# Patient Record
Sex: FEMALE | Race: WHITE | NOT HISPANIC OR LATINO | Employment: FULL TIME | URBAN - NONMETROPOLITAN AREA
[De-identification: names, ages, dates, MRNs, and addresses within clinical notes are randomized per-mention and may not be internally consistent; named-entity substitution may affect disease eponyms.]

---

## 2017-03-12 ENCOUNTER — TRANSFERRED RECORDS (OUTPATIENT)
Dept: HEALTH INFORMATION MANAGEMENT | Facility: HOSPITAL | Age: 47
End: 2017-03-12

## 2017-03-22 ENCOUNTER — OFFICE VISIT (OUTPATIENT)
Dept: FAMILY MEDICINE | Facility: OTHER | Age: 47
End: 2017-03-22
Attending: NURSE PRACTITIONER
Payer: COMMERCIAL

## 2017-03-22 VITALS
HEIGHT: 64 IN | TEMPERATURE: 96.7 F | RESPIRATION RATE: 14 BRPM | SYSTOLIC BLOOD PRESSURE: 122 MMHG | WEIGHT: 151 LBS | HEART RATE: 76 BPM | BODY MASS INDEX: 25.78 KG/M2 | DIASTOLIC BLOOD PRESSURE: 80 MMHG

## 2017-03-22 DIAGNOSIS — N85.9 FLUID IN ENDOMETRIAL CAVITY: ICD-10-CM

## 2017-03-22 DIAGNOSIS — N92.1 MENORRHAGIA WITH IRREGULAR CYCLE: Primary | ICD-10-CM

## 2017-03-22 PROCEDURE — 99213 OFFICE O/P EST LOW 20 MIN: CPT | Performed by: NURSE PRACTITIONER

## 2017-03-22 ASSESSMENT — PAIN SCALES - GENERAL: PAINLEVEL: SEVERE PAIN (6)

## 2017-03-22 NOTE — MR AVS SNAPSHOT
After Visit Summary   3/22/2017    Shazia Verma    MRN: [id number]           Patient Information     Date Of Birth          1970        Visit Information        Provider Department      3/22/2017 8:15 AM Mary Ellen Armstrong NP [facility]        Today's Diagnoses     Menorrhagia with irregular cycle    -  1    Fluid in endometrial cavity          Care Instructions      ASSESSMENT / PLAN:  1. Menorrhagia with irregular cycle  - US Transvaginal Non OB  - US Pelvis Complete without Transvaginal    2. Fluid in endometrial cavity  - US Transvaginal Non OB  - US Pelvis Complete without Transvaginal        Follow-up as needed for acute concerns    Mary Ellen Armstrong,   Certified Adult Nurse Practitioner  [phone]          Follow-ups after your visit        Who to contact     If you have questions or need follow up information about today's clinic visit or your schedule please contact [facility] directly at [phone].  Normal or non-critical lab and imaging results will be communicated to you by [email or web address], letter or phone within 4 business days after the clinic has received the results. If you do not hear from us within 7 days, please contact the clinic through iPositiont or phone. If you have a critical or abnormal lab result, we will notify you by phone as soon as possible.  Submit refill requests through ShareTracker or call your pharmacy and they will forward the refill request to us. Please allow 3 business days for your refill to be completed.          Additional Information About Your Visit        MyChart Information     ShareTracker gives you secure access to your electronic health record. If you see a primary care provider, you can also send messages to your care team and make appointments. If you have questions, please call your primary care clinic.  If you do not have a primary care provider, please call [phone] and they will assist you.        Care EveryWhere  "ID     This is your Care EveryWhere ID. This could be used by other organizations to access your [place] medical records  [id number]        Your Vitals Were     Pulse Temperature Respirations Height BMI (Body Mass Index)       76 96.7  F (35.9  C) (Tympanic) 14 5' 3.5\" (1.613 m) 26.33 kg/m2        Blood Pressure from Last 3 Encounters:   03/22/17 122/80   11/28/16 118/72    Weight from Last 3 Encounters:   03/22/17 151 lb (68.5 kg)   11/28/16 153 lb 3.2 oz (69.5 kg)              We Performed the Following     US Pelvis Complete without Transvaginal     US Transvaginal Non OB        Primary Care Provider Office Phone # Fax #    Mary Ellen Armstrong [phone]       [facility] [address] [place] [address]        Thank you!     Thank you for choosing [facility]  for your care. Our goal is always to provide you with excellent care. Hearing back from our patients is one way we can continue to improve our services. Please take a few minutes to complete the written survey that you may receive in the mail after your visit with us. Thank you!             Your Updated Medication List - Protect others around you: Learn how to safely use, store and throw away your medicines at www.disposemymeds.org.          This list is accurate as of: 3/22/17  8:44 AM.  Always use your most recent med list.                   Brand Name Dispense Instructions for use    calcium carbonate 500 MG tablet    OS-MANUEL 500 mg [place]. Ca         IRON SUPPLEMENT PO      Take 325 mg by mouth daily (with breakfast) 2 tabs daily       Multi-vitamin Tabs tablet      Take 1 tablet by mouth daily       VITAMIN B 12 PO          VITAMIN D (CHOLECALCIFEROL) PO      Take 5,000 Units by mouth daily 2 tabs daily         "
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
## 2017-03-22 NOTE — NURSING NOTE
"Chief Complaint   Patient presents with     Clinic Care Coordination - Follow-up     er visit [facility] 11/12/17     *_* Health Care Directive *_*     info at home        Initial /80 (BP Location: Left arm, Patient Position: Chair, Cuff Size: Adult Regular)  Pulse 76  Temp 96.7  F (35.9  C) (Tympanic)  Resp 14  Ht 5' 3.5\" (1.613 m)  Wt 151 lb (68.5 kg)  BMI 26.33 kg/m2 Estimated body mass index is 26.33 kg/(m^2) as calculated from the following:    Height as of this encounter: 5' 3.5\" (1.613 m).    Weight as of this encounter: 151 lb (68.5 kg).  Medication Reconciliation: toro LENNON      "

## 2017-03-22 NOTE — PATIENT INSTRUCTIONS
ASSESSMENT / PLAN:  1. Menorrhagia with irregular cycle  - US Transvaginal Non OB  - US Pelvis Complete without Transvaginal    2. Fluid in endometrial cavity  - US Transvaginal Non OB  - US Pelvis Complete without Transvaginal        Follow-up as needed for acute concerns    Mary Ellen Armstrong,   Certified Adult Nurse Practitioner  [phone]

## 2017-03-24 ENCOUNTER — HOSPITAL ENCOUNTER (OUTPATIENT)
Dept: ULTRASOUND IMAGING | Facility: HOSPITAL | Age: 47
Discharge: HOME | End: 2017-03-24
Attending: NURSE PRACTITIONER | Admitting: NURSE PRACTITIONER
Payer: COMMERCIAL

## 2017-03-24 PROCEDURE — 76830 TRANSVAGINAL US NON-OB: CPT | Mod: TC | Performed by: RADIOLOGY

## 2017-03-24 PROCEDURE — 76856 US EXAM PELVIC COMPLETE: CPT | Mod: TC | Performed by: RADIOLOGY

## 2017-03-30 ENCOUNTER — OFFICE VISIT (OUTPATIENT)
Dept: OBGYN | Facility: OTHER | Age: 47
End: 2017-03-30
Attending: OBSTETRICS & GYNECOLOGY
Payer: COMMERCIAL

## 2017-03-30 VITALS
HEIGHT: 64 IN | DIASTOLIC BLOOD PRESSURE: 76 MMHG | WEIGHT: 146 LBS | HEART RATE: 69 BPM | SYSTOLIC BLOOD PRESSURE: 122 MMHG | BODY MASS INDEX: 24.92 KG/M2

## 2017-03-30 DIAGNOSIS — N92.0 EXCESSIVE OR FREQUENT MENSTRUATION: Primary | ICD-10-CM

## 2017-03-30 LAB
ERYTHROCYTE [DISTWIDTH] IN BLOOD BY AUTOMATED COUNT: 13.1 % (ref 10–15)
HCT VFR BLD AUTO: 38 % (ref 35–47)
HGB BLD-MCNC: 12.7 G/DL (ref 11.7–15.7)
MCH RBC QN AUTO: 29.1 PG (ref 26.5–33)
MCHC RBC AUTO-ENTMCNC: 33.4 G/DL (ref 31.5–36.5)
MCV RBC AUTO: 87 FL (ref 78–100)
PLATELET # BLD AUTO: 342 10E9/L (ref 150–450)
RBC # BLD AUTO: 4.37 10E12/L (ref 3.8–5.2)
WBC # BLD AUTO: 6.4 10E9/L (ref 4–11)

## 2017-03-30 PROCEDURE — 85027 COMPLETE CBC AUTOMATED: CPT | Performed by: OBSTETRICS & GYNECOLOGY

## 2017-03-30 PROCEDURE — 36415 COLL VENOUS BLD VENIPUNCTURE: CPT | Performed by: OBSTETRICS & GYNECOLOGY

## 2017-03-30 PROCEDURE — 88305 TISSUE EXAM BY PATHOLOGIST: CPT | Mod: TC | Performed by: OBSTETRICS & GYNECOLOGY

## 2017-03-30 PROCEDURE — 99213 OFFICE O/P EST LOW 20 MIN: CPT | Performed by: OBSTETRICS & GYNECOLOGY

## 2017-03-30 NOTE — MR AVS SNAPSHOT
After Visit Summary   3/30/2017    Shazia Verma    MRN: [id number]           Patient Information     Date Of Birth          1970        Visit Information        Provider Department      3/30/2017 10:00 AM Julian Mcdermott MD [facility]        Today's Diagnoses     Excessive or frequent menstruation    -  1      Care Instructions    See us in 4 weeks to discuss results of biopsy and if bleeding per vagina persists  To discuss further measures to stop vaginal bleeding like laparoscopic assisted  Vaginal hysterectomy.        Follow-ups after your visit        Follow-up notes from your care team     Return in about 4 weeks (around 4/27/2017).      Your next 10 appointments already scheduled     Apr 28, 2017  2:00 PM CDT   (Arrive by 1:45 PM)   SHORT with MD Allyson Alcantara ([facility])    [address]  [place] [address]   [phone]              Who to contact     If you have questions or need follow up information about today's clinic visit or your schedule please contact [facility] directly at [phone].  Normal or non-critical lab and imaging results will be communicated to you by MyChart, letter or phone within 4 business days after the clinic has received the results. If you do not hear from us within 7 days, please contact the clinic through MyChart or phone. If you have a critical or abnormal lab result, we will notify you by phone as soon as possible.  Submit refill requests through 3G Multimedia or call your pharmacy and they will forward the refill request to us. Please allow 3 business days for your refill to be completed.          Additional Information About Your Visit        MyChart Information     [employer] gives you secure access to your electronic health record. If you see a primary care provider, you can also send messages to your care team and make appointments. If you have questions, please call your primary care  "clinic.  If you do not have a primary care provider, please call [phone] and they will assist you.        Care EveryWhere ID     This is your Care EveryWhere ID. This could be used by other organizations to access your [place] medical records  [id number]        Your Vitals Were     Pulse Height BMI (Body Mass Index)             69 5' 4\" (1.626 m) 25.06 kg/m2          Blood Pressure from Last 3 Encounters:   03/30/17 122/76   03/22/17 122/80   11/28/16 118/72    Weight from Last 3 Encounters:   03/30/17 146 lb (66.2 kg)   03/22/17 151 lb (68.5 kg)   11/28/16 153 lb 3.2 oz (69.5 kg)              We Performed the Following     CBC with platelets     Surgical pathology exam        Primary Care Provider Office Phone # Fax #    Mary Ellen ZAIRE Armstrong [phone]       [facility] [address] [place]  [facility] [address]        Thank you!     Thank you for choosing [facility]  for your care. Our goal is always to provide you with excellent care. Hearing back from our patients is one way we can continue to improve our services. Please take a few minutes to complete the written survey that you may receive in the mail after your visit with us. Thank you!             Your Updated Medication List - Protect others around you: Learn how to safely use, store and throw away your medicines at www.disposemymeds.org.          This list is accurate as of: 3/30/17 11:59 PM.  Always use your most recent med list.                   Brand Name Dispense Instructions for use    calcium carbonate 500 MG tablet    OS-MANUEL 500 mg [place]. Ca         IRON SUPPLEMENT PO      Take 325 mg by mouth daily (with breakfast) 2 tabs daily       Multi-vitamin Tabs tablet      Take 1 tablet by mouth daily       VITAMIN B 12 PO          VITAMIN D (CHOLECALCIFEROL) PO      Take 5,000 Units by mouth daily 2 tabs daily         "

## 2017-03-30 NOTE — NURSING NOTE
"Chief Complaint   Patient presents with     Consult     natalia       Initial /76  Pulse 69  Ht 5' 4\" (1.626 m)  Wt 146 lb (66.2 kg)  BMI 25.06 kg/m2 Estimated body mass index is 25.06 kg/(m^2) as calculated from the following:    Height as of this encounter: 5' 4\" (1.626 m).    Weight as of this encounter: 146 lb (66.2 kg).  Medication Reconciliation: toro Castro      "

## 2017-03-31 LAB — COPATH REPORT: NORMAL
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
## 2017-04-03 NOTE — PATIENT INSTRUCTIONS
See us in 4 weeks to discuss results of biopsy and if bleeding per vagina persists  To discuss further measures to stop vaginal bleeding like laparoscopic assisted  Vaginal hysterectomy.

## 2017-04-28 ENCOUNTER — OFFICE VISIT (OUTPATIENT)
Dept: OBGYN | Facility: OTHER | Age: 47
End: 2017-04-28
Attending: OBSTETRICS & GYNECOLOGY
Payer: COMMERCIAL

## 2017-04-28 VITALS
DIASTOLIC BLOOD PRESSURE: 74 MMHG | HEIGHT: 65 IN | HEART RATE: 76 BPM | OXYGEN SATURATION: 98 % | WEIGHT: 146 LBS | SYSTOLIC BLOOD PRESSURE: 118 MMHG | BODY MASS INDEX: 24.32 KG/M2

## 2017-04-28 DIAGNOSIS — R10.2 PELVIC PAIN IN FEMALE: Primary | ICD-10-CM

## 2017-04-28 PROCEDURE — 99213 OFFICE O/P EST LOW 20 MIN: CPT | Performed by: OBSTETRICS & GYNECOLOGY

## 2017-04-28 ASSESSMENT — PAIN SCALES - GENERAL: PAINLEVEL: NO PAIN (0)

## 2017-04-28 NOTE — NURSING NOTE
"Chief Complaint   Patient presents with     RECHECK     1 month Follow up/ Vaginal Bleeding        Initial /74 (BP Location: Left arm, Patient Position: Chair, Cuff Size: Adult Regular)  Pulse 76  Ht 5' 5\" (1.651 m)  Wt 146 lb (66.2 kg)  SpO2 98%  BMI 24.3 kg/m2 Estimated body mass index is 24.3 kg/(m^2) as calculated from the following:    Height as of this encounter: 5' 5\" (1.651 m).    Weight as of this encounter: 146 lb (66.2 kg).  Medication Reconciliation: complete  [place] [facility]      "

## 2017-04-28 NOTE — MR AVS SNAPSHOT
"              After Visit Summary   4/28/2017    Shazia Verma    MRN: [id number]           Patient Information     Date Of Birth          1970        Visit Information        Provider Department      4/28/2017 2:00 PM Julian Mcdermott MD [facility]        Today's Diagnoses     Pelvic pain in female    -  1       Follow-ups after your visit        Who to contact     If you have questions or need follow up information about today's clinic visit or your schedule please contact [facility] directly at [phone].  Normal or non-critical lab and imaging results will be communicated to you by MyChart, letter or phone within 4 business days after the clinic has received the results. If you do not hear from us within 7 days, please contact the clinic through Novate Medicalt or phone. If you have a critical or abnormal lab result, we will notify you by phone as soon as possible.  Submit refill requests through Showkicker or call your pharmacy and they will forward the refill request to us. Please allow 3 business days for your refill to be completed.          Additional Information About Your Visit        MyChart Information     Showkicker gives you secure access to your electronic health record. If you see a primary care provider, you can also send messages to your care team and make appointments. If you have questions, please call your primary care clinic.  If you do not have a primary care provider, please call [phone] and they will assist you.        Care EveryWhere ID     This is your Care EveryWhere ID. This could be used by other organizations to access your [facility] medical records  [id number]        Your Vitals Were     Pulse Height Last Period Pulse Oximetry BMI (Body Mass Index)       76 5' 5\" (1.651 m) 03/28/2017 98% 24.3 kg/m2        Blood Pressure from Last 3 Encounters:   04/28/17 118/74   03/30/17 122/76   03/22/17 122/80    Weight from Last 3 Encounters:   04/28/17 146 lb (66.2 kg) "   03/30/17 146 lb (66.2 kg)   03/22/17 151 lb (68.5 kg)              Today, you had the following     No orders found for display       Primary Care Provider Office Phone # Fax #    Mary Ellen Armstrong [phone]       [facility] [address] [place] [address]        Thank you!     Thank you for choosing [facility]  for your care. Our goal is always to provide you with excellent care. Hearing back from our patients is one way we can continue to improve our services. Please take a few minutes to complete the written survey that you may receive in the mail after your visit with us. Thank you!             Your Updated Medication List - Protect others around you: Learn how to safely use, store and throw away your medicines at www.disposemymeds.org.          This list is accurate as of: 4/28/17 11:59 PM.  Always use your most recent med list.                   Brand Name Dispense Instructions for use    calcium carbonate 500 MG tablet    OS-MANUEL 500 mg [place]. Ca         IRON SUPPLEMENT PO      Take 325 mg by mouth daily (with breakfast) 2 tabs daily       Multi-vitamin Tabs tablet      Take 1 tablet by mouth daily       VITAMIN B 12 PO          VITAMIN D (CHOLECALCIFEROL) PO      Take 5,000 Units by mouth daily 2 tabs daily

## 2017-06-30 ENCOUNTER — HOSPITAL ENCOUNTER (EMERGENCY)
Facility: HOSPITAL | Age: 47
Discharge: HOME | End: 2017-06-30
Attending: EMERGENCY MEDICINE | Admitting: EMERGENCY MEDICINE
Payer: COMMERCIAL

## 2017-06-30 VITALS
HEART RATE: 69 BPM | OXYGEN SATURATION: 98 % | RESPIRATION RATE: 14 BRPM | SYSTOLIC BLOOD PRESSURE: 143 MMHG | TEMPERATURE: 98.5 F | DIASTOLIC BLOOD PRESSURE: 90 MMHG

## 2017-06-30 DIAGNOSIS — R42 DIZZINESS: ICD-10-CM

## 2017-06-30 DIAGNOSIS — R53.1 WEAKNESS: ICD-10-CM

## 2017-06-30 LAB
ALBUMIN SERPL-MCNC: 3.6 G/DL (ref 3.4–5)
ALBUMIN UR-MCNC: NEGATIVE MG/DL
ALP SERPL-CCNC: 83 U/L (ref 40–150)
ALT SERPL W P-5'-P-CCNC: 21 U/L (ref 0–50)
ANION GAP SERPL CALCULATED.3IONS-SCNC: 8 MMOL/L (ref 3–14)
APPEARANCE UR: CLEAR
AST SERPL W P-5'-P-CCNC: 21 U/L (ref 0–45)
BASOPHILS # BLD AUTO: 0 10E9/L (ref 0–0.2)
BASOPHILS NFR BLD AUTO: 0.5 %
BILIRUB SERPL-MCNC: 0.8 MG/DL (ref 0.2–1.3)
BILIRUB UR QL STRIP: NEGATIVE
BUN SERPL-MCNC: 12 MG/DL (ref 7–30)
CALCIUM SERPL-MCNC: 8.7 MG/DL (ref 8.5–10.1)
CHLORIDE SERPL-SCNC: 109 MMOL/L (ref 94–109)
CO2 SERPL-SCNC: 25 MMOL/L (ref 20–32)
COLOR UR AUTO: NORMAL
CREAT SERPL-MCNC: 0.57 MG/DL (ref 0.52–1.04)
DIFFERENTIAL METHOD BLD: ABNORMAL
EOSINOPHIL # BLD AUTO: 0.1 10E9/L (ref 0–0.7)
EOSINOPHIL NFR BLD AUTO: 0.9 %
ERYTHROCYTE [DISTWIDTH] IN BLOOD BY AUTOMATED COUNT: 13 % (ref 10–15)
GFR SERPL CREATININE-BSD FRML MDRD: NORMAL ML/MIN/1.7M2
GLUCOSE SERPL-MCNC: 91 MG/DL (ref 70–99)
GLUCOSE UR STRIP-MCNC: NEGATIVE MG/DL
HCG UR QL: NEGATIVE
HCT VFR BLD AUTO: 34.8 % (ref 35–47)
HGB BLD-MCNC: 11.7 G/DL (ref 11.7–15.7)
HGB UR QL STRIP: NEGATIVE
IMM GRANULOCYTES # BLD: 0 10E9/L (ref 0–0.4)
IMM GRANULOCYTES NFR BLD: 0.4 %
KETONES UR STRIP-MCNC: NEGATIVE MG/DL
LEUKOCYTE ESTERASE UR QL STRIP: NEGATIVE
LYMPHOCYTES # BLD AUTO: 1.7 10E9/L (ref 0.8–5.3)
LYMPHOCYTES NFR BLD AUTO: 21.1 %
MAGNESIUM SERPL-MCNC: 2.1 MG/DL (ref 1.6–2.3)
MCH RBC QN AUTO: 28.7 PG (ref 26.5–33)
MCHC RBC AUTO-ENTMCNC: 33.6 G/DL (ref 31.5–36.5)
MCV RBC AUTO: 86 FL (ref 78–100)
MONOCYTES # BLD AUTO: 0.5 10E9/L (ref 0–1.3)
MONOCYTES NFR BLD AUTO: 6.6 %
NEUTROPHILS # BLD AUTO: 5.7 10E9/L (ref 1.6–8.3)
NEUTROPHILS NFR BLD AUTO: 70.5 %
NITRATE UR QL: NEGATIVE
NRBC # BLD AUTO: 0 10*3/UL
NRBC BLD AUTO-RTO: 0 /100
PH UR STRIP: 6.5 PH (ref 4.7–8)
PLATELET # BLD AUTO: 314 10E9/L (ref 150–450)
POTASSIUM SERPL-SCNC: 4.3 MMOL/L (ref 3.4–5.3)
PROT SERPL-MCNC: 7.3 G/DL (ref 6.8–8.8)
RBC # BLD AUTO: 4.07 10E12/L (ref 3.8–5.2)
SODIUM SERPL-SCNC: 142 MMOL/L (ref 133–144)
SP GR UR STRIP: 1.01 (ref 1–1.03)
TROPONIN I SERPL-MCNC: NORMAL UG/L (ref 0–0.04)
TSH SERPL DL<=0.005 MIU/L-ACNC: 1.36 MU/L (ref 0.4–4)
URN SPEC COLLECT METH UR: NORMAL
UROBILINOGEN UR STRIP-MCNC: NORMAL MG/DL (ref 0–2)
WBC # BLD AUTO: 8 10E9/L (ref 4–11)

## 2017-06-30 PROCEDURE — 99284 EMERGENCY DEPT VISIT MOD MDM: CPT | Performed by: PHYSICIAN ASSISTANT

## 2017-06-30 PROCEDURE — 93010 ELECTROCARDIOGRAM REPORT: CPT | Performed by: INTERNAL MEDICINE

## 2017-06-30 PROCEDURE — 99284 EMERGENCY DEPT VISIT MOD MDM: CPT

## 2017-06-30 PROCEDURE — 85025 COMPLETE CBC W/AUTO DIFF WBC: CPT | Performed by: PHYSICIAN ASSISTANT

## 2017-06-30 PROCEDURE — 93005 ELECTROCARDIOGRAM TRACING: CPT

## 2017-06-30 PROCEDURE — 84443 ASSAY THYROID STIM HORMONE: CPT | Performed by: PHYSICIAN ASSISTANT

## 2017-06-30 PROCEDURE — 80053 COMPREHEN METABOLIC PANEL: CPT | Performed by: PHYSICIAN ASSISTANT

## 2017-06-30 PROCEDURE — 82607 VITAMIN B-12: CPT | Performed by: PHYSICIAN ASSISTANT

## 2017-06-30 PROCEDURE — 83540 ASSAY OF IRON: CPT | Performed by: PHYSICIAN ASSISTANT

## 2017-06-30 PROCEDURE — 83550 IRON BINDING TEST: CPT | Performed by: PHYSICIAN ASSISTANT

## 2017-06-30 PROCEDURE — 84425 ASSAY OF VITAMIN B-1: CPT

## 2017-06-30 PROCEDURE — 36415 COLL VENOUS BLD VENIPUNCTURE: CPT | Performed by: PHYSICIAN ASSISTANT

## 2017-06-30 PROCEDURE — 81003 URINALYSIS AUTO W/O SCOPE: CPT | Performed by: PHYSICIAN ASSISTANT

## 2017-06-30 PROCEDURE — 81025 URINE PREGNANCY TEST: CPT | Performed by: PHYSICIAN ASSISTANT

## 2017-06-30 PROCEDURE — 84484 ASSAY OF TROPONIN QUANT: CPT | Performed by: PHYSICIAN ASSISTANT

## 2017-06-30 PROCEDURE — 83735 ASSAY OF MAGNESIUM: CPT | Performed by: PHYSICIAN ASSISTANT

## 2017-06-30 PROCEDURE — 82728 ASSAY OF FERRITIN: CPT | Performed by: PHYSICIAN ASSISTANT

## 2017-06-30 RX ORDER — LIDOCAINE 40 MG/G
CREAM TOPICAL
Status: DISCONTINUED | OUTPATIENT
Start: 2017-06-30 | End: 2017-07-01 | Stop reason: HOSPADM

## 2017-06-30 ASSESSMENT — ENCOUNTER SYMPTOMS
VOMITING: 0
CHILLS: 0
PHOTOPHOBIA: 0
NAUSEA: 1
APPETITE CHANGE: 0
SPEECH DIFFICULTY: 0
COUGH: 0
SHORTNESS OF BREATH: 0
DIAPHORESIS: 1
WEAKNESS: 1
MUSCULOSKELETAL NEGATIVE: 1
PALPITATIONS: 1
CHEST TIGHTNESS: 0
DIARRHEA: 0
NUMBNESS: 0
ABDOMINAL PAIN: 0
HEADACHES: 1
FEVER: 0
ACTIVITY CHANGE: 0
LIGHT-HEADEDNESS: 1
DIZZINESS: 1

## 2017-06-30 NOTE — ED AVS SNAPSHOT
[facility]    [address]    [facility] [address]    Phone:  [phone]                                       Shazia Verma   MRN: [id number]    Department:  [facility]   Date of Visit:  6/30/2017           Patient Information     Date Of Birth          1970        Your diagnoses for this visit were:     Dizziness     Weakness        You were seen by Leroy French MD and Alberto Jarquin PA-C.      Follow-up Information     Follow up with Mary Ellen Armstrong NP.    Specialty:  Family Practice    Contact information:    [facility]  [address] [place]  [facility] [address] [phone]          Follow up with [facility].    Specialty:  EMERGENCY MEDICINE    Why:  If symptoms worsen    Contact information:    [address]  [facility] [address] [phone]    Additional information:    From [place]: Take [address]. Turn left at [address]. Turn left at the first stoplight on [address]. At the first stop sign, take a right onto [address]. Take a left into the parking lot and continue through until you reach the North enterance of the building.       From [place]: Take [address]. Take the [address] ramp towards [place]. Turn left onto [address]. Take a slight right onto [address]. Turn left at the first stoplight on [address]. At the first stop sign, take a right onto [address]. Take a left into the parking lot and continue through until you reach the North enterance of the building.       From [place]: Take [address]. Take a right at [address]. At the first stop sign, take a right onto [address]. Take a left into the parking lot and continue through until you reach the North enterance of the building.         Discharge Instructions       Your exam and labs are normal today. This does not mean that nothing is wrong.     Rest and  stay hydrated.     Take tomorrow off to monitor your symptoms.     Follow-up in the clinic for recheck.     Return HERE for ANY other concerns or questions.     Discharge References/Attachments     DIZZINESS, UNCERTAIN CAUSE (ENGLISH)    WEAKNESS (UNCERTAIN CAUSE) (ENGLISH)         Review of your medicines      Our records show that you are taking the medicines listed below. If these are incorrect, please call your family doctor or clinic.        Dose / Directions Last dose taken    calcium carbonate 1250 MG tablet   Commonly known as:  OS-MANUEL 500 mg [place]. Ca        Refills:  0        IRON SUPPLEMENT PO   Dose:  325 mg        Take 325 mg by mouth daily (with breakfast) 2 tabs daily   Refills:  0        Multi-vitamin Tabs tablet   Dose:  1 tablet        Take 1 tablet by mouth daily   Refills:  0        VITAMIN B 12 PO        Refills:  0        VITAMIN D (CHOLECALCIFEROL) PO   Dose:  5000 Units        Take 5,000 Units by mouth daily 2 tabs daily   Refills:  0                Procedures and tests performed during your visit     CBC with platelets differential    Comprehensive metabolic panel    EKG 12-lead, tracing only    HCG qualitative urine    Magnesium    Orthostatic blood pressure and pulse    Peripheral IV catheter    TSH with free T4 reflex    Troponin I    UA reflex to Microscopic      Orders Needing Specimen Collection     None      Pending Results     No orders found from 6/28/2017 to 7/1/2017.            Pending Culture Results     No orders found from 6/28/2017 to 7/1/2017.            Thank you for choosing [facility]       Thank you for choosing [facility] for your care. Our goal is always to provide you with excellent care. Hearing back from our patients is one way we can continue to improve our services. Please take a few minutes to complete the written survey that you may receive in the mail after you visit with us. Thank you!        [employer] Information     [employer] gives you secure access to your electronic  health record. If you see a primary care provider, you can also send messages to your care team and make appointments. If you have questions, please call your primary care clinic.  If you do not have a primary care provider, please call [phone] and they will assist you.        Care EveryWhere ID     This is your Care EveryWhere ID. This could be used by other organizations to access your [facility] medical records  [id number]        Equal Access to Services     [place] : Portillo Conn, marlon alarcon, wyatt edmonds, jeff osullivan . So [facility] [phone].    ATENCIÓN: Si habla español, tiene a wadsworth disposición servicios gratuitos de asistencia lingüística. Llame al [phone].    We comply with applicable federal civil rights laws and [place] laws. We do not discriminate on the basis of race, color, national origin, age, disability sex, sexual orientation or gender identity.            After Visit Summary       This is your record. Keep this with you and show to your community pharmacist(s) and doctor(s) at your next visit.

## 2017-06-30 NOTE — ED NOTES
Pt reports lightheadedness, difficulty finding words.  Had similar episode at work on Wednesday.  FAST negative in triage.

## 2017-06-30 NOTE — LETTER
[facility]  [address]  [place] [address]  Phone: [phone]    June 30, 2017        Shazia Verma  [address]  [place] [address]          To whom it may concern:    Shazia Verma was seen and treated in the [facility] ED on 6/30.  Please excuse her from work on 7/1/17 due to illness.         Please contact me for questions or concerns.      Sincerely,                Alberto Jarquin PA-C

## 2017-06-30 NOTE — ED AVS SNAPSHOT
[facility]    [address]    [place] [address]    Phone:  [phone]                                       Shazia Verma   MRN: [id number]    Department:  [facility]   Date of Visit:  6/30/2017           After Visit Summary Signature Page     I have received my discharge instructions, and my questions have been answered. I have discussed any challenges I see with this plan with the nurse or doctor.    ..........................................................................................................................................  Patient/Patient Representative Signature      ..........................................................................................................................................  Patient Representative Print Name and Relationship to Patient    ..................................................               ................................................  Date                                            Time    ..........................................................................................................................................  Reviewed by Signature/Title    ...................................................              ..............................................  Date                                                            Time

## 2017-07-01 LAB
FERRITIN SERPL-MCNC: 7 NG/ML (ref 8–252)
IRON SATN MFR SERPL: 9 % (ref 15–46)
IRON SERPL-MCNC: 40 UG/DL (ref 35–180)
TIBC SERPL-MCNC: 440 UG/DL (ref 240–430)

## 2017-07-01 NOTE — PROGRESS NOTES
Iron and Iron Binding Capicity and Ferritin results routed to PCP, EVELIO Armstrong NP.  Pt advised to follow up with PCP.

## 2017-07-01 NOTE — DISCHARGE INSTRUCTIONS
Your exam and labs are normal today. This does not mean that nothing is wrong.     Rest and stay hydrated.     Take tomorrow off to monitor your symptoms.     Follow-up in the clinic for recheck.     Return HERE for ANY other concerns or questions.

## 2017-07-01 NOTE — ED NOTES
Patient presents after having two episodes of difficulty speaking and blurred vision-- almost like a hypoglycemic reaction, however the patient did check her blood sugar and it was 80. The patient had one episode Wednesday and again today. Assessment as per chart and WNL. IV in.

## 2017-07-01 NOTE — ED PROVIDER NOTES
History     Chief Complaint   Patient presents with     Dizziness     episode today, also one on wednesday     HPI  Shazia Verma is a 47 year old female whois not my patient.    I have reviewed the Medications, Allergies, Past Medical and Surgical History, and Social History in the Epic system.  Review of Systems   Constitutional:        Not my patient     Physical Exam   BP: (!) 154/101  Pulse: 69  Temp: 97.6  F (36.4  C)  Resp: 16  SpO2: 100 %  Physical Exam   Constitutional:   Not my patient     ED Course     ED Course     Procedures  Critical Care time:  none    Labs Ordered and Resulted from Time of ED Arrival Up to the Time of Departure from the ED   CBC WITH PLATELETS DIFFERENTIAL - Abnormal; Notable for the following:        Result Value    Hematocrit 34.8 (*)     All other components within normal limits   COMPREHENSIVE METABOLIC PANEL   TROPONIN I   TSH WITH FREE T4 REFLEX   MAGNESIUM   URINE MACROSCOPIC WITH REFLEX TO MICRO   PERIPHERAL IV CATHETER   ORTHOSTATIC BLOOD PRESSURE AND PULSE     Assessments & Plan (with Medical Decision Making)   Not my patient.   I have reviewed the nursing notes.    I have reviewed the findings, diagnosis, plan and need for follow up with the patient.    New Prescriptions    No medications on file       Final diagnoses:   Dizziness       6/30/2017   [facility]     Leroy French MD  06/30/17 2044

## 2017-07-01 NOTE — ED PROVIDER NOTES
"  History     Chief Complaint   Patient presents with     Dizziness     episode today, also one on wednesday     The history is provided by the patient.     Shazia Verma is a 47 year old female who presented to the ED ambulatory for evaluation of two episodes of multiple vague symptoms.  Shazia reports that on Wednesday she was sitting at work and began to feel flushed and warm.  She then began to feel dizziness and weak.  She ate something and slowly felt better.  No headaches.  Denied any difficulty speaking or walking to me.  No weakness. The exact symptoms happened again today around the same time.  She elected to come to the ED.  On her arrival here Shazia tells me that she felt \"a little bleh.\"  Otherwise had no questions or concerns.  She has a hx of RNY and does not take her oral supplements.  No fevers.  No chest pain.  No unusual headaches.  No abdominal pain.  No rashes.  No unilateral weakness.  No gait concerns.      I have reviewed the Medications, Allergies, Past Medical and Surgical History, and Social History in the Epic system.    Allergies:   Allergies   Allergen Reactions     Reglan [Metoclopramide] Anxiety         No current facility-administered medications on file prior to encounter.   Current Outpatient Prescriptions on File Prior to Encounter:  VITAMIN D, CHOLECALCIFEROL, PO Take 5,000 Units by mouth daily 2 tabs daily   Cyanocobalamin (VITAMIN B 12 PO)    multivitamin, therapeutic with minerals (MULTI-VITAMIN) TABS Take 1 tablet by mouth daily   Ferrous Sulfate (IRON SUPPLEMENT PO) Take 325 mg by mouth daily (with breakfast) 2 tabs daily   calcium carbonate (OS-MANUEL 500 MG [place]. CA) 500 MG tablet        Patient Active Problem List   Diagnosis     ACP (advance care planning)     H/O gastric bypass       Past Surgical History:   Procedure Laterality Date     ABDOMEN SURGERY  2012    gastric bypass     APPENDECTOMY  1991     BREAST SURGERY  2002    right bx     COLONOSCOPY  2013     GI SURGERY  " "2012    gastric by pass     GYN SURGERY  1992 1994    c section x 2     ORTHOPEDIC SURGERY  2015    right hip labreal tear     ORTHOPEDIC SURGERY  2014    left wrist        Social History   Substance Use Topics     Smoking status: Former Smoker     Smokeless tobacco: Not on file     Alcohol use Yes      Comment: occ       Most Recent Immunizations   Administered Date(s) Administered     Influenza Vaccine, 3 YRS +, IM (QUADRIVALENT W/PRESERVATIVES) 10/05/2016     Pneumococcal 23 valent 03/02/2012     TD (ADULT, 7+) 02/06/1996     TDAP Vaccine (Adacel) 01/01/2010     TDAP Vaccine (Boostrix) 11/28/2016       BMI: Estimated body mass index is 24.3 kg/(m^2) as calculated from the following:    Height as of 4/28/17: 1.651 m (5' 5\").    Weight as of 4/28/17: 66.2 kg (146 lb).      Review of Systems   Constitutional: Positive for diaphoresis. Negative for activity change, appetite change, chills and fever.        Had some sweating during the episodes    Eyes: Negative for photophobia and visual disturbance.   Respiratory: Negative for cough, chest tightness and shortness of breath.    Cardiovascular: Positive for palpitations. Negative for chest pain and leg swelling.   Gastrointestinal: Positive for nausea. Negative for abdominal pain, diarrhea and vomiting.   Genitourinary: Negative.    Musculoskeletal: Negative.    Skin: Negative.    Neurological: Positive for dizziness, weakness, light-headedness and headaches. Negative for syncope, speech difficulty and numbness.       Physical Exam   BP: (!) 154/101  Pulse: 69  Temp: 97.6  F (36.4  C)  Resp: 16  SpO2: 100 %  Physical Exam   Constitutional: She is oriented to person, place, and time. She appears well-developed and well-nourished. No distress.   Pleasant and talkative    HENT:   Head is atraumatic and normocephalic.  External auditory canals are clear and without edema or erythema.  TMs are pearly gray and unremarkable. Posterior pharynx has no swelling, erythema, or " exudate.  Oral mucosa is pink and moist, without petechia, lesions, or ulcer.  Tongue is midline.  Palate rises symmetric.    Eyes:   Extraocular movements are intact and smooth throughout the H.  Conjunctiva are normal.  There is no horizontal or vertical nystagmus.  Pupils are equil in size and reactive to light. Lids are unremarkable.     Neck: Normal range of motion. Neck supple.   Cardiovascular: Normal rate and regular rhythm.    Pulmonary/Chest: Effort normal.   Abdominal: Soft. There is no tenderness. There is no guarding.   Musculoskeletal: She exhibits no edema.   Neurological: She is alert and oriented to person, place, and time.   No focal findings.    Normal speech and normal gait  Follows commands well    Skin: Skin is warm and dry.   Psychiatric: She has a normal mood and affect.   Nursing note and vitals reviewed.      ED Course     ED Course     Procedures        EKG shows sinus bradycardia without ST or T wave concerns.      Medications - No data to display     Results for orders placed or performed during the hospital encounter of 06/30/17   CBC with platelets differential   Result Value Ref Range    WBC 8.0 4.0 - 11.0 10e9/L    RBC Count 4.07 3.8 - 5.2 10e12/L    Hemoglobin 11.7 11.7 - 15.7 g/dL    Hematocrit 34.8 (L) 35.0 - 47.0 %    MCV 86 78 - 100 fl    MCH 28.7 26.5 - 33.0 pg    MCHC 33.6 31.5 - 36.5 g/dL    RDW 13.0 10.0 - 15.0 %    Platelet Count 314 150 - 450 10e9/L    Diff Method Automated Method     % Neutrophils 70.5 %    % Lymphocytes 21.1 %    % Monocytes 6.6 %    % Eosinophils 0.9 %    % Basophils 0.5 %    % Immature Granulocytes 0.4 %    Nucleated RBCs 0 0 /100    Absolute Neutrophil 5.7 1.6 - 8.3 10e9/L    Absolute Lymphocytes 1.7 0.8 - 5.3 10e9/L    Absolute Monocytes 0.5 0.0 - 1.3 10e9/L    Absolute Eosinophils 0.1 0.0 - 0.7 10e9/L    Absolute Basophils 0.0 0.0 - 0.2 10e9/L    Abs Immature Granulocytes 0.0 0 - 0.4 10e9/L    Absolute Nucleated RBC 0.0    Comprehensive metabolic  panel   Result Value Ref Range    Sodium 142 133 - 144 mmol/L    Potassium 4.3 3.4 - 5.3 mmol/L    Chloride 109 94 - 109 mmol/L    Carbon Dioxide 25 20 - 32 mmol/L    Anion Gap 8 3 - 14 mmol/L    Glucose 91 70 - 99 mg/dL    Urea Nitrogen 12 7 - 30 mg/dL    Creatinine 0.57 0.52 - 1.04 mg/dL    GFR Estimate >90  Non  GFR Calc   >60 mL/min/1.7m2    GFR Estimate If Black >90   GFR Calc   >60 mL/min/1.7m2    Calcium 8.7 8.5 - 10.1 mg/dL    Bilirubin Total 0.8 0.2 - 1.3 mg/dL    Albumin 3.6 3.4 - 5.0 g/dL    Protein Total 7.3 6.8 - 8.8 g/dL    Alkaline Phosphatase 83 40 - 150 U/L    ALT 21 0 - 50 U/L    AST 21 0 - 45 U/L   Troponin I   Result Value Ref Range    Troponin I ES  0.000 - 0.045 ug/L     <0.015  The 99th percentile for upper reference range is 0.045 ug/L.  Troponin values in   the range of 0.045 - 0.120 ug/L may be associated with risks of adverse   clinical events.     TSH with free T4 reflex   Result Value Ref Range    TSH 1.36 0.40 - 4.00 mU/L   Magnesium   Result Value Ref Range    Magnesium 2.1 1.6 - 2.3 mg/dL   UA reflex to Microscopic   Result Value Ref Range    Color Urine Light Yellow     Appearance Urine Clear     Glucose Urine Negative NEG mg/dL    Bilirubin Urine Negative NEG    Ketones Urine Negative NEG mg/dL    Specific Gravity Urine 1.015 1.003 - 1.035    Blood Urine Negative NEG    pH Urine 6.5 4.7 - 8.0 pH    Protein Albumin Urine Negative NEG mg/dL    Urobilinogen mg/dL Normal 0.0 - 2.0 mg/dL    Nitrite Urine Negative NEG    Leukocyte Esterase Urine Negative NEG    Source Midstream Urine    HCG qualitative urine   Result Value Ref Range    HCG Qual Urine Negative NEG   Vitamin B12   Result Value Ref Range    Vitamin B12 335 193 - 986 pg/mL   Iron and iron binding capacity   Result Value Ref Range    Iron 40 35 - 180 ug/dL    Iron Binding Cap 440 (H) 240 - 430 ug/dL    Iron Saturation Index 9 (L) 15 - 46 %   Ferritin   Result Value Ref Range    Ferritin 7 (L) 8  - 252 ng/mL       Medications - No data to display  No results found for this or any previous visit (from the past 24 hour(s)).    Critical Care time:  none               Labs Ordered and Resulted from Time of ED Arrival Up to the Time of Departure from the ED   CBC WITH PLATELETS DIFFERENTIAL - Abnormal; Notable for the following:        Result Value    Hematocrit 34.8 (*)     All other components within normal limits   COMPREHENSIVE METABOLIC PANEL   TROPONIN I   TSH WITH FREE T4 REFLEX   MAGNESIUM   URINE MACROSCOPIC WITH REFLEX TO MICRO   HCG QUALITATIVE URINE   VITAMIN B1 PLASMA   PERIPHERAL IV CATHETER   ORTHOSTATIC BLOOD PRESSURE AND PULSE       Assessments & Plan (with Medical Decision Making)   Shazia was observed over a protracted period and did well.  She was asymptomatic here. Looks well.  Work-up and exam are normal.  No red flags.  Shazia would like to go home.  This is certainly reasonable.  Discussed rest and hydration.  Follow-up in the clinic.  Ms. Verma requested additional testing for her RNY.  This is reasonable as well.  She voiced complete understanding and was happy and agreeable.  She was discharged in stable and good condition, ambulatory without assistance.     I have reviewed the nursing notes.    I have reviewed the findings, diagnosis, plan and need for follow up with the patient.       Discharge Medication List as of 6/30/2017 10:38 PM          Final diagnoses:   Dizziness   Weakness       6/30/2017   [facility]     Alberto Jarquin PA-C  07/05/17 1004

## 2017-07-03 LAB — VIT B12 SERPL-MCNC: 335 PG/ML (ref 193–986)

## 2017-07-07 LAB — VIT B1 SERPL-MCNC: 4 UG/DL

## 2017-09-01 ENCOUNTER — OFFICE VISIT (OUTPATIENT)
Dept: FAMILY MEDICINE | Facility: OTHER | Age: 47
End: 2017-09-01
Attending: NURSE PRACTITIONER
Payer: COMMERCIAL

## 2017-09-01 ENCOUNTER — MYC MEDICAL ADVICE (OUTPATIENT)
Dept: FAMILY MEDICINE | Facility: OTHER | Age: 47
End: 2017-09-01

## 2017-09-01 VITALS
BODY MASS INDEX: 26.49 KG/M2 | HEART RATE: 73 BPM | OXYGEN SATURATION: 100 % | HEIGHT: 65 IN | SYSTOLIC BLOOD PRESSURE: 132 MMHG | WEIGHT: 159 LBS | DIASTOLIC BLOOD PRESSURE: 82 MMHG | TEMPERATURE: 95.9 F | RESPIRATION RATE: 18 BRPM

## 2017-09-01 DIAGNOSIS — E16.2 HYPOGLYCEMIA: Primary | ICD-10-CM

## 2017-09-01 DIAGNOSIS — Z98.84 S/P GASTRIC BYPASS: ICD-10-CM

## 2017-09-01 DIAGNOSIS — R42 DIZZINESS: ICD-10-CM

## 2017-09-01 LAB
ANION GAP SERPL CALCULATED.3IONS-SCNC: 8 MMOL/L (ref 3–14)
BUN SERPL-MCNC: 13 MG/DL (ref 7–30)
CALCIUM SERPL-MCNC: 8.3 MG/DL (ref 8.5–10.1)
CHLORIDE SERPL-SCNC: 105 MMOL/L (ref 94–109)
CO2 SERPL-SCNC: 26 MMOL/L (ref 20–32)
CREAT SERPL-MCNC: 0.57 MG/DL (ref 0.52–1.04)
ERYTHROCYTE [DISTWIDTH] IN BLOOD BY AUTOMATED COUNT: 13.6 % (ref 10–15)
EST. AVERAGE GLUCOSE BLD GHB EST-MCNC: 117 MG/DL
GFR SERPL CREATININE-BSD FRML MDRD: >90 ML/MIN/1.7M2
GLUCOSE SERPL-MCNC: 155 MG/DL (ref 70–99)
HBA1C MFR BLD: 5.7 % (ref 4.3–6)
HCT VFR BLD AUTO: 33.8 % (ref 35–47)
HGB BLD-MCNC: 11.4 G/DL (ref 11.7–15.7)
MCH RBC QN AUTO: 28.7 PG (ref 26.5–33)
MCHC RBC AUTO-ENTMCNC: 33.7 G/DL (ref 31.5–36.5)
MCV RBC AUTO: 85 FL (ref 78–100)
PLATELET # BLD AUTO: 299 10E9/L (ref 150–450)
POTASSIUM SERPL-SCNC: 4.1 MMOL/L (ref 3.4–5.3)
RBC # BLD AUTO: 3.97 10E12/L (ref 3.8–5.2)
SODIUM SERPL-SCNC: 139 MMOL/L (ref 133–144)
TSH SERPL DL<=0.005 MIU/L-ACNC: 2.16 MU/L (ref 0.4–4)
WBC # BLD AUTO: 5 10E9/L (ref 4–11)

## 2017-09-01 PROCEDURE — 84443 ASSAY THYROID STIM HORMONE: CPT | Performed by: NURSE PRACTITIONER

## 2017-09-01 PROCEDURE — 99214 OFFICE O/P EST MOD 30 MIN: CPT | Performed by: NURSE PRACTITIONER

## 2017-09-01 PROCEDURE — 36415 COLL VENOUS BLD VENIPUNCTURE: CPT | Performed by: NURSE PRACTITIONER

## 2017-09-01 PROCEDURE — 83036 HEMOGLOBIN GLYCOSYLATED A1C: CPT | Performed by: NURSE PRACTITIONER

## 2017-09-01 PROCEDURE — 80048 BASIC METABOLIC PNL TOTAL CA: CPT | Performed by: NURSE PRACTITIONER

## 2017-09-01 PROCEDURE — 85027 COMPLETE CBC AUTOMATED: CPT | Performed by: NURSE PRACTITIONER

## 2017-09-01 ASSESSMENT — ANXIETY QUESTIONNAIRES
4. TROUBLE RELAXING: SEVERAL DAYS
IF YOU CHECKED OFF ANY PROBLEMS ON THIS QUESTIONNAIRE, HOW DIFFICULT HAVE THESE PROBLEMS MADE IT FOR YOU TO DO YOUR WORK, TAKE CARE OF THINGS AT HOME, OR GET ALONG WITH OTHER PEOPLE: SOMEWHAT DIFFICULT
6. BECOMING EASILY ANNOYED OR IRRITABLE: NOT AT ALL
7. FEELING AFRAID AS IF SOMETHING AWFUL MIGHT HAPPEN: NOT AT ALL
5. BEING SO RESTLESS THAT IT IS HARD TO SIT STILL: NOT AT ALL
GAD7 TOTAL SCORE: 4
2. NOT BEING ABLE TO STOP OR CONTROL WORRYING: NOT AT ALL
3. WORRYING TOO MUCH ABOUT DIFFERENT THINGS: SEVERAL DAYS
1. FEELING NERVOUS, ANXIOUS, OR ON EDGE: MORE THAN HALF THE DAYS

## 2017-09-01 ASSESSMENT — PATIENT HEALTH QUESTIONNAIRE - PHQ9: SUM OF ALL RESPONSES TO PHQ QUESTIONS 1-9: 14

## 2017-09-01 ASSESSMENT — PAIN SCALES - GENERAL: PAINLEVEL: NO PAIN (0)

## 2017-09-01 NOTE — PROGRESS NOTES
SUBJECTIVE:   Shazia Verma is a 47 year old female who presents to clinic today for the following health issues:  Follow up Er Visit - notes and labs are reviewed.      Acute Illness   Acute illness concerns: Dizzy shaky low bs lethargic blurred vision dizziness  Onset: since june    Fever: no     Chills/Sweats: YES    Headache (location?): YES    Sinus Pressure:no    Conjunctivitis:  no    Ear Pain: no    Rhinorrhea: no     Congestion: no     Sore Throat: no      Cough: no    Wheeze: no     Decreased Appetite: YES    Nausea: no     Vomiting: no     Diarrhea:  no     Dysuria/Freq.: no     Fatigue/Achiness: YES    Sick/Strep Exposure: no      Therapies Tried and outcome: eating more frequently    She has a history of type 2 diabetes prior to her gastric bypass.  Shortly after her surgery she had a few episodes of hypoglycemia, these episodes feel the same.  She did not have any further episodes until June of this year.  These episodes are occurring more frequently.   She is worried, she does not feel well, she notes increased confusion when these episodes occur.  She is trying to eat more frequently with foods that contain both carb and protein.  It is not helping.        Problem list and histories reviewed & adjusted, as indicated.  Additional history: as documented    Patient Active Problem List   Diagnosis     ACP (advance care planning)     H/O gastric bypass     Past Surgical History:   Procedure Laterality Date     ABDOMEN SURGERY  2012    gastric bypass     APPENDECTOMY  1991     BREAST SURGERY  2002    right bx     COLONOSCOPY  2013     GI SURGERY  2012    gastric by pass     GYN SURGERY  1992 1994    c section x 2     ORTHOPEDIC SURGERY  2015    right hip labreal tear     ORTHOPEDIC SURGERY  2014    left wrist        Social History   Substance Use Topics     Smoking status: Former Smoker     Smokeless tobacco: Not on file     Alcohol use Yes      Comment: occ     Family History   Problem Relation Age of  "Onset     CEREBROVASCULAR DISEASE Mother      Hyperlipidemia Mother      Hypertension Mother      Coronary Artery Disease Mother      Migraines Mother      Obesity Mother      Osteoarthritis Mother      OSTEOPOROSIS Mother      DIABETES Mother      Hyperlipidemia Father      Hypertension Father      Thyroid Disease Father      Hypertension Brother      Hyperlipidemia Brother      Obesity Brother      Hypertension Sister      Thyroid Disease Sister          Current Outpatient Prescriptions   Medication Sig Dispense Refill     VITAMIN D, CHOLECALCIFEROL, PO Take 5,000 Units by mouth daily 2 tabs daily       Cyanocobalamin (VITAMIN B 12 PO)        multivitamin, therapeutic with minerals (MULTI-VITAMIN) TABS Take 1 tablet by mouth daily       Ferrous Sulfate (IRON SUPPLEMENT PO) Take 325 mg by mouth daily (with breakfast) 2 tabs daily       calcium carbonate (OS-MANUEL 500 MG [place]. CA) 500 MG tablet        Allergies   Allergen Reactions     Reglan [Metoclopramide] Anxiety     Recent Labs   Lab Test  06/30/17 1956 11/28/16   1048   A1C   --   5.2   ALT  21   --    CR  0.57   --    GFRESTIMATED  >90  Non  GFR Calc     --    GFRESTBLACK  >90   GFR Calc     --    POTASSIUM  4.3   --    TSH  1.36   --       BP Readings from Last 3 Encounters:   09/01/17 132/82   06/30/17 143/90   04/28/17 118/74    Wt Readings from Last 3 Encounters:   09/01/17 159 lb (72.1 kg)   04/28/17 146 lb (66.2 kg)   03/30/17 146 lb (66.2 kg)            Reviewed and updated as needed this visit by clinical staffTobacco  Allergies       Reviewed and updated as needed this visit by Provider         ROS:  Constitutional, HEENT, cardiovascular, pulmonary, gi and gu systems are negative, except as otherwise noted.      OBJECTIVE:   /82 (BP Location: Left arm, Patient Position: Chair, Cuff Size: Adult Regular)  Pulse 73  Temp 95.9  F (35.5  C) (Tympanic)  Resp 18  Ht 5' 5\" (1.651 m)  Wt 159 lb (72.1 kg)  LMP " 08/31/2017  SpO2 100%  BMI 26.46 kg/m2  Body mass index is 26.46 kg/(m^2).  GENERAL: healthy, alert and no distress  NECK: no adenopathy, no asymmetry, masses, or scars and thyroid normal to palpation  RESP: lungs clear to auscultation - no rales, rhonchi or wheezes  CV: regular rate and rhythm, normal S1 S2, no S3 or S4, no murmur, click or rub, no peripheral edema and peripheral pulses strong  MS: no gross musculoskeletal defects noted, no edema  NEURO: Normal strength and tone, mentation intact and speech normal  PSYCH: mentation appears normal, affect normal/bright        ASSESSMENT/PLAN:       1. Hypoglycemia  suspect  - Hemoglobin A1c  - TSH with free T4 reflex  - CBC with platelets  - Basic metabolic panel  - DIABETES EDUCATION REFERRAL ([place]) - continuous glucose monitoring study  - small frequent meals - mixed carb/protein.        2. S/P gastric bypass  - DIABETES EDUCATION REFERRAL ([place])    3. Dizziness  - Hemoglobin A1c  - TSH with free T4 reflex  - CBC with platelets  - Basic metabolic panel    FUTURE APPOINTMENTS:       - Follow-up visit as needed, to ED with acute concerns.     Mary Ellen Armstrong NP  [facility]

## 2017-09-01 NOTE — Clinical Note
Hi! Please review; any chance you can get her in next week for CGM?  She scared and not feeling well at all.  Thanks for your help!!

## 2017-09-01 NOTE — NURSING NOTE
"Chief Complaint   Patient presents with     Hospital F/U     from june 30th dizzy and weak       Initial /82 (BP Location: Left arm, Patient Position: Chair, Cuff Size: Adult Regular)  Pulse 73  Temp 95.9  F (35.5  C) (Tympanic)  Resp 18  Ht 5' 5\" (1.651 m)  Wt 159 lb (72.1 kg)  LMP 08/31/2017  SpO2 100%  BMI 26.46 kg/m2 Estimated body mass index is 26.46 kg/(m^2) as calculated from the following:    Height as of this encounter: 5' 5\" (1.651 m).    Weight as of this encounter: 159 lb (72.1 kg).  Medication Reconciliation: complete   Pamela M Lechevalier LPN      "

## 2017-09-01 NOTE — PATIENT INSTRUCTIONS
"  ASSESSMENT/PLAN:       1. Hypoglycemia  suspect  - Hemoglobin A1c  - TSH with free T4 reflex  - CBC with platelets  - Basic metabolic panel  - DIABETES EDUCATION REFERRAL ([place]) - continuous glucose monitoring study  - small frequent meals - mixed carb/protein.        2. S/P gastric bypass  - DIABETES EDUCATION REFERRAL ([place])    3. Dizziness  - Hemoglobin A1c  - TSH with free T4 reflex  - CBC with platelets  - Basic metabolic panel    FUTURE APPOINTMENTS:       - Follow-up visit as needed, to ED with acute concerns.     Mary Ellen Armstrong NP  [facility] IRONTreating Hypoglycemia (Low Blood Glucose)  Type 1 and Type 2 Diabetes  What is hypoglycemia?  When your blood glucose (blood sugar) level goes below 70, or you have certain symptoms, we say you have hypoglycemia (low blood glucose).   If not treated quickly, it can be dangerous. Follow the treatment guidelines listed here. If you over-treat low glucose, it can cause \"rebound hyperglycemia\" (high blood glucose), which is not healthy for your body.  Reduce your risk of hypoglycemia   1. Treat low blood glucose right away.  2. Eat balanced meals and snacks spread evenly throughout the day.  3. Your blood glucose should be at least 100 to drive, exercise, do heavy housework or if you can't eat for an hour.  4. Take the prescribed amount of medicines that lower blood glucose (such as sulfonylureas, meglitinides or insulin).  5. Ask your doctor before taking any herbal remedies (such as bitter melon or fenugreek), as these may lower blood glucose.  6. Watch your blood glucose carefully if you are under stress, exercising harder or more often or drinking alcohol on an empty stomach.  7. Call your care team if your blood glucose readings are often low.  What are the signs of low blood glucose?  You may have low blood glucose if:    You feel shaky.    You start sweating.    Your heart begins to beat fast.    You feel dizzy, tired or weak.    You " "feel nervous, crabby or confused.    You are suddenly very hungry.    You cannot see well.    You have a headache.    Your lips or mouth feel numb or tingly.  Very young children may seem dazed, confused, tired and crabby. If they are old enough to talk, their speech may slow. Some children use a special word to describe how they are feeling, such as \"silly,\" \"weird\" or \"tired.\"  Sometimes symptoms occur at night. If you are restless, sweating, having nightmares or waking up with headaches, you may have low blood glucose. Check your glucose and treat yourself if needed.  How should I treat low blood glucose?  You need to treat low blood glucose as soon as possible. It will not get better on its own.  What to do:  1. Test your blood glucose, if you can.  2. Eat or drink carbohydrate right away.    If glucose level is 51 to 70: eat or drink 15 grams of carbohydrate (one carbohydrate choice from the box below).    If glucose level is less than 50: eat or drink 30 grams of carbohydrate (pick two different carbohydrate choices or one carbohydrate choice and have twice the amount shown.)  Each of these items has 15 grams of carbohydrate (1 carbohydrate choice):    3 to 4 glucose tablets    1/2 cup (4 ounces) regular soda pop (soda pop with sugar)    1/2 cup (4 ounces) fruit juice    Small box of raisins    1 cup (8 ounces) skim or low-fat milk    Small tube (15 grams) of glucose gel  3. Wait 15 minutes before eating or drinking anything else. Then, retest your blood glucose.  4.  Repeat these steps until your glucose is between 70 and 100. Your glucose level should be at least 100 if:    You are going to drive.    You are going to exercise. This includes heavy housework, yard work, running, walking or other physical activity.    Your next meal or snack is more than an hour away.  5. Check your glucose again in one hour if you take insulin, sulfonylureas or meglitinides.  Call 911 if your blood glucose does not get " better.  What happens if I can't treat myself?  Your doctor may prescribe glucagon (medicine to raise your blood glucose). If your glucose is very low and you cannot eat or drink carbohydrate, someone else can give you a shot of this medicine.  If your doctor prescribes this, we will give you special instructions to share with family and friends (as well as teachers, day care providers and other caregivers, if the glucagon is for a child).   When should I call my care team?  Let your diabetes care team know if:    You have more than two to three low blood glucose readings in a row.    You have two or more low readings in 24 hours.    You have low readings at the same time of day several days in a row.    You often need to eat extra food to keep your blood glucose from getting too low.  For informational purposes only. Not to replace the advice of your health care provider.   Copyright   2006 [employer]. All rights reserved. tradeNOW [id number]   REV 11/15.

## 2017-09-01 NOTE — MR AVS SNAPSHOT
"              After Visit Summary   9/1/2017    Shazia Verma    MRN: [id number]           Patient Information     Date Of Birth          1970        Visit Information        Provider Department      9/1/2017 11:15 AM Mary Ellen Armstrong NP [facility]        Today's Diagnoses     Hypoglycemia    -  1    S/P gastric bypass        Dizziness          Care Instructions      ASSESSMENT/PLAN:       1. Hypoglycemia  suspect  - Hemoglobin A1c  - TSH with free T4 reflex  - CBC with platelets  - Basic metabolic panel  - DIABETES EDUCATION REFERRAL ([place]) - continuous glucose monitoring study  - small frequent meals - mixed carb/protein.        2. S/P gastric bypass  - DIABETES EDUCATION REFERRAL ([place])    3. Dizziness  - Hemoglobin A1c  - TSH with free T4 reflex  - CBC with platelets  - Basic metabolic panel    FUTURE APPOINTMENTS:       - Follow-up visit as needed, to ED with acute concerns.     Mary Ellen Armstrong NP  [facility] IRONTreating Hypoglycemia (Low Blood Glucose)  Type 1 and Type 2 Diabetes  What is hypoglycemia?  When your blood glucose (blood sugar) level goes below 70, or you have certain symptoms, we say you have hypoglycemia (low blood glucose).   If not treated quickly, it can be dangerous. Follow the treatment guidelines listed here. If you over-treat low glucose, it can cause \"rebound hyperglycemia\" (high blood glucose), which is not healthy for your body.  Reduce your risk of hypoglycemia   1. Treat low blood glucose right away.  2. Eat balanced meals and snacks spread evenly throughout the day.  3. Your blood glucose should be at least 100 to drive, exercise, do heavy housework or if you can't eat for an hour.  4. Take the prescribed amount of medicines that lower blood glucose (such as sulfonylureas, meglitinides or insulin).  5. Ask your doctor before taking any herbal remedies (such as bitter melon or fenugreek), as these may lower blood " "glucose.  6. Watch your blood glucose carefully if you are under stress, exercising harder or more often or drinking alcohol on an empty stomach.  7. Call your care team if your blood glucose readings are often low.  What are the signs of low blood glucose?  You may have low blood glucose if:    You feel shaky.    You start sweating.    Your heart begins to beat fast.    You feel dizzy, tired or weak.    You feel nervous, crabby or confused.    You are suddenly very hungry.    You cannot see well.    You have a headache.    Your lips or mouth feel numb or tingly.  Very young children may seem dazed, confused, tired and crabby. If they are old enough to talk, their speech may slow. Some children use a special word to describe how they are feeling, such as \"silly,\" \"weird\" or \"tired.\"  Sometimes symptoms occur at night. If you are restless, sweating, having nightmares or waking up with headaches, you may have low blood glucose. Check your glucose and treat yourself if needed.  How should I treat low blood glucose?  You need to treat low blood glucose as soon as possible. It will not get better on its own.  What to do:  1. Test your blood glucose, if you can.  2. Eat or drink carbohydrate right away.    If glucose level is 51 to 70: eat or drink 15 grams of carbohydrate (one carbohydrate choice from the box below).    If glucose level is less than 50: eat or drink 30 grams of carbohydrate (pick two different carbohydrate choices or one carbohydrate choice and have twice the amount shown.)  Each of these items has 15 grams of carbohydrate (1 carbohydrate choice):    3 to 4 glucose tablets    1/2 cup (4 ounces) regular soda pop (soda pop with sugar)    1/2 cup (4 ounces) fruit juice    Small box of raisins    1 cup (8 ounces) skim or low-fat milk    Small tube (15 grams) of glucose gel  3. Wait 15 minutes before eating or drinking anything else. Then, retest your blood glucose.  4.  Repeat these steps until your glucose " is between 70 and 100. Your glucose level should be at least 100 if:    You are going to drive.    You are going to exercise. This includes heavy housework, yard work, running, walking or other physical activity.    Your next meal or snack is more than an hour away.  5. Check your glucose again in one hour if you take insulin, sulfonylureas or meglitinides.  Call 911 if your blood glucose does not get better.  What happens if I can't treat myself?  Your doctor may prescribe glucagon (medicine to raise your blood glucose). If your glucose is very low and you cannot eat or drink carbohydrate, someone else can give you a shot of this medicine.  If your doctor prescribes this, we will give you special instructions to share with family and friends (as well as teachers, day care providers and other caregivers, if the glucagon is for a child).   When should I call my care team?  Let your diabetes care team know if:    You have more than two to three low blood glucose readings in a row.    You have two or more low readings in 24 hours.    You have low readings at the same time of day several days in a row.    You often need to eat extra food to keep your blood glucose from getting too low.  For informational purposes only. Not to replace the advice of your health care provider.   Copyright   2006 [place] [facility]. All rights reserved. Mayvenn [id number] - REV 11/15.            Follow-ups after your visit        Additional Services     DIABETES EDUCATION REFERRAL ([place])       DIABETES SELF-MANAGEMENT TRAINING (DSMT)  Type of training and number of hours requested:  CGM;     (Medicare will cover:10 hours initial DSMT in 12-month period, plus 2 hours follow-up DSMT annually)    Please add if the patient has special educational need:   (Patients with special needs requiring individual DSMT)    Please include the following DMST content:     Patient has the following:    Please begin Medical Nutritional  Therapy.    (Medcare allows 3 hours initial MNT in the first calendar year, plus two hours follow up MNT annually.  Additional MNT hours are available for change in medical condition treatment and/or diagnosis)    Additional Services Provided:  >>A1c will be completed upon referral and completion of program unless completed in clinic.  >>Influenza vaccination assessment (form #[id number]) as applicable.  >>Order for diabetes supplies will be faxed to patient's pharmacy.  >>If on insulin: Insulin dose adjustment per staged Diabetes Mgmt. Protocols    DIABETES RESOURCE CENTER  [facility]  Telephone:  [phone]   Fax:  [phone]                  Who to contact     If you have questions or need follow up information about today's clinic visit or your schedule please contact [facility] directly at [phone].  Normal or non-critical lab and imaging results will be communicated to you by MyChart, letter or phone within 4 business days after the clinic has received the results. If you do not hear from us within 7 days, please contact the clinic through [employer] or phone. If you have a critical or abnormal lab result, we will notify you by phone as soon as possible.  Submit refill requests through [employer] or call your pharmacy and they will forward the refill request to us. Please allow 3 business days for your refill to be completed.          Additional Information About Your Visit        [employer] Information     [employer] gives you secure access to your electronic health record. If you see a primary care provider, you can also send messages to your care team and make appointments. If you have questions, please call your primary care clinic.  If you do not have a primary care provider, please call [phone] and they will assist you.        Care EveryWhere ID     This is your Care EveryWhere ID. This could be used by other organizations to access your [facility]  "records  [id number]        Your Vitals Were     Pulse Temperature Respirations Height Last Period Pulse Oximetry    73 95.9  F (35.5  C) (Tympanic) 18 5' 5\" (1.651 m) 08/31/2017 100%    BMI (Body Mass Index)                   26.46 kg/m2            Blood Pressure from Last 3 Encounters:   09/01/17 132/82   06/30/17 143/90   04/28/17 118/74    Weight from Last 3 Encounters:   09/01/17 159 lb (72.1 kg)   04/28/17 146 lb (66.2 kg)   03/30/17 146 lb (66.2 kg)              We Performed the Following     Basic metabolic panel     CBC with platelets     DIABETES EDUCATION REFERRAL ([place])     Hemoglobin A1c     TSH with free T4 reflex        Primary Care Provider Office Phone # Fax #    Mary Ellen ZAIRE Armstrong [phone]       [facility] [address] [place]  [facility] [address]        Equal Access to Services     [place] : Hadii aad ku hadasho Soomaali, waaxda luqadaha, qaybta kaalmada adeegyada, waxay idiin hayjurgenn diana osullivan . So [facility] [phone].    ATENCIÓN: Si habla español, tiene a wadsworth disposición servicios gratuitos de asistencia lingüística. Llame al [phone].    We comply with applicable federal civil rights laws and [place] laws. We do not discriminate on the basis of race, color, national origin, age, disability sex, sexual orientation or gender identity.            Thank you!     Thank you for choosing [facility]  for your care. Our goal is always to provide you with excellent care. Hearing back from our patients is one way we can continue to improve our services. Please take a few minutes to complete the written survey that you may receive in the mail after your visit with us. Thank you!             Your Updated Medication List - Protect others around you: Learn how to safely use, store and throw away your medicines at www.disposemymeds.org.          This list is accurate as of: 9/1/17 11:47 AM.  Always use your most recent med list.       "             Brand Name Dispense Instructions for use Diagnosis    calcium carbonate 1250 MG tablet    OS-MANUEL 500 mg [place]. Ca          IRON SUPPLEMENT PO      Take 325 mg by mouth daily (with breakfast) 2 tabs daily        Multi-vitamin Tabs tablet      Take 1 tablet by mouth daily        VITAMIN B 12 PO           VITAMIN D (CHOLECALCIFEROL) PO      Take 5,000 Units by mouth daily 2 tabs daily

## 2017-09-02 ASSESSMENT — ANXIETY QUESTIONNAIRES: GAD7 TOTAL SCORE: 4

## 2017-09-06 ENCOUNTER — TELEPHONE (OUTPATIENT)
Dept: FAMILY MEDICINE | Facility: OTHER | Age: 47
End: 2017-09-06

## 2017-09-06 ENCOUNTER — MYC MEDICAL ADVICE (OUTPATIENT)
Dept: FAMILY MEDICINE | Facility: OTHER | Age: 47
End: 2017-09-06

## 2017-09-06 NOTE — TELEPHONE ENCOUNTER
11:19 AM    Reason for Call: Phone Call    Description: Pt called and states that she was told to give them a call back if the diabetes person had not gotten back to her , and she states she has not heard anything yet     Was an appointment offered for this call? No  If yes : Appointment type              Date    Preferred method for responding to this message: Telephone Call  What is your phone number ?    If we cannot reach you directly, may we leave a detailed response at the number you provided? Yes    Can this message wait until your PCP/provider returns, if available today? Not applicable,     Amee Vee

## 2017-09-06 NOTE — TELEPHONE ENCOUNTER
Pt called back and stated she still has not heard anything back from nurse or diabetic place. Please call her back at [phone]

## 2017-09-08 ENCOUNTER — ALLIED HEALTH/NURSE VISIT (OUTPATIENT)
Dept: WOUND CARE | Facility: OTHER | Age: 47
End: 2017-09-08
Attending: NURSE PRACTITIONER
Payer: COMMERCIAL

## 2017-09-08 DIAGNOSIS — E16.2 LOW BLOOD SUGAR: Primary | ICD-10-CM

## 2017-09-08 PROCEDURE — 95250 CONT GLUC MNTR PHYS/QHP EQP: CPT

## 2017-09-08 NOTE — MR AVS SNAPSHOT
After Visit Summary   9/8/2017    Shazia Verma    MRN: [id number]           Patient Information     Date Of Birth          1970        Visit Information        Provider Department      9/8/2017 1:30 PM Nurse, Hc Dm [facility]        Today's Diagnoses     Low blood sugar    -  1       Follow-ups after your visit        Your next 10 appointments already scheduled     Sep 12, 2017  8:45 AM CDT   (Arrive by 8:30 AM)   Continuous Glucose Monitor Detach with Hc Dm Nurse   [facility] ([facility] )    [address]  [facility] [id number] [phone]            Sep 27, 2017  8:30 AM CDT   (Arrive by 8:15 AM)   Continuous Glucose Monitor Evaluation with Janeth Call NP   [facility] ([facility] )    [address]  [place] [id number] [phone]              Who to contact     If you have questions or need follow up information about today's clinic visit or your schedule please contact [facility] directly at [phone].  Normal or non-critical lab and imaging results will be communicated to you by [employer], letter or phone within 4 business days after the clinic has received the results. If you do not hear from us within 7 days, please contact the clinic through [employer] or phone. If you have a critical or abnormal lab result, we will notify you by phone as soon as possible.  Submit refill requests through [employer] or call your pharmacy and they will forward the refill request to us. Please allow 3 business days for your refill to be completed.          Additional Information About Your Visit        [employer] Information     [employer] gives you secure access to your electronic health record. If you see a primary care provider, you can also send messages to your care team and make appointments. If you have questions, please call your primary care clinic.  If you do not have a primary care  provider, please call [phone] and they will assist you.        Care EveryWhere ID     This is your Care EveryWhere ID. This could be used by other organizations to access your [facility] medical records  [id number]        Your Vitals Were     Last Period                   08/31/2017            Blood Pressure from Last 3 Encounters:   09/01/17 132/82   06/30/17 143/90   04/28/17 118/74    Weight from Last 3 Encounters:   09/01/17 159 lb (72.1 kg)   04/28/17 146 lb (66.2 kg)   03/30/17 146 lb (66.2 kg)              Today, you had the following     No orders found for display       Primary Care Provider Office Phone # Fax #    Mary Ellen ZAIRE Armstrong [phone]       [facility] [address] [place]  [facility] [address]        Equal Access to Services     [facility]: Hadii aad ku hadasho Soomaali, waaxda luqadaha, qaybta kaalmada adeegyada, waxay serain hayaan diana osullivan . So [facility] [phone].    ATENCIÓN: Si habla español, tiene a wadsworth disposición servicios gratuitos de asistencia lingüística. Llame al [phone].    We comply with applicable federal civil rights laws and [place] laws. We do not discriminate on the basis of race, color, national origin, age, disability sex, sexual orientation or gender identity.            Thank you!     Thank you for choosing [facility] [place]  for your care. Our goal is always to provide you with excellent care. Hearing back from our patients is one way we can continue to improve our services. Please take a few minutes to complete the written survey that you may receive in the mail after your visit with us. Thank you!             Your Updated Medication List - Protect others around you: Learn how to safely use, store and throw away your medicines at www.disposemymeds.org.          This list is accurate as of: 9/8/17  2:35 PM.  Always use your most recent med list.                   Brand Name Dispense Instructions for use  Diagnosis    calcium carbonate 1250 MG tablet    OS-MANUEL 500 mg [place]. Ca          IRON SUPPLEMENT PO      Take 325 mg by mouth daily (with breakfast) 2 tabs daily        Multi-vitamin Tabs tablet      Take 1 tablet by mouth daily        VITAMIN B 12 PO           VITAMIN D (CHOLECALCIFEROL) PO      Take 5,000 Units by mouth daily 2 tabs daily

## 2017-09-08 NOTE — PROGRESS NOTES
Shazia Mosleydick is here for a glucose sensor insertion for a CGMS study. Sensor agreement signed.    Sensor attached to R flank. No redness, drainage or bleeding noted. Pt instructed on testing schedule, how to complete the patient log, restrictions during wear, and to remove if needs to have MRI, CT or x-ray. Pt will return on 9/12/17 for detach and 9/27/17 for evaluation.     Sensor Lot #: [id number]  Exp date: 12/19/2017  Transmitter S/N:  [id number]  Hook up time: 1:30 pm    First BG to be done at: 2:30 pm    Written instructions and patient log given to patient.  Aimee Fox

## 2017-09-12 ENCOUNTER — APPOINTMENT (OUTPATIENT)
Dept: WOUND CARE | Facility: OTHER | Age: 47
End: 2017-09-12
Attending: NURSE PRACTITIONER
Payer: COMMERCIAL

## 2017-09-27 ENCOUNTER — ALLIED HEALTH/NURSE VISIT (OUTPATIENT)
Dept: WOUND CARE | Facility: OTHER | Age: 47
End: 2017-09-27
Attending: NURSE PRACTITIONER
Payer: COMMERCIAL

## 2017-09-27 VITALS
OXYGEN SATURATION: 99 % | WEIGHT: 159.1 LBS | DIASTOLIC BLOOD PRESSURE: 70 MMHG | SYSTOLIC BLOOD PRESSURE: 121 MMHG | HEART RATE: 75 BPM | BODY MASS INDEX: 26.51 KG/M2 | HEIGHT: 65 IN

## 2017-09-27 DIAGNOSIS — E16.2 HYPOGLYCEMIA WITHOUT DIAGNOSIS OF DIABETES MELLITUS: Primary | ICD-10-CM

## 2017-09-27 DIAGNOSIS — Z98.84 HISTORY OF ROUX-EN-Y GASTRIC BYPASS: ICD-10-CM

## 2017-09-27 PROCEDURE — 99214 OFFICE O/P EST MOD 30 MIN: CPT | Mod: 25 | Performed by: NURSE PRACTITIONER

## 2017-09-27 PROCEDURE — 95251 CONT GLUC MNTR ANALYSIS I&R: CPT | Performed by: NURSE PRACTITIONER

## 2017-09-27 ASSESSMENT — PAIN SCALES - GENERAL: PAINLEVEL: EXTREME PAIN (8)

## 2017-09-27 NOTE — PATIENT INSTRUCTIONS
Hypoglycemic Reaction (Nondiabetic)  You have had an episode of low blood sugar (hypoglycemia). A single episode of hypoglycemia does not mean that you have diabetes or that this problem will recur. There are many causes for low blood sugar. These include eating highly refined starchy foods (carbohydrates), drinking too much alcohol, intense exercise, fatigue, stress, poor diet, pregnancy, and certain illnesses.  Your blood sugar level may also be affected by tobacco, caffeine, and certain medicines, including:    Aspirin    Haloperidol    Propoxyphene    Chlorpromazine    Propranolol    Disopyramide    ACE inhibitors  A class of medicine called beta-blockers is used for high blood pressure, rapid heart rates, and other conditions. Beta-blockers may prevent the early symptoms of low blood sugar. If you are taking a beta-blocker, you might not realize that your blood sugar is getting low. If you are taking a beta-blocker and are prone to low blood sugar, talk to your healthcare provider about switching to a different class of medicine. The beta-blocker class includes:    Propranolol    Atenolol    Metoprolol    Nadolol    Labetalol    Carvedilol  Home care    Rest today and resume a normal diet. Eliminate any of the above known causes where possible.    The proper diet for true hypoglycemia (diagnosed with a glucose tolerance test) is high protein (20% of calories), low carbohydrate (50% of calories), and moderate fat (30% of calories) in 6 small meals per day.    If this is your first episode of low blood sugar, or if you have not yet been tested with a glucose tolerance test, eat small frequent meals rather than fewer large meals. Limit starchy foods during the next 1 to 2 days to avoid a recurrence of low blood sugar.    It is important to learn the warning signals your body gives as your blood sugar starts to drop. See the symptoms listed below.  If symptoms of hypoglycemia return    Keep a source of  fast-acting sugar with you. At the first sign of low blood sugar, eat or drink 15 to 20 grams of fast-acting sugar. Examples include:    3 to 4 glucose tablets (found at most drugstores)    4 ounces of regular soda    4 ounces of fruit juice    2 tablespoons of raisins    1 tablespoon of honey    If consuming fast-acting sugar does not improve your symptoms within 20 minutes, go to an emergency room.    If you have severe hypoglycemic spells, wear a medical alert bracelet or carry a card in your wallet describing this condition. If you have a severe hypoglycemic reaction and are unable to give this information, it will help medical staff provide proper care.  Follow-up care  Follow up with your healthcare provider, or as advised.  When to seek medical advice  Call your healthcare provider right away if any of these symptoms of low blood sugar occur.    Fatigue or headache    Trembling or excess sweating    Hunger    Feeling anxious or restless    Vision changes    Irritability    Sleepiness    Dizziness  Call 911  Contact emergency services right away if any of these occur:    Drowsiness    Weakness    Confusion    Loss of consciousness  Date Last Reviewed: 8/24/2015 2000-2017 [employer]. [address] [place] [address]. All rights reserved. This information is not intended as a substitute for professional medical care. Always follow your healthcare professional's instructions.    Recommendation:  1.  Add protein to both meals and snacks    [facility]  [phone]

## 2017-09-27 NOTE — PROGRESS NOTES
"SUBJECTIVE:  Shazia Verma, 47 year old, female presents with the following Chief Complaint(s) with HPI to follow:  Chief Complaint   Patient presents with     Diabetes     CGM eval        Diabetes Follow-up      Patient is checking blood sugars: 0-3.  Results:  Highest: 177  Lowest: 76  Period average: 106    Values above, >130: 1  Values within goal (): 13  Values below goal, <70: 0      Symptoms of hypoglycemia (low blood sugar): yes, BG in the 50-60s.      Paresthesias (numbness or burning in feet) or sores: No    Breakfast eaten regularly: Yes    Patient counting carbs: No         HPI:  Shazia's here today for the follow up regarding her hypoglycemia without diagnosis of diabetes mellitus.      Lab Results   Component Value Date    A1C 5.7 09/01/2017    A1C 5.2 11/28/2016     Shazia reports the following:  It started at the end of June.    She was went to the ED, on June 30th, 2017, after her \"2nd episode\".    Shazia didn't have a BG meter at this time, but reports that she began to feel flushed, warm, dizzines, and weak.  She ate something and slowly felt better.  BG during ED visit, 91.    She does report BGs in the 50-60s.  Feels symptomatically low, BGs <80.    History of Janusz-en-Y gastric bypass in 2012.    Reports dumping syndrome with concentrated sugar consumption.    Has been eating protein to help.  However, Shazia's concerned about weight gain.    Has not seen an Endocrinologist for it or a Registered Dietician.       [facility].  She has her evaluation today.      Patient Active Problem List   Diagnosis     ACP (advance care planning)     H/O gastric bypass     Low blood sugar       History reviewed. No pertinent past medical history.    Past Surgical History:   Procedure Laterality Date     ABDOMEN SURGERY  2012    gastric bypass     APPENDECTOMY  1991     BREAST SURGERY  2002    right bx     COLONOSCOPY  2013     GI SURGERY  2012    gastric by pass     " "GYN SURGERY  1992 1994    c section x 2     ORTHOPEDIC SURGERY  2015    right hip labreal tear     ORTHOPEDIC SURGERY  2014    left wrist        Family History   Problem Relation Age of Onset     CEREBROVASCULAR DISEASE Mother      Hyperlipidemia Mother      Hypertension Mother      Coronary Artery Disease Mother      Migraines Mother      Obesity Mother      Osteoarthritis Mother      OSTEOPOROSIS Mother      DIABETES Mother      Hyperlipidemia Father      Hypertension Father      Thyroid Disease Father      Hypertension Brother      Hyperlipidemia Brother      Obesity Brother      Hypertension Sister      Thyroid Disease Sister        Social History   Substance Use Topics     Smoking status: Former Smoker     Smokeless tobacco: Never Used     Alcohol use Yes      Comment: occ       Current Outpatient Prescriptions   Medication Sig Dispense Refill     VITAMIN D, CHOLECALCIFEROL, PO Take 5,000 Units by mouth daily 2 tabs daily       Cyanocobalamin (VITAMIN B 12 PO)        multivitamin, therapeutic with minerals (MULTI-VITAMIN) TABS Take 1 tablet by mouth daily       Ferrous Sulfate (IRON SUPPLEMENT PO) Take 325 mg by mouth daily (with breakfast) 2 tabs daily       calcium carbonate (OS-MANUEL 500 MG [place]. [place]) 500 MG tablet          Allergies   Allergen Reactions     Reglan [Metoclopramide] Anxiety       REVIEW OF SYSTEMS  Skin: negative  Eyes: positive for vision changes with low BGs  Ears/Nose/Throat: negative  Respiratory: No shortness of breath, dyspnea on exertion, cough, or hemoptysis  Cardiovascular: negative  Gastrointestinal: positive for dumping syndrome; history of Janusz-en-Y gastric bypass in 2012  Genitourinary: negative  Musculoskeletal: positive for arthritis and joint pain  Neurologic: positive for headaches  Psychiatric: positive for \"worried\"  Hematologic/Lymphatic/Immunologic: negative  Endocrine: positive for prediabetes    OBJECTIVE:  /70 (BP Location: Right arm, Patient Position: Chair, Cuff " "Size: Adult Large)  Pulse 75  Ht 5' 5\" (1.651 m)  Wt 159 lb 1.6 oz (72.2 kg)  LMP 2017  SpO2 99%  BMI 26.48 kg/m2  Constitutional: healthy, alert and no distress  Neck: neck supple, no lymphadenopathy, trachea midline, thyroid symmetrical with out nodules noted.  Cardiovascular: RRR. No murmurs, clicks gallops or rub  Respiratory:  Good diaphragmatic excursion. Lungs clear  Psychiatric: mentation appears normal and affect normal/bright  Hematologic/Lymphatic/Immunologic: Normal cervical lymph nodes      LABS  Results for orders placed or performed in visit on 17   Hemoglobin A1c   Result Value Ref Range    Hemoglobin A1C 5.7 4.3 - 6.0 %   TSH with free T4 reflex   Result Value Ref Range    TSH 2.16 0.40 - 4.00 mU/L   CBC with platelets   Result Value Ref Range    WBC 5.0 4.0 - 11.0 10e9/L    RBC Count 3.97 3.8 - 5.2 10e12/L    Hemoglobin 11.4 (L) 11.7 - 15.7 g/dL    Hematocrit 33.8 (L) 35.0 - 47.0 %    MCV 85 78 - 100 fl    MCH 28.7 26.5 - 33.0 pg    MCHC 33.7 31.5 - 36.5 g/dL    RDW 13.6 10.0 - 15.0 %    Platelet Count 299 150 - 450 10e9/L   Basic metabolic panel   Result Value Ref Range    Sodium 139 133 - 144 mmol/L    Potassium 4.1 3.4 - 5.3 mmol/L    Chloride 105 94 - 109 mmol/L    Carbon Dioxide 26 20 - 32 mmol/L    Anion Gap 8 3 - 14 mmol/L    Glucose 155 (H) 70 - 99 mg/dL    Urea Nitrogen 13 7 - 30 mg/dL    Creatinine 0.57 0.52 - 1.04 mg/dL    GFR Estimate >90 >60 mL/min/1.7m2    GFR Estimate If Black >90 >60 mL/min/1.7m2    Calcium 8.3 (L) 8.5 - 10.1 mg/dL   Estimated Average Glucose   Result Value Ref Range    Estimated Average Glucose 117 mg/dL       ASSESSMENT / PLAN:  (E16.2) Hypoglycemia without diagnosis of diabetes mellitus  (primary encounter diagnosis)  Comment:   Suggested an RD consult.  Shazia refused at this time.   Continue checking BGs if symptomatic.      Findings:  Ave. S  Est. A1c: 5.2%    Date: 17  Lowest: 76 Highest: 138  Date: 17  Lowest: 56 Highest: " 214  Date: 9/10/17  Lowest: 79 Highest: 172  Date: 17  Lowest: 82 Highest: 168  Date: 17  Lowest: 79 Highest: 124    Distribution Data:  Percentage above 140: 5%  Percentage within : 91%  Percentage below 70: 4%    Nocturnal glucose control:  Lowest: 62  Highest: 141    Post-prandial glucose control:  After breakfast:  Lowest: 86  Highest: 170    After lunch:  Lowest: 92  Highest 141    After supper:  Lowest: 57  Highest: 195    Hypoglycemia incidence:  Yes, noted SG <70 on Saturday,  on 3 separate occasions from the period of 1600 to 2300.      Food choices/Carbohydrate countin17  1.  small bag BBQ chips, 1/2 c diet rootbeer  2.  meatlaf 2 oz + ketchup  3.  3/4 c. Total oatmeal squares (dry)  4.  Turkey and ham sandwich, crackers  5. Crackers and diet rootbeer  6.  Candy and banana  7.  Cheese stick and slim ginette    17  1.  Mini donuts  2.  Egg, toast, coffee  3.  Pizza and water  4.  Trail mix  5.  Banana  6.  Total oatmeal squares  7. Crackers, snap peas, broccoli, cauliflower, chips, dips, and carrots  8.  Red licorice and total squares  9.  Mini snickers bars  10.  Candy  11.  P3--turkey breast, almonds, cheese      1.  Oatmeal squares, PB and J toast, coffee  2.  Corn dog (/2), water  3.  Sesame chicken, 1/2 egg roll, grapes  4.  Nachos and cheese, diet coke  5. Pizza and diet pepsi   6. Pizza leftovers  7.  Popcorn    17  1.  Cheddar sausage and coffee  2.  Chicken dumpling soup and water  3.  Trail mix and ice tea  4. Taco salad and iced tea, maria teresa butter bar (1/2)  5. oatmeal square cereal, cheese stick and slim ginette     Exercise:  Nothing regular    Recommendations:  See note      Plan: ENDOCRINOLOGY ADULT REFERRAL, GLUCOSE         MONITORING CONTINUOUS, >=72 HR          Referral sent for endocrinology.     for Manuel Armstrong NP, to discuss plan.    Will wait to order labs until I talk to Manuel.      (Z98.84) History of Janusz-en-Y gastric  bypass  Comment: noted  Plan: continue eating small, frequent meals with proteint    Please contact the [facility] if you changed your mind about meeting with the RD.      Patient Instructions     Hypoglycemic Reaction (Nondiabetic)  You have had an episode of low blood sugar (hypoglycemia). A single episode of hypoglycemia does not mean that you have diabetes or that this problem will recur. There are many causes for low blood sugar. These include eating highly refined starchy foods (carbohydrates), drinking too much alcohol, intense exercise, fatigue, stress, poor diet, pregnancy, and certain illnesses.  Your blood sugar level may also be affected by tobacco, caffeine, and certain medicines, including:    Aspirin    Haloperidol    Propoxyphene    Chlorpromazine    Propranolol    Disopyramide    ACE inhibitors  A class of medicine called beta-blockers is used for high blood pressure, rapid heart rates, and other conditions. Beta-blockers may prevent the early symptoms of low blood sugar. If you are taking a beta-blocker, you might not realize that your blood sugar is getting low. If you are taking a beta-blocker and are prone to low blood sugar, talk to your healthcare provider about switching to a different class of medicine. The beta-blocker class includes:    Propranolol    Atenolol    Metoprolol    Nadolol    Labetalol    Carvedilol  Home care    Rest today and resume a normal diet. Eliminate any of the above known causes where possible.    The proper diet for true hypoglycemia (diagnosed with a glucose tolerance test) is high protein (20% of calories), low carbohydrate (50% of calories), and moderate fat (30% of calories) in 6 small meals per day.    If this is your first episode of low blood sugar, or if you have not yet been tested with a glucose tolerance test, eat small frequent meals rather than fewer large meals. Limit starchy foods during the next 1 to 2 days to avoid a recurrence of low blood sugar.    It is  important to learn the warning signals your body gives as your blood sugar starts to drop. See the symptoms listed below.  If symptoms of hypoglycemia return    Keep a source of fast-acting sugar with you. At the first sign of low blood sugar, eat or drink 15 to 20 grams of fast-acting sugar. Examples include:    3 to 4 glucose tablets (found at most drugstores)    4 ounces of regular soda    4 ounces of fruit juice    2 tablespoons of raisins    1 tablespoon of honey    If consuming fast-acting sugar does not improve your symptoms within 20 minutes, go to an emergency room.    If you have severe hypoglycemic spells, wear a medical alert bracelet or carry a card in your wallet describing this condition. If you have a severe hypoglycemic reaction and are unable to give this information, it will help medical staff provide proper care.  Follow-up care  Follow up with your healthcare provider, or as advised.  When to seek medical advice  Call your healthcare provider right away if any of these symptoms of low blood sugar occur.    Fatigue or headache    Trembling or excess sweating    Hunger    Feeling anxious or restless    Vision changes    Irritability    Sleepiness    Dizziness  Call 911  Contact emergency services right away if any of these occur:    Drowsiness    Weakness    Confusion    Loss of consciousness  Date Last Reviewed: 8/24/2015 2000-2017 [employer]. [address] [place] [address], [place] [address]. All rights reserved. This information is not intended as a substitute for professional medical care. Always follow your healthcare professional's instructions.    Recommendation:  1.  Add protein to both meals and snacks    [facility]  [phone]            Time: 50 minutes  Barrier: none  Willingness to learn: accepting    Janeth FITZGERALD [facility]  Disease Management    50 minutes was spent with patient.    Over 50%  of this time was spent on counseling patient regarding  illness, medication and/or treatment options, coordinating further cares and follow ups that are needed along with resource material that will be helpful in the treatment of these issues.

## 2017-09-27 NOTE — MR AVS SNAPSHOT
After Visit Summary   9/27/2017    Shazia Verma    MRN: [id number]           Patient Information     Date Of Birth          1970        Visit Information        Provider Department      9/27/2017 8:30 AM Janeth Call NP [facility]        Today's Diagnoses     Low blood sugar    -  1      Care Instructions      Hypoglycemic Reaction (Nondiabetic)  You have had an episode of low blood sugar (hypoglycemia). A single episode of hypoglycemia does not mean that you have diabetes or that this problem will recur. There are many causes for low blood sugar. These include eating highly refined starchy foods (carbohydrates), drinking too much alcohol, intense exercise, fatigue, stress, poor diet, pregnancy, and certain illnesses.  Your blood sugar level may also be affected by tobacco, caffeine, and certain medicines, including:    Aspirin    Haloperidol    Propoxyphene    Chlorpromazine    Propranolol    Disopyramide    ACE inhibitors  A class of medicine called beta-blockers is used for high blood pressure, rapid heart rates, and other conditions. Beta-blockers may prevent the early symptoms of low blood sugar. If you are taking a beta-blocker, you might not realize that your blood sugar is getting low. If you are taking a beta-blocker and are prone to low blood sugar, talk to your healthcare provider about switching to a different class of medicine. The beta-blocker class includes:    Propranolol    Atenolol    Metoprolol    Nadolol    Labetalol    Carvedilol  Home care    Rest today and resume a normal diet. Eliminate any of the above known causes where possible.    The proper diet for true hypoglycemia (diagnosed with a glucose tolerance test) is high protein (20% of calories), low carbohydrate (50% of calories), and moderate fat (30% of calories) in 6 small meals per day.    If this is your first episode of low blood sugar, or if you have not yet been tested with a glucose tolerance  test, eat small frequent meals rather than fewer large meals. Limit starchy foods during the next 1 to 2 days to avoid a recurrence of low blood sugar.    It is important to learn the warning signals your body gives as your blood sugar starts to drop. See the symptoms listed below.  If symptoms of hypoglycemia return    Keep a source of fast-acting sugar with you. At the first sign of low blood sugar, eat or drink 15 to 20 grams of fast-acting sugar. Examples include:    3 to 4 glucose tablets (found at most drugstores)    4 ounces of regular soda    4 ounces of fruit juice    2 tablespoons of raisins    1 tablespoon of honey    If consuming fast-acting sugar does not improve your symptoms within 20 minutes, go to an emergency room.    If you have severe hypoglycemic spells, wear a medical alert bracelet or carry a card in your wallet describing this condition. If you have a severe hypoglycemic reaction and are unable to give this information, it will help medical staff provide proper care.  Follow-up care  Follow up with your healthcare provider, or as advised.  When to seek medical advice  Call your healthcare provider right away if any of these symptoms of low blood sugar occur.    Fatigue or headache    Trembling or excess sweating    Hunger    Feeling anxious or restless    Vision changes    Irritability    Sleepiness    Dizziness  Call 911  Contact emergency services right away if any of these occur:    Drowsiness    Weakness    Confusion    Loss of consciousness  Date Last Reviewed: 8/24/2015 2000-2017 [employer]. [address] [place] [address]. All rights reserved. This information is not intended as a substitute for professional medical care. Always follow your healthcare professional's instructions.    Recommendation:  1.  Add protein to both meals and snacks    [facility]  [phone]                Follow-ups after your visit        Your next 10 appointments  "already scheduled     Oct 27, 2017  8:00 AM CDT   Xray with [facility]   [facility] )    [place] [address]   [phone]            Oct 27, 2017  8:20 AM CDT   (Arrive by 8:00 AM)   New Visit with Floyd Bunn MD    ORTHOPEDICS ([facility] )    [address]  [place] [id number] [phone]              Who to contact     If you have questions or need follow up information about today's clinic visit or your schedule please contact [facility] directly at [phone].  Normal or non-critical lab and imaging results will be communicated to you by [facility], letter or phone within 4 business days after the clinic has received the results. If you do not hear from us within 7 days, please contact the clinic through [facility] or phone. If you have a critical or abnormal lab result, we will notify you by phone as soon as possible.  Submit refill requests through [employer] or call your pharmacy and they will forward the refill request to us. Please allow 3 business days for your refill to be completed.          Additional Information About Your Visit        [facility] Information     [employer] gives you secure access to your electronic health record. If you see a primary care provider, you can also send messages to your care team and make appointments. If you have questions, please call your primary care clinic.  If you do not have a primary care provider, please call [phone] and they will assist you.        Care EveryWhere ID     This is your Care EveryWhere ID. This could be used by other organizations to access your [facility] medical records  [id number]        Your Vitals Were     Pulse Height Last Period Pulse Oximetry BMI (Body Mass Index)       75 5' 5\" (1.651 m) 08/31/2017 99% 26.48 kg/m2        Blood Pressure from Last 3 Encounters:   09/27/17 121/70   09/01/17 132/82   06/30/17 143/90    Weight from Last 3 " Encounters:   09/27/17 159 lb 1.6 oz (72.2 kg)   09/01/17 159 lb (72.1 kg)   04/28/17 146 lb (66.2 kg)              Today, you had the following     No orders found for display       Primary Care Provider Office Phone # Fax #    Mary Ellen ArmstrongSAL [phone]       [facility] [address] [place]  [facility] [address]        Equal Access to Services     SANTA SHABAZZ : Hadii aad ku hadasho Soomaali, waaxda luqadaha, qaybta kaalmada adeegyada, waxay idiin hayaan adeeg kharaakila larajendra . So [facility] [phone].    ATENCIÓN: Si habla español, tiene a wadsworth disposición servicios gratuitos de asistencia lingüística. [facility] [phone].    We comply with applicable federal civil rights laws and [place] laws. We do not discriminate on the basis of race, color, national origin, age, disability sex, sexual orientation or gender identity.            Thank you!     Thank you for choosing [facility]  for your [facility]. Our goal is always to provide you with excellent care. Hearing back from our patients is one way we can continue to improve our services. Please take a few minutes to complete the written survey that you may receive in the mail after your visit with us. Thank you!             Your Updated Medication List - Protect others around you: Learn how to safely use, store and throw away your medicines at www.disposemymeds.org.          This list is accurate as of: 9/27/17  9:01 AM.  Always use your most recent med list.                   Brand Name Dispense Instructions for use Diagnosis    calcium carbonate 1250 MG tablet    OS-MANUEL 500 mg [place]. [place]          IRON SUPPLEMENT PO      Take 325 mg by mouth daily (with breakfast) 2 tabs daily        Multi-vitamin Tabs tablet      Take 1 tablet by mouth daily        VITAMIN B 12 PO           VITAMIN D (CHOLECALCIFEROL) PO      Take 5,000 Units by mouth daily 2 tabs daily

## 2017-09-27 NOTE — PROGRESS NOTES
"Chief Complaint   Patient presents with     Diabetes     CGM eval       Initial LMP 08/31/2017 Estimated body mass index is 26.46 kg/(m^2) as calculated from the following:    Height as of 9/1/17: 5' 5\" (1.651 m).    Weight as of 9/1/17: 159 lb (72.1 kg).  Medication Reconciliation: complete   Aimee Fox      "

## 2017-10-05 ENCOUNTER — TRANSFERRED RECORDS (OUTPATIENT)
Dept: HEALTH INFORMATION MANAGEMENT | Facility: HOSPITAL | Age: 47
End: 2017-10-05

## 2017-10-05 ENCOUNTER — MYC MEDICAL ADVICE (OUTPATIENT)
Dept: FAMILY MEDICINE | Facility: OTHER | Age: 47
End: 2017-10-05

## 2017-10-06 ENCOUNTER — TELEPHONE (OUTPATIENT)
Dept: FAMILY MEDICINE | Facility: OTHER | Age: 47
End: 2017-10-06

## 2017-10-12 ENCOUNTER — OFFICE VISIT (OUTPATIENT)
Dept: FAMILY MEDICINE | Facility: OTHER | Age: 47
End: 2017-10-12
Attending: NURSE PRACTITIONER
Payer: COMMERCIAL

## 2017-10-12 VITALS
TEMPERATURE: 97 F | BODY MASS INDEX: 25.23 KG/M2 | DIASTOLIC BLOOD PRESSURE: 68 MMHG | HEART RATE: 72 BPM | RESPIRATION RATE: 14 BRPM | HEIGHT: 66 IN | SYSTOLIC BLOOD PRESSURE: 98 MMHG | WEIGHT: 157 LBS

## 2017-10-12 DIAGNOSIS — Z00.00 ROUTINE GENERAL MEDICAL EXAMINATION AT A HEALTH CARE FACILITY: Primary | ICD-10-CM

## 2017-10-12 DIAGNOSIS — Z12.31 ENCOUNTER FOR SCREENING MAMMOGRAM FOR BREAST CANCER: ICD-10-CM

## 2017-10-12 PROCEDURE — 99396 PREV VISIT EST AGE 40-64: CPT | Performed by: NURSE PRACTITIONER

## 2017-10-12 ASSESSMENT — ANXIETY QUESTIONNAIRES
5. BEING SO RESTLESS THAT IT IS HARD TO SIT STILL: NOT AT ALL
2. NOT BEING ABLE TO STOP OR CONTROL WORRYING: SEVERAL DAYS
3. WORRYING TOO MUCH ABOUT DIFFERENT THINGS: SEVERAL DAYS
IF YOU CHECKED OFF ANY PROBLEMS ON THIS QUESTIONNAIRE, HOW DIFFICULT HAVE THESE PROBLEMS MADE IT FOR YOU TO DO YOUR WORK, TAKE CARE OF THINGS AT HOME, OR GET ALONG WITH OTHER PEOPLE: NOT DIFFICULT AT ALL
6. BECOMING EASILY ANNOYED OR IRRITABLE: NOT AT ALL
4. TROUBLE RELAXING: NOT AT ALL
GAD7 TOTAL SCORE: 3
1. FEELING NERVOUS, ANXIOUS, OR ON EDGE: SEVERAL DAYS
7. FEELING AFRAID AS IF SOMETHING AWFUL MIGHT HAPPEN: NOT AT ALL

## 2017-10-12 ASSESSMENT — PATIENT HEALTH QUESTIONNAIRE - PHQ9: SUM OF ALL RESPONSES TO PHQ QUESTIONS 1-9: 5

## 2017-10-12 NOTE — PROGRESS NOTES
SUBJECTIVE:   CC: Shazia Verma is an 47 year old woman who presents for preventive health visit.     Healthy Habits:    Do you get at least three servings of calcium containing foods daily (dairy, green leafy vegetables, etc.)?  no, taking calcium and/or vitamin D supplement: yes -     Amount of exercise or daily activities, outside of work: none, has bad hip    Problems taking medications regularly No    Medication side effects: No    Have you had an eye exam in the past two years? yes    Do you see a dentist twice per year? yes    Do you have sleep apnea, excessive snoring or daytime drowsiness?no    Had cholesterol levels checked at work.    Had flu vaccine at work last week.      She has been following with [facility] for hypoglycemia - she has been referred to endocrinology and is scheduled early November.      She is also following with Dr Rincon for hip pain; she had an injection a few weeks ago and it has been helping a bit.  She is scheduled for a follow-up.      She is otherwise feeling well and does not have any new concerns today.         Today's PHQ-2 Score:   PHQ-2 ( 1999 [employer]) 10/9/2017   Q1: Little interest or pleasure in doing things 0   Q2: Feeling down, depressed or hopeless 0   PHQ-2 Score 0   Q1: Little interest or pleasure in doing things Not at all   Q2: Feeling down, depressed or hopeless Not at all   PHQ-2 Score 0       Abuse: Current or Past(Physical, Sexual or Emotional)- No  Do you feel safe in your environment - Yes    Social History   Substance Use Topics     Smoking status: Former Smoker     Smokeless tobacco: Never Used     Alcohol use Yes      Comment: occ     Limits alcohol.    Reviewed orders with patient.  Reviewed health maintenance and updated orders accordingly - Yes  BP Readings from Last 3 Encounters:   10/12/17 98/68   09/27/17 121/70   09/01/17 132/82    Wt Readings from Last 3 Encounters:   10/12/17 157 lb (71.2 kg)   09/27/17 159 lb 1.6 oz (72.2 kg)   09/01/17 159 lb (72.1  kg)                  Patient Active Problem List   Diagnosis     ACP (advance care planning)     H/O gastric bypass     Low blood sugar     Past Surgical History:   Procedure Laterality Date     ABDOMEN SURGERY  2012    gastric bypass     APPENDECTOMY  1991     BREAST SURGERY  2002    right bx     COLONOSCOPY  2013     GI SURGERY  2012    gastric by pass     GYN SURGERY  1992 1994    c section x 2     ORTHOPEDIC SURGERY  2015    right hip labreal tear     ORTHOPEDIC SURGERY  2014    left wrist        Social History   Substance Use Topics     Smoking status: Former Smoker     Smokeless tobacco: Never Used     Alcohol use Yes      Comment: occ     Family History   Problem Relation Age of Onset     CEREBROVASCULAR DISEASE Mother      Hyperlipidemia Mother      Hypertension Mother      Coronary Artery Disease Mother      Migraines Mother      Obesity Mother      Osteoarthritis Mother      OSTEOPOROSIS Mother      DIABETES Mother      Hyperlipidemia Father      Hypertension Father      Thyroid Disease Father      Hypertension Brother      Hyperlipidemia Brother      Obesity Brother      Hypertension Sister      Thyroid Disease Sister          Current Outpatient Prescriptions   Medication Sig Dispense Refill     VITAMIN D, CHOLECALCIFEROL, PO Take 5,000 Units by mouth daily 2 tabs daily       Cyanocobalamin (VITAMIN B 12 PO)        multivitamin, therapeutic with minerals (MULTI-VITAMIN) TABS Take 1 tablet by mouth daily       Ferrous Sulfate (IRON SUPPLEMENT PO) Take 325 mg by mouth daily (with breakfast) 2 tabs daily       calcium carbonate (OS-MANUEL 500 MG [facility]. [place]) 500 MG tablet        Allergies   Allergen Reactions     Reglan [Metoclopramide] Anxiety     Recent Labs   Lab Test  09/01/17   1154  06/30/17   1956  11/28/16   1048   A1C  5.7   --   5.2   ALT   --   21   --    CR  0.57  0.57   --    GFRESTIMATED  >90  >90  Non African American GFR Calc     --    GFRESTBLACK  >90  >90  African American GFR Calc     --   "  POTASSIUM  4.1  4.3   --    TSH  2.16  1.36   --               Mammogram is ordered    Pertinent mammograms are reviewed under the imaging tab.  History of abnormal Pap smear: due next year.      Reviewed and updated as needed this visit by clinical staffTobacco  Allergies  Meds  Med Hx  Surg Hx  Fam Hx  Soc Hx        Reviewed and updated as needed this visit by Provider            ROS:  C: NEGATIVE for fever, chills, change in weight  I: NEGATIVE for worrisome rashes, moles or lesions  E: NEGATIVE for vision changes or irritation  ENT: NEGATIVE for ear, mouth and throat problems  R: NEGATIVE for significant cough or SOB  B: NEGATIVE for masses, tenderness or discharge  CV: NEGATIVE for chest pain, palpitations or peripheral edema  GI: NEGATIVE for nausea, abdominal pain, heartburn, or change in bowel habits  : NEGATIVE for unusual urinary or vaginal symptoms.   M:hip pain as above  N: NEGATIVE for weakness, dizziness or paresthesias  P: NEGATIVE for changes in mood or affect   Hypoglycemia as above    OBJECTIVE:   BP 98/68 (BP Location: Left arm, Patient Position: Sitting, Cuff Size: Adult Regular)  Pulse 72  Temp 97  F (36.1  C)  Resp 14  Ht 5' 6\" (1.676 m)  Wt 157 lb (71.2 kg)  LMP 10/12/2017  BMI 25.34 kg/m2  EXAM:  GENERAL: healthy, alert and no distress  EYES: Eyes grossly normal to inspection, PERRL and conjunctivae and sclerae normal  HENT: ear canals and TM's normal, nose and mouth without ulcers or lesions  NECK: no adenopathy, no asymmetry, masses, or scars, thyroid normal to palpation and no carotid bruits  RESP: lungs clear to auscultation - no rales, rhonchi or wheezes  BREAST: normal without masses, tenderness or nipple discharge and no palpable axillary masses or adenopathy  CV: regular rate and rhythm, normal S1 S2, no S3 or S4, no murmur, click or rub, no peripheral edema and peripheral pulses strong  ABDOMEN: soft, nontender, no hepatosplenomegaly, no masses and bowel sounds " "normal  MS: no gross musculoskeletal defects noted, no edema  SKIN: no suspicious lesions or rashes  NEURO: Normal strength and tone, mentation intact and speech normal  PSYCH: mentation appears normal, affect normal/bright    ASSESSMENT/PLAN:   1. Routine general medical examination at a health care facility  Exam completed    2. Encounter for screening mammogram for breast cancer  - MA Screen Bilateral w/Paul; Future    COUNSELING:   Reviewed preventive health counseling, as reflected in patient instructions       Regular exercise       Healthy diet/nutrition       Vision screening       Hearing screening       Immunizations                 reports that she has quit smoking. She has never used smokeless tobacco.    Estimated body mass index is 25.34 kg/(m^2) as calculated from the following:    Height as of this encounter: 5' 6\" (1.676 m).    Weight as of this encounter: 157 lb (71.2 kg).       Counseling Resources:  ATP IV Guidelines  Pooled Cohorts Equation Calculator  Breast Cancer Risk Calculator  FRAX Risk Assessment  ICSI Preventive Guidelines  Dietary Guidelines for Americans, 2010  USDA's MyPlate  ASA Prophylaxis  Lung CA Screening    Mary Ellen Armstrong, NP  [facility]  "

## 2017-10-12 NOTE — MR AVS SNAPSHOT
After Visit Summary   10/12/2017    Shazia Verma    MRN: [id number]           Patient Information     Date Of Birth          1970        Visit Information        Provider Department      10/12/2017 9:00 AM Mary Ellen Armstrong NP [facility]        Today's Diagnoses     Routine general medical examination at a health care facility    -  1    Encounter for screening mammogram for breast cancer          Care Instructions      ASSESSMENT/PLAN:   1. Routine general medical examination at a health care facility  Exam completed    2. Encounter for screening mammogram for breast cancer  - MA Screen Bilateral w/Paul; Future      Preventive Health Recommendations  Female Ages 40 to 49    Yearly exam:     See your health care provider every year in order to  1. Review health changes.   2. Discuss preventive care.    3. Review your medicines if your doctor prescribed any.      Get a Pap test every three years (unless you have an abnormal result and your provider advises testing more often).      If you get Pap tests with HPV test, you only need to test every 5 years, unless you have an abnormal result. You do not need a Pap test if your uterus was removed (hysterectomy) and you have not had cancer.      You should be tested each year for STDs (sexually transmitted diseases), if you're at risk.       Ask your doctor if you should have a mammogram.      Have a colonoscopy (test for colon cancer) if someone in your family has had colon cancer or polyps before age 50.       Have a cholesterol test every 5 years.       Have a diabetes test (fasting glucose) after age 45. If you are at risk for diabetes, you should have this test every 3 years.    Shots: Get a flu shot each year. Get a tetanus shot every 10 years.     Nutrition:     Eat at least 5 servings of fruits and vegetables each day.    Eat whole-grain bread, whole-wheat pasta and brown rice instead of white grains and rice.    Talk to  your provider about Calcium and Vitamin D.     Lifestyle    Exercise at least 150 minutes a week (an average of 30 minutes a day, 5 days a week). This will help you control your weight and prevent disease.    Limit alcohol to one drink per day.    No smoking.     Wear sunscreen to prevent skin cancer.    See your dentist every six months for an exam and cleaning.          Follow-ups after your visit        Future tests that were ordered for you today     Open Future Orders        Priority Expected Expires Ordered    MA Screen Bilateral w/Paul Routine  10/12/2018 10/12/2017            Who to contact     If you have questions or need follow up information about today's clinic visit or your schedule please contact [facility] directly at [phone].  Normal or non-critical lab and imaging results will be communicated to you by Managed Methodshart, letter or phone within 4 business days after the clinic has received the results. If you do not hear from us within 7 days, please contact the clinic through Managed Methodshart or phone. If you have a critical or abnormal lab result, we will notify you by phone as soon as possible.  Submit refill requests through Lectorati or call your pharmacy and they will forward the refill request to us. Please allow 3 business days for your refill to be completed.          Additional Information About Your Visit        Managed MethodsharClavister Information     Lectorati gives you secure access to your electronic health record. If you see a primary care provider, you can also send messages to your care team and make appointments. If you have questions, please call your primary care clinic.  If you do not have a primary care provider, please call [phone] and they will assist you.        Care EveryWhere ID     This is your Care EveryWhere ID. This could be used by other organizations to access your [facility] medical records  [id number]        Your Vitals Were     Pulse Temperature Respirations Height Last Period  "BMI (Body Mass Index)    72 97  F (36.1  C) 14 5' 6\" (1.676 m) 10/12/2017 25.34 kg/m2       Blood Pressure from Last 3 Encounters:   10/12/17 98/68   09/27/17 121/70   09/01/17 132/82    Weight from Last 3 Encounters:   10/12/17 157 lb (71.2 kg)   09/27/17 159 lb 1.6 oz (72.2 kg)   09/01/17 159 lb (72.1 kg)               Primary Care Provider Office Phone # Fax #    Mary Ellen MukherjeebobEzeSAL [phone]       [facility] [address] [place]  [facility] [id number]        Equal Access to Services     [place] : Portillo stewarto Soomaali, waaxda luqadaha, qaybta kaalmada adeegyada, jeff osullivan . So [facility] [phone].    ATENCIÓN: Si habla español, tiene a wadsworth disposición servicios gratuitos de asistencia lingüística. Llame al [phone].    We comply with applicable federal civil rights laws and [place] laws. We do not discriminate on the basis of race, color, national origin, age, disability, sex, sexual orientation, or gender identity.            Thank you!     Thank you for choosing [facility]  for your care. Our goal is always to provide you with excellent care. Hearing back from our patients is one way we can continue to improve our services. Please take a few minutes to complete the written survey that you may receive in the mail after your visit with us. Thank you!             Your Updated Medication List - Protect others around you: Learn how to safely use, store and throw away your medicines at www.disposemymeds.org.          This list is accurate as of: 10/12/17  9:43 AM.  Always use your most recent med list.                   Brand Name Dispense Instructions for use Diagnosis    calcium carbonate 1250 MG tablet    OS-MANUEL 500 mg [place]. [place]          IRON SUPPLEMENT PO      Take 325 mg by mouth daily (with breakfast) 2 tabs daily        Multi-vitamin Tabs tablet      Take 1 tablet by mouth daily        VITAMIN B 12 PO           " VITAMIN D (CHOLECALCIFEROL) PO      Take 5,000 Units by mouth daily 2 tabs daily

## 2017-10-12 NOTE — NURSING NOTE
"Chief Complaint   Patient presents with     Physical       Initial BP 98/68 (BP Location: Left arm, Patient Position: Sitting, Cuff Size: Adult Regular)  Pulse 72  Temp 97  F (36.1  C)  Resp 14  Ht 5' 6\" (1.676 m)  Wt 157 lb (71.2 kg)  LMP 10/12/2017  BMI 25.34 kg/m2 Estimated body mass index is 25.34 kg/(m^2) as calculated from the following:    Height as of this encounter: 5' 6\" (1.676 m).    Weight as of this encounter: 157 lb (71.2 kg).  Medication Reconciliation: complete     Liliya Mejia      "

## 2017-10-12 NOTE — PATIENT INSTRUCTIONS
ASSESSMENT/PLAN:   1. Routine general medical examination at a health care facility  Exam completed    2. Encounter for screening mammogram for breast cancer  - MA Screen Bilateral w/Paul; Future      Preventive Health Recommendations  Female Ages 40 to 49    Yearly exam:     See your health care provider every year in order to  1. Review health changes.   2. Discuss preventive care.    3. Review your medicines if your doctor prescribed any.      Get a Pap test every three years (unless you have an abnormal result and your provider advises testing more often).      If you get Pap tests with HPV test, you only need to test every 5 years, unless you have an abnormal result. You do not need a Pap test if your uterus was removed (hysterectomy) and you have not had cancer.      You should be tested each year for STDs (sexually transmitted diseases), if you're at risk.       Ask your doctor if you should have a mammogram.      Have a colonoscopy (test for colon cancer) if someone in your family has had colon cancer or polyps before age 50.       Have a cholesterol test every 5 years.       Have a diabetes test (fasting glucose) after age 45. If you are at risk for diabetes, you should have this test every 3 years.    Shots: Get a flu shot each year. Get a tetanus shot every 10 years.     Nutrition:     Eat at least 5 servings of fruits and vegetables each day.    Eat whole-grain bread, whole-wheat pasta and brown rice instead of white grains and rice.    Talk to your provider about Calcium and Vitamin D.     Lifestyle    Exercise at least 150 minutes a week (an average of 30 minutes a day, 5 days a week). This will help you control your weight and prevent disease.    Limit alcohol to one drink per day.    No smoking.     Wear sunscreen to prevent skin cancer.    See your dentist every six months for an exam and cleaning.

## 2017-10-13 ASSESSMENT — ANXIETY QUESTIONNAIRES: GAD7 TOTAL SCORE: 3

## 2017-10-17 ENCOUNTER — RADIANT APPOINTMENT (OUTPATIENT)
Dept: MAMMOGRAPHY | Facility: OTHER | Age: 47
End: 2017-10-17
Attending: NURSE PRACTITIONER
Payer: COMMERCIAL

## 2017-10-17 DIAGNOSIS — Z12.31 ENCOUNTER FOR SCREENING MAMMOGRAM FOR BREAST CANCER: ICD-10-CM

## 2017-10-17 PROCEDURE — 77063 BREAST TOMOSYNTHESIS BI: CPT | Mod: TC

## 2017-10-17 PROCEDURE — G0202 SCR MAMMO BI INCL CAD: HCPCS | Mod: TC

## 2017-11-06 ENCOUNTER — TRANSFERRED RECORDS (OUTPATIENT)
Dept: HEALTH INFORMATION MANAGEMENT | Facility: HOSPITAL | Age: 47
End: 2017-11-06

## 2017-11-15 ENCOUNTER — TRANSFERRED RECORDS (OUTPATIENT)
Dept: HEALTH INFORMATION MANAGEMENT | Facility: HOSPITAL | Age: 47
End: 2017-11-15

## 2017-11-20 ENCOUNTER — HOSPITAL ENCOUNTER (OUTPATIENT)
Dept: PHYSICAL THERAPY | Facility: HOSPITAL | Age: 47
Setting detail: THERAPIES SERIES
End: 2017-11-20
Attending: SPECIALIST
Payer: COMMERCIAL

## 2017-11-20 PROCEDURE — 97162 PT EVAL MOD COMPLEX 30 MIN: CPT | Mod: GP

## 2017-11-20 PROCEDURE — 40000718 ZZHC STATISTIC PT DEPARTMENT ORTHO VISIT

## 2017-11-20 PROCEDURE — 97035 APP MDLTY 1+ULTRASOUND EA 15: CPT | Mod: GP

## 2017-11-20 ASSESSMENT — ACTIVITIES OF DAILY LIVING (ADL)
WALKING_APPROXIMATELY_10_MINUTES: SLIGHT DIFFICULTY
GOING_UP_1_FLIGHT_OF_STAIRS: MODERATE DIFFICULTY
DEEP_SQUATTING: MODERATE DIFFICULTY
PUTTING_ON_SOCKS_AND_SHOES: MODERATE DIFFICULTY
WALKING_UP_STEEP_HILLS: MODERATE DIFFICULTY
STANDING_FOR_15_MINUTES: SLIGHT DIFFICULTY
STEPPING_UP_AND_DOWN_CURBS: SLIGHT DIFFICULTY
HOW_WOULD_YOU_RATE_YOUR_CURRENT_LEVEL_OF_FUNCTION_DURING_YOUR_USUAL_ACTIVITIES_OF_DAILY_LIVING_FROM_0_TO_100_WITH_100_BEING_YOUR_LEVEL_OF_FUNCTION_PRIOR_TO_YOUR_HIP_PROBLEM_AND_0_BEING_THE_INABILITY_TO_PERFORM_ANY_OF_YOUR_USUAL_DAILY_ACTIVITIES?: 25
GETTING_INTO_AND_OUT_OF_AN_AVERAGE_CAR: SLIGHT DIFFICULTY
WALKING_DOWN_STEEP_HILLS: MODERATE DIFFICULTY
SITTING_FOR_15_MINUTES: SLIGHT DIFFICULTY
GOING_DOWN_1_FLIGHT_OF_STAIRS: MODERATE DIFFICULTY
RECREATIONAL_ACTIVITIES: EXTREME DIFFICULTY
WALKING_15_MINUTES_OR_GREATER: EXTREME DIFFICULTY
ROLLING_OVER_IN_BED: MODERATE DIFFICULTY
WALKING_INITIALLY: SLIGHT DIFFICULTY
HEAVY_WORK: EXTREME DIFFICULTY
LIGHT_TO_MODERATE_WORK: SLIGHT DIFFICULTY
TWISTING/PIVOTING_ON_INVOLVED_LEG: MODERATE DIFFICULTY
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
## 2017-11-27 ENCOUNTER — HOSPITAL ENCOUNTER (OUTPATIENT)
Dept: PHYSICAL THERAPY | Facility: HOSPITAL | Age: 47
Setting detail: THERAPIES SERIES
End: 2017-11-27
Attending: SPECIALIST
Payer: COMMERCIAL

## 2017-11-27 PROCEDURE — 97035 APP MDLTY 1+ULTRASOUND EA 15: CPT | Mod: GP

## 2017-11-27 PROCEDURE — 97110 THERAPEUTIC EXERCISES: CPT | Mod: GP

## 2017-11-27 PROCEDURE — 40000718 ZZHC STATISTIC PT DEPARTMENT ORTHO VISIT

## 2017-11-30 ENCOUNTER — HOSPITAL ENCOUNTER (OUTPATIENT)
Dept: PHYSICAL THERAPY | Facility: HOSPITAL | Age: 47
Setting detail: THERAPIES SERIES
End: 2017-11-30
Attending: SPECIALIST
Payer: COMMERCIAL

## 2017-11-30 PROCEDURE — 40000718 ZZHC STATISTIC PT DEPARTMENT ORTHO VISIT

## 2017-11-30 PROCEDURE — 97110 THERAPEUTIC EXERCISES: CPT | Mod: GP

## 2017-11-30 PROCEDURE — 97035 APP MDLTY 1+ULTRASOUND EA 15: CPT | Mod: GP

## 2017-12-04 ENCOUNTER — HOSPITAL ENCOUNTER (OUTPATIENT)
Dept: PHYSICAL THERAPY | Facility: HOSPITAL | Age: 47
Setting detail: THERAPIES SERIES
End: 2017-12-04
Attending: SPECIALIST
Payer: COMMERCIAL

## 2017-12-04 PROCEDURE — 40000718 ZZHC STATISTIC PT DEPARTMENT ORTHO VISIT

## 2017-12-04 PROCEDURE — 97110 THERAPEUTIC EXERCISES: CPT | Mod: GP

## 2017-12-07 ENCOUNTER — HOSPITAL ENCOUNTER (OUTPATIENT)
Dept: PHYSICAL THERAPY | Facility: HOSPITAL | Age: 47
Setting detail: THERAPIES SERIES
End: 2017-12-07
Attending: SPECIALIST
Payer: COMMERCIAL

## 2017-12-07 PROCEDURE — 40000718 ZZHC STATISTIC PT DEPARTMENT ORTHO VISIT

## 2017-12-07 PROCEDURE — 97110 THERAPEUTIC EXERCISES: CPT | Mod: GP

## 2017-12-07 PROCEDURE — 97033 APP MDLTY 1+IONTPHRSIS EA 15: CPT | Mod: GP

## 2017-12-12 ENCOUNTER — HOSPITAL ENCOUNTER (OUTPATIENT)
Dept: PHYSICAL THERAPY | Facility: HOSPITAL | Age: 47
Setting detail: THERAPIES SERIES
End: 2017-12-12
Attending: SPECIALIST
Payer: COMMERCIAL

## 2017-12-12 PROCEDURE — 97110 THERAPEUTIC EXERCISES: CPT | Mod: GP

## 2017-12-12 PROCEDURE — 40000718 ZZHC STATISTIC PT DEPARTMENT ORTHO VISIT

## 2017-12-12 PROCEDURE — 97033 APP MDLTY 1+IONTPHRSIS EA 15: CPT | Mod: GP

## 2017-12-14 ENCOUNTER — HOSPITAL ENCOUNTER (OUTPATIENT)
Dept: PHYSICAL THERAPY | Facility: HOSPITAL | Age: 47
Setting detail: THERAPIES SERIES
End: 2017-12-14
Attending: SPECIALIST
Payer: COMMERCIAL

## 2017-12-14 PROCEDURE — 40000718 ZZHC STATISTIC PT DEPARTMENT ORTHO VISIT

## 2017-12-14 PROCEDURE — 97110 THERAPEUTIC EXERCISES: CPT | Mod: GP

## 2017-12-14 NOTE — PROGRESS NOTES
Outpatient Physical Therapy Progress Note     Patient: Shazia Verma  : 1970    Beginning/End Dates of Reporting Period:   17 to 2017    Referring Provider: Dr Rincon    Therapy Diagnosis:  R hip pain     Client Self Report: Pt reports having no signif change in pain - when getting up to stand, pt has more R hip pain.  Also clicks with walking at times. Not sure if PT is making a difference    Objective Measurements:      Goals:  Goal Identifier STG 1   Goal Description Pt will demonstrate indep HEP compliance   Target Date 17   Date Met   17   Progress:     Goal Identifier STG 2   Goal Description Pt will have reduced hip pain to 4/10 or less consistently with no signif gait antalgia   Target Date 18   Date Met      Progress: not consistently met as of this date.      Goal Identifier LTG   Goal Description Pt will resume all activities without hip pain, tightness or mm guarding   Target Date 01/15/18   Date Met      Progress: not met as of this date.      Goal Identifier     Goal Description     Target Date     Date Met      Progress:     Goal Identifier     Goal Description     Target Date     Date Met      Progress:     Goal Identifier     Goal Description     Target Date     Date Met      Progress:     Goal Identifier     Goal Description     Target Date     Date Met      Progress:     Goal Identifier     Goal Description     Target Date     Date Met      Progress:     Progress Toward Goals:   Progress this reporting period: Pt has been seen x 7 visits total in PT. Pt has been seen for phonophoresis, iontophoresis, strengthening, and stretching. She has an exercise program for sidelying glut med strengthening as well as multiple stretches as tolerated. Pt's main improvement is her R hip strength. She has 4-/5 grade glut med strength as of this date compared to 3/5 on eval.  Her pain has decreased very minimally and she still has antalgic gait pattern. Due to lack of progress  with pain and gait, no further PT is planned at this time. Pt agrees with this plan.           Plan:  Discharge from therapy.    Discharge:    Reason for Discharge: No further expectation of progress.    Equipment Issued: n/a  Discharge Plan: Patient to continue home program.  Other services: R/U with orthopaedics

## 2017-12-20 ENCOUNTER — TRANSFERRED RECORDS (OUTPATIENT)
Dept: HEALTH INFORMATION MANAGEMENT | Facility: HOSPITAL | Age: 47
End: 2017-12-20

## 2018-01-08 ENCOUNTER — TRANSFERRED RECORDS (OUTPATIENT)
Dept: HEALTH INFORMATION MANAGEMENT | Facility: HOSPITAL | Age: 48
End: 2018-01-08

## 2018-01-15 ENCOUNTER — OFFICE VISIT (OUTPATIENT)
Dept: FAMILY MEDICINE | Facility: OTHER | Age: 48
End: 2018-01-15
Attending: NURSE PRACTITIONER
Payer: COMMERCIAL

## 2018-01-15 VITALS
DIASTOLIC BLOOD PRESSURE: 74 MMHG | OXYGEN SATURATION: 99 % | BODY MASS INDEX: 25.07 KG/M2 | HEART RATE: 76 BPM | WEIGHT: 156 LBS | SYSTOLIC BLOOD PRESSURE: 130 MMHG | RESPIRATION RATE: 14 BRPM | HEIGHT: 66 IN | TEMPERATURE: 96.6 F

## 2018-01-15 DIAGNOSIS — E16.2 HYPOGLYCEMIA: ICD-10-CM

## 2018-01-15 DIAGNOSIS — Z01.818 PREOP GENERAL PHYSICAL EXAM: Primary | ICD-10-CM

## 2018-01-15 DIAGNOSIS — M16.11 ARTHRITIS OF RIGHT HIP: ICD-10-CM

## 2018-01-15 DIAGNOSIS — M25.551 HIP PAIN, RIGHT: ICD-10-CM

## 2018-01-15 DIAGNOSIS — Z98.84 H/O GASTRIC BYPASS: ICD-10-CM

## 2018-01-15 LAB
ALBUMIN UR-MCNC: NEGATIVE MG/DL
APPEARANCE UR: CLEAR
BACTERIA #/AREA URNS HPF: ABNORMAL /HPF
BASOPHILS # BLD AUTO: 0 10E9/L (ref 0–0.2)
BASOPHILS NFR BLD AUTO: 0.2 %
BILIRUB UR QL STRIP: NEGATIVE
COLOR UR AUTO: YELLOW
DIFFERENTIAL METHOD BLD: ABNORMAL
EOSINOPHIL # BLD AUTO: 0.1 10E9/L (ref 0–0.7)
EOSINOPHIL NFR BLD AUTO: 0.8 %
ERYTHROCYTE [DISTWIDTH] IN BLOOD BY AUTOMATED COUNT: 14.8 % (ref 10–15)
ERYTHROCYTE [SEDIMENTATION RATE] IN BLOOD BY WESTERGREN METHOD: 15 MM/H (ref 0–20)
GLUCOSE UR STRIP-MCNC: NEGATIVE MG/DL
HCT VFR BLD AUTO: 34.3 % (ref 35–47)
HGB BLD-MCNC: 11.1 G/DL (ref 11.7–15.7)
HGB UR QL STRIP: ABNORMAL
KETONES UR STRIP-MCNC: ABNORMAL MG/DL
LEUKOCYTE ESTERASE UR QL STRIP: NEGATIVE
LYMPHOCYTES # BLD AUTO: 2 10E9/L (ref 0.8–5.3)
LYMPHOCYTES NFR BLD AUTO: 32.5 %
MCH RBC QN AUTO: 26.9 PG (ref 26.5–33)
MCHC RBC AUTO-ENTMCNC: 32.4 G/DL (ref 31.5–36.5)
MCV RBC AUTO: 83 FL (ref 78–100)
MONOCYTES # BLD AUTO: 0.4 10E9/L (ref 0–1.3)
MONOCYTES NFR BLD AUTO: 6.9 %
NEUTROPHILS # BLD AUTO: 3.6 10E9/L (ref 1.6–8.3)
NEUTROPHILS NFR BLD AUTO: 59.6 %
NITRATE UR QL: NEGATIVE
NON-SQ EPI CELLS #/AREA URNS LPF: ABNORMAL /LPF
PH UR STRIP: 5 PH (ref 5–7)
PLATELET # BLD AUTO: 295 10E9/L (ref 150–450)
RBC # BLD AUTO: 4.13 10E12/L (ref 3.8–5.2)
RBC #/AREA URNS AUTO: ABNORMAL /HPF
SOURCE: ABNORMAL
SP GR UR STRIP: >1.03 (ref 1–1.03)
UROBILINOGEN UR STRIP-ACNC: 0.2 EU/DL (ref 0.2–1)
WBC # BLD AUTO: 6.1 10E9/L (ref 4–11)
WBC #/AREA URNS AUTO: ABNORMAL /HPF

## 2018-01-15 PROCEDURE — 81001 URINALYSIS AUTO W/SCOPE: CPT | Performed by: NURSE PRACTITIONER

## 2018-01-15 PROCEDURE — 80053 COMPREHEN METABOLIC PANEL: CPT | Performed by: NURSE PRACTITIONER

## 2018-01-15 PROCEDURE — 36415 COLL VENOUS BLD VENIPUNCTURE: CPT | Performed by: NURSE PRACTITIONER

## 2018-01-15 PROCEDURE — 85025 COMPLETE CBC W/AUTO DIFF WBC: CPT | Performed by: NURSE PRACTITIONER

## 2018-01-15 PROCEDURE — 99214 OFFICE O/P EST MOD 30 MIN: CPT | Performed by: NURSE PRACTITIONER

## 2018-01-15 PROCEDURE — 85652 RBC SED RATE AUTOMATED: CPT | Performed by: NURSE PRACTITIONER

## 2018-01-15 ASSESSMENT — ANXIETY QUESTIONNAIRES
GAD7 TOTAL SCORE: 1
IF YOU CHECKED OFF ANY PROBLEMS ON THIS QUESTIONNAIRE, HOW DIFFICULT HAVE THESE PROBLEMS MADE IT FOR YOU TO DO YOUR WORK, TAKE CARE OF THINGS AT HOME, OR GET ALONG WITH OTHER PEOPLE: NOT DIFFICULT AT ALL
5. BEING SO RESTLESS THAT IT IS HARD TO SIT STILL: NOT AT ALL
6. BECOMING EASILY ANNOYED OR IRRITABLE: NOT AT ALL
2. NOT BEING ABLE TO STOP OR CONTROL WORRYING: NOT AT ALL
7. FEELING AFRAID AS IF SOMETHING AWFUL MIGHT HAPPEN: NOT AT ALL
1. FEELING NERVOUS, ANXIOUS, OR ON EDGE: NOT AT ALL
3. WORRYING TOO MUCH ABOUT DIFFERENT THINGS: NOT AT ALL

## 2018-01-15 ASSESSMENT — PATIENT HEALTH QUESTIONNAIRE - PHQ9
SUM OF ALL RESPONSES TO PHQ QUESTIONS 1-9: 1
5. POOR APPETITE OR OVEREATING: SEVERAL DAYS

## 2018-01-15 NOTE — NURSING NOTE
"Chief Complaint   Patient presents with     Pre-Op Exam       Initial /74 (BP Location: Left leg, Patient Position: Sitting, Cuff Size: Adult Regular)  Pulse 76  Temp 96.6  F (35.9  C) (Tympanic)  Resp 14  Ht 5' 6\" (1.676 m)  Wt 156 lb (70.8 kg)  SpO2 99%  BMI 25.18 kg/m2 Estimated body mass index is 25.18 kg/(m^2) as calculated from the following:    Height as of this encounter: 5' 6\" (1.676 m).    Weight as of this encounter: 156 lb (70.8 kg).  Medication Reconciliation: complete   Traci Wiley      "

## 2018-01-15 NOTE — MR AVS SNAPSHOT
After Visit Summary   1/15/2018    Shazia Verma    MRN: [id number]           Patient Information     Date Of Birth          1970        Visit Information        Provider Department      1/15/2018 3:00 PM Mary Ellen Armstrong NP [facility]        Today's Diagnoses     Preop general physical exam    -  1    Hip pain, right        Arthritis of right hip        Hypoglycemia        H/O gastric bypass          Care Instructions      Before Your Surgery      Call your surgeon if there is any change in your health. This includes signs of a cold or flu (such as a sore throat, runny nose, cough, rash or fever).    Do not smoke, drink alcohol or take over the counter medicine (unless your surgeon or primary care doctor tells you to) for the 24 hours before and after surgery.    If you take prescribed drugs: Follow your doctor s orders about which medicines to take and which to stop until after surgery.    Eating and drinking prior to surgery: follow the instructions from your surgeon    Take a shower or bath the night before surgery. Use the soap your surgeon gave you to gently clean your skin. If you do not have soap from your surgeon, use your regular soap. Do not shave or scrub the surgery site.  Wear clean pajamas and have clean sheets on your bed.           Follow-ups after your visit        Who to contact     If you have questions or need follow up information about today's clinic visit or your schedule please contact [facility] directly at [phone].  Normal or non-critical lab and imaging results will be communicated to you by MyChart, letter or phone within 4 business days after the clinic has received the results. If you do not hear from us within 7 days, please contact the clinic through MyChart or phone. If you have a critical or abnormal lab result, we will notify you by phone as soon as possible.  Submit refill requests through LOYAL3 or call your pharmacy  "and they will forward the refill request to us. Please allow 3 business days for your refill to be completed.          Additional Information About Your Visit        AMXhart Information     [employer] gives you secure access to your electronic health record. If you see a primary care provider, you can also send messages to your care team and make appointments. If you have questions, please call your primary care clinic.  If you do not have a primary care provider, please call [phone] and they will assist you.        Care EveryWhere ID     This is your Care EveryWhere ID. This could be used by other organizations to access your [facility] medical records  [id number]        Your Vitals Were     Pulse Temperature Respirations Height Pulse Oximetry BMI (Body Mass Index)    76 96.6  F (35.9  C) (Tympanic) 14 5' 6\" (1.676 m) 99% 25.18 kg/m2       Blood Pressure from Last 3 Encounters:   01/15/18 130/74   10/12/17 98/68   09/27/17 121/70    Weight from Last 3 Encounters:   01/15/18 156 lb (70.8 kg)   10/12/17 157 lb (71.2 kg)   09/27/17 159 lb 1.6 oz (72.2 kg)              We Performed the Following     *UA reflex to Microscopic and Culture - [facility]/English     CBC with platelets differential     Comprehensive metabolic panel     Erythrocyte sedimentation rate auto        Primary Care Provider Office Phone # Fax #    Mary Ellen TOCasimiro Armstrong [phone] [address] [facility] [address]        Equal Access to Services     SNATA SHABAZZ : Hadii aad ku hadasho Soomaali, waaxda luqadaha, qaybta kaalmada adeegyada, waxay bryce osullivan . So [facility] [phone].    ATENCIÓN: Si habla español, tiene a wadsworth disposición servicios gratuitos de asistencia lingüística. Llame al [phone].    We comply with applicable federal civil rights laws and [place] laws. We do not discriminate on the basis of race, color, national origin, age, disability, sex, sexual orientation, or " gender identity.            Thank you!     Thank you for choosing [facility]  for your care. Our goal is always to provide you with excellent care. Hearing back from our patients is one way we can continue to improve our services. Please take a few minutes to complete the written survey that you may receive in the mail after your visit with us. Thank you!             Your Updated Medication List - Protect others around you: Learn how to safely use, store and throw away your medicines at www.disposemymeds.org.          This list is accurate as of: 1/15/18  3:34 PM.  Always use your most recent med list.                   Brand Name Dispense Instructions for use Diagnosis    calcium carbonate 1250 MG tablet    OS-MANUEL 500 mg [place]. Ca          IRON SUPPLEMENT PO      Take 325 mg by mouth daily (with breakfast) 2 tabs daily        Multi-vitamin Tabs tablet      Take 1 tablet by mouth daily        VITAMIN B 12 PO           VITAMIN D (CHOLECALCIFEROL) PO      Take 5,000 Units by mouth daily 2 tabs daily
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
## 2018-01-16 ENCOUNTER — TELEPHONE (OUTPATIENT)
Dept: FAMILY MEDICINE | Facility: OTHER | Age: 48
End: 2018-01-16

## 2018-01-16 LAB
ALBUMIN SERPL-MCNC: 3.8 G/DL (ref 3.4–5)
ALP SERPL-CCNC: 79 U/L (ref 40–150)
ALT SERPL W P-5'-P-CCNC: 27 U/L (ref 0–50)
ANION GAP SERPL CALCULATED.3IONS-SCNC: 8 MMOL/L (ref 3–14)
AST SERPL W P-5'-P-CCNC: 19 U/L (ref 0–45)
BILIRUB SERPL-MCNC: 0.5 MG/DL (ref 0.2–1.3)
BUN SERPL-MCNC: 11 MG/DL (ref 7–30)
CALCIUM SERPL-MCNC: 8.5 MG/DL (ref 8.5–10.1)
CHLORIDE SERPL-SCNC: 107 MMOL/L (ref 94–109)
CO2 SERPL-SCNC: 27 MMOL/L (ref 20–32)
CREAT SERPL-MCNC: 0.57 MG/DL (ref 0.52–1.04)
GFR SERPL CREATININE-BSD FRML MDRD: >90 ML/MIN/1.7M2
GLUCOSE SERPL-MCNC: 119 MG/DL (ref 70–99)
POTASSIUM SERPL-SCNC: 3.7 MMOL/L (ref 3.4–5.3)
PROT SERPL-MCNC: 7.1 G/DL (ref 6.8–8.8)
SODIUM SERPL-SCNC: 142 MMOL/L (ref 133–144)

## 2018-01-16 ASSESSMENT — ANXIETY QUESTIONNAIRES: GAD7 TOTAL SCORE: 1

## 2018-01-18 NOTE — PROGRESS NOTES
Faxed PreOp 1/15/18, labs and ECG to:  Dr. Rincon, [facility]  Ph,. [phone]

## 2018-02-28 ENCOUNTER — TRANSFERRED RECORDS (OUTPATIENT)
Dept: HEALTH INFORMATION MANAGEMENT | Facility: CLINIC | Age: 48
End: 2018-02-28

## 2018-03-01 ENCOUNTER — HOSPITAL ENCOUNTER (EMERGENCY)
Facility: HOSPITAL | Age: 48
Discharge: HOME | End: 2018-03-01
Attending: PHYSICIAN ASSISTANT | Admitting: PHYSICIAN ASSISTANT
Payer: COMMERCIAL

## 2018-03-01 VITALS
RESPIRATION RATE: 18 BRPM | DIASTOLIC BLOOD PRESSURE: 99 MMHG | SYSTOLIC BLOOD PRESSURE: 149 MMHG | HEART RATE: 65 BPM | OXYGEN SATURATION: 100 % | TEMPERATURE: 96.9 F

## 2018-03-01 DIAGNOSIS — I10 HYPERTENSION, UNSPECIFIED TYPE: ICD-10-CM

## 2018-03-01 PROCEDURE — 99282 EMERGENCY DEPT VISIT SF MDM: CPT | Performed by: PHYSICIAN ASSISTANT

## 2018-03-01 PROCEDURE — 99282 EMERGENCY DEPT VISIT SF MDM: CPT

## 2018-03-01 ASSESSMENT — ENCOUNTER SYMPTOMS
SHORTNESS OF BREATH: 0
LIGHT-HEADEDNESS: 0
FATIGUE: 0
PALPITATIONS: 0
APPETITE CHANGE: 0
COUGH: 0
FEVER: 0
NAUSEA: 0
HEADACHES: 1
DIARRHEA: 0
ACTIVITY CHANGE: 0
BACK PAIN: 0
ABDOMINAL PAIN: 0
DIZZINESS: 0
VOMITING: 0
CHEST TIGHTNESS: 0
NECK PAIN: 0

## 2018-03-01 NOTE — ED AVS SNAPSHOT
[facility]    [address] [facility] [address]    Phone:  [phone]                                       Shazia Verma   MRN: [id number]    Department:  [facility]   Date of Visit:  3/1/2018           Patient Information     Date Of Birth          1970        Your diagnoses for this visit were:     Hypertension, unspecified type        You were seen by Alberto Jarquin PA-C.      Follow-up Information     Schedule an appointment as soon as possible for a visit with Mary Ellen Armstrong NP.    Specialty:  Family Practice    Contact information:    [address] [facility] [address]  [phone]          Follow up with [facility].    Specialty:  EMERGENCY MEDICINE    Why:  If symptoms worsen    Contact information:    [address] [facility] [address] [phone]    Additional information:    From [facility]: Take [address]. Turn left at [address]. Turn left at the first stoplight on [address]. At the first stop sign, take a right onto [address]. Take a left into the parking lot and continue through until you reach the North enterance of the building.       From [place]: Take [address]. Take the [address] ramp towards [place]. Turn left onto [address]. Take a slight right onto [address]. Turn left at the first stoplight on [address]. At the first stop sign, take a right onto [address]. Take a left into the parking lot and continue through until you reach the North enterance of the building.       From [place]: Take [address]. Take a right at [address]. At the first stop sign, take a right onto [address]. Take a left into the parking lot and continue through until you reach the North enterance of the building.         Discharge Instructions       Please follow-up in the clinic for recheck.     Please return here for any other  concerns.     Discharge References/Attachments     BLOOD PRESSURE, TAKING YOUR (ENGLISH)    HYPERTENSION, TO BE CONFIRMED (ENGLISH)      Future Appointments        Provider Department Dept Phone Center    3/5/2018 9:00 AM Steff Rosario, PT, PT [facility] [phone] [facility]         Review of your medicines      Our records show that you are taking the medicines listed below. If these are incorrect, please call your family doctor or clinic.        Dose / Directions Last dose taken    calcium carbonate 1250 MG tablet   Commonly known as:  OS-MANUEL 500 mg [place]. Ca        Refills:  0        IRON SUPPLEMENT PO   Dose:  325 mg        Take 325 mg by mouth daily (with breakfast) 2 tabs daily   Refills:  0        Multi-vitamin Tabs tablet   Dose:  1 tablet        Take 1 tablet by mouth daily   Refills:  0        VITAMIN B 12 PO        Refills:  0        VITAMIN D (CHOLECALCIFEROL) PO   Dose:  5000 Units        Take 5,000 Units by mouth daily 2 tabs daily   Refills:  0                Orders Needing Specimen Collection     None      Pending Results     No orders found from 2/27/2018 to 3/2/2018.            Pending Culture Results     No orders found from 2/27/2018 to 3/2/2018.            Thank you for choosing [facility]       Thank you for choosing [facility] for your care. Our goal is always to provide you with excellent care. Hearing back from our patients is one way we can continue to improve our services. Please take a few minutes to complete the written survey that you may receive in the mail after you visit with us. Thank you!        Obviousideahart Information     [address] gives you secure access to your electronic health record. If you see a primary care provider, you can also send messages to your care team and make appointments. If you have questions, please call your primary care clinic.  If you do not have a primary care provider, please call [phone] and they will assist you.        Care EveryWhere ID      This is your Care EveryWhere ID. This could be used by other organizations to access your [facility] medical records  [id number]        Equal Access to Services     SANTA SHABAZZ : Portillo Conn, marlon alarcon, wyatt edmonds, jeff ivey. So [facility] [phone].    ATENCIÓN: Si habla español, tiene a wadsworth disposición servicios gratuitos de asistencia lingüística. Llame al [phone].    We comply with applicable federal civil rights laws and [place] laws. We do not discriminate on the basis of race, color, national origin, age, disability, sex, sexual orientation, or gender identity.            After Visit Summary       This is your record. Keep this with you and show to your community pharmacist(s) and doctor(s) at your next visit.

## 2018-03-01 NOTE — ED AVS SNAPSHOT
[facility]    [address]    [place] [address]    Phone:  [phone]                                       Shazia Verma   MRN: [id number]    Department:  [facility]   Date of Visit:  3/1/2018           After Visit Summary Signature Page     I have received my discharge instructions, and my questions have been answered. I have discussed any challenges I see with this plan with the nurse or doctor.    ..........................................................................................................................................  Patient/Patient Representative Signature      ..........................................................................................................................................  Patient Representative Print Name and Relationship to Patient    ..................................................               ................................................  Date                                            Time    ..........................................................................................................................................  Reviewed by Signature/Title    ...................................................              ..............................................  Date                                                            Time

## 2018-03-02 NOTE — ED NOTES
Discharge instructions completed with patient with no questions or concerns. Vital signs stable. Patient to follow up with PCP.

## 2018-03-02 NOTE — ED NOTES
Patient arrives with daughter with complaint of hypertension. Patient states she was working at [employer] today when she had her BP checked x2 by a coworker, and found to be -170's. Diastolic noted 100-110. Patient stating she has not had high blood pressure for 6 years since she had gastric bypass surgery. Patient stating she had a mild headache and felt flushed earlier, however denies any symptoms at this time. Denies chest pain. Denies shortness of breath. Call light within reach.

## 2018-03-02 NOTE — ED PROVIDER NOTES
History     Chief Complaint   Patient presents with     Hypertension     179/133 while at work     The history is provided by the patient.     Shazia Verma is a 47 year old female who presented to the emergency department ambulatory along with coworker for evaluation of elevated blood pressure.  She was at work and noticed a mild headache and facial flushing, and she asked her supervisor to take her blood pressure.  It was noted to be 170/110.  She elected to come to the emergency department for checkup.  On my interview her symptoms had resolved.  Blood pressure 150/98.  Denies headache, flushing has improved, denies dyspnea, denies chest pain, denies focal weakness, denies difficulty walking or talking, or any other concerns.  She does not take a blood pressure medication, as she has not had hypertension in approximately 6 years.  Past history is also significant for gastric bypass as well as left hip injection yesterday for olecranon bursitis.    Problem List:    Patient Active Problem List    Diagnosis Date Noted     Hypoglycemia 09/08/2017     Priority: Medium     ACP (advance care planning) 11/28/2016     Priority: Medium     Advance Care Planning 11/28/2016: ACP Review of Chart / Resources Provided:  Reviewed chart for advance care plan.  Shazia Verma has been provided information and resources to begin or update their advance care plan.  Added by WOODY LENNON             H/O gastric bypass 11/28/2016     Priority: Medium     Had type 2 diabetes, hyperlipidemia and hypertension prior to surgery.           Past Medical History:    No past medical history on file.    Past Surgical History:    Past Surgical History:   Procedure Laterality Date     ABDOMEN SURGERY  2012    gastric bypass     APPENDECTOMY  1991     BREAST SURGERY  2002    right bx     COLONOSCOPY  2013     GI SURGERY  2012    gastric by pass     GYN SURGERY  1992 1994    c section x 2     ORTHOPEDIC SURGERY  2015    right hip labreal tear      ORTHOPEDIC SURGERY  2014    left wrist        Family History:    Family History   Problem Relation Age of Onset     CEREBROVASCULAR DISEASE Mother      Hyperlipidemia Mother      Hypertension Mother      Coronary Artery Disease Mother      Migraines Mother      Obesity Mother      Osteoarthritis Mother      OSTEOPOROSIS Mother      DIABETES Mother      Hyperlipidemia Father      Hypertension Father      Thyroid Disease Father      Hypertension Brother      Hyperlipidemia Brother      Obesity Brother      Hypertension Sister      Thyroid Disease Sister        Social History:  Marital Status:   [2]  Social History   Substance Use Topics     Smoking status: Former Smoker     Smokeless tobacco: Never Used     Alcohol use Yes      Comment: occ        Medications:      VITAMIN D, CHOLECALCIFEROL, PO   Cyanocobalamin (VITAMIN B 12 PO)   multivitamin, therapeutic with minerals (MULTI-VITAMIN) TABS   Ferrous Sulfate (IRON SUPPLEMENT PO)   calcium carbonate (OS-MANUEL 500 MG [place]. [place]) 500 MG tablet         Review of Systems   Constitutional: Negative for activity change, appetite change, fatigue and fever.   Respiratory: Negative for cough, chest tightness and shortness of breath.    Cardiovascular: Negative for chest pain and palpitations.   Gastrointestinal: Negative for abdominal pain, diarrhea, nausea and vomiting.   Genitourinary: Negative.    Musculoskeletal: Negative for back pain and neck pain.   Skin: Negative.    Neurological: Positive for headaches. Negative for dizziness and light-headedness.        See HPI       Physical Exam   BP: 155/100  Pulse: 72  Heart Rate: 65  Temp: 96.9  F (36.1  C)  Resp: 16  SpO2: 100 %      Physical Exam   Constitutional: She is oriented to person, place, and time. She appears well-developed and well-nourished. No distress.   Pleasant and talkative   Eyes: Conjunctivae and EOM are normal. Pupils are equal, round, and reactive to light.   Cardiovascular: Normal rate and regular  rhythm.    Pulmonary/Chest: Effort normal and breath sounds normal.   Abdominal: Soft. There is no tenderness.   Musculoskeletal: She exhibits no edema.   Neurological: She is alert and oriented to person, place, and time.   Skin: Skin is warm and dry.   Psychiatric: She has a normal mood and affect.   Nursing note and vitals reviewed.      ED Course     ED Course     Procedures               Critical Care time:  none               Labs Ordered and Resulted from Time of ED Arrival Up to the Time of Departure from the ED - No data to display    Assessments & Plan (with Medical Decision Making)   Long discussion regarding blood pressure.  Symptoms have resolved.  Blood pressures trending down.  There is no medical indication for workup at this point in the emergency department.  Certainly could be secondary to the recent hip injection, as well as a myriad of other etiologies.  There are no red flags.  No indication for emergency room treatment.  She can follow-up in the clinic.  She has a blood pressure monitor at home.  Shazia voiced complete understanding and was happy and agreeable.    (Please note that this note was completed with a voice recognition program.  Every attempt was made to edit the dictations, but inevitably there remain words that are mis-transcribed.)    I have reviewed the nursing notes.    I have reviewed the findings, diagnosis, plan and need for follow up with the patient.       New Prescriptions    No medications on file       Final diagnoses:   Hypertension, unspecified type       3/1/2018   [facility]     Alberto Jarquin PA-C  03/01/18 1918       Alberto Jarquin PA-C  03/01/18 1948

## 2018-03-12 ENCOUNTER — HOSPITAL ENCOUNTER (OUTPATIENT)
Dept: PHYSICAL THERAPY | Facility: HOSPITAL | Age: 48
Setting detail: THERAPIES SERIES
End: 2018-03-12
Attending: PHYSICIAN ASSISTANT
Payer: COMMERCIAL

## 2018-03-12 PROCEDURE — 97162 PT EVAL MOD COMPLEX 30 MIN: CPT | Mod: GP

## 2018-03-12 PROCEDURE — 40000718 ZZHC STATISTIC PT DEPARTMENT ORTHO VISIT

## 2018-03-12 PROCEDURE — 97110 THERAPEUTIC EXERCISES: CPT | Mod: GP

## 2018-03-12 ASSESSMENT — ACTIVITIES OF DAILY LIVING (ADL)
GOING_UP_1_FLIGHT_OF_STAIRS: NO DIFFICULTY AT ALL
HOW_WOULD_YOU_RATE_YOUR_CURRENT_LEVEL_OF_FUNCTION_DURING_YOUR_USUAL_ACTIVITIES_OF_DAILY_LIVING_FROM_0_TO_100_WITH_100_BEING_YOUR_LEVEL_OF_FUNCTION_PRIOR_TO_YOUR_HIP_PROBLEM_AND_0_BEING_THE_INABILITY_TO_PERFORM_ANY_OF_YOUR_USUAL_DAILY_ACTIVITIES?: 50
RECREATIONAL_ACTIVITIES: SLIGHT DIFFICULTY
TWISTING/PIVOTING_ON_INVOLVED_LEG: SLIGHT DIFFICULTY
DEEP_SQUATTING: SLIGHT DIFFICULTY
WALKING_UP_STEEP_HILLS: SLIGHT DIFFICULTY
HOS_ADL_HIGHEST_POTENTIAL_SCORE: 68
HOS_ADL_SCORE(%): 85.29
HEAVY_WORK: NO DIFFICULTY AT ALL
WALKING_APPROXIMATELY_10_MINUTES: SLIGHT DIFFICULTY
WALKING_DOWN_STEEP_HILLS: SLIGHT DIFFICULTY
GOING_DOWN_1_FLIGHT_OF_STAIRS: NO DIFFICULTY AT ALL
PUTTING_ON_SOCKS_AND_SHOES: NO DIFFICULTY AT ALL
STANDING_FOR_15_MINUTES: NO DIFFICULTY AT ALL
WALKING_INITIALLY: SLIGHT DIFFICULTY
SITTING_FOR_15_MINUTES: NO DIFFICULTY AT ALL
ROLLING_OVER_IN_BED: SLIGHT DIFFICULTY
GETTING_INTO_AND_OUT_OF_AN_AVERAGE_CAR: NO DIFFICULTY AT ALL
WALKING_15_MINUTES_OR_GREATER: SLIGHT DIFFICULTY
GETTING_INTO_AND_OUT_OF_A_BATHTUB: NO DIFFICULTY AT ALL
STEPPING_UP_AND_DOWN_CURBS: SLIGHT DIFFICULTY
HOS_ADL_ITEM_SCORE_TOTAL: 58
HOS_ADL_COUNT: 17
LIGHT_TO_MODERATE_WORK: NO DIFFICULTY AT ALL

## 2018-03-13 ENCOUNTER — APPOINTMENT (OUTPATIENT)
Dept: ULTRASOUND IMAGING | Facility: HOSPITAL | Age: 48
End: 2018-03-13
Attending: FAMILY MEDICINE
Payer: COMMERCIAL

## 2018-03-13 ENCOUNTER — HOSPITAL ENCOUNTER (EMERGENCY)
Facility: HOSPITAL | Age: 48
Discharge: HOME | End: 2018-03-13
Attending: FAMILY MEDICINE | Admitting: FAMILY MEDICINE
Payer: COMMERCIAL

## 2018-03-13 VITALS
DIASTOLIC BLOOD PRESSURE: 90 MMHG | TEMPERATURE: 98.1 F | OXYGEN SATURATION: 97 % | SYSTOLIC BLOOD PRESSURE: 129 MMHG | HEART RATE: 67 BPM | RESPIRATION RATE: 16 BRPM

## 2018-03-13 DIAGNOSIS — N97.0 ANOVULATORY (DYSFUNCTIONAL UTERINE) BLEEDING: ICD-10-CM

## 2018-03-13 LAB
ALBUMIN SERPL-MCNC: 3.7 G/DL (ref 3.4–5)
ALBUMIN UR-MCNC: NEGATIVE MG/DL
ALP SERPL-CCNC: 77 U/L (ref 40–150)
ALT SERPL W P-5'-P-CCNC: 22 U/L (ref 0–50)
ANION GAP SERPL CALCULATED.3IONS-SCNC: 6 MMOL/L (ref 3–14)
APPEARANCE UR: CLEAR
AST SERPL W P-5'-P-CCNC: 17 U/L (ref 0–45)
BACTERIA #/AREA URNS HPF: ABNORMAL /HPF
BASOPHILS # BLD AUTO: 0 10E9/L (ref 0–0.2)
BASOPHILS NFR BLD AUTO: 0.4 %
BILIRUB SERPL-MCNC: 0.8 MG/DL (ref 0.2–1.3)
BILIRUB UR QL STRIP: NEGATIVE
BUN SERPL-MCNC: 14 MG/DL (ref 7–30)
CALCIUM SERPL-MCNC: 8.7 MG/DL (ref 8.5–10.1)
CHLORIDE SERPL-SCNC: 105 MMOL/L (ref 94–109)
CO2 SERPL-SCNC: 27 MMOL/L (ref 20–32)
COLOR UR AUTO: YELLOW
CREAT SERPL-MCNC: 0.63 MG/DL (ref 0.52–1.04)
DIFFERENTIAL METHOD BLD: NORMAL
EOSINOPHIL # BLD AUTO: 0.1 10E9/L (ref 0–0.7)
EOSINOPHIL NFR BLD AUTO: 0.5 %
ERYTHROCYTE [DISTWIDTH] IN BLOOD BY AUTOMATED COUNT: 14.4 % (ref 10–15)
GFR SERPL CREATININE-BSD FRML MDRD: >90 ML/MIN/1.7M2
GLUCOSE SERPL-MCNC: 109 MG/DL (ref 70–99)
GLUCOSE UR STRIP-MCNC: NEGATIVE MG/DL
HCG UR QL: NEGATIVE
HCT VFR BLD AUTO: 37.8 % (ref 35–47)
HGB BLD-MCNC: 12.5 G/DL (ref 11.7–15.7)
HGB UR QL STRIP: ABNORMAL
IMM GRANULOCYTES # BLD: 0 10E9/L (ref 0–0.4)
IMM GRANULOCYTES NFR BLD: 0.2 %
KETONES UR STRIP-MCNC: 80 MG/DL
LEUKOCYTE ESTERASE UR QL STRIP: NEGATIVE
LYMPHOCYTES # BLD AUTO: 1.9 10E9/L (ref 0.8–5.3)
LYMPHOCYTES NFR BLD AUTO: 20 %
MCH RBC QN AUTO: 27.5 PG (ref 26.5–33)
MCHC RBC AUTO-ENTMCNC: 33.1 G/DL (ref 31.5–36.5)
MCV RBC AUTO: 83 FL (ref 78–100)
MONOCYTES # BLD AUTO: 0.6 10E9/L (ref 0–1.3)
MONOCYTES NFR BLD AUTO: 6.4 %
MUCOUS THREADS #/AREA URNS LPF: PRESENT /LPF
NEUTROPHILS # BLD AUTO: 7 10E9/L (ref 1.6–8.3)
NEUTROPHILS NFR BLD AUTO: 72.5 %
NITRATE UR QL: NEGATIVE
NRBC # BLD AUTO: 0 10*3/UL
NRBC BLD AUTO-RTO: 0 /100
PH UR STRIP: 5.5 PH (ref 4.7–8)
PLATELET # BLD AUTO: 365 10E9/L (ref 150–450)
POTASSIUM SERPL-SCNC: 4.1 MMOL/L (ref 3.4–5.3)
PROT SERPL-MCNC: 7.5 G/DL (ref 6.8–8.8)
RBC # BLD AUTO: 4.55 10E12/L (ref 3.8–5.2)
RBC #/AREA URNS AUTO: 1 /HPF (ref 0–2)
SODIUM SERPL-SCNC: 138 MMOL/L (ref 133–144)
SOURCE: ABNORMAL
SP GR UR STRIP: 1.02 (ref 1–1.03)
UROBILINOGEN UR STRIP-MCNC: NORMAL MG/DL (ref 0–2)
WBC # BLD AUTO: 9.7 10E9/L (ref 4–11)
WBC #/AREA URNS AUTO: 1 /HPF (ref 0–5)

## 2018-03-13 PROCEDURE — 81001 URINALYSIS AUTO W/SCOPE: CPT | Performed by: FAMILY MEDICINE

## 2018-03-13 PROCEDURE — 76830 TRANSVAGINAL US NON-OB: CPT | Mod: TC

## 2018-03-13 PROCEDURE — 96374 THER/PROPH/DIAG INJ IV PUSH: CPT

## 2018-03-13 PROCEDURE — 96372 THER/PROPH/DIAG INJ SC/IM: CPT | Mod: XS

## 2018-03-13 PROCEDURE — 25000128 H RX IP 250 OP 636: Performed by: FAMILY MEDICINE

## 2018-03-13 PROCEDURE — 80053 COMPREHEN METABOLIC PANEL: CPT | Performed by: FAMILY MEDICINE

## 2018-03-13 PROCEDURE — 99285 EMERGENCY DEPT VISIT HI MDM: CPT | Mod: 25

## 2018-03-13 PROCEDURE — 25000125 ZZHC RX 250: Performed by: FAMILY MEDICINE

## 2018-03-13 PROCEDURE — 81025 URINE PREGNANCY TEST: CPT | Performed by: FAMILY MEDICINE

## 2018-03-13 PROCEDURE — 99285 EMERGENCY DEPT VISIT HI MDM: CPT | Mod: Z6 | Performed by: FAMILY MEDICINE

## 2018-03-13 PROCEDURE — 36415 COLL VENOUS BLD VENIPUNCTURE: CPT | Performed by: FAMILY MEDICINE

## 2018-03-13 PROCEDURE — 85025 COMPLETE CBC W/AUTO DIFF WBC: CPT | Performed by: FAMILY MEDICINE

## 2018-03-13 RX ORDER — KETOROLAC TROMETHAMINE 15 MG/ML
15 INJECTION, SOLUTION INTRAMUSCULAR; INTRAVENOUS ONCE
Status: COMPLETED | OUTPATIENT
Start: 2018-03-13 | End: 2018-03-13

## 2018-03-13 RX ORDER — KETOROLAC TROMETHAMINE 15 MG/ML
15 INJECTION, SOLUTION INTRAMUSCULAR; INTRAVENOUS ONCE
Status: DISCONTINUED | OUTPATIENT
Start: 2018-03-13 | End: 2018-03-13

## 2018-03-13 RX ORDER — NORETHINDRONE ACETATE 5 MG
5 TABLET ORAL 3 TIMES DAILY
Qty: 28 TABLET | Refills: 1 | Status: ON HOLD | OUTPATIENT
Start: 2018-03-13 | End: 2018-05-03

## 2018-03-13 RX ADMIN — TRANEXAMIC ACID 1 G: 100 INJECTION, SOLUTION INTRAVENOUS at 21:00

## 2018-03-13 RX ADMIN — KETOROLAC TROMETHAMINE 15 MG: 15 INJECTION, SOLUTION INTRAMUSCULAR; INTRAVENOUS at 19:35

## 2018-03-13 NOTE — ED PROVIDER NOTES
"eMERGENCY dEPARTMENT eNCOUnter        CHIEF COMPLAINT    Chief Complaint   Patient presents with     Groin Pain       HPI    Shazia Verma is a 47 year old female  2 para 2 who presents with bilateral groin pain off and on for the past 2 weeks which is been getting worse today.  Says the pain has been intermittent nausea with intermittent menstrual bleeding as well she has had ovarian cysts in the past and is wondering about those.  She has not talked to her doctor or see her doctor.  She woke up this morning the pain was worse, she took a bath and waited throughout the day to see if it got better.  It continued to get worse and so she presents to the emergency department tonight.  She is sexually active with her  he has had a vasectomy.  No urinary symptoms.  The bleeding seemed to get worse today and just before she came into the ER she had a \"gush\" she is taken some Tylenol today for it.    REVIEW OF SYSTEMS    General: No fevers or chills  : No dysuria or flank pain  GI: No vomiting or diarrhea  Pulmonary: No difficulty breathing or cough  Neurologic: No loss of consciousness or syncope  See HPI for further details.  All other systems reviewed and are negative.    PAST MEDICAL & SURGICAL HISTORY    No past medical history on file.  Past Surgical History:   Procedure Laterality Date     ABDOMEN SURGERY  2012    gastric bypass     APPENDECTOMY  1991     BREAST SURGERY      right bx     COLONOSCOPY  2013     GI SURGERY  2012    gastric by pass     GYN SURGERY  1992 1994    c section x 2     ORTHOPEDIC SURGERY  2015    right hip labreal tear     ORTHOPEDIC SURGERY  2014    left wrist        CURRENT MEDICATIONS    Current Outpatient Rx   Medication Sig Dispense Refill     VITAMIN D, CHOLECALCIFEROL, PO Take 5,000 Units by mouth daily 2 tabs daily       Cyanocobalamin (VITAMIN B 12 PO)        multivitamin, therapeutic with minerals (MULTI-VITAMIN) TABS Take 1 tablet by mouth daily       Ferrous " Sulfate (IRON SUPPLEMENT PO) Take 325 mg by mouth daily (with breakfast) 2 tabs daily       calcium carbonate (OS-MANUEL 500 MG [place]. [place]) 500 MG tablet          ALLERGIES    Allergies   Allergen Reactions     Reglan [Metoclopramide] Anxiety       FAMILY AND SOCIAL HISTORY    Family History   Problem Relation Age of Onset     CEREBROVASCULAR DISEASE Mother      Hyperlipidemia Mother      Hypertension Mother      Coronary Artery Disease Mother      Migraines Mother      Obesity Mother      Osteoarthritis Mother      OSTEOPOROSIS Mother      DIABETES Mother      Hyperlipidemia Father      Hypertension Father      Thyroid Disease Father      Hypertension Brother      Hyperlipidemia Brother      Obesity Brother      Hypertension Sister      Thyroid Disease Sister      Social History     Social History     Marital status:      Spouse name: N/A     Number of children: N/A     Years of education: N/A     Social History Main Topics     Smoking status: Former Smoker     Smokeless tobacco: Never Used     Alcohol use Yes      Comment: occ     Drug use: No     Sexual activity: Not on file     Other Topics Concern     Not on file     Social History Narrative       PHYSICAL EXAM    VITAL SIGNS: BP (!) 170/107  Pulse 95  Temp 97.1  F (36.2  C) (Tympanic)  Resp 16  SpO2 99%   Constitutional:  Well-developed, well-nourished, appears comfortable  Eyes:  Non-icteric sclera, no conjunctival injection   HENT:  Atraumatic, external nose normal.   NECK: Supple, no JVD   Respiratory:  No respiratory distress, normal breath sounds   Cardiovascular:  Normal rate, no murmurs  GI:  Abdominal is soft, there is no tenderness, Bowel sounds unremarkable   Pelvic: She has blood clots coming from the cervical office.  There is no cervical motion tenderness with movement.  She is mildly tender in bilateral adnexa.  No masses were noted.  :  No CVA tenderness  Integument:  Nondiaphoretic Skin, no obvious rashes  Neurologic: Awake and  oriented, no slurred speech    RADIOLOGY/PROCEDURES    US pending    ED COURSE & MEDICAL DECISION MAKING    Pertinent Labs & Imaging studies reviewed and interpreted. (See chart for details)  See chart for details of medications given during the ED stay.      Vitals:    03/13/18 1630 03/13/18 1805   BP: (!) 170/107    Pulse: 98 95   Resp: 16 16   Temp: 97.1  F (36.2  C)    TempSrc: Tympanic    SpO2: 99% 99%         FINAL IMPRESSION    Dysfunctional uterine bleeding    Plan:  I am going off service, the pa  tient is signed out to Dr. Rosas.        (Please note that this note was completed with a voice recognition program.  Every attempt was made to edit the dictations, but inevitably there remain words that are mis-transcribed.)       Colleen Robert MD  03/14/18 4219

## 2018-03-13 NOTE — ED AVS SNAPSHOT
[facility]    [address] [place] [address]    Phone:  [phone]                                       Shazia Verma   MRN: [id number]    Department:  [facility]   Date of Visit:  3/13/2018           After Visit Summary Signature Page     I have received my discharge instructions, and my questions have been answered. I have discussed any challenges I see with this plan with the nurse or doctor.    ..........................................................................................................................................  Patient/Patient Representative Signature      ..........................................................................................................................................  Patient Representative Print Name and Relationship to Patient    ..................................................               ................................................  Date                                            Time    ..........................................................................................................................................  Reviewed by Signature/Title    ...................................................              ..............................................  Date                                                            Time

## 2018-03-13 NOTE — ED AVS SNAPSHOT
[facility]    [address] [address]    Phone:  [phone]                                       Shazia Verma   MRN: [id number]    Department:  [facility]   Date of Visit:  3/13/2018           Patient Information     Date Of Birth          1970        Your diagnoses for this visit were:     Anovulatory (dysfunctional uterine) bleeding        You were seen by Colleen Robert MD.      Follow-up Information     Please follow up.    Why:  contact your primary physician to arrange for a refferal to OB/ GYN for endometrial biopsy       Future Appointments        Provider Department Dept Phone [place]    3/14/2018 11:00 AM Reina Alejandre, PT [facility] [phone] [facility]    3/15/2018 1:15 PM Mary Ellen Armstrong, NP [facility] [phone] [facility]    3/19/2018 9:00 AM Reina Alejandre, PT [facility] [phone] [facility]    3/21/2018 8:30 AM Reina Alejandre, PT [facility] [phone] [facility]    3/26/2018 10:00 AM Reina Alejandre, PT [facility] [phone] [facility]    3/28/2018 11:00 AM Reina Alejandre, PT [facility] [phone] [facility]         Review of your medicines      START taking        Dose / Directions Last dose taken    norethindrone 5 MG tablet   Commonly known as:  AYGESTIN   Dose:  5 mg   Quantity:  28 tablet        Take 1 tablet (5 mg) by mouth 3 times daily   Refills:  1          Our records show that you are taking the medicines listed below. If these are incorrect, please call your family doctor or clinic.        Dose / Directions Last dose taken    calcium carbonate 1250 MG tablet   Commonly known as:  OS-MANUEL 500 mg [place]. [place]        Refills:  0        IRON SUPPLEMENT PO   Dose:  325 mg        Take 325 mg by mouth daily (with breakfast) 2 tabs daily   Refills:  0        Multi-vitamin Tabs tablet   Dose:  1 tablet        Take 1 tablet by  mouth daily   Refills:  0        VITAMIN B 12 PO        Refills:  0        VITAMIN D (CHOLECALCIFEROL) PO   Dose:  5000 Units        Take 5,000 Units by mouth daily 2 tabs daily   Refills:  0                Prescriptions were sent or printed at these locations (1 Prescription)                   [id number] - [place], [place] - [address] AT [facility] [address]   [address], [place] [address]    Telephone:  [phone]   Fax:  [phone]   Hours:                  E-Prescribed (1 of 1)         norethindrone (AYGESTIN) 5 MG tablet                Procedures and tests performed during your visit     CBC with platelets differential    Comprehensive metabolic panel    HCG qualitative urine (UPT)    UA with Microscopic    US Pelvic Complete with Transvaginal      Orders Needing Specimen Collection     None      Pending Results     No orders found from 3/11/2018 to 3/14/2018.            Pending Culture Results     No orders found from 3/11/2018 to 3/14/2018.            Thank you for choosing [facility]       Thank you for choosing [facility] for your care. Our goal is always to provide you with excellent care. Hearing back from our patients is one way we can continue to improve our services. Please take a few minutes to complete the written survey that you may receive in the mail after you visit with us. Thank you!        [employer] Information     [employer] gives you secure access to your electronic health record. If you see a primary care provider, you can also send messages to your care team and make appointments. If you have questions, please call your primary care clinic.  If you do not have a primary care provider, please call [phone] and they will assist you.        Care EveryWhere ID     This is your Care EveryWhere ID. This could be used by other organizations to access your [facility] medical records  [id number]        Equal Access to Services     [place] AH: Portillo Conn  marlon alarcon, jeff rivera. So [facility] [phone].    ATENCIÓN: Si habla español, tiene a wadsworth disposición servicios gratuitos de asistencia lingüística. Llame al [phone].    We comply with applicable federal civil rights laws and [place] laws. We do not discriminate on the basis of race, color, national origin, age, disability, sex, sexual orientation, or gender identity.            After Visit Summary       This is your record. Keep this with you and show to your community pharmacist(s) and doctor(s) at your next visit.

## 2018-03-13 NOTE — ED NOTES
Ambulated to room 2 independently.  Abd pain for the past 2 weeks.  Intermittent bleeding for the past 2 weeks.  Worse the last 2 days, with increased menstrual flow.  Woke this morning with increased pain.  Tylenol not helping.  Dr. Robert at bedside.

## 2018-03-14 ENCOUNTER — HOSPITAL ENCOUNTER (OUTPATIENT)
Dept: PHYSICAL THERAPY | Facility: HOSPITAL | Age: 48
Setting detail: THERAPIES SERIES
End: 2018-03-14
Attending: ORTHOPAEDIC SURGERY
Payer: COMMERCIAL

## 2018-03-14 PROCEDURE — 40000718 ZZHC STATISTIC PT DEPARTMENT ORTHO VISIT

## 2018-03-14 PROCEDURE — 97110 THERAPEUTIC EXERCISES: CPT | Mod: GP

## 2018-03-14 NOTE — ED NOTES
Pt and  given verbal and written d/c instructions and both verbalize understanding. Pt left department ambulatory. Pt's  has already picked up pt's prescription. Pt advised to f/u with OB-GYN and pt states that she has appointment with PCP on 3/15/18.

## 2018-03-14 NOTE — ED NOTES
Face to face report given with opportunity to observe patient.    Report given to Ml Gallardo   3/13/2018  7:13 PM

## 2018-03-14 NOTE — ED PROVIDER NOTES
History     Chief Complaint   Patient presents with     Groin Pain     HPI  Shazia Verma is a 47 year old female who presented to ER for groin pain and vaginal bleeding. I assumed care from previous provider DR Robert while ultrasound pending     Problem List:    Patient Active Problem List    Diagnosis Date Noted     Hypoglycemia 09/08/2017     Priority: Medium     ACP (advance care planning) 11/28/2016     Priority: Medium     Advance Care Planning 11/28/2016: ACP Review of Chart / Resources Provided:  Reviewed chart for advance care plan.  Shazia Verma has been provided information and resources to begin or update their advance care plan.  Added by WOODY LENNON             H/O gastric bypass 11/28/2016     Priority: Medium     Had type 2 diabetes, hyperlipidemia and hypertension prior to surgery.           Past Medical History:    History reviewed. No pertinent past medical history.    Past Surgical History:    Past Surgical History:   Procedure Laterality Date     ABDOMEN SURGERY  2012    gastric bypass     APPENDECTOMY  1991     BREAST SURGERY  2002    right bx     COLONOSCOPY  2013     GI SURGERY  2012    gastric by pass     GYN SURGERY  1992 1994    c section x 2     ORTHOPEDIC SURGERY  2015    right hip labreal tear     ORTHOPEDIC SURGERY  2014    left wrist        Family History:    Family History   Problem Relation Age of Onset     CEREBROVASCULAR DISEASE Mother      Hyperlipidemia Mother      Hypertension Mother      Coronary Artery Disease Mother      Migraines Mother      Obesity Mother      Osteoarthritis Mother      OSTEOPOROSIS Mother      DIABETES Mother      Hyperlipidemia Father      Hypertension Father      Thyroid Disease Father      Hypertension Brother      Hyperlipidemia Brother      Obesity Brother      Hypertension Sister      Thyroid Disease Sister        Social History:  Marital Status:   [2]  Social History   Substance Use Topics     Smoking status: Former Smoker      Smokeless tobacco: Never Used     Alcohol use Yes      Comment: occ        Medications:      norethindrone (AYGESTIN) 5 MG tablet   VITAMIN D, CHOLECALCIFEROL, PO   Cyanocobalamin (VITAMIN B 12 PO)   multivitamin, therapeutic with minerals (MULTI-VITAMIN) TABS   Ferrous Sulfate (IRON SUPPLEMENT PO)   calcium carbonate (OS-MANUEL 500 MG [place]. [place]) 500 MG tablet         Review of Systems per DR Robert     Physical Exam   BP: (!) 170/107  Pulse: 98  Temp: 97.1  F (36.2  C)  Resp: 16  SpO2: 99 %      Physical Exam per Dr Robert     ED Course     ED Course     Procedures          Ultrasound results returned showing thickened endometrium at 26 mm. Telephone Consult with DR Lau. Recommendations including 1 gram transexamic acid IV followed by prescription for oral norethindrone 5 mg tid . Patient should contact primary care provider in AM to arrange for OB/GYN consult for probable endometrial biopsy . Discussed discharge plan with patient . She states that she has seen DR Mcdermott in the past and will follow up with him          Results for orders placed or performed during the hospital encounter of 03/13/18 (from the past 24 hour(s))   CBC with platelets differential   Result Value Ref Range    WBC 9.7 4.0 - 11.0 10e9/L    RBC Count 4.55 3.8 - 5.2 10e12/L    Hemoglobin 12.5 11.7 - 15.7 g/dL    Hematocrit 37.8 35.0 - 47.0 %    MCV 83 78 - 100 fl    MCH 27.5 26.5 - 33.0 pg    MCHC 33.1 31.5 - 36.5 g/dL    RDW 14.4 10.0 - 15.0 %    Platelet Count 365 150 - 450 10e9/L    Diff Method Automated Method     % Neutrophils 72.5 %    % Lymphocytes 20.0 %    % Monocytes 6.4 %    % Eosinophils 0.5 %    % Basophils 0.4 %    % Immature Granulocytes 0.2 %    Nucleated RBCs 0 0 /100    Absolute Neutrophil 7.0 1.6 - 8.3 10e9/L    Absolute Lymphocytes 1.9 0.8 - 5.3 10e9/L    Absolute Monocytes 0.6 0.0 - 1.3 10e9/L    Absolute Eosinophils 0.1 0.0 - 0.7 10e9/L    Absolute Basophils 0.0 0.0 - 0.2 10e9/L    Abs Immature Granulocytes 0.0 0 - 0.4 10e9/L     Absolute Nucleated RBC 0.0    Comprehensive metabolic panel   Result Value Ref Range    Sodium 138 133 - 144 mmol/L    Potassium 4.1 3.4 - 5.3 mmol/L    Chloride 105 94 - 109 mmol/L    Carbon Dioxide 27 20 - 32 mmol/L    Anion Gap 6 3 - 14 mmol/L    Glucose 109 (H) 70 - 99 mg/dL    Urea Nitrogen 14 7 - 30 mg/dL    Creatinine 0.63 0.52 - 1.04 mg/dL    GFR Estimate >90 >60 mL/min/1.7m2    GFR Estimate If Black >90 >60 mL/min/1.7m2    Calcium 8.7 8.5 - 10.1 mg/dL    Bilirubin Total 0.8 0.2 - 1.3 mg/dL    Albumin 3.7 3.4 - 5.0 g/dL    Protein Total 7.5 6.8 - 8.8 g/dL    Alkaline Phosphatase 77 40 - 150 U/L    ALT 22 0 - 50 U/L    AST 17 0 - 45 U/L   UA with Microscopic   Result Value Ref Range    Color Urine Yellow     Appearance Urine Clear     Glucose Urine Negative NEG^Negative mg/dL    Bilirubin Urine Negative NEG^Negative    Ketones Urine 80 (A) NEG^Negative mg/dL    Specific Gravity Urine 1.019 1.003 - 1.035    Blood Urine Small (A) NEG^Negative    pH Urine 5.5 4.7 - 8.0 pH    Protein Albumin Urine Negative NEG^Negative mg/dL    Urobilinogen mg/dL Normal 0.0 - 2.0 mg/dL    Nitrite Urine Negative NEG^Negative    Leukocyte Esterase Urine Negative NEG^Negative    Source Catheterized Urine     WBC Urine 1 0 - 5 /HPF    RBC Urine 1 0 - 2 /HPF    Bacteria Urine None (A) NEG^Negative /HPF    Mucous Urine Present (A) NEG^Negative /LPF   HCG qualitative urine (UPT)   Result Value Ref Range    HCG Qual Urine Negative NEG^Negative       Medications   tranexamic acid (CYKLOKAPRON) 1 g in D5W 50 mL bolus (not administered)   ketorolac (TORADOL) injection 15 mg (15 mg Intramuscular Given 3/13/18 1935)       Assessments & Plan (with Medical Decision Making)     I have reviewed the nursing notes.    I have reviewed the findings, diagnosis, plan and need for follow up with the patient.      New Prescriptions    NORETHINDRONE (AYGESTIN) 5 MG TABLET    Take 1 tablet (5 mg) by mouth 3 times daily       Final diagnoses:    Anovulatory (dysfunctional uterine) bleeding       3/13/2018   [facility]     Megan Santiago MD  03/13/18 2044

## 2018-03-15 ENCOUNTER — OFFICE VISIT (OUTPATIENT)
Dept: FAMILY MEDICINE | Facility: OTHER | Age: 48
End: 2018-03-15
Attending: NURSE PRACTITIONER
Payer: COMMERCIAL

## 2018-03-15 VITALS
HEART RATE: 94 BPM | TEMPERATURE: 97.3 F | RESPIRATION RATE: 14 BRPM | WEIGHT: 156 LBS | DIASTOLIC BLOOD PRESSURE: 72 MMHG | HEIGHT: 66 IN | OXYGEN SATURATION: 99 % | BODY MASS INDEX: 25.07 KG/M2 | SYSTOLIC BLOOD PRESSURE: 120 MMHG

## 2018-03-15 DIAGNOSIS — R03.0 ELEVATED BLOOD PRESSURE READING WITHOUT DIAGNOSIS OF HYPERTENSION: Primary | ICD-10-CM

## 2018-03-15 DIAGNOSIS — N92.1 MENORRHAGIA WITH IRREGULAR CYCLE: ICD-10-CM

## 2018-03-15 PROCEDURE — 99213 OFFICE O/P EST LOW 20 MIN: CPT | Performed by: NURSE PRACTITIONER

## 2018-03-15 ASSESSMENT — PATIENT HEALTH QUESTIONNAIRE - PHQ9: 5. POOR APPETITE OR OVEREATING: NOT AT ALL

## 2018-03-15 ASSESSMENT — ANXIETY QUESTIONNAIRES
1. FEELING NERVOUS, ANXIOUS, OR ON EDGE: NOT AT ALL
GAD7 TOTAL SCORE: 0
5. BEING SO RESTLESS THAT IT IS HARD TO SIT STILL: NOT AT ALL
2. NOT BEING ABLE TO STOP OR CONTROL WORRYING: NOT AT ALL
3. WORRYING TOO MUCH ABOUT DIFFERENT THINGS: NOT AT ALL
6. BECOMING EASILY ANNOYED OR IRRITABLE: NOT AT ALL
7. FEELING AFRAID AS IF SOMETHING AWFUL MIGHT HAPPEN: NOT AT ALL

## 2018-03-15 NOTE — PROGRESS NOTES
SUBJECTIVE:   Shazia Verma is a 47 year old female who presents to clinic today for the following health issues:      ED/UC Followup:    Facility:  [facility]  Date of visit: 3/01/18  Reason for visit: High Blood Pressure - now resolved.    Current Status: Improved     She was in the ED a second time due to menorrhagia with severe cramping.  Pelvic US was completed with recommendation of endometrial biopsy.  She is scheduled with Dr. Mcdermott tomorrow.      For now, pain is improved and bleeding is scant.  Blood pressure has returned to normal.        Problem list and histories reviewed & adjusted, as indicated.  Additional history: as documented    Patient Active Problem List   Diagnosis     ACP (advance care planning)     H/O gastric bypass     Hypoglycemia     Past Surgical History:   Procedure Laterality Date     ABDOMEN SURGERY  2012    gastric bypass     APPENDECTOMY  1991     BREAST SURGERY  2002    right bx     COLONOSCOPY  2013     GI SURGERY  2012    gastric by pass     GYN SURGERY  1992 1994    c section x 2     ORTHOPEDIC SURGERY  2015    right hip labreal tear     ORTHOPEDIC SURGERY  2014    left wrist        Social History   Substance Use Topics     Smoking status: Former Smoker     Smokeless tobacco: Never Used     Alcohol use Yes      Comment: occ     Family History   Problem Relation Age of Onset     CEREBROVASCULAR DISEASE Mother      Hyperlipidemia Mother      Hypertension Mother      Coronary Artery Disease Mother      Migraines Mother      Obesity Mother      Osteoarthritis Mother      OSTEOPOROSIS Mother      DIABETES Mother      Hyperlipidemia Father      Hypertension Father      Thyroid Disease Father      Hypertension Brother      Hyperlipidemia Brother      Obesity Brother      Hypertension Sister      Thyroid Disease Sister          Current Outpatient Prescriptions   Medication Sig Dispense Refill     norethindrone (AYGESTIN) 5 MG tablet Take 1 tablet (5 mg) by mouth 3  "times daily 28 tablet 1     VITAMIN D, CHOLECALCIFEROL, PO Take 5,000 Units by mouth daily 2 tabs daily       Cyanocobalamin (VITAMIN B 12 PO)        Ferrous Sulfate (IRON SUPPLEMENT PO) Take 325 mg by mouth daily (with breakfast) 2 tabs daily       calcium carbonate (OS-MANUEL 500 MG [place]. [place]) 500 MG tablet        multivitamin, therapeutic with minerals (MULTI-VITAMIN) TABS Take 1 tablet by mouth daily       Allergies   Allergen Reactions     Reglan [Metoclopramide] Anxiety     Recent Labs   Lab Test  03/13/18   1853  01/15/18   1542  09/01/17   1154  06/30/17   1956   11/28/16   1048   A1C   --    --   5.7   --    --   5.2   ALT  22  27   --   21   --    --    CR  0.63  0.57  0.57  0.57   < >   --    GFRESTIMATED  >90  >90  >90  >90  Non African American GFR Calc     < >   --    GFRESTBLACK  >90  >90  >90  >90  African American GFR Calc     < >   --    POTASSIUM  4.1  3.7  4.1  4.3   < >   --    TSH   --    --   2.16  1.36   --    --     < > = values in this interval not displayed.      BP Readings from Last 3 Encounters:   03/15/18 120/72   03/13/18 129/90   03/01/18 149/99    Wt Readings from Last 3 Encounters:   03/15/18 156 lb (70.8 kg)   01/15/18 156 lb (70.8 kg)   10/12/17 157 lb (71.2 kg)                    Reviewed and updated as needed this visit by clinical staff  Tobacco  Allergies  Meds       Reviewed and updated as needed this visit by Provider         ROS:  Constitutional, HEENT, cardiovascular, pulmonary, gi and gu systems are negative, except as otherwise noted.    OBJECTIVE:     /72 (BP Location: Left arm, Patient Position: Sitting, Cuff Size: Adult Regular)  Pulse 94  Temp 97.3  F (36.3  C) (Tympanic)  Resp 14  Ht 5' 6\" (1.676 m)  Wt 156 lb (70.8 kg)  SpO2 99%  BMI 25.18 kg/m2  Body mass index is 25.18 kg/(m^2).  GENERAL: healthy, alert and no distress  MS: no gross musculoskeletal defects noted, no edema  PSYCH: mentation appears normal, affect normal/bright      PROCEDURE: US " PELVIC COMPLETE WITH TRANSVAGINAL  3/13/2018 8:40 PM     HISTORY: Female, age 47 years, . pelvic bleeding;      GYNECOLOGIC HISTORY:  , PARA ; LMP     TECHNIQUE: Transabdominal and endovaginal ultrasound of the pelvis.     COMPARISON: Pelvic ultrasound 3/24/2017     FINDINGS:      MEASUREMENTS:  Uterus: 9.7 x 6.2 x 6.3 cm (Length x Height x Width)  Endometrium: 26.3 mm in thickness  Right Ovary: 2.1 x 1.4 x 2.0 cm (Length x Height x Width) Normal blood  flow.  Left Ovary:  3.3 x 2.4 x 2.4 cm (Length x Height x Width) Normal blood  flow.     UTERUS: Heterogeneously acholic myometrium. The endometrial stripe is  also thickened attaining a maximum thickness of 2.6 cm.     ADNEXA: 1.8 cm prominent follicle on the left ovary.     MISC: No free fluid         IMPRESSION:   1.  Heterogeneous thickening of the endometrium concerning for  neoplasm versus hyperplasia.     2.  1.8 cm prominent follicle left ovary.     EMMETT MEJIA MD    ASSESSMENT/PLAN:       1. Elevated blood pressure reading without diagnosis of hypertension  Normal today, no diagnosis of hypertension    2. Menorrhagia with irregular cycle  Follow-up with Dr Armstrong as scheduled.        FUTURE APPOINTMENTS:       - Follow-up visit as needed    Mary Ellen Armstrong NP  [facility]

## 2018-03-15 NOTE — MR AVS SNAPSHOT
After Visit Summary   3/15/2018    Shazia Verma    MRN: [id number]           Patient Information     Date Of Birth          1970        Visit Information        Provider Department      3/15/2018 1:15 PM Mary Ellen Armstrong NP [facility]        Today's Diagnoses     Elevated blood pressure reading without diagnosis of hypertension    -  1    Menorrhagia with irregular cycle           Follow-ups after your visit        Follow-up notes from your care team     Return if symptoms worsen or fail to improve.      Your next 10 appointments already scheduled     Mar 16, 2018  3:45 PM CDT   (Arrive by 3:30 PM)   Office Visit with Julian Mcdermott MD   [facility] ([facility] )    [address] [facility] [address] [address]   [phone]           Bring a current list of meds and any records pertaining to this visit. For Physicals, please bring immunization records and any forms needing to be filled out. Please arrive 10 minutes early to complete paperwork.            Mar 19, 2018  9:00 AM CDT   Ortho Treatment with Reina Alejandre, PT   [facility] ([facility] )    [address] [address]   [phone]            Mar 21, 2018  8:30 AM CDT   Ortho Treatment with Reina B Hill, PT   [facility] ([facility] )    [address] [address]   [phone]            Mar 26, 2018 10:00 AM CDT   Ortho Treatment with Reina B Hill, PT   [facility] ([facility] )    [address] [address]   [phone]            Mar 28, 2018 11:00 AM CDT   Ortho Treatment with Reina B Hill, PT   [facility] ([facility] )    [address] [address]   [phone]              Who to contact     If you have questions or need follow up information about today's clinic visit or your schedule please contact [facility]  "directly at [phone].  Normal or non-critical lab and imaging results will be communicated to you by MyChart, letter or phone within 4 business days after the clinic has received the results. If you do not hear from us within 7 days, please contact the clinic through XanEduhart or phone. If you have a critical or abnormal lab result, we will notify you by phone as soon as possible.  Submit refill requests through Pockit or call your pharmacy and they will forward the refill request to us. Please allow 3 business days for your refill to be completed.          Additional Information About Your Visit        XanEduhart Information     Pockit gives you secure access to your electronic health record. If you see a primary care provider, you can also send messages to your care team and make appointments. If you have questions, please call your primary care clinic.  If you do not have a primary care provider, please call [phone] and they will assist you.        Care EveryWhere ID     This is your Care EveryWhere ID. This could be used by other organizations to access your [place] medical records  [id number]        Your Vitals Were     Pulse Temperature Respirations Height Pulse Oximetry BMI (Body Mass Index)    94 97.3  F (36.3  C) (Tympanic) 14 5' 6\" (1.676 m) 99% 25.18 kg/m2       Blood Pressure from Last 3 Encounters:   03/15/18 120/72   03/13/18 129/90   03/01/18 149/99    Weight from Last 3 Encounters:   03/15/18 156 lb (70.8 kg)   01/15/18 156 lb (70.8 kg)   10/12/17 157 lb (71.2 kg)              Today, you had the following     No orders found for display       Primary Care Provider Office Phone # Fax #    Mary Ellen TOCasimiro Armstrong [phone]       [address] [place] [address]        Equal Access to Services     SANTA SHABAZZ AH: Portillo serrano Soana rosa, waaxda luqadaha, qaybta kaalmada adeegyaevan, jeff ivey. So [facility] " [phone].    ATENCIÓN: Si hernandez plaza, tiene a wadsworth disposición servicios gratuitos de asistencia lingüística. Alysa wren [phone].    We comply with applicable federal civil rights laws and [place] laws. We do not discriminate on the basis of race, color, national origin, age, disability, sex, sexual orientation, or gender identity.            Thank you!     Thank you for choosing [facility]  for your care. Our goal is always to provide you with excellent care. Hearing back from our patients is one way we can continue to improve our services. Please take a few minutes to complete the written survey that you may receive in the mail after your visit with us. Thank you!             Your Updated Medication List - Protect others around you: Learn how to safely use, store and throw away your medicines at www.disposemymeds.org.          This list is accurate as of 3/15/18  3:30 PM.  Always use your most recent med list.                   Brand Name Dispense Instructions for use Diagnosis    calcium carbonate 1250 MG tablet    OS-MANUEL 500 mg [place]. [place]          IRON SUPPLEMENT PO      Take 325 mg by mouth daily (with breakfast) 2 tabs daily        Multi-vitamin Tabs tablet      Take 1 tablet by mouth daily        norethindrone 5 MG tablet    AYGESTIN    28 tablet    Take 1 tablet (5 mg) by mouth 3 times daily        VITAMIN B 12 PO           VITAMIN D (CHOLECALCIFEROL) PO      Take 5,000 Units by mouth daily 2 tabs daily

## 2018-03-16 ENCOUNTER — OFFICE VISIT (OUTPATIENT)
Dept: OBGYN | Facility: OTHER | Age: 48
End: 2018-03-16
Attending: OBSTETRICS & GYNECOLOGY
Payer: COMMERCIAL

## 2018-03-16 VITALS
HEIGHT: 66 IN | OXYGEN SATURATION: 98 % | SYSTOLIC BLOOD PRESSURE: 126 MMHG | WEIGHT: 156 LBS | BODY MASS INDEX: 25.07 KG/M2 | TEMPERATURE: 98.5 F | DIASTOLIC BLOOD PRESSURE: 72 MMHG | HEART RATE: 82 BPM

## 2018-03-16 DIAGNOSIS — N93.9 ABNORMAL UTERINE BLEEDING: Primary | ICD-10-CM

## 2018-03-16 PROCEDURE — 99212 OFFICE O/P EST SF 10 MIN: CPT | Mod: 25 | Performed by: OBSTETRICS & GYNECOLOGY

## 2018-03-16 PROCEDURE — 58100 BIOPSY OF UTERUS LINING: CPT | Performed by: OBSTETRICS & GYNECOLOGY

## 2018-03-16 PROCEDURE — 88305 TISSUE EXAM BY PATHOLOGIST: CPT | Mod: TC | Performed by: OBSTETRICS & GYNECOLOGY

## 2018-03-16 ASSESSMENT — PATIENT HEALTH QUESTIONNAIRE - PHQ9: SUM OF ALL RESPONSES TO PHQ QUESTIONS 1-9: 0

## 2018-03-16 ASSESSMENT — ANXIETY QUESTIONNAIRES: GAD7 TOTAL SCORE: 0

## 2018-03-16 ASSESSMENT — PAIN SCALES - GENERAL: PAINLEVEL: NO PAIN (0)

## 2018-03-16 NOTE — MR AVS SNAPSHOT
After Visit Summary   3/16/2018    Shazia Verma    MRN: [id number]           Patient Information     Date Of Birth          1970        Visit Information        Provider Department      3/16/2018 3:45 PM Julian Mcdermott MD [facility]        Today's Diagnoses     Abnormal uterine bleeding    -  1      Care Instructions    If endometrial biopsy is benign we will offer you surgery for the bleeding problem:    A total abdominal hysterectomy and bilateral salpingo-oophorectomy.    Please co-ordinate this with the OB-GYN nursing staff.          Follow-ups after your visit        Follow-up notes from your care team     Return in about 4 weeks (around 4/13/2018).      Your next 10 appointments already scheduled     Mar 19, 2018  9:00 AM CDT   Ortho Treatment with Reina B Hill, PT   [facility] ([facility] )    [address] [address]   [phone]            Mar 21, 2018  8:30 AM CDT   Ortho Treatment with Reina B Hill, PT   [facility] )    [address] [address]   [phone]            Mar 26, 2018 10:00 AM CDT   Ortho Treatment with Reina B Hill, PT   [facility] )    [address] [address]   [phone]            Mar 28, 2018 11:00 AM CDT   Ortho Treatment with Reina B Hill, PT   [facility] ([facility] )    [address] [address]   [phone]              Who to contact     If you have questions or need follow up information about today's clinic visit or your schedule please contact [facility] directly at [phone].  Normal or non-critical lab and imaging results will be communicated to you by MyChart, letter or phone within 4 business days after the clinic has received the results. If you do not hear from us within 7 days, please contact the clinic through MyChart or phone.  "If you have a critical or abnormal lab result, we will notify you by phone as soon as possible.  Submit refill requests through Red Guru or call your pharmacy and they will forward the refill request to us. Please allow 3 business days for your refill to be completed.          Additional Information About Your Visit        BBS Technologieshart Information     Red Guru gives you secure access to your electronic health record. If you see a primary care provider, you can also send messages to your care team and make appointments. If you have questions, please call your primary care clinic.  If you do not have a primary care provider, please call [phone] and they will assist you.        Care EveryWhere ID     This is your Care EveryWhere ID. This could be used by other organizations to access your [place] medical records  [id number]        Your Vitals Were     Pulse Temperature Height Pulse Oximetry BMI (Body Mass Index)       82 98.5  F (36.9  C) (Tympanic) 5' 6\" (1.676 m) 98% 25.18 kg/m2        Blood Pressure from Last 3 Encounters:   03/16/18 126/72   03/15/18 120/72   03/13/18 129/90    Weight from Last 3 Encounters:   03/16/18 156 lb (70.8 kg)   03/15/18 156 lb (70.8 kg)   01/15/18 156 lb (70.8 kg)              We Performed the Following     ENDOMETRIAL BIOPSY W/O CERVICAL DILATION     Surgical pathology exam        Primary Care Provider Office Phone # Fax #    Mary Ellen MccrayEnderEzeSAL [phone] [address] [facility] [address]        Equal Access to Services     ALIE SHABAZZ : Hadii claudia ku hadasho Soomaali, waaxda luqadaha, qaybta kaalmada adeegyada, jeff ivey. So [facility] [phone].    ATENCIÓN: Si habla español, tiene a wadsworth disposición servicios gratuitos de asistencia lingüística. Llame al [phone].    We comply with applicable federal civil rights laws and [place] laws. We do not discriminate on the basis of race, color, national origin, age, " disability, sex, sexual orientation, or gender identity.            Thank you!     Thank you for choosing [facility]  for your care. Our goal is always to provide you with excellent care. Hearing back from our patients is one way we can continue to improve our services. Please take a few minutes to complete the written survey that you may receive in the mail after your visit with us. Thank you!             Your Updated Medication List - Protect others around you: Learn how to safely use, store and throw away your medicines at www.disposemymeds.org.          This list is accurate as of 3/16/18 11:59 PM.  Always use your most recent med list.                   Brand Name Dispense Instructions for use Diagnosis    calcium carbonate 1250 MG tablet    OS-MANUEL 500 mg [place]. [place]          IRON SUPPLEMENT PO      Take 325 mg by mouth daily (with breakfast) 2 tabs daily        Multi-vitamin Tabs tablet      Take 1 tablet by mouth daily        norethindrone 5 MG tablet    AYGESTIN    28 tablet    Take 1 tablet (5 mg) by mouth 3 times daily        VITAMIN B 12 PO           VITAMIN D (CHOLECALCIFEROL) PO      Take 5,000 Units by mouth daily 2 tabs daily

## 2018-03-19 ENCOUNTER — HOSPITAL ENCOUNTER (OUTPATIENT)
Dept: PHYSICAL THERAPY | Facility: HOSPITAL | Age: 48
Setting detail: THERAPIES SERIES
End: 2018-03-19
Attending: ORTHOPAEDIC SURGERY
Payer: COMMERCIAL

## 2018-03-19 PROCEDURE — 40000718 ZZHC STATISTIC PT DEPARTMENT ORTHO VISIT

## 2018-03-19 PROCEDURE — 97110 THERAPEUTIC EXERCISES: CPT | Mod: GP

## 2018-03-19 NOTE — PATIENT INSTRUCTIONS
If endometrial biopsy is benign we will offer you surgery for the bleeding problem:    A total abdominal hysterectomy and bilateral salpingo-oophorectomy.    Please co-ordinate this with the OB-GYN nursing staff.

## 2018-03-19 NOTE — PROGRESS NOTES
S:   This is a very pleasant 47 year old female who has been having pelvic pain and irregular and heavy vaginal bleeding with the passage of blood clots for a few months.         She has had an endometrial ablation done  and also had transexamic acid IV and followed by a prescription for oral  norenthindrone 5 mg tid and inspite of all         that she continues to have metrorrhagia.   She had an endometrial biopsy performed by me on 3/30/17 also for metrorrhagia and the biopsy was benign - scanty atrophic endometrium.         She had a pelvic ultrasound on 3/24/2018 and this showed the uterus was normal size with a  section scar. The endometrium measured 1.6 cm in thickness.                 Medical:  0        Surgical:   Gastric Bypass 2012                         D & C and endometrial ablation                            2  Sections          OB  2 children  Ages  26y and 23 y both by  section.          Smoking:            Occupation:    O:   GC satisfactory   Not in acute distress         No pallor no cyanosis no jaundice         Abdomen soft not distended, no guarding ,  No mass         Liver, spleen and Kidneys not palpable         VE:   Vulva and vagina normal                  Cervix normal                  Uterus anteverted normal size   Adnexa not palpable   Blood in the vagina          Endometrial biopsy done with aseptic technique , 1% lidocaine paracervical block and Pipelle        Biopsy specimen sent for histology        Patient is well at end of procedure    A:   Persistent Dysfunctional uterine bleeding despite endometrial ablation and use of hormones.        Because of failed conservative therapy for metrorrhagia and dysfunctional uterine bleeding, we would        Recommend a total abdominal hysterectomy and bilateral salpingo-oophorectomy as a safer route of surgery instead of LAVHBSO because of her previous surgeries.    P:   If endometrial biopsy is benign will  proceed with TAHBSO.  Patient is aware of the risks of sugery:  Bleeding, infection, abcess formation, injury to the bowel, bladder, ureters , nerves, deep vein thrombosis         Embolism and even death. Risks were discussed at length with the patient.    She is willing to accept the risks and benefits of surgery.

## 2018-03-20 LAB — COPATH REPORT: NORMAL

## 2018-03-23 ENCOUNTER — TELEPHONE (OUTPATIENT)
Dept: OBGYN | Facility: OTHER | Age: 48
End: 2018-03-23

## 2018-03-23 NOTE — TELEPHONE ENCOUNTER
Spoke with pt and told her Dr Mcdermott will look at pathology next week and possibly schedule surgery for 4/20/18. Please call pt.

## 2018-03-23 NOTE — TELEPHONE ENCOUNTER
Patient called left voice mail that she would like to get her test results and a date for surgery.    Could someone please give her a call at [phone]

## 2018-03-26 ENCOUNTER — TELEPHONE (OUTPATIENT)
Dept: OBGYN | Facility: OTHER | Age: 48
End: 2018-03-26

## 2018-03-26 ENCOUNTER — HOSPITAL ENCOUNTER (OUTPATIENT)
Dept: PHYSICAL THERAPY | Facility: HOSPITAL | Age: 48
Setting detail: THERAPIES SERIES
End: 2018-03-26
Attending: ORTHOPAEDIC SURGERY
Payer: COMMERCIAL

## 2018-03-26 PROCEDURE — 97110 THERAPEUTIC EXERCISES: CPT | Mod: GP

## 2018-03-26 PROCEDURE — 40000718 ZZHC STATISTIC PT DEPARTMENT ORTHO VISIT

## 2018-03-26 NOTE — TELEPHONE ENCOUNTER
Please inform patient that the endometrial biopsy was benign.   Please set up appointment with Dr. Garvin for discussion of further surgery to stop her   Bleeding problem since endometrial ablation has failed eg. Hysterectomy. (Routing comment)     Set from Dr. Mcdermott from unspecific documentation.

## 2018-03-26 NOTE — TELEPHONE ENCOUNTER
Patient notified and is scheduled to see Dr. Garvin on 4/10/18 at 8:30 to discuss surgical options per Dr. Mcdermott.

## 2018-03-28 ENCOUNTER — HOSPITAL ENCOUNTER (OUTPATIENT)
Dept: PHYSICAL THERAPY | Facility: HOSPITAL | Age: 48
Setting detail: THERAPIES SERIES
End: 2018-03-28
Attending: ORTHOPAEDIC SURGERY
Payer: COMMERCIAL

## 2018-03-28 ENCOUNTER — TRANSFERRED RECORDS (OUTPATIENT)
Dept: HEALTH INFORMATION MANAGEMENT | Facility: CLINIC | Age: 48
End: 2018-03-28

## 2018-03-28 PROCEDURE — 40000718 ZZHC STATISTIC PT DEPARTMENT ORTHO VISIT

## 2018-03-28 PROCEDURE — 97110 THERAPEUTIC EXERCISES: CPT | Mod: GP

## 2018-03-28 NOTE — PROGRESS NOTES
03/12/18 1000   General Information   Type of Visit Initial OP Ortho PT Evaluation   Start of Care Date 03/12/18   Referring Physician Dr Santos   Patient/Family Goals Statement decrease pain, increase strength   Orders Evaluate and Treat   Orders Comment Hip/Core strengthening, stretching. F/U with MD in 4-6 weeks   Insurance Type [employer]   Insurance Comments/Visits Authorized [id number]   Medical Diagnosis R hip pain, S/P labral repair 2017, R hip abductor tear per Dr Santos   Surgical/Medical history reviewed Yes   Precautions/Limitations no known precautions/limitations   Body Part(s)   Body Part(s) Hip   Presentation and Etiology   Pertinent history of current problem (include personal factors and/or comorbidities that impact the POC) This 46 y/o female referred to OP PT from Dr Santos secondary to significant R hip pain - unable to get R VADIM approved through insurance company. Dr Santos reports hip abductor partial tearing, R hip bursitis. This is per MRI results. Pt is s/p labral repair from Dr Santos 2017. MRI results unable to be obtained through this epic charting   Impairments A. Pain;H. Impaired gait;M. Locking or catching   Functional Limitations perform activities of daily living;perform desired leisure / sports activities   Symptom Location R hip to knee lateral,R buttock   How/Where did it occur From insidious onset   Chronicity Chronic   Pain rating (0-10 point scale) Best (/10);Worst (/10)   Best (/10) 0   Worst (/10) 10   Pain quality B. Dull;C. Aching   Frequency of pain/symptoms B. Intermittent   Pain/symptoms exacerbated by B. Walking;D. Carrying;K. Home tasks;L. Work tasks;J. ADL;I. Bending;G. Certain positions   Pain/symptoms eased by C. Rest;D. Nothing;E. Changing positions;I. OTC medication(s)  (cortisone)   Progression of symptoms since onset: Improved  (since cortisone shot on 2/27)   Current / Previous Interventions   Diagnostic Tests: MRI   MRI Results (labral tear per pt)   Prior Level of  Function   Prior Level of Function-Mobility no asst device   Prior Level of Function-ADLs indep in all adls.    Current Level of Function   Current Community Support Family/friend caregiver   Patient role/employment history A. Employed   Employment Comments [employer] . sits 7 hours   [facility]   Current equipment-Gait/Locomotion None   Current equipment-ADL None   Fall Risk Screen   Have you fallen 2 or more times in the past year? No   Have you fallen and had an injury in the past year? No   Is patient a fall risk? No   System Outcome Measures   Outcome Measures (Hip Outcome Score- 85.29%)   Hip Objective Findings   Side (if bilateral, select both right and left) Right   Observation in no acute pain this date-    Integumentary  intact   Gait/Locomotion slight R hip flexion, decrd stance phase R   JUANITA Test neg   Neurological Testing Comments intact lt touch RLE   Palpation slight tenderness R ITB   Right Hip Flexion PROM wfl   Right Hip Abduction PROM wfl   Right Hip Adduction PROM wfl   Right Hip ER PROM wfl   Right Hip IR PROM wfl   Right Hip Ext PROM wfl   Right Hip Abduction Strength 3+   Right Hip IR Strength 3+   Right Hip ER Strength 3+   Obers/ITB Flexibility tight, pain provoked   Right Piriformis Flexibility mod to max tightness   Planned Therapy Interventions   Planned Therapy Interventions ROM;manual therapy;stretching;gait training;neuromuscular re-education;strengthening   Planned Therapy Interventions Comment HEP   Clinical Impression   Criteria for Skilled Therapeutic Interventions Met yes, treatment indicated   PT Diagnosis R hip bursitis, weakness   Influenced by the following impairments tightness, weakness, pain   Functional limitations due to impairments ambulation, stairs, sleep   Clinical Presentation Evolving/Changing   Clinical Presentation Rationale eval findings   Clinical Decision Making (Complexity) Moderate complexity   Therapy Frequency 2  times/Week   Predicted Duration of Therapy Intervention (days/wks) up to 8 weeks   Risk & Benefits of therapy have been explained Yes   Patient, Family & other staff in agreement with plan of care Yes   Clinical Impression Comments PT will focus on strengthening, stretching, HEP and education. Pt agrees with POC.   Education Assessment   Barriers to Learning No barriers   Ortho Goal 1   Goal Identifier STG 1   Goal Description Pt will demonstrate indep HEP compliance   Target Date 04/18/18   Ortho Goal 2   Goal Identifier STG2   Goal Description Pt will have reduced hip pain to 4/10 or less consistently with no signif gait antalgia   Target Date 04/25/18   Ortho Goal 3   Goal Identifier LTG   Goal Description Pt will resume all activities  this summer - ie walking, biking without hip pain and normal strength   Target Date 06/20/18   Total Evaluation Time   Total Evaluation Time 20

## 2018-04-09 ENCOUNTER — HOSPITAL ENCOUNTER (OUTPATIENT)
Dept: PHYSICAL THERAPY | Facility: HOSPITAL | Age: 48
Setting detail: THERAPIES SERIES
End: 2018-04-09
Attending: NURSE PRACTITIONER
Payer: COMMERCIAL

## 2018-04-09 PROCEDURE — 97110 THERAPEUTIC EXERCISES: CPT | Mod: GP

## 2018-04-09 PROCEDURE — 40000718 ZZHC STATISTIC PT DEPARTMENT ORTHO VISIT

## 2018-04-10 ENCOUNTER — OFFICE VISIT (OUTPATIENT)
Dept: OBGYN | Facility: OTHER | Age: 48
End: 2018-04-10
Attending: OBSTETRICS & GYNECOLOGY
Payer: COMMERCIAL

## 2018-04-10 VITALS
HEIGHT: 66 IN | SYSTOLIC BLOOD PRESSURE: 118 MMHG | HEART RATE: 71 BPM | OXYGEN SATURATION: 99 % | WEIGHT: 156 LBS | DIASTOLIC BLOOD PRESSURE: 72 MMHG | BODY MASS INDEX: 25.07 KG/M2

## 2018-04-10 DIAGNOSIS — Z12.4 SCREENING FOR CERVICAL CANCER: Primary | ICD-10-CM

## 2018-04-10 DIAGNOSIS — N93.9 ABNORMAL UTERINE BLEEDING: ICD-10-CM

## 2018-04-10 PROCEDURE — 88142 CYTOPATH C/V THIN LAYER: CPT | Performed by: OBSTETRICS & GYNECOLOGY

## 2018-04-10 PROCEDURE — 87624 HPV HI-RISK TYP POOLED RSLT: CPT | Mod: 90 | Performed by: OBSTETRICS & GYNECOLOGY

## 2018-04-10 PROCEDURE — 99214 OFFICE O/P EST MOD 30 MIN: CPT | Mod: 57 | Performed by: OBSTETRICS & GYNECOLOGY

## 2018-04-10 PROCEDURE — 99000 SPECIMEN HANDLING OFFICE-LAB: CPT | Performed by: OBSTETRICS & GYNECOLOGY

## 2018-04-10 ASSESSMENT — PAIN SCALES - GENERAL: PAINLEVEL: NO PAIN (0)

## 2018-04-10 NOTE — MR AVS SNAPSHOT
After Visit Summary   4/10/2018    Shazia Verma    MRN: [id number]           Patient Information     Date Of Birth          1970        Visit Information        Provider Department      4/10/2018 8:30 AM Jean Marie Garvin MD [facility]        Today's Diagnoses     Screening for cervical cancer    -  1    Abnormal uterine bleeding          Care Instructions    Nothing to eat or drink after midnight prior to procedure.            Follow-ups after your visit        Your next 10 appointments already scheduled     Apr 12, 2018  9:00 AM CDT   Ortho Treatment with Reina Alejandre, DANA   [facility] ([facility] )    [address] [address]   [phone]            Apr 19, 2018  1:30 PM CDT   (Arrive by 1:15 PM)   Pre-Op physical with Mary Ellen Armstrong NP   [facility] ([facility] )    [address]  [facility] [address]   [phone]              Who to contact     If you have questions or need follow up information about today's clinic visit or your schedule please contact [facility] directly at [phone].  Normal or non-critical lab and imaging results will be communicated to you by MyChart, letter or phone within 4 business days after the clinic has received the results. If you do not hear from us within 7 days, please contact the clinic through [employer] or phone. If you have a critical or abnormal lab result, we will notify you by phone as soon as possible.  Submit refill requests through [employer] or call your pharmacy and they will forward the refill request to us. Please allow 3 business days for your refill to be completed.          Additional Information About Your Visit        MyChart Information     [employer] gives you secure access to your electronic health record. If you see a primary care provider, you can also send messages to your care team and make appointments. If you  "have questions, please call your primary care clinic.  If you do not have a primary care provider, please call [phone] and they will assist you.        Care EveryWhere ID     This is your Care EveryWhere ID. This could be used by other organizations to access your [facility] medical records  [id number]        Your Vitals Were     Pulse Height Pulse Oximetry BMI (Body Mass Index)          71 5' 6\" (1.676 m) 99% 25.18 kg/m2         Blood Pressure from Last 3 Encounters:   04/10/18 118/72   03/16/18 126/72   03/15/18 120/72    Weight from Last 3 Encounters:   04/10/18 156 lb (70.8 kg)   03/16/18 156 lb (70.8 kg)   03/15/18 156 lb (70.8 kg)              We Performed the Following     A pap thin layer screen with  HPV - recommended age 30 - 65 years (select HPV order below)     HPV High Risk Types DNA Cervical        Primary Care Provider Office Phone # Fax #    Mary Ellen Armstrong [phone] [facility] [address]        Equal Access to Services     [facility] : Hadii aad ku hadasho Soomaali, waaxda luqadaha, qaybta kaalmada adeegyada, jeff osullivan . So [facility] [phone].    ATENCIÓN: Si habla español, tiene a wadsworth disposición servicios gratuitos de asistencia lingüística. [facility] [phone].    We comply with applicable federal civil rights laws and [place] laws. We do not discriminate on the basis of race, color, national origin, age, disability, sex, sexual orientation, or gender identity.            Thank you!     Thank you for choosing [facility]  for your care. Our goal is always to provide you with excellent care. Hearing back from our patients is one way we can continue to improve our services. Please take a few minutes to complete the written survey that you may receive in the mail after your visit with us. Thank you!             Your Updated Medication List - Protect others around you: Learn how to " safely use, store and throw away your medicines at www.disposemymeds.org.          This list is accurate as of 4/10/18 12:53 PM.  Always use your most recent med list.                   Brand Name Dispense Instructions for use Diagnosis    calcium carbonate 1250 MG tablet    OS-MANUEL 500 mg [place]. [place]          IRON SUPPLEMENT PO      Take 325 mg by mouth daily (with breakfast) 2 tabs daily        Multi-vitamin Tabs tablet      Take 1 tablet by mouth daily        norethindrone 5 MG tablet    AYGESTIN    28 tablet    Take 1 tablet (5 mg) by mouth 3 times daily        VITAMIN B 12 PO           VITAMIN D (CHOLECALCIFEROL) PO      Take 5,000 Units by mouth daily 2 tabs daily

## 2018-04-10 NOTE — PROGRESS NOTES
CHIEF COMPLAINT:  Consult from Dr. Mcdermott for hysterectomy  HPI:  Shazia Verma is a 47 year old female Para 2(CS) . Menses for last couple of years are Irregular with gushing/closts and heavy flow.  She has had an endometrial ablation done  and also had transexamic acid IV and followed by a prescription for oral  norenthindrone 5 mg tid and continues to have AUB   She had an endometrial biopsy performed on 3/30/17and the biopsy was benign       She had a pelvic ultrasound on 3/24/2018 and this showed the uterus was normal size with a  section scar. The endometrium measured 1.6 cm in thickness. Her menstrual flow is limiting her clothing choices and interferes with lifestyle/activities. See prior evals by Dr. Mcdermott.       Clots: Yes  Intermenstrual bleeding: Yes  Post-coital bleeding: Does not know  Previous work-up:Yes  Abnormal Pap: Yes, 2004 with colpo.  Nml since.   Dysmenorrhea: No  Pelvic pain:No  Dyspareunia: No   BC- vas    Past GYN history:        Last PAP smear:  Normal  > 3 yrs ago    Patients records are available and reviewed at today's visit.    No past medical history on file.    Past Surgical History:   Procedure Laterality Date     ABDOMEN SURGERY      gastric bypass     APPENDECTOMY       BREAST SURGERY      right bx     COLONOSCOPY  2013     GI SURGERY  2012    gastric by pass     GYN SURGERY   1994    c section x 2     ORTHOPEDIC SURGERY  2015    right hip labreal tear     ORTHOPEDIC SURGERY  2014    left wrist        Family History   Problem Relation Age of Onset     CEREBROVASCULAR DISEASE Mother      Hyperlipidemia Mother      Hypertension Mother      Coronary Artery Disease Mother      Migraines Mother      Obesity Mother      Osteoarthritis Mother      OSTEOPOROSIS Mother      DIABETES Mother      Hyperlipidemia Father      Hypertension Father      Thyroid Disease Father      Hypertension Brother      Hyperlipidemia Brother      Obesity Brother       "Hypertension Sister      Thyroid Disease Sister        Current Outpatient Prescriptions   Medication Sig Dispense Refill     norethindrone (AYGESTIN) 5 MG tablet Take 1 tablet (5 mg) by mouth 3 times daily 28 tablet 1     VITAMIN D, CHOLECALCIFEROL, PO Take 5,000 Units by mouth daily 2 tabs daily       Cyanocobalamin (VITAMIN B 12 PO)        multivitamin, therapeutic with minerals (MULTI-VITAMIN) TABS Take 1 tablet by mouth daily       Ferrous Sulfate (IRON SUPPLEMENT PO) Take 325 mg by mouth daily (with breakfast) 2 tabs daily       calcium carbonate (OS-MANUEL 500 MG [place]. [place]) 500 MG tablet          Allergies: Reglan [metoclopramide]    ROS:  CONSTITUTIONAL: NEGATIVE for fever, chills, change in weight  GI: NEGATIVE for nausea, abdominal pain, heartburn, or change in bowel habits  : NEGATIVE for frequency, dysuria, hematuria, vaginal discharge  PSYCHIATRIC: NEGATIVE for changes in mood or affect    EXAM:  Blood pressure 118/72, pulse 71, height 5' 6\" (1.676 m), weight 156 lb (70.8 kg), SpO2 99 %.   BMI= Body mass index is 25.18 kg/(m^2).  General - pleasant female in no acute distress.  Abdomen - soft, non tender, non distended, no hepatosplenomegaly.  Pelvic - External genitalia: normal adult female, BUS: within normal limits, Vagina: well rugated, no discharge, Cervix: no lesions or CMT, Uterus: firm,  normal sized and non tender, Adnexae: no masses or tenderness.  Mild pelvic relaxation.   Rectovaginal - deferred.  Musculoskeletal - no gross deformities or edema  Neurological - normal mental status.  Pap done      ASSESSMENT/PLAN:  (Z12.4) Screening for cervical cancer  (primary encounter diagnosis)  Plan: A pap thin layer screen with  HPV - recommended        age 30 - 65 years (select HPV order below), HPV        High Risk Types DNA Cervical            (N93.9) Abnormal uterine bleeding  Comment: Menometrorrhagia with failed conservative medical/surgical therapy  Plan: A pap thin layer screen with  HPV - " recommended        age 30 - 65 years (select HPV order below), HPV        High Risk Types DNA Cervical        Recommend hysterectomy for definitive management.  Discussed minimally invasive options including LSH/LAVH with bilateral salpingectomy.  Also discussed risks/benefits of ovarian removal and given pt fam h/o CAD she would like to preserve them if normal. She would like to proceed with LSH/BS (with extracorporeal morcelation) if pap nml.  Reviewed goals, risks, alternatives for planned procedure.  Including risk of bleeding, infection, damage to nerves, blood vessels, bowel and bladder. Discussed recovery period and expected discomfort. We discussed if there was a lot of scarring from previous surgeries we may need to convert to laparotomy.   All questions were answered.  Preoperative instructions discussed.  NPO after midnight.  Scheduled 5/1 and pre-op arranged with PCM.  30 minutes were spent with the patient with greater than 50% of the visit spent in face-to-face counseling and coordination of care.

## 2018-04-10 NOTE — NURSING NOTE
"Chief Complaint   Patient presents with     Consult     Bong/ discuss surgery       Initial /72 (BP Location: Left arm, Cuff Size: Adult Large)  Pulse 71  Ht 5' 6\" (1.676 m)  Wt 156 lb (70.8 kg)  SpO2 99%  BMI 25.18 kg/m2 Estimated body mass index is 25.18 kg/(m^2) as calculated from the following:    Height as of this encounter: 5' 6\" (1.676 m).    Weight as of this encounter: 156 lb (70.8 kg).  Medication Reconciliation: complete     Aishwarya Colbert      "

## 2018-04-11 DIAGNOSIS — N92.1 MENOMETRORRHAGIA: Primary | ICD-10-CM

## 2018-04-12 ENCOUNTER — HOSPITAL ENCOUNTER (OUTPATIENT)
Dept: PHYSICAL THERAPY | Facility: HOSPITAL | Age: 48
Setting detail: THERAPIES SERIES
End: 2018-04-12
Attending: NURSE PRACTITIONER
Payer: COMMERCIAL

## 2018-04-12 PROCEDURE — 97110 THERAPEUTIC EXERCISES: CPT | Mod: GP

## 2018-04-12 PROCEDURE — 40000718 ZZHC STATISTIC PT DEPARTMENT ORTHO VISIT

## 2018-04-16 LAB
COPATH REPORT: NORMAL
PAP: NORMAL

## 2018-04-17 LAB
FINAL DIAGNOSIS: NORMAL
HPV HR 12 DNA CVX QL NAA+PROBE: NEGATIVE
HPV16 DNA SPEC QL NAA+PROBE: NEGATIVE
HPV18 DNA SPEC QL NAA+PROBE: NEGATIVE
SPECIMEN DESCRIPTION: NORMAL
SPECIMEN SOURCE CVX/VAG CYTO: NORMAL

## 2018-04-26 ENCOUNTER — OFFICE VISIT (OUTPATIENT)
Dept: FAMILY MEDICINE | Facility: OTHER | Age: 48
End: 2018-04-26
Attending: NURSE PRACTITIONER
Payer: COMMERCIAL

## 2018-04-26 VITALS
BODY MASS INDEX: 24.91 KG/M2 | WEIGHT: 155 LBS | TEMPERATURE: 96 F | RESPIRATION RATE: 14 BRPM | HEIGHT: 66 IN | HEART RATE: 63 BPM | OXYGEN SATURATION: 99 % | DIASTOLIC BLOOD PRESSURE: 70 MMHG | SYSTOLIC BLOOD PRESSURE: 110 MMHG

## 2018-04-26 DIAGNOSIS — Z98.84 H/O GASTRIC BYPASS: ICD-10-CM

## 2018-04-26 DIAGNOSIS — N92.1 MENORRHAGIA WITH IRREGULAR CYCLE: ICD-10-CM

## 2018-04-26 DIAGNOSIS — Z01.818 PREOP GENERAL PHYSICAL EXAM: Primary | ICD-10-CM

## 2018-04-26 LAB
ALBUMIN SERPL-MCNC: 3.5 G/DL (ref 3.4–5)
ALBUMIN UR-MCNC: NEGATIVE MG/DL
ALP SERPL-CCNC: 73 U/L (ref 40–150)
ALT SERPL W P-5'-P-CCNC: 20 U/L (ref 0–50)
ANION GAP SERPL CALCULATED.3IONS-SCNC: 7 MMOL/L (ref 3–14)
APPEARANCE UR: CLEAR
AST SERPL W P-5'-P-CCNC: 15 U/L (ref 0–45)
BASOPHILS # BLD AUTO: 0 10E9/L (ref 0–0.2)
BASOPHILS NFR BLD AUTO: 0.3 %
BILIRUB SERPL-MCNC: 0.8 MG/DL (ref 0.2–1.3)
BILIRUB UR QL STRIP: NEGATIVE
BUN SERPL-MCNC: 11 MG/DL (ref 7–30)
CALCIUM SERPL-MCNC: 8.6 MG/DL (ref 8.5–10.1)
CHLORIDE SERPL-SCNC: 106 MMOL/L (ref 94–109)
CO2 SERPL-SCNC: 26 MMOL/L (ref 20–32)
COLOR UR AUTO: YELLOW
CREAT SERPL-MCNC: 0.58 MG/DL (ref 0.52–1.04)
DIFFERENTIAL METHOD BLD: NORMAL
EOSINOPHIL # BLD AUTO: 0.1 10E9/L (ref 0–0.7)
EOSINOPHIL NFR BLD AUTO: 0.6 %
ERYTHROCYTE [DISTWIDTH] IN BLOOD BY AUTOMATED COUNT: 13.6 % (ref 10–15)
GFR SERPL CREATININE-BSD FRML MDRD: >90 ML/MIN/1.7M2
GLUCOSE SERPL-MCNC: 91 MG/DL (ref 70–99)
GLUCOSE UR STRIP-MCNC: NEGATIVE MG/DL
HCG UR QL: NEGATIVE
HCT VFR BLD AUTO: 35.8 % (ref 35–47)
HGB BLD-MCNC: 11.7 G/DL (ref 11.7–15.7)
HGB UR QL STRIP: NEGATIVE
KETONES UR STRIP-MCNC: NEGATIVE MG/DL
LEUKOCYTE ESTERASE UR QL STRIP: NEGATIVE
LYMPHOCYTES # BLD AUTO: 2 10E9/L (ref 0.8–5.3)
LYMPHOCYTES NFR BLD AUTO: 25.7 %
MCH RBC QN AUTO: 27.8 PG (ref 26.5–33)
MCHC RBC AUTO-ENTMCNC: 32.7 G/DL (ref 31.5–36.5)
MCV RBC AUTO: 85 FL (ref 78–100)
MONOCYTES # BLD AUTO: 0.5 10E9/L (ref 0–1.3)
MONOCYTES NFR BLD AUTO: 6.8 %
NEUTROPHILS # BLD AUTO: 5.2 10E9/L (ref 1.6–8.3)
NEUTROPHILS NFR BLD AUTO: 66.6 %
NITRATE UR QL: NEGATIVE
PH UR STRIP: 7 PH (ref 5–7)
PLATELET # BLD AUTO: 247 10E9/L (ref 150–450)
POTASSIUM SERPL-SCNC: 4 MMOL/L (ref 3.4–5.3)
PROT SERPL-MCNC: 7.1 G/DL (ref 6.8–8.8)
RBC # BLD AUTO: 4.21 10E12/L (ref 3.8–5.2)
SODIUM SERPL-SCNC: 139 MMOL/L (ref 133–144)
SOURCE: NORMAL
SP GR UR STRIP: 1.02 (ref 1–1.03)
UROBILINOGEN UR STRIP-ACNC: 0.2 EU/DL (ref 0.2–1)
WBC # BLD AUTO: 7.8 10E9/L (ref 4–11)

## 2018-04-26 PROCEDURE — 80053 COMPREHEN METABOLIC PANEL: CPT | Performed by: NURSE PRACTITIONER

## 2018-04-26 PROCEDURE — 81003 URINALYSIS AUTO W/O SCOPE: CPT | Performed by: NURSE PRACTITIONER

## 2018-04-26 PROCEDURE — 81025 URINE PREGNANCY TEST: CPT | Performed by: NURSE PRACTITIONER

## 2018-04-26 PROCEDURE — 93000 ELECTROCARDIOGRAM COMPLETE: CPT | Performed by: INTERNAL MEDICINE

## 2018-04-26 PROCEDURE — 85025 COMPLETE CBC W/AUTO DIFF WBC: CPT | Performed by: NURSE PRACTITIONER

## 2018-04-26 PROCEDURE — 99214 OFFICE O/P EST MOD 30 MIN: CPT | Mod: 25 | Performed by: NURSE PRACTITIONER

## 2018-04-26 PROCEDURE — 36415 COLL VENOUS BLD VENIPUNCTURE: CPT | Performed by: NURSE PRACTITIONER

## 2018-04-26 ASSESSMENT — ANXIETY QUESTIONNAIRES
5. BEING SO RESTLESS THAT IT IS HARD TO SIT STILL: NOT AT ALL
3. WORRYING TOO MUCH ABOUT DIFFERENT THINGS: NOT AT ALL
1. FEELING NERVOUS, ANXIOUS, OR ON EDGE: NOT AT ALL
2. NOT BEING ABLE TO STOP OR CONTROL WORRYING: NOT AT ALL
6. BECOMING EASILY ANNOYED OR IRRITABLE: NOT AT ALL
GAD7 TOTAL SCORE: 0
7. FEELING AFRAID AS IF SOMETHING AWFUL MIGHT HAPPEN: NOT AT ALL

## 2018-04-26 ASSESSMENT — PAIN SCALES - GENERAL: PAINLEVEL: NO PAIN (0)

## 2018-04-26 ASSESSMENT — PATIENT HEALTH QUESTIONNAIRE - PHQ9: 5. POOR APPETITE OR OVEREATING: NOT AT ALL

## 2018-04-26 NOTE — PROGRESS NOTES
[facility]  [address]  [place] [address]  [phone]  Dept: [phone]    PRE-OP EVALUATION:  Today's date: 2018    Shazia Verma (: 1970) presents for pre-operative evaluation assessment as requested by Dr. Jean Marie Garvin.  She requires evaluation and anesthesia risk assessment prior to undergoing surgery/procedure for treatment of abnormal vaginal bleeding .    Proposed Surgery/ Procedure: Laparoscopic supra cervical hysterectomy with bilateral salpingectomy  Date of Surgery/ Procedure: 18  Time of Surgery/ Procedure: to be determined  Facility: [place], [place]    Primary Physician: Mary Ellen Armstrong  Type of Anesthesia Anticipated: to be determined    Patient has a Health Care Directive or Living Will:  NO    1. NO - Do you have a history of heart attack, stroke, stent, bypass or surgery on an artery in the head, neck, heart or legs?  2. NO - Do you ever have any pain or discomfort in your chest?  3. NO - Do you have a history of  Heart Failure?  4. NO - Are you troubled by shortness of breath when: walking on the level, up a slight hill or at night?  5. NO - Do you currently have a cold, bronchitis or other respiratory infection?  6. NO - Do you have a cough, shortness of breath or wheezing?  7. NO - Do you sometimes get pains in the calves of your legs when you walk?  8. NO - Do you or anyone in your family have previous history of blood clots?  9. NO - Do you or does anyone in your family have a serious bleeding problem such as prolonged bleeding following surgeries or cuts?  10. YES - Have you ever had problems with anemia or been told to take iron pills? Due to gastric bypass and heavy periods.   11. YES - Have you had any abnormal blood loss such as black, tarry or bloody stools, or abnormal vaginal bleeding? Heavy periods  12. NO - Have you ever had a blood transfusion?  13. NO - Have you or any of your relatives ever  had problems with anesthesia?  14. NO - Do you have sleep apnea, excessive snoring or daytime drowsiness?  15. NO - Do you have any prosthetic heart valves?  16. NO - Do you have prosthetic joints?  17. NO - Is there any chance that you may be pregnant?      HPI:     HPI related to upcoming procedure: heavy periods that are now interfering with daily activities and clothing choices.        She is otherwise quite healthy and does not have any new concerns today.      MEDICAL HISTORY:     Patient Active Problem List    Diagnosis Date Noted     Hypoglycemia 09/08/2017     Priority: Medium     ACP (advance care planning) 11/28/2016     Priority: Medium     Advance Care Planning 11/28/2016: ACP Review of Chart / Resources Provided:  Reviewed chart for advance care plan.  Shazia Verma has been provided information and resources to begin or update their advance care plan.  Added by WOODY LENNON             H/O gastric bypass 11/28/2016     Priority: Medium     Had type 2 diabetes, hyperlipidemia and hypertension prior to surgery.         No past medical history on file.  Past Surgical History:   Procedure Laterality Date     ABDOMEN SURGERY  2012    gastric bypass     APPENDECTOMY  1991     BREAST SURGERY  2002    right bx     COLONOSCOPY  2013     GI SURGERY  2012    gastric by pass     GYN SURGERY  1992 1994    c section x 2     ORTHOPEDIC SURGERY  2015    right hip labreal tear     ORTHOPEDIC SURGERY  2014    left wrist      Current Outpatient Prescriptions   Medication Sig Dispense Refill     calcium carbonate (OS-MANUEL 500 MG [place]. [place]) 500 MG tablet        Cyanocobalamin (VITAMIN B 12 PO)        Ferrous Sulfate (IRON SUPPLEMENT PO) Take 325 mg by mouth daily (with breakfast) 2 tabs daily       multivitamin, therapeutic with minerals (MULTI-VITAMIN) TABS Take 1 tablet by mouth daily       norethindrone (AYGESTIN) 5 MG tablet Take 1 tablet (5 mg) by mouth 3 times daily 28 tablet 1     VITAMIN D, CHOLECALCIFEROL, PO  "Take 5,000 Units by mouth daily 2 tabs daily       OTC products: None, except as noted above, no recent use of OTC ASA, NSAIDS or Steroids and no use of herbal medications or other supplements    Allergies   Allergen Reactions     Reglan [Metoclopramide] Anxiety      Latex Allergy: NO    Social History   Substance Use Topics     Smoking status: Former Smoker     Smokeless tobacco: Never Used     Alcohol use Yes      Comment: occ     History   Drug Use No       REVIEW OF SYSTEMS:   CONSTITUTIONAL: NEGATIVE for fever, chills, change in weight  INTEGUMENTARY/SKIN: NEGATIVE for worrisome rashes, moles or lesions  EYES: NEGATIVE for vision changes or irritation  ENT/MOUTH: NEGATIVE for ear, mouth and throat problems  RESP: NEGATIVE for significant cough or SOB  CV: NEGATIVE for chest pain, palpitations or peripheral edema  GI: NEGATIVE for nausea, abdominal pain, heartburn, or change in bowel habits   female: menorrhagia   MUSCULOSKELETAL: NEGATIVE for significant arthralgias or myalgia  NEURO: NEGATIVE for weakness, dizziness or paresthesias  ENDOCRINE: NEGATIVE for temperature intolerance, skin/hair changes  HEME: NEGATIVE for bleeding problems  PSYCHIATRIC: NEGATIVE for changes in mood or affect    EXAM:   /70 (BP Location: Right arm, Patient Position: Sitting, Cuff Size: Adult Regular)  Pulse 63  Temp 96  F (35.6  C) (Tympanic)  Resp 14  Ht 5' 6\" (1.676 m)  Wt 155 lb (70.3 kg)  SpO2 99%  BMI 25.02 kg/m2    GENERAL APPEARANCE: healthy, alert and no distress     EYES: EOMI, PERRL     HENT: ear canals and TM's normal and nose and mouth without ulcers or lesions     NECK: no adenopathy, no asymmetry, masses, or scars and thyroid normal to palpation     RESP: lungs clear to auscultation - no rales, rhonchi or wheezes     CV: regular rates and rhythm, normal S1 S2, no S3 or S4 and no murmur, click or rub     ABDOMEN:  soft, nontender, no HSM or masses and bowel sounds normal     MS: extremities normal- no " gross deformities noted, no evidence of inflammation in joints, FROM in all extremities.     SKIN: no suspicious lesions or rashes     NEURO: Normal strength and tone, sensory exam grossly normal, mentation intact and speech normal     PSYCH: mentation appears normal. and affect normal/bright    DIAGNOSTICS:     Results for orders placed or performed in visit on 04/26/18   Comprehensive metabolic panel   Result Value Ref Range    Sodium 139 133 - 144 mmol/L    Potassium 4.0 3.4 - 5.3 mmol/L    Chloride 106 94 - 109 mmol/L    Carbon Dioxide 26 20 - 32 mmol/L    Anion Gap 7 3 - 14 mmol/L    Glucose 91 70 - 99 mg/dL    Urea Nitrogen 11 7 - 30 mg/dL    Creatinine 0.58 0.52 - 1.04 mg/dL    GFR Estimate >90 >60 mL/min/1.7m2    GFR Estimate If Black >90 >60 mL/min/1.7m2    Calcium 8.6 8.5 - 10.1 mg/dL    Bilirubin Total 0.8 0.2 - 1.3 mg/dL    Albumin 3.5 3.4 - 5.0 g/dL    Protein Total 7.1 6.8 - 8.8 g/dL    Alkaline Phosphatase 73 40 - 150 U/L    ALT 20 0 - 50 U/L    AST 15 0 - 45 U/L   CBC with platelets differential   Result Value Ref Range    WBC 7.8 4.0 - 11.0 10e9/L    RBC Count 4.21 3.8 - 5.2 10e12/L    Hemoglobin 11.7 11.7 - 15.7 g/dL    Hematocrit 35.8 35.0 - 47.0 %    MCV 85 78 - 100 fl    MCH 27.8 26.5 - 33.0 pg    MCHC 32.7 31.5 - 36.5 g/dL    RDW 13.6 10.0 - 15.0 %    Platelet Count 247 150 - 450 10e9/L    Diff Method Automated Method     % Neutrophils 66.6 %    % Lymphocytes 25.7 %    % Monocytes 6.8 %    % Eosinophils 0.6 %    % Basophils 0.3 %    Absolute Neutrophil 5.2 1.6 - 8.3 10e9/L    Absolute Lymphocytes 2.0 0.8 - 5.3 10e9/L    Absolute Monocytes 0.5 0.0 - 1.3 10e9/L    Absolute Eosinophils 0.1 0.0 - 0.7 10e9/L    Absolute Basophils 0.0 0.0 - 0.2 10e9/L   *UA reflex to Microscopic and Culture - [facility]   Result Value Ref Range    Color Urine Yellow     Appearance Urine Clear     Glucose Urine Negative NEG^Negative mg/dL    Bilirubin Urine Negative NEG^Negative    Ketones Urine Negative  NEG^Negative mg/dL    Specific Gravity Urine 1.020 1.003 - 1.035    Blood Urine Negative NEG^Negative    pH Urine 7.0 5.0 - 7.0 pH    Protein Albumin Urine Negative NEG^Negative mg/dL    Urobilinogen Urine 0.2 0.2 - 1.0 EU/dL    Nitrite Urine Negative NEG^Negative    Leukocyte Esterase Urine Negative NEG^Negative    Source Midstream Urine    HCG qualitative urine   Result Value Ref Range    HCG Qual Urine Negative NEG^Negative         Recent Labs   Lab Test  03/13/18   1853  01/15/18   1542  09/01/17   1154   11/28/16   1048   HGB  12.5  11.1*  11.4*   < >  12.7   PLT  365  295  299   < >  284   NA  138  142  139   < >   --    POTASSIUM  4.1  3.7  4.1   < >   --    CR  0.63  0.57  0.57   < >   --    A1C   --    --   5.7   --   5.2    < > = values in this interval not displayed.           EKG Interpretation:      Interpreted by Mary Ellen Armstrong    Symptoms at time of EKG: None   Rhythm: Normal sinus   Rate: Bradycardia  Axis: Normal  Ectopy: None  Conduction: Normal  ST Segments/ T Waves: No ST-T wave changes and No acute ischemic changes  Q Waves: None  Comparison to prior: Unchanged from 6/2016    Clinical Impression: normal EKG        IMPRESSION:   Reason for surgery/procedure: menorrhagia   Diagnosis/reason for consult: anesthesia risk assessment     The proposed surgical procedure is considered INTERMEDIATE risk.    REVISED CARDIAC RISK INDEX  The patient has the following serious cardiovascular risks for perioperative complications such as (MI, PE, VFib and 3  AV Block):  No serious cardiac risks  INTERPRETATION: 0 risks: Class I (very low risk - 0.4% complication rate)      The patient has the following additional risks for perioperative complications:  No identified additional risks other than gastric bypass      ICD-10-CM    1. Preop general physical exam Z01.818 Comprehensive metabolic panel     CBC with platelets differential     *UA reflex to Microscopic and Culture - [facility]     HCG  qualitative urine     EKG 12-lead complete w/read - (Clinic Performed)   2. Menorrhagia with irregular cycle N92.1    3. H/O gastric bypass Z98.890        RECOMMENDATIONS:       Cardiovascular Risk  Performs 4 METs exercise without symptoms (Walk on level ground at 15 minutes per mile (4 miles/hour)) .       --Patient is to HOLD all scheduled medications on the day of surgery EXCEPT for modifications listed below.    APPROVAL GIVEN to proceed with proposed procedure, without further diagnostic evaluation       Signed Electronically by: Mary Ellen Armstrong NP    Copy of this evaluation report is provided to requesting physician.    [facility] Preop Guidelines    Revised Cardiac Risk Index

## 2018-04-26 NOTE — NURSING NOTE
"Chief Complaint   Patient presents with     Pre-Op Exam       Initial /70 (BP Location: Right arm, Patient Position: Sitting, Cuff Size: Adult Regular)  Pulse 63  Temp 96  F (35.6  C) (Tympanic)  Resp 14  Ht 5' 6\" (1.676 m)  Wt 155 lb (70.3 kg)  SpO2 99%  BMI 25.02 kg/m2 Estimated body mass index is 25.02 kg/(m^2) as calculated from the following:    Height as of this encounter: 5' 6\" (1.676 m).    Weight as of this encounter: 155 lb (70.3 kg).  Medication Reconciliation: complete   Traci Wiley      "

## 2018-04-26 NOTE — MR AVS SNAPSHOT
After Visit Summary   4/26/2018    Shazia Verma    MRN: [id number]           Patient Information     Date Of Birth          1970        Visit Information        Provider Department      4/26/2018 9:00 AM Mary Ellen Armstrong NP [facility]        Today's Diagnoses     Preop general physical exam    -  1    Menorrhagia with irregular cycle        H/O gastric bypass          Care Instructions    1. Preop general physical exam  - Comprehensive metabolic panel  - CBC with platelets differential  - *UA reflex to Microscopic and Culture - [facility]  - HCG qualitative urine    2. Menorrhagia with irregular cycle    3. H/O gastric bypass    Before Your Surgery      Call your surgeon if there is any change in your health. This includes signs of a cold or flu (such as a sore throat, runny nose, cough, rash or fever).    Do not smoke, drink alcohol or take over the counter medicine (unless your surgeon or primary care doctor tells you to) for the 24 hours before and after surgery.    If you take prescribed drugs: Follow your doctor s orders about which medicines to take and which to stop until after surgery.    Eating and drinking prior to surgery: follow the instructions from your surgeon    Take a shower or bath the night before surgery. Use the soap your surgeon gave you to gently clean your skin. If you do not have soap from your surgeon, use your regular soap. Do not shave or scrub the surgery site.  Wear clean pajamas and have clean sheets on your bed.           Follow-ups after your visit        Your next 10 appointments already scheduled     May 02, 2018   Procedure with Jean Marie Garvin MD   [facility] ([address] [phone]              Who to contact     If you have questions or need follow up information about today's clinic visit or your schedule please contact [facility] directly at  "[phone].  Normal or non-critical lab and imaging results will be communicated to you by MyChart, letter or phone within 4 business days after the clinic has received the results. If you do not hear from us within 7 days, please contact the clinic through [employer] or phone. If you have a critical or abnormal lab result, we will notify you by phone as soon as possible.  Submit refill requests through [employer] or call your pharmacy and they will forward the refill request to us. Please allow 3 business days for your refill to be completed.          Additional Information About Your Visit        [employer] Information     [employer] gives you secure access to your electronic health record. If you see a primary care provider, you can also send messages to your care team and make appointments. If you have questions, please call your primary care clinic.  If you do not have a primary care provider, please call [phone] and they will assist you.        Care EveryWhere ID     This is your Care EveryWhere ID. This could be used by other organizations to access your [place] medical records  [id number]        Your Vitals Were     Pulse Temperature Respirations Height Pulse Oximetry BMI (Body Mass Index)    63 96  F (35.6  C) (Tympanic) 14 5' 6\" (1.676 m) 99% 25.02 kg/m2       Blood Pressure from Last 3 Encounters:   04/26/18 110/70   04/10/18 118/72   03/16/18 126/72    Weight from Last 3 Encounters:   04/26/18 155 lb (70.3 kg)   04/10/18 156 lb (70.8 kg)   03/16/18 156 lb (70.8 kg)              We Performed the Following     *UA reflex to Microscopic and Culture - [facility]     CBC with platelets differential     Comprehensive metabolic panel     HCG qualitative urine        Primary Care Provider Office Phone # Fax #    Mary Ellen Armstrong [phone] [facility] [address] [facility] [phone]        Equal Access to Services     SANTA [place] AH: Portillo Conn, " wacristinaevan alarcon, qaybta kajonh edmonds, jeff ortiz roshanirma mccartyaavick ah. So [facility] [phone].    ATENCIÓN: Si hernandez plaza, tiene a wadsworth disposición servicios gratuitos de asistencia lingüística. Alysa al [phone].    We comply with applicable federal civil rights laws and [place] laws. We do not discriminate on the basis of race, color, national origin, age, disability, sex, sexual orientation, or gender identity.            Thank you!     Thank you for choosing [facility]  for your care. Our goal is always to provide you with excellent care. Hearing back from our patients is one way we can continue to improve our services. Please take a few minutes to complete the written survey that you may receive in the mail after your visit with us. Thank you!             Your Updated Medication List - Protect others around you: Learn how to safely use, store and throw away your medicines at www.disposemymeds.org.          This list is accurate as of 4/26/18  9:26 AM.  Always use your most recent med list.                   Brand Name Dispense Instructions for use Diagnosis    calcium carbonate 500 tablet    OS-MANUEL 500 mg [place]. [place]          IRON SUPPLEMENT PO      Take 325 mg by mouth daily (with breakfast) 2 tabs daily        Multi-vitamin Tabs tablet      Take 1 tablet by mouth daily        norethindrone 5 MG tablet    AYGESTIN    28 tablet    Take 1 tablet (5 mg) by mouth 3 times daily        VITAMIN B 12 PO           VITAMIN D (CHOLECALCIFEROL) PO      Take 5,000 Units by mouth daily 2 tabs daily

## 2018-04-27 ASSESSMENT — PATIENT HEALTH QUESTIONNAIRE - PHQ9: SUM OF ALL RESPONSES TO PHQ QUESTIONS 1-9: 0

## 2018-04-27 ASSESSMENT — ANXIETY QUESTIONNAIRES: GAD7 TOTAL SCORE: 0

## 2018-05-01 ENCOUNTER — ANESTHESIA EVENT (OUTPATIENT)
Dept: SURGERY | Facility: HOSPITAL | Age: 48
End: 2018-05-01
Payer: COMMERCIAL

## 2018-05-01 NOTE — H&P (VIEW-ONLY)
[facility]  [address]  [place] [address]  [phone]  Dept: [phone]    PRE-OP EVALUATION:  Today's date: 2018    Shazia Verma (: 1970) presents for pre-operative evaluation assessment as requested by Dr. Jean Marie Garvin.  She requires evaluation and anesthesia risk assessment prior to undergoing surgery/procedure for treatment of abnormal vaginal bleeding .    Proposed Surgery/ Procedure: Laparoscopic supra cervical hysterectomy with bilateral salpingectomy  Date of Surgery/ Procedure: 18  Time of Surgery/ Procedure: to be determined  Facility: [place], [place]    Primary Physician: Mary Ellen Armstrong  Type of Anesthesia Anticipated: to be determined    Patient has a Health Care Directive or Living Will:  NO    1. NO - Do you have a history of heart attack, stroke, stent, bypass or surgery on an artery in the head, neck, heart or legs?  2. NO - Do you ever have any pain or discomfort in your chest?  3. NO - Do you have a history of  Heart Failure?  4. NO - Are you troubled by shortness of breath when: walking on the level, up a slight hill or at night?  5. NO - Do you currently have a cold, bronchitis or other respiratory infection?  6. NO - Do you have a cough, shortness of breath or wheezing?  7. NO - Do you sometimes get pains in the calves of your legs when you walk?  8. NO - Do you or anyone in your family have previous history of blood clots?  9. NO - Do you or does anyone in your family have a serious bleeding problem such as prolonged bleeding following surgeries or cuts?  10. YES - Have you ever had problems with anemia or been told to take iron pills? Due to gastric bypass and heavy periods.   11. YES - Have you had any abnormal blood loss such as black, tarry or bloody stools, or abnormal vaginal bleeding? Heavy periods  12. NO - Have you ever had a blood transfusion?  13. NO - Have you or any of your relatives ever  had problems with anesthesia?  14. NO - Do you have sleep apnea, excessive snoring or daytime drowsiness?  15. NO - Do you have any prosthetic heart valves?  16. NO - Do you have prosthetic joints?  17. NO - Is there any chance that you may be pregnant?      HPI:     HPI related to upcoming procedure: heavy periods that are now interfering with daily activities and clothing choices.        She is otherwise quite healthy and does not have any new concerns today.      MEDICAL HISTORY:     Patient Active Problem List    Diagnosis Date Noted     Hypoglycemia 09/08/2017     Priority: Medium     ACP (advance care planning) 11/28/2016     Priority: Medium     Advance Care Planning 11/28/2016: ACP Review of Chart / Resources Provided:  Reviewed chart for advance care plan.  Sahzia Verma has been provided information and resources to begin or update their advance care plan.  Added by WOODY LENNON             H/O gastric bypass 11/28/2016     Priority: Medium     Had type 2 diabetes, hyperlipidemia and hypertension prior to surgery.         No past medical history on file.  Past Surgical History:   Procedure Laterality Date     ABDOMEN SURGERY  2012    gastric bypass     APPENDECTOMY  1991     BREAST SURGERY  2002    right bx     COLONOSCOPY  2013     GI SURGERY  2012    gastric by pass     GYN SURGERY  1992 1994    c section x 2     ORTHOPEDIC SURGERY  2015    right hip labreal tear     ORTHOPEDIC SURGERY  2014    left wrist      Current Outpatient Prescriptions   Medication Sig Dispense Refill     calcium carbonate (OS-MANUEL 500 MG [place]. [place]) 500 MG tablet        Cyanocobalamin (VITAMIN B 12 PO)        Ferrous Sulfate (IRON SUPPLEMENT PO) Take 325 mg by mouth daily (with breakfast) 2 tabs daily       multivitamin, therapeutic with minerals (MULTI-VITAMIN) TABS Take 1 tablet by mouth daily       norethindrone (AYGESTIN) 5 MG tablet Take 1 tablet (5 mg) by mouth 3 times daily 28 tablet 1     VITAMIN D, CHOLECALCIFEROL, PO  "Take 5,000 Units by mouth daily 2 tabs daily       OTC products: None, except as noted above, no recent use of OTC ASA, NSAIDS or Steroids and no use of herbal medications or other supplements    Allergies   Allergen Reactions     Reglan [Metoclopramide] Anxiety      Latex Allergy: NO    Social History   Substance Use Topics     Smoking status: Former Smoker     Smokeless tobacco: Never Used     Alcohol use Yes      Comment: occ     History   Drug Use No       REVIEW OF SYSTEMS:   CONSTITUTIONAL: NEGATIVE for fever, chills, change in weight  INTEGUMENTARY/SKIN: NEGATIVE for worrisome rashes, moles or lesions  EYES: NEGATIVE for vision changes or irritation  ENT/MOUTH: NEGATIVE for ear, mouth and throat problems  RESP: NEGATIVE for significant cough or SOB  CV: NEGATIVE for chest pain, palpitations or peripheral edema  GI: NEGATIVE for nausea, abdominal pain, heartburn, or change in bowel habits   female: menorrhagia   MUSCULOSKELETAL: NEGATIVE for significant arthralgias or myalgia  NEURO: NEGATIVE for weakness, dizziness or paresthesias  ENDOCRINE: NEGATIVE for temperature intolerance, skin/hair changes  HEME: NEGATIVE for bleeding problems  PSYCHIATRIC: NEGATIVE for changes in mood or affect    EXAM:   /70 (BP Location: Right arm, Patient Position: Sitting, Cuff Size: Adult Regular)  Pulse 63  Temp 96  F (35.6  C) (Tympanic)  Resp 14  Ht 5' 6\" (1.676 m)  Wt 155 lb (70.3 kg)  SpO2 99%  BMI 25.02 kg/m2    GENERAL APPEARANCE: healthy, alert and no distress     EYES: EOMI, PERRL     HENT: ear canals and TM's normal and nose and mouth without ulcers or lesions     NECK: no adenopathy, no asymmetry, masses, or scars and thyroid normal to palpation     RESP: lungs clear to auscultation - no rales, rhonchi or wheezes     CV: regular rates and rhythm, normal S1 S2, no S3 or S4 and no murmur, click or rub     ABDOMEN:  soft, nontender, no HSM or masses and bowel sounds normal     MS: extremities normal- no " gross deformities noted, no evidence of inflammation in joints, FROM in all extremities.     SKIN: no suspicious lesions or rashes     NEURO: Normal strength and tone, sensory exam grossly normal, mentation intact and speech normal     PSYCH: mentation appears normal. and affect normal/bright    DIAGNOSTICS:     Results for orders placed or performed in visit on 04/26/18   Comprehensive metabolic panel   Result Value Ref Range    Sodium 139 133 - 144 mmol/L    Potassium 4.0 3.4 - 5.3 mmol/L    Chloride 106 94 - 109 mmol/L    Carbon Dioxide 26 20 - 32 mmol/L    Anion Gap 7 3 - 14 mmol/L    Glucose 91 70 - 99 mg/dL    Urea Nitrogen 11 7 - 30 mg/dL    Creatinine 0.58 0.52 - 1.04 mg/dL    GFR Estimate >90 >60 mL/min/1.7m2    GFR Estimate If Black >90 >60 mL/min/1.7m2    Calcium 8.6 8.5 - 10.1 mg/dL    Bilirubin Total 0.8 0.2 - 1.3 mg/dL    Albumin 3.5 3.4 - 5.0 g/dL    Protein Total 7.1 6.8 - 8.8 g/dL    Alkaline Phosphatase 73 40 - 150 U/L    ALT 20 0 - 50 U/L    AST 15 0 - 45 U/L   CBC with platelets differential   Result Value Ref Range    WBC 7.8 4.0 - 11.0 10e9/L    RBC Count 4.21 3.8 - 5.2 10e12/L    Hemoglobin 11.7 11.7 - 15.7 g/dL    Hematocrit 35.8 35.0 - 47.0 %    MCV 85 78 - 100 fl    MCH 27.8 26.5 - 33.0 pg    MCHC 32.7 31.5 - 36.5 g/dL    RDW 13.6 10.0 - 15.0 %    Platelet Count 247 150 - 450 10e9/L    Diff Method Automated Method     % Neutrophils 66.6 %    % Lymphocytes 25.7 %    % Monocytes 6.8 %    % Eosinophils 0.6 %    % Basophils 0.3 %    Absolute Neutrophil 5.2 1.6 - 8.3 10e9/L    Absolute Lymphocytes 2.0 0.8 - 5.3 10e9/L    Absolute Monocytes 0.5 0.0 - 1.3 10e9/L    Absolute Eosinophils 0.1 0.0 - 0.7 10e9/L    Absolute Basophils 0.0 0.0 - 0.2 10e9/L   *UA reflex to Microscopic and Culture - [facility]   Result Value Ref Range    Color Urine Yellow     Appearance Urine Clear     Glucose Urine Negative NEG^Negative mg/dL    Bilirubin Urine Negative NEG^Negative    Ketones Urine Negative  NEG^Negative mg/dL    Specific Gravity Urine 1.020 1.003 - 1.035    Blood Urine Negative NEG^Negative    pH Urine 7.0 5.0 - 7.0 pH    Protein Albumin Urine Negative NEG^Negative mg/dL    Urobilinogen Urine 0.2 0.2 - 1.0 EU/dL    Nitrite Urine Negative NEG^Negative    Leukocyte Esterase Urine Negative NEG^Negative    Source Midstream Urine    HCG qualitative urine   Result Value Ref Range    HCG Qual Urine Negative NEG^Negative         Recent Labs   Lab Test  03/13/18   1853  01/15/18   1542  09/01/17   1154   11/28/16   1048   HGB  12.5  11.1*  11.4*   < >  12.7   PLT  365  295  299   < >  284   NA  138  142  139   < >   --    POTASSIUM  4.1  3.7  4.1   < >   --    CR  0.63  0.57  0.57   < >   --    A1C   --    --   5.7   --   5.2    < > = values in this interval not displayed.           EKG Interpretation:      Interpreted by Mary Ellen Armstrong    Symptoms at time of EKG: None   Rhythm: Normal sinus   Rate: Bradycardia  Axis: Normal  Ectopy: None  Conduction: Normal  ST Segments/ T Waves: No ST-T wave changes and No acute ischemic changes  Q Waves: None  Comparison to prior: Unchanged from 6/2016    Clinical Impression: normal EKG        IMPRESSION:   Reason for surgery/procedure: menorrhagia   Diagnosis/reason for consult: anesthesia risk assessment     The proposed surgical procedure is considered INTERMEDIATE risk.    REVISED CARDIAC RISK INDEX  The patient has the following serious cardiovascular risks for perioperative complications such as (MI, PE, VFib and 3  AV Block):  No serious cardiac risks  INTERPRETATION: 0 risks: Class I (very low risk - 0.4% complication rate)      The patient has the following additional risks for perioperative complications:  No identified additional risks other than gastric bypass      ICD-10-CM    1. Preop general physical exam Z01.818 Comprehensive metabolic panel     CBC with platelets differential     *UA reflex to Microscopic and Culture - [facility]     HCG  qualitative urine     EKG 12-lead complete w/read - (Clinic Performed)   2. Menorrhagia with irregular cycle N92.1    3. H/O gastric bypass Z98.890        RECOMMENDATIONS:       Cardiovascular Risk  Performs 4 METs exercise without symptoms (Walk on level ground at 15 minutes per mile (4 miles/hour)) .       --Patient is to HOLD all scheduled medications on the day of surgery EXCEPT for modifications listed below.    APPROVAL GIVEN to proceed with proposed procedure, without further diagnostic evaluation       Signed Electronically by: Mary Ellen Armstrong NP    Copy of this evaluation report is provided to requesting physician.    [place] Preop Guidelines    Revised Cardiac Risk Index

## 2018-05-01 NOTE — ANESTHESIA PREPROCEDURE EVALUATION
Anesthesia Evaluation     . Pt has had prior anesthetic. Type: General    No history of anesthetic complications          ROS/MED HX    ENT/Pulmonary:     (+)tobacco use (remote hx), Past use , . .    Neurologic:     (+)migraines,     Cardiovascular:     (+) ----. : . . . :. . Previous cardiac testing date:results:date: results:ECG reviewed date:4/26/18 results:SB date: results:          METS/Exercise Tolerance:  >4 METS   Hematologic:     (+) Anemia (s/p gastric bypass, takes iron supplements), -      Musculoskeletal:  - neg musculoskeletal ROS       GI/Hepatic:     (+) Other GI/Hepatic h/o gastric bypass      Renal/Genitourinary:  - ROS Renal section negative       Endo:  - neg endo ROS       Psychiatric:  - neg psychiatric ROS       Infectious Disease:  - neg infectious disease ROS       Malignancy:      - no malignancy   Other:    - neg other ROS                 Physical Exam  Normal systems: cardiovascular, pulmonary and dental    Airway   Mallampati: III  TM distance: >3 FB  Neck ROM: full    Dental     Cardiovascular   Rhythm and rate: regular and normal      Pulmonary    breath sounds clear to auscultation                    Anesthesia Plan      History & Physical Review  History and physical reviewed and following examination; no interval change.    ASA Status:  2 .    NPO Status:  > 8 hours    Plan for General and ETT with Intravenous and Propofol induction. Maintenance will be Balanced.    PONV prophylaxis:  Ondansetron (or other 5HT-3), Scopolamine patch and Dexamethasone or Solumedrol  Urine HCG negative today. Discussed risks and benefits with patient for general anesthesia including sore throat, nausea, vomiting, aspiration, dental damage, loss of airway, CV complications, stroke, MI, death. Pt wishes to proceed.         Postoperative Care  Postoperative pain management:  IV analgesics.      Consents  Anesthetic plan, risks, benefits and alternatives discussed with:  Patient.  Use of blood products  discussed: Yes.   Use of blood products discussed with Patient.  Consented to blood products.  .                          .

## 2018-05-02 ENCOUNTER — HOSPITAL ENCOUNTER (OUTPATIENT)
Facility: HOSPITAL | Age: 48
Discharge: HOME | End: 2018-05-03
Attending: OBSTETRICS & GYNECOLOGY | Admitting: OBSTETRICS & GYNECOLOGY
Payer: COMMERCIAL

## 2018-05-02 ENCOUNTER — APPOINTMENT (OUTPATIENT)
Dept: LAB | Facility: HOSPITAL | Age: 48
End: 2018-05-02
Attending: NURSE PRACTITIONER
Payer: COMMERCIAL

## 2018-05-02 ENCOUNTER — SURGERY (OUTPATIENT)
Age: 48
End: 2018-05-02

## 2018-05-02 ENCOUNTER — ANESTHESIA (OUTPATIENT)
Dept: SURGERY | Facility: HOSPITAL | Age: 48
End: 2018-05-02
Payer: COMMERCIAL

## 2018-05-02 DIAGNOSIS — Z90.710 S/P HYSTERECTOMY: Primary | ICD-10-CM

## 2018-05-02 LAB
ABO + RH BLD: NORMAL
ABO + RH BLD: NORMAL
BLD GP AB SCN SERPL QL: NORMAL
BLOOD BANK CMNT PATIENT-IMP: NORMAL
HCG UR QL: NEGATIVE
SPECIMEN EXP DATE BLD: NORMAL

## 2018-05-02 PROCEDURE — 36000056 ZZH SURGERY LEVEL 3 1ST 30 MIN: Performed by: OBSTETRICS & GYNECOLOGY

## 2018-05-02 PROCEDURE — 25000132 ZZH RX MED GY IP 250 OP 250 PS 637: Performed by: OBSTETRICS & GYNECOLOGY

## 2018-05-02 PROCEDURE — 81025 URINE PREGNANCY TEST: CPT | Performed by: OBSTETRICS & GYNECOLOGY

## 2018-05-02 PROCEDURE — 71000014 ZZH RECOVERY PHASE 1 LEVEL 2 FIRST HR: Performed by: OBSTETRICS & GYNECOLOGY

## 2018-05-02 PROCEDURE — 58542 LSH W/T/O UT 250 G OR LESS: CPT | Mod: AS | Performed by: NURSE PRACTITIONER

## 2018-05-02 PROCEDURE — 86850 RBC ANTIBODY SCREEN: CPT | Performed by: OBSTETRICS & GYNECOLOGY

## 2018-05-02 PROCEDURE — 25000128 H RX IP 250 OP 636: Performed by: OBSTETRICS & GYNECOLOGY

## 2018-05-02 PROCEDURE — 88305 TISSUE EXAM BY PATHOLOGIST: CPT | Mod: TC | Performed by: OBSTETRICS & GYNECOLOGY

## 2018-05-02 PROCEDURE — 25000128 H RX IP 250 OP 636: Performed by: NURSE ANESTHETIST, CERTIFIED REGISTERED

## 2018-05-02 PROCEDURE — 27210794 ZZH OR GENERAL SUPPLY STERILE: Performed by: OBSTETRICS & GYNECOLOGY

## 2018-05-02 PROCEDURE — 25000125 ZZHC RX 250: Performed by: NURSE ANESTHETIST, CERTIFIED REGISTERED

## 2018-05-02 PROCEDURE — 36000058 ZZH SURGERY LEVEL 3 EA 15 ADDTL MIN: Performed by: OBSTETRICS & GYNECOLOGY

## 2018-05-02 PROCEDURE — 36415 COLL VENOUS BLD VENIPUNCTURE: CPT | Performed by: OBSTETRICS & GYNECOLOGY

## 2018-05-02 PROCEDURE — 27110028 ZZH OR GENERAL SUPPLY NON-STERILE: Performed by: OBSTETRICS & GYNECOLOGY

## 2018-05-02 PROCEDURE — 86901 BLOOD TYPING SEROLOGIC RH(D): CPT | Performed by: OBSTETRICS & GYNECOLOGY

## 2018-05-02 PROCEDURE — 88307 TISSUE EXAM BY PATHOLOGIST: CPT | Mod: TC | Performed by: OBSTETRICS & GYNECOLOGY

## 2018-05-02 PROCEDURE — 71000015 ZZH RECOVERY PHASE 1 LEVEL 2 EA ADDTL HR: Performed by: OBSTETRICS & GYNECOLOGY

## 2018-05-02 PROCEDURE — 01999 UNLISTED ANES PROCEDURE: CPT | Performed by: NURSE ANESTHETIST, CERTIFIED REGISTERED

## 2018-05-02 PROCEDURE — 37000009 ZZH ANESTHESIA TECHNICAL FEE, EACH ADDTL 15 MIN: Performed by: OBSTETRICS & GYNECOLOGY

## 2018-05-02 PROCEDURE — 86900 BLOOD TYPING SEROLOGIC ABO: CPT | Performed by: OBSTETRICS & GYNECOLOGY

## 2018-05-02 PROCEDURE — 25000566 ZZH SEVOFLURANE, EA 15 MIN: Performed by: NURSE ANESTHETIST, CERTIFIED REGISTERED

## 2018-05-02 PROCEDURE — 40000305 ZZH STATISTIC PRE PROC ASSESS I: Performed by: OBSTETRICS & GYNECOLOGY

## 2018-05-02 PROCEDURE — 37000008 ZZH ANESTHESIA TECHNICAL FEE, 1ST 30 MIN: Performed by: OBSTETRICS & GYNECOLOGY

## 2018-05-02 PROCEDURE — 58542 LSH W/T/O UT 250 G OR LESS: CPT | Performed by: OBSTETRICS & GYNECOLOGY

## 2018-05-02 RX ORDER — LIDOCAINE HYDROCHLORIDE 20 MG/ML
INJECTION, SOLUTION INFILTRATION; PERINEURAL PRN
Status: DISCONTINUED | OUTPATIENT
Start: 2018-05-02 | End: 2018-05-02

## 2018-05-02 RX ORDER — GLYCOPYRROLATE 0.2 MG/ML
INJECTION, SOLUTION INTRAMUSCULAR; INTRAVENOUS PRN
Status: DISCONTINUED | OUTPATIENT
Start: 2018-05-02 | End: 2018-05-02

## 2018-05-02 RX ORDER — SODIUM CHLORIDE, SODIUM LACTATE, POTASSIUM CHLORIDE, CALCIUM CHLORIDE 600; 310; 30; 20 MG/100ML; MG/100ML; MG/100ML; MG/100ML
INJECTION, SOLUTION INTRAVENOUS CONTINUOUS
Status: DISCONTINUED | OUTPATIENT
Start: 2018-05-02 | End: 2018-05-02 | Stop reason: HOSPADM

## 2018-05-02 RX ORDER — PROCHLORPERAZINE MALEATE 5 MG
10 TABLET ORAL EVERY 6 HOURS PRN
Status: DISCONTINUED | OUTPATIENT
Start: 2018-05-02 | End: 2018-05-03 | Stop reason: HOSPADM

## 2018-05-02 RX ORDER — CEFAZOLIN SODIUM 1 G/50ML
1 INJECTION, SOLUTION INTRAVENOUS SEE ADMIN INSTRUCTIONS
Status: DISCONTINUED | OUTPATIENT
Start: 2018-05-02 | End: 2018-05-02 | Stop reason: HOSPADM

## 2018-05-02 RX ORDER — HYDROXYZINE HYDROCHLORIDE 50 MG/ML
100 INJECTION, SOLUTION INTRAMUSCULAR EVERY 6 HOURS PRN
Status: DISCONTINUED | OUTPATIENT
Start: 2018-05-02 | End: 2018-05-03 | Stop reason: HOSPADM

## 2018-05-02 RX ORDER — DOCUSATE SODIUM 100 MG/1
100 CAPSULE, LIQUID FILLED ORAL 2 TIMES DAILY
Status: DISCONTINUED | OUTPATIENT
Start: 2018-05-02 | End: 2018-05-03 | Stop reason: HOSPADM

## 2018-05-02 RX ORDER — ONDANSETRON 2 MG/ML
INJECTION INTRAMUSCULAR; INTRAVENOUS PRN
Status: DISCONTINUED | OUTPATIENT
Start: 2018-05-02 | End: 2018-05-02

## 2018-05-02 RX ORDER — ONDANSETRON 2 MG/ML
4 INJECTION INTRAMUSCULAR; INTRAVENOUS EVERY 6 HOURS PRN
Status: DISCONTINUED | OUTPATIENT
Start: 2018-05-02 | End: 2018-05-03 | Stop reason: HOSPADM

## 2018-05-02 RX ORDER — ONDANSETRON 2 MG/ML
4 INJECTION INTRAMUSCULAR; INTRAVENOUS EVERY 30 MIN PRN
Status: DISCONTINUED | OUTPATIENT
Start: 2018-05-02 | End: 2018-05-02 | Stop reason: HOSPADM

## 2018-05-02 RX ORDER — LIDOCAINE 40 MG/G
CREAM TOPICAL
Status: DISCONTINUED | OUTPATIENT
Start: 2018-05-02 | End: 2018-05-03 | Stop reason: HOSPADM

## 2018-05-02 RX ORDER — ONDANSETRON 4 MG/1
4 TABLET, ORALLY DISINTEGRATING ORAL EVERY 6 HOURS PRN
Status: DISCONTINUED | OUTPATIENT
Start: 2018-05-02 | End: 2018-05-03 | Stop reason: HOSPADM

## 2018-05-02 RX ORDER — KETOROLAC TROMETHAMINE 30 MG/ML
INJECTION, SOLUTION INTRAMUSCULAR; INTRAVENOUS PRN
Status: DISCONTINUED | OUTPATIENT
Start: 2018-05-02 | End: 2018-05-02

## 2018-05-02 RX ORDER — PROMETHAZINE HYDROCHLORIDE 25 MG/ML
12.5 INJECTION, SOLUTION INTRAMUSCULAR; INTRAVENOUS
Status: DISCONTINUED | OUTPATIENT
Start: 2018-05-02 | End: 2018-05-02 | Stop reason: HOSPADM

## 2018-05-02 RX ORDER — NEOSTIGMINE METHYLSULFATE 1 MG/ML
VIAL (ML) INJECTION PRN
Status: DISCONTINUED | OUTPATIENT
Start: 2018-05-02 | End: 2018-05-02

## 2018-05-02 RX ORDER — NALOXONE HYDROCHLORIDE 0.4 MG/ML
.1-.4 INJECTION, SOLUTION INTRAMUSCULAR; INTRAVENOUS; SUBCUTANEOUS
Status: DISCONTINUED | OUTPATIENT
Start: 2018-05-02 | End: 2018-05-02 | Stop reason: HOSPADM

## 2018-05-02 RX ORDER — ACETAMINOPHEN 325 MG/1
650 TABLET ORAL EVERY 6 HOURS PRN
Status: DISCONTINUED | OUTPATIENT
Start: 2018-05-02 | End: 2018-05-03 | Stop reason: HOSPADM

## 2018-05-02 RX ORDER — FENTANYL CITRATE 50 UG/ML
25-50 INJECTION, SOLUTION INTRAMUSCULAR; INTRAVENOUS EVERY 5 MIN PRN
Status: DISCONTINUED | OUTPATIENT
Start: 2018-05-02 | End: 2018-05-02 | Stop reason: HOSPADM

## 2018-05-02 RX ORDER — LIDOCAINE 40 MG/G
CREAM TOPICAL
Status: DISCONTINUED | OUTPATIENT
Start: 2018-05-02 | End: 2018-05-02 | Stop reason: HOSPADM

## 2018-05-02 RX ORDER — NALOXONE HYDROCHLORIDE 0.4 MG/ML
.1-.4 INJECTION, SOLUTION INTRAMUSCULAR; INTRAVENOUS; SUBCUTANEOUS
Status: DISCONTINUED | OUTPATIENT
Start: 2018-05-02 | End: 2018-05-03 | Stop reason: HOSPADM

## 2018-05-02 RX ORDER — EPHEDRINE SULFATE 50 MG/ML
INJECTION, SOLUTION INTRAMUSCULAR; INTRAVENOUS; SUBCUTANEOUS PRN
Status: DISCONTINUED | OUTPATIENT
Start: 2018-05-02 | End: 2018-05-02

## 2018-05-02 RX ORDER — MEPERIDINE HYDROCHLORIDE 25 MG/ML
12.5 INJECTION INTRAMUSCULAR; INTRAVENOUS; SUBCUTANEOUS
Status: DISCONTINUED | OUTPATIENT
Start: 2018-05-02 | End: 2018-05-02 | Stop reason: HOSPADM

## 2018-05-02 RX ORDER — DEXAMETHASONE SODIUM PHOSPHATE 10 MG/ML
INJECTION, SOLUTION INTRAMUSCULAR; INTRAVENOUS PRN
Status: DISCONTINUED | OUTPATIENT
Start: 2018-05-02 | End: 2018-05-02

## 2018-05-02 RX ORDER — PROPOFOL 10 MG/ML
INJECTION, EMULSION INTRAVENOUS PRN
Status: DISCONTINUED | OUTPATIENT
Start: 2018-05-02 | End: 2018-05-02

## 2018-05-02 RX ORDER — ALUMINA, MAGNESIA, AND SIMETHICONE 2400; 2400; 240 MG/30ML; MG/30ML; MG/30ML
30 SUSPENSION ORAL EVERY 4 HOURS PRN
Status: DISCONTINUED | OUTPATIENT
Start: 2018-05-02 | End: 2018-05-03 | Stop reason: HOSPADM

## 2018-05-02 RX ORDER — ONDANSETRON 4 MG/1
4 TABLET, ORALLY DISINTEGRATING ORAL EVERY 30 MIN PRN
Status: DISCONTINUED | OUTPATIENT
Start: 2018-05-02 | End: 2018-05-02 | Stop reason: HOSPADM

## 2018-05-02 RX ORDER — HYDROMORPHONE HYDROCHLORIDE 1 MG/ML
0.2 INJECTION, SOLUTION INTRAMUSCULAR; INTRAVENOUS; SUBCUTANEOUS
Status: DISCONTINUED | OUTPATIENT
Start: 2018-05-02 | End: 2018-05-03 | Stop reason: HOSPADM

## 2018-05-02 RX ORDER — LABETALOL HYDROCHLORIDE 5 MG/ML
10 INJECTION, SOLUTION INTRAVENOUS
Status: DISCONTINUED | OUTPATIENT
Start: 2018-05-02 | End: 2018-05-02 | Stop reason: HOSPADM

## 2018-05-02 RX ORDER — SCOLOPAMINE TRANSDERMAL SYSTEM 1 MG/1
1 PATCH, EXTENDED RELEASE TRANSDERMAL ONCE
Status: COMPLETED | OUTPATIENT
Start: 2018-05-02 | End: 2018-05-02

## 2018-05-02 RX ORDER — CEFAZOLIN SODIUM 2 G/100ML
2 INJECTION, SOLUTION INTRAVENOUS
Status: COMPLETED | OUTPATIENT
Start: 2018-05-02 | End: 2018-05-02

## 2018-05-02 RX ORDER — FENTANYL CITRATE 50 UG/ML
INJECTION, SOLUTION INTRAMUSCULAR; INTRAVENOUS PRN
Status: DISCONTINUED | OUTPATIENT
Start: 2018-05-02 | End: 2018-05-02

## 2018-05-02 RX ORDER — FENTANYL CITRATE 50 UG/ML
25-50 INJECTION, SOLUTION INTRAMUSCULAR; INTRAVENOUS
Status: DISCONTINUED | OUTPATIENT
Start: 2018-05-02 | End: 2018-05-02 | Stop reason: HOSPADM

## 2018-05-02 RX ORDER — SODIUM CHLORIDE, SODIUM LACTATE, POTASSIUM CHLORIDE, CALCIUM CHLORIDE 600; 310; 30; 20 MG/100ML; MG/100ML; MG/100ML; MG/100ML
INJECTION, SOLUTION INTRAVENOUS CONTINUOUS
Status: DISCONTINUED | OUTPATIENT
Start: 2018-05-02 | End: 2018-05-03 | Stop reason: HOSPADM

## 2018-05-02 RX ORDER — HYDROMORPHONE HYDROCHLORIDE 1 MG/ML
.3-.5 INJECTION, SOLUTION INTRAMUSCULAR; INTRAVENOUS; SUBCUTANEOUS EVERY 10 MIN PRN
Status: DISCONTINUED | OUTPATIENT
Start: 2018-05-02 | End: 2018-05-02 | Stop reason: HOSPADM

## 2018-05-02 RX ORDER — OXYCODONE HYDROCHLORIDE 5 MG/1
5-10 TABLET ORAL
Status: DISCONTINUED | OUTPATIENT
Start: 2018-05-02 | End: 2018-05-03 | Stop reason: HOSPADM

## 2018-05-02 RX ADMIN — FENTANYL CITRATE 50 MCG: 50 INJECTION, SOLUTION INTRAMUSCULAR; INTRAVENOUS at 15:36

## 2018-05-02 RX ADMIN — FENTANYL CITRATE 50 MCG: 50 INJECTION, SOLUTION INTRAMUSCULAR; INTRAVENOUS at 15:49

## 2018-05-02 RX ADMIN — SODIUM CHLORIDE, POTASSIUM CHLORIDE, SODIUM LACTATE AND CALCIUM CHLORIDE: 600; 310; 30; 20 INJECTION, SOLUTION INTRAVENOUS at 17:39

## 2018-05-02 RX ADMIN — ONDANSETRON 4 MG: 2 INJECTION INTRAMUSCULAR; INTRAVENOUS at 15:28

## 2018-05-02 RX ADMIN — FENTANYL CITRATE 50 MCG: 50 INJECTION, SOLUTION INTRAMUSCULAR; INTRAVENOUS at 16:40

## 2018-05-02 RX ADMIN — DOCUSATE SODIUM 100 MG: 100 CAPSULE, LIQUID FILLED ORAL at 21:06

## 2018-05-02 RX ADMIN — FENTANYL CITRATE 50 MCG: 50 INJECTION, SOLUTION INTRAMUSCULAR; INTRAVENOUS at 16:30

## 2018-05-02 RX ADMIN — SODIUM CHLORIDE, POTASSIUM CHLORIDE, SODIUM LACTATE AND CALCIUM CHLORIDE: 600; 310; 30; 20 INJECTION, SOLUTION INTRAVENOUS at 14:33

## 2018-05-02 RX ADMIN — PROPOFOL 170 MG: 10 INJECTION, EMULSION INTRAVENOUS at 13:56

## 2018-05-02 RX ADMIN — CEFAZOLIN SODIUM 2 G: 2 INJECTION, SOLUTION INTRAVENOUS at 14:00

## 2018-05-02 RX ADMIN — SCOPALAMINE 1 PATCH: 1 PATCH, EXTENDED RELEASE TRANSDERMAL at 12:06

## 2018-05-02 RX ADMIN — LIDOCAINE HYDROCHLORIDE 40 MG: 20 INJECTION, SOLUTION INFILTRATION; PERINEURAL at 13:56

## 2018-05-02 RX ADMIN — HYDROMORPHONE HYDROCHLORIDE 0.2 MG: 1 INJECTION, SOLUTION INTRAMUSCULAR; INTRAVENOUS; SUBCUTANEOUS at 17:46

## 2018-05-02 RX ADMIN — FENTANYL CITRATE 50 MCG: 50 INJECTION, SOLUTION INTRAMUSCULAR; INTRAVENOUS at 13:56

## 2018-05-02 RX ADMIN — Medication 100 MG: at 13:56

## 2018-05-02 RX ADMIN — OXYCODONE HYDROCHLORIDE 10 MG: 5 TABLET ORAL at 17:46

## 2018-05-02 RX ADMIN — ROCURONIUM BROMIDE 30 MG: 10 INJECTION INTRAVENOUS at 14:09

## 2018-05-02 RX ADMIN — SODIUM CHLORIDE, POTASSIUM CHLORIDE, SODIUM LACTATE AND CALCIUM CHLORIDE: 600; 310; 30; 20 INJECTION, SOLUTION INTRAVENOUS at 13:06

## 2018-05-02 RX ADMIN — DEXAMETHASONE SODIUM PHOSPHATE 10 MG: 10 INJECTION, SOLUTION INTRAMUSCULAR; INTRAVENOUS at 14:15

## 2018-05-02 RX ADMIN — OXYCODONE HYDROCHLORIDE 10 MG: 5 TABLET ORAL at 21:06

## 2018-05-02 RX ADMIN — Medication 5 MG: at 14:08

## 2018-05-02 RX ADMIN — FENTANYL CITRATE 50 MCG: 50 INJECTION, SOLUTION INTRAMUSCULAR; INTRAVENOUS at 15:06

## 2018-05-02 RX ADMIN — KETOROLAC TROMETHAMINE 30 MG: 30 INJECTION, SOLUTION INTRAMUSCULAR at 15:27

## 2018-05-02 RX ADMIN — SODIUM CHLORIDE, POTASSIUM CHLORIDE, SODIUM LACTATE AND CALCIUM CHLORIDE: 600; 310; 30; 20 INJECTION, SOLUTION INTRAVENOUS at 12:56

## 2018-05-02 RX ADMIN — FENTANYL CITRATE 50 MCG: 50 INJECTION, SOLUTION INTRAMUSCULAR; INTRAVENOUS at 15:58

## 2018-05-02 RX ADMIN — Medication 4 MG: at 15:32

## 2018-05-02 RX ADMIN — Medication 5 MG: at 15:11

## 2018-05-02 RX ADMIN — ROCURONIUM BROMIDE 10 MG: 10 INJECTION INTRAVENOUS at 14:37

## 2018-05-02 RX ADMIN — GLYCOPYRROLATE 0.6 MG: 0.2 INJECTION, SOLUTION INTRAMUSCULAR; INTRAVENOUS at 15:32

## 2018-05-02 RX ADMIN — MIDAZOLAM 2 MG: 1 INJECTION INTRAMUSCULAR; INTRAVENOUS at 13:46

## 2018-05-02 ASSESSMENT — LIFESTYLE VARIABLES: TOBACCO_USE: 1

## 2018-05-02 NOTE — PLAN OF CARE
[facility] Inpatient Admission Note:    Patient admitted to 4202/4202-1 at approximately 1730 via bed accompanied by nurse from surgery . Report received from Aimee Landis  in SBAR format at 1735 via face to face in room. Patient transferred to bed via slide board.. Patient is alert and oriented X 3, reports pain; rates at 3 on 0-10 scale.  Patient oriented to room, unit, hourly rounding, and plan of care. Explained admission packet and patient handbook with patient bill of rights brochure. Will continue to monitor and document as needed.     Inpatient Nursing criteria listed below was met:      Health care directives status obtained and documented: Yes      Care Everywhere authorization obtained Yes      MRSA swab completed for patient 65 years and older: N/A      Patient identifies a surrogate decision maker: Yes If yes, who: Dominic, . Telephone number on white board      Core Measure diagnosis present:Yes. If yes, state diagnosis: hysterectomy        Is initial lactic acid >2.0? NA.       Vaccination assessment and education completed: Yes   Influenza(seasonal)  N/A   Vaccination(s) ordered: patient declines      Clergy visit ordered if patient requests: N/A      Skin issues/needs documented: N/A      Isolation Patient: no Education given, correct sign in place and documentation row added to PCS:  Yes      Fall Prevention Yes: Care plan updated, education given and documented, sticker and magnet in place: N/A      Care Plan initiated: Yes      Education Documented (including assessment): Yes      Patient has discharge needs : No

## 2018-05-02 NOTE — OR NURSING
Patient to OB. Responsible RN given transfer instructions with no questions regarding instructions. Mary score 10 Pain level 3/10.  Discharged from unit via ambulatory.

## 2018-05-02 NOTE — ANESTHESIA CARE TRANSFER NOTE
Patient: Shazia ANAYA Luci    Procedure(s):  LAPAROSCOPIC SUPRACERVICAL HYSTERECTOMY, BILATEARL SALPINGECTOMY, LYSIS OF ADHESIONS - Wound Class: II-Clean Contaminated   - Wound Class: II-Clean Contaminated    Diagnosis: MENOMETRORRHAGIA  Diagnosis Additional Information: No value filed.    Anesthesia Type:   General, ETT     Note:  Airway :Nasal Cannula  Patient transferred to:PACU  Handoff Report: Identifed the Patient, Identified the Reponsible Provider, Reviewed the pertinent medical history, Discussed the surgical course, Reviewed Intra-OP anesthesia mangement and issues during anesthesia, Set expectations for post-procedure period and Allowed opportunity for questions and acknowledgement of understanding      Vitals: (Last set prior to Anesthesia Care Transfer)    CRNA VITALS  5/2/2018 1522 - 5/2/2018 1601      5/2/2018             Resp Rate (set): 8                Electronically Signed By: LIA Jain CRNA  May 2, 2018  4:01 PM

## 2018-05-02 NOTE — OP NOTE
[facility] OPERATIVE NOTE:    Pre-operative diagnosis:     MENOMETRORRHAGIA   Post-operative diagnosis Same, Pathology Pending   Procedure: Laparoscopic Supracervical Hysterectomy, Bilateral Salpingectomy, Lysis of adhesions   Surgeon:  Assistant: MD Janee Matias NP (Assistance required for retraction, exposure, instrument handling, and wound closure.)     Anesthesia: General    Estimated blood loss: Less than 50 ml     SPECIMENS: Uterus and fallopian tubes  COMPLICATIONS: None.   OPERATIVE FINDINGS:  Omental and Pelvic/bladder/uterine adhesions.  Otherwise normal pelvic and abdominal anatomy  OPERATIVE NARRATION: The patient was brought to the Operating Room and uneventfully placed under general anesthesia. She was prepped and draped in the dorsal lithotomy position and her bladder drained. The cervix was visualized with a speculum and grasped anteriorly with a fine tooth tenaculum and a uterine manipulating device placed. We then changed gloves and proceeded to the abdominal portion of the case. A 5 mm infraumbilical skin incision was performed with a scalpel. A Veress needle was introduced without difficulty and a water drop test performed. The abdomen was insufflated with several liters of carbon dioxide. A 5 mm trocar and a laparoscope were introduced. Visualization was excellent. Findings were as described above. Next,  5 mm  lower quadrant ports were placed under direct visualization. After initial exploration the uterus was then elevated. The LigaSure bipolar cutting cautery device was used to Lyse the omental and uterine adhesions.  It was then possible to transect down through the mesosalpinx, uteroovarian, and round ligaments adjacent to the uterus. A bladder flap was developed using sharp and blunt dissection and the uterine artery skeletonized within the cardinal ligament. The uterine artery was then transected with the LigaSure just below the level of the internal cervical os. The  same procedure was repeated on the patient's right side. The bladder flap was completed in the midline.  The umbilical incision was then extended and an Rolf ring retractor  Gel Port was placed. The fallopian tubes were transected and removed through umbilical  port.  The Ramona loop was placed around the uterus and encircled the cervix at the level of the internal os. The uterine manipulating device was removed. Using 120 guerrero pure cutting current the uterus was amputated. Vigorous monopolar cautery was performed on the endocervical canal. The Gel Portwas removed ant the Rolf containment bag was placed in the pelvis. The  laparoscope was  reintroduced and the uterus placed into the containment bag. Gel Port was again removed and the specimen brought to the abdominal wall within the bag.   A  scalpel guard was  placed in umbilical incision. The uterus was grasped with Leahey clamps and using the c-incision technique with a scalpel  the uterus was sequentially morcellated in an extracorporeal fashion until it was completely removed.  The ring retractor and guard were removed and the Gel Port replaced and the abdomen reinsufflated.  The pelvis was irrigated and found to be hemostatic. The remaining trocars were removed and excess carbon dioxide expressed from the abdomen. The umbilical abdominal fascia was closed  with interrupted 0 Vicryl.   The subcutaneous spaces were irrigated and checked for hemostasis. The skin incisions were closed with 3.0 subcuticular Monocryl and surgical glue. There were no complications. The patient was transferred to the Recovery Room in excellent and stable condition.    HANNAH VILLARREAL MD

## 2018-05-02 NOTE — IP AVS SNAPSHOT
MRN:[id number]                      After Visit Summary   5/2/2018    Shazia Verma    MRN: [id number]           Thank you!     Thank you for choosing [facility] for your care. Our goal is always to provide you with excellent care. Hearing back from our patients is one way we can continue to improve our services. Please take a few minutes to complete the written survey that you may receive in the mail after you visit with us. Thank you!        Patient Information     Date Of Birth          1970        About your hospital stay     You were admitted on:  May 2, 2018 You last received care in the:  [facility]    You were discharged on:  May 3, 2018       Who to Call     For medical emergencies, please call 911.  For non-urgent questions about your medical care, please call your primary care provider or clinic, [phone]  For questions related to your surgery, please call your surgery clinic        Attending Provider     Provider Specialty    Jean Marie Garvin MD OB/Gyn       Primary Care Provider Office Phone # Fax #    Mary Ellen TOCasimiro Armstrong [phone]      Your next 10 appointments already scheduled     May 31, 2018  9:30 AM CDT   (Arrive by 9:15 AM)   Post Op with Janee De Jesus NP   [facility] ([facility] )    [address] [place] [address] [place] [address]   [phone]              Further instructions from your care team           Discharge Instructions for Laparoscopic Hysterectomy  You had a procedure called laparoscopic hysterectomy. A surgeon removed your uterus using instruments inserted through small incisions in your abdomen. These incisions may be sore. You may also have pain in your upper back or shoulders. This is from the gas used to enlarge your abdomen to allow your healthcare provider to see inside your pelvis and do the procedure. This pain usually goes away in a day or two. It usually takes from 1 to 4 weeks to recover  from laparoscopic hysterectomy. Remember, though, that recovery time varies from woman to woman. Here's what you can do to speed your recovery after surgery.  Home care     Continue the coughing and deep breathing exercises that you learned in the hospital.    Take your medicines exactly as directed by your healthcare provider.    Avoid constipation.  ? Eat fruits, vegetables, and whole grains.  ? Drink 6 to 8 glasses of water a day, unless told to do otherwise.  ? Use a laxative or a mild stool softener if your doctor says it's OK.    Shower as usual. Wash your incisions with mild soap and water. Pat dry.    Don't use oils, powders, or lotions on your incisions.    Don't put anything in your vagina until your healthcare provider says it's safe to do so. Don't use tampons or douches. Don't have sex.    If you had both ovaries removed, report hot flashes, mood swings, and irritability to your healthcare provider. There may be medicines that can help you.  Activity    Ask your healthcare provider when you can start driving again. It's usually OK to drive as soon as you are free of pain and able to move comfortably from side to side. Don't drive while you are still taking opioid pain medicine.    Ask others to help with chores and errands while you recover.    Don t lift anything heavier than 10 pounds for 6 weeks.    Don t vacuum or do other strenuous activities until the healthcare provider says it's OK.    Walk as often as you feel able.    Climb stairs slowly and pause after every few steps.  Follow-up care  Make a follow-up appointment, or as directed.     When to call your healthcare provider  Call your doctor right away if you have any of the following:    Fever above 100.4 F (38 C) or chills    Bright red vaginal bleeding or vaginal bleeding that soaks more than one sanitary pad per hour    A foul smelling discharge from the vagina    Trouble urinating or burning when you urinate    Severe pain or bloating in  "your abdomen    Redness, swelling, or drainage at your incision sites    Shortness of breath or chest pain    Nausea and vomiting   Date Last Reviewed: 11/1/2017 2000-2017 [employer]. [address] [facility], [place] [address]. All rights reserved. This information is not intended as a substitute for professional medical care. Always follow your healthcare professional's instructions.      No heavy lifting greater than 15-20 lb for 4 weeks  No driving while on pain meds  Pelvic rest for 4 weeks  No vigorous activity, exercise, swim, bath for 4 weeks  Schedule Postop check Dr. Garvin or Janee De Jesus NP in 4 weeks  Call Dr. Garvin [phone] as necessary if problems in interim      Pending Results     No orders found for last 3 day(s).            Statement of Approval     Ordered          05/03/18 0824  I have reviewed and agree with all the recommendations and orders detailed in this document.  EFFECTIVE NOW     Approved and electronically signed by:  Janee De Jesus NP             Admission Information     Date & Time Provider Department Dept. Phone    5/2/2018 Jean Marie Garvin MD [facility] [phone]      Your Vitals Were     Blood Pressure Temperature Respirations Height Weight Last Period    118/71 97.8  F (36.6  C) (Oral) 18 1.651 m (5' 5\") 75 kg (165 lb 6.4 oz) 04/10/2018 (LMP Unknown)    Pulse Oximetry BMI (Body Mass Index)                97% 27.52 kg/m2          MyChart Information     [facility] gives you secure access to your electronic health record. If you see a primary care provider, you can also send messages to your care team and make appointments. If you have questions, please call your primary care clinic.  If you do not have a primary care provider, please call [phone] and they will assist you.        Care EveryWhere ID     This is your Care EveryWhere ID. This could be used by other organizations to access your [facility] medical records  [id number]        Equal Access " to Services     [facility] : Hadii claudia Conn, wacristinada luqadaha, qaybta kaalmaevan edmonds, jeff ivey. So [facility] [phone].    ATENCIÓN: Si habla mela, tiene a wadsworth disposición servicios gratuitos de asistencia lingüística. Llame al [phone].    We comply with applicable federal civil rights laws and [place] laws. We do not discriminate on the basis of race, color, national origin, age, disability, sex, sexual orientation, or gender identity.               Review of your medicines      START taking        Dose / Directions    oxyCODONE IR 5 MG tablet   Commonly known as:  ROXICODONE        Dose:  5-10 mg   Take 1-2 tablets (5-10 mg) by mouth every 3 hours as needed for severe pain or other (pain control or improvement in physical function. Hold dose for analgesic side effects.)   Quantity:  14 tablet   Refills:  0         CONTINUE these medicines which have NOT CHANGED        Dose / Directions    calcium carbonate 500 tablet   Commonly known as:  OS-MANUEL 500 mg [place]. [place]        Refills:  0       IRON SUPPLEMENT PO        Dose:  325 mg   Take 325 mg by mouth daily (with breakfast) 2 tabs daily   Refills:  0       Multi-vitamin Tabs tablet        Dose:  1 tablet   Take 1 tablet by mouth daily   Refills:  0       VITAMIN B 12 PO        Refills:  0       VITAMIN D (CHOLECALCIFEROL) PO        Dose:  5000 Units   Take 5,000 Units by mouth daily 2 tabs daily   Refills:  0         STOP taking     norethindrone 5 MG tablet   Commonly known as:  AYGESTIN                Where to get your medicines      Some of these will need a paper prescription and others can be bought over the counter. Ask your nurse if you have questions.     Bring a paper prescription for each of these medications     oxyCODONE IR 5 MG tablet                Protect others around you: Learn how to safely use, store and throw away your medicines at www.disposemymeds.org.        Information about OPIOIDS      PRESCRIPTION OPIOIDS: WHAT YOU NEED TO KNOW   You have a prescription for an opioid (narcotic) pain medicine. Opioids can cause addiction. If you have a history of chemical dependency of any type, you are at a higher risk of becoming addicted to opioids. Only take this medicine after all other options have been tried. Take it for as short a time and as few doses as possible.     Do not:    Drive. If you drive while taking these medicines, you could be arrested for driving under the influence (DUI).    Operate heavy machinery    Do any other dangerous activities while taking these medicines.     Drink any alcohol while taking these medicines.      Take with any other medicines that contain acetaminophen. Read all labels carefully. Look for the word  acetaminophen  or  Tylenol.  Ask your pharmacist if you have questions or are unsure.    Store your pills in a secure place, locked if possible. We will not replace any lost or stolen medicine. If you don t finish your medicine, please throw away (dispose) as directed by your pharmacist. The [place] Pollution Control Agency has more information about safe disposal: https://www.pca.[facility].[place].us/living-green/managing-unwanted-medications    All opioids tend to cause constipation. Drink plenty of water and eat foods that have a lot of fiber, such as fruits, vegetables, prune juice, apple juice and high-fiber cereal. Take a laxative (Miralax, milk of magnesia, Colace, Senna) if you don t move your bowels at least every other day.              Medication List: This is a list of all your medications and when to take them. Check marks below indicate your daily home schedule. Keep this list as a reference.      Medications           Morning Afternoon Evening Bedtime As Needed    calcium carbonate 500 tablet   Commonly known as:  OS-MANUEL 500 mg [place]. [place]                                IRON SUPPLEMENT PO   Take 325 mg by mouth daily (with breakfast) 2 tabs daily                                 Multi-vitamin Tabs tablet   Take 1 tablet by mouth daily                                oxyCODONE IR 5 MG tablet   Commonly known as:  ROXICODONE   Take 1-2 tablets (5-10 mg) by mouth every 3 hours as needed for severe pain or other (pain control or improvement in physical function. Hold dose for analgesic side effects.)   Last time this was given:  10 mg on 5/3/2018  7:54 AM                                VITAMIN B 12 PO                                VITAMIN D (CHOLECALCIFEROL) PO   Take 5,000 Units by mouth daily 2 tabs daily

## 2018-05-02 NOTE — IP AVS SNAPSHOT
[facility]    [address] [address]    Phone:  [phone]    Fax:  [phone]                                       After Visit Summary   5/2/2018    Shazia Verma    MRN: [id number]           After Visit Summary Signature Page     I have received my discharge instructions, and my questions have been answered. I have discussed any challenges I see with this plan with the nurse or doctor.    ..........................................................................................................................................  Patient/Patient Representative Signature      ..........................................................................................................................................  Patient Representative Print Name and Relationship to Patient    ..................................................               ................................................  Date                                            Time    ..........................................................................................................................................  Reviewed by Signature/Title    ...................................................              ..............................................  Date                                                            Time

## 2018-05-02 NOTE — OR NURSING
To [facility] for recovery. Room 16.  NO bleeding on pad.  Incisions x 3 clean dry and intact

## 2018-05-02 NOTE — ANESTHESIA POSTPROCEDURE EVALUATION
Patient: Shazia ANAYA Luci    Procedure(s):  LAPAROSCOPIC SUPRACERVICAL HYSTERECTOMY, BILATEARL SALPINGECTOMY, LYSIS OF ADHESIONS - Wound Class: II-Clean Contaminated   - Wound Class: II-Clean Contaminated    Diagnosis:MENOMETRORRHAGIA  Diagnosis Additional Information: No value filed.    Anesthesia Type:  General, ETT    Note:  Anesthesia Post Evaluation    Patient location during evaluation: Bedside  Patient participation: Able to fully participate in evaluation  Level of consciousness: awake  Pain management: adequate  Airway patency: patent  Cardiovascular status: acceptable  Respiratory status: acceptable  Hydration status: acceptable  PONV: none     Anesthetic complications: None          Last vitals:  Vitals:    05/02/18 1142 05/02/18 1600   BP: 139/89 118/67   Resp: 18 18   Temp: 97  F (36.1  C)    SpO2: 98% 100%         Electronically Signed By: LIA Jain CRNA  May 2, 2018  4:06 PM

## 2018-05-03 VITALS
DIASTOLIC BLOOD PRESSURE: 71 MMHG | BODY MASS INDEX: 27.56 KG/M2 | WEIGHT: 165.4 LBS | HEIGHT: 65 IN | RESPIRATION RATE: 18 BRPM | OXYGEN SATURATION: 97 % | SYSTOLIC BLOOD PRESSURE: 118 MMHG | TEMPERATURE: 97.8 F

## 2018-05-03 LAB
ANION GAP SERPL CALCULATED.3IONS-SCNC: 7 MMOL/L (ref 3–14)
BASOPHILS # BLD AUTO: 0 10E9/L (ref 0–0.2)
BASOPHILS NFR BLD AUTO: 0.1 %
BUN SERPL-MCNC: 6 MG/DL (ref 7–30)
CALCIUM SERPL-MCNC: 8.6 MG/DL (ref 8.5–10.1)
CHLORIDE SERPL-SCNC: 104 MMOL/L (ref 94–109)
CO2 SERPL-SCNC: 27 MMOL/L (ref 20–32)
CREAT SERPL-MCNC: 0.56 MG/DL (ref 0.52–1.04)
DIFFERENTIAL METHOD BLD: ABNORMAL
EOSINOPHIL # BLD AUTO: 0 10E9/L (ref 0–0.7)
EOSINOPHIL NFR BLD AUTO: 0 %
ERYTHROCYTE [DISTWIDTH] IN BLOOD BY AUTOMATED COUNT: 13.2 % (ref 10–15)
GFR SERPL CREATININE-BSD FRML MDRD: >90 ML/MIN/1.7M2
GLUCOSE SERPL-MCNC: 125 MG/DL (ref 70–99)
HCT VFR BLD AUTO: 30.7 % (ref 35–47)
HGB BLD-MCNC: 10.3 G/DL (ref 11.7–15.7)
IMM GRANULOCYTES # BLD: 0.1 10E9/L (ref 0–0.4)
IMM GRANULOCYTES NFR BLD: 0.5 %
LYMPHOCYTES # BLD AUTO: 0.9 10E9/L (ref 0.8–5.3)
LYMPHOCYTES NFR BLD AUTO: 6.2 %
MCH RBC QN AUTO: 27.4 PG (ref 26.5–33)
MCHC RBC AUTO-ENTMCNC: 33.6 G/DL (ref 31.5–36.5)
MCV RBC AUTO: 82 FL (ref 78–100)
MONOCYTES # BLD AUTO: 0.8 10E9/L (ref 0–1.3)
MONOCYTES NFR BLD AUTO: 5.8 %
NEUTROPHILS # BLD AUTO: 12.2 10E9/L (ref 1.6–8.3)
NEUTROPHILS NFR BLD AUTO: 87.4 %
NRBC # BLD AUTO: 0 10*3/UL
NRBC BLD AUTO-RTO: 0 /100
PLATELET # BLD AUTO: 245 10E9/L (ref 150–450)
POTASSIUM SERPL-SCNC: 4.3 MMOL/L (ref 3.4–5.3)
RBC # BLD AUTO: 3.76 10E12/L (ref 3.8–5.2)
SODIUM SERPL-SCNC: 138 MMOL/L (ref 133–144)
WBC # BLD AUTO: 14 10E9/L (ref 4–11)

## 2018-05-03 PROCEDURE — 25000132 ZZH RX MED GY IP 250 OP 250 PS 637: Performed by: OBSTETRICS & GYNECOLOGY

## 2018-05-03 PROCEDURE — 25000128 H RX IP 250 OP 636: Performed by: OBSTETRICS & GYNECOLOGY

## 2018-05-03 PROCEDURE — 36415 COLL VENOUS BLD VENIPUNCTURE: CPT | Performed by: OBSTETRICS & GYNECOLOGY

## 2018-05-03 PROCEDURE — 40000275 ZZH STATISTIC RCP TIME EA 10 MIN

## 2018-05-03 PROCEDURE — 80048 BASIC METABOLIC PNL TOTAL CA: CPT | Performed by: OBSTETRICS & GYNECOLOGY

## 2018-05-03 PROCEDURE — 85025 COMPLETE CBC W/AUTO DIFF WBC: CPT | Performed by: OBSTETRICS & GYNECOLOGY

## 2018-05-03 RX ORDER — KETOROLAC TROMETHAMINE 30 MG/ML
30 INJECTION, SOLUTION INTRAMUSCULAR; INTRAVENOUS ONCE
Status: COMPLETED | OUTPATIENT
Start: 2018-05-03 | End: 2018-05-03

## 2018-05-03 RX ORDER — OXYCODONE HYDROCHLORIDE 5 MG/1
5-10 TABLET ORAL
Qty: 14 TABLET | Refills: 0 | Status: SHIPPED | OUTPATIENT
Start: 2018-05-03 | End: 2018-05-31

## 2018-05-03 RX ADMIN — OXYCODONE HYDROCHLORIDE 10 MG: 5 TABLET ORAL at 07:54

## 2018-05-03 RX ADMIN — OXYCODONE HYDROCHLORIDE 10 MG: 5 TABLET ORAL at 04:06

## 2018-05-03 RX ADMIN — DOCUSATE SODIUM 100 MG: 100 CAPSULE, LIQUID FILLED ORAL at 09:03

## 2018-05-03 RX ADMIN — OXYCODONE HYDROCHLORIDE 10 MG: 5 TABLET ORAL at 00:28

## 2018-05-03 RX ADMIN — KETOROLAC TROMETHAMINE 30 MG: 30 INJECTION, SOLUTION INTRAMUSCULAR at 09:03

## 2018-05-03 NOTE — DISCHARGE SUMMARY
"Discharge Summary    Shazia Verma MRN# [id number]   YOB: 1970 Age: 47 year old     Date of Admission:  5/2/2018  Date of Discharge:  5/3/2018  Admitting Physician:  Jean Marie Garvin MD  Discharge Physician:  Janee De Jesus NP  Discharging Service:  Obstetrics and Gynecology     Primary Provider: Mary Ellen Armstrong          Admission Diagnoses:   MENOMETRORRHAGIA  S/P hysterectomy          Discharge Diagnosis:   Patient Active Problem List   Diagnosis     ACP (advance care planning)     H/O gastric bypass     Hypoglycemia     S/P hysterectomy                Discharge Disposition:   Discharged to home           Condition on Discharge:   Discharge condition: Stable   Discharge vitals: Blood pressure 118/71, temperature 97.8  F (36.6  C), temperature source Oral, resp. rate 18, height 1.651 m (5' 5\"), weight 75 kg (165 lb 6.4 oz), last menstrual period 04/10/2018, SpO2 96 %.   Code status on discharge: Full Code           Procedures / Labs / Imaging:   Invasive procedures: [facility]          Medications Prior to Admission:     Prescriptions Prior to Admission   Medication Sig Dispense Refill Last Dose     calcium carbonate (OS-MANUEL 500 MG [place]. [place]) 500 MG tablet    Past Month at Unknown time     Cyanocobalamin (VITAMIN B 12 PO)    Past Month at Unknown time     Ferrous Sulfate (IRON SUPPLEMENT PO) Take 325 mg by mouth daily (with breakfast) 2 tabs daily   Past Month at Unknown time     multivitamin, therapeutic with minerals (MULTI-VITAMIN) TABS Take 1 tablet by mouth daily   Past Month at Unknown time     VITAMIN D, CHOLECALCIFEROL, PO Take 5,000 Units by mouth daily 2 tabs daily   Past Month at Unknown time     [DISCONTINUED] norethindrone (AYGESTIN) 5 MG tablet Take 1 tablet (5 mg) by mouth 3 times daily 28 tablet 1 Past Month at Unknown time             Discharge Medications:     Current Discharge Medication List      START taking these medications    Details   oxyCODONE IR (ROXICODONE) 5 MG tablet Take " 1-2 tablets (5-10 mg) by mouth every 3 hours as needed for severe pain or other (pain control or improvement in physical function. Hold dose for analgesic side effects.)  Qty: 14 tablet, Refills: 0    Associated Diagnoses: S/P hysterectomy         CONTINUE these medications which have NOT CHANGED    Details   calcium carbonate (OS-MANUEL 500 MG [place]. [place]) 500 MG tablet       Cyanocobalamin (VITAMIN B 12 PO)       Ferrous Sulfate (IRON SUPPLEMENT PO) Take 325 mg by mouth daily (with breakfast) 2 tabs daily      multivitamin, therapeutic with minerals (MULTI-VITAMIN) TABS Take 1 tablet by mouth daily      VITAMIN D, CHOLECALCIFEROL, PO Take 5,000 Units by mouth daily 2 tabs daily         STOP taking these medications       norethindrone (AYGESTIN) 5 MG tablet Comments:   Reason for Stopping:                     Consultations:   No consultations were requested during this admission             Brief History of Illness:   Shazia Verma is a 47 year old female who was admitted for elective laparoscopic hysterectomy for definitive management of metromenorrhagia. Uncomplicated surgical and post-op course.  Discharged post op day 1.           Hospital Course:     S/P hysterectomy    * No resolved hospital problems. *                 Significant Results:   None             Pending Results:   None           Discharge Instructions and Follow-Up:   Discharge diet: Regular   Discharge activity: No lifting, driving, or strenuous exercise for 4 week(s)  No driving or operating machinery while on narcotic analgesics  Pelvic rest: abstain from intercourse and do not use tampons for 4 week(s)   Discharge follow-up: Follow up with me in 4 weeks   Wound care: None   Other instructions: None

## 2018-05-03 NOTE — PROGRESS NOTES
"[facility]  Daily Post-Op Note         Assessment and Plan:    Assessment:   Post-operative day #1  Procedure(s):  LAPAROSCOPIC HYSTERECTOMY SUPRACERVICAL  SALPINGECTOMY     Doing well.  Clean wound without signs of infection.  Normal healing wound.  No immediate surgical complications identified.  No excessive bleeding  Pain well-controlled.      Plan:   Ambulate  Advance activity as tolerated  Continue supportive and symptomatic treatment  Pain control measures  Discharge home            Interval History:   Stable.  Doing well.  Improving slowly.  Pain is reasonably controlled.  No fevers. No bleeding.  Voiding.  No flatus.            Review of Systems:    CONSTITUTIONAL:NEGATIVE  for fever   R: NEGATIVE for significant cough or SOB  CV: NEGATIVE for chest pain, palpitations or peripheral edema  GI: NEGATIVE for nausea and vomiting  : negative for, vaginal discharge and bleeding             Medications:   I have reviewed this patient's current medications          Physical Exam:   Vitals were reviewed  All vitals stable /71  Temp 97.8  F (36.6  C) (Oral)  Resp 18  Ht 1.651 m (5' 5\")  Wt 75 kg (165 lb 6.4 oz)  LMP 04/10/2018 (LMP Unknown)  SpO2 96%  BMI 27.52 kg/m2  Lungs clear  C-RRR  Abdomen soft  Wound clean and dry with minimal or no drainage.  Surrounding skin with minimal erythema.           Data:   All laboratory data related to this surgery reviewed    "

## 2018-05-03 NOTE — PLAN OF CARE
Problem: Patient Care Overview  Goal: Plan of Care/Patient Progress Review  Outcome: Improving  Assessments as charted. VSS. Pain controlled with PRN oxycodone. Pt up sitting in chair earlier in shift. Denies nausea. Lungs clear. BS active X 4. SCD's in place. Adequate output from pereira catheter. Patient denies needs at this time.

## 2018-05-03 NOTE — PLAN OF CARE
Problem: Patient Care Overview  Goal: Plan of Care/Patient Progress Review  Outcome: Improving   05/03/18 0845   OTHER   Plan Of Care Reviewed With patient   Plan of Care Review   Progress improving       Temp: 97.8  F (36.6  C) Temp src: Oral BP: 118/71   Heart Rate: 77 Resp: 18 SpO2: 96 % O2 Device: None (Room air)   Pt A&O x3, c/o abdominal soreness 8/10, PO analgesics given with some relief. Surgical incision without drainage or signs of infection. LS clear, BS active. Pt tolerating regular diet. Pt up ad trang, activity encouraged. Pt makes needs known, call light with in reach .

## 2018-05-03 NOTE — PLAN OF CARE
Face to face report given with opportunity to observe patient.    Report given to Martha Bains RN  5/3/2018  7:13 AM

## 2018-05-03 NOTE — DISCHARGE INSTRUCTIONS
Discharge Instructions for Laparoscopic Hysterectomy  You had a procedure called laparoscopic hysterectomy. A surgeon removed your uterus using instruments inserted through small incisions in your abdomen. These incisions may be sore. You may also have pain in your upper back or shoulders. This is from the gas used to enlarge your abdomen to allow your healthcare provider to see inside your pelvis and do the procedure. This pain usually goes away in a day or two. It usually takes from 1 to 4 weeks to recover from laparoscopic hysterectomy. Remember, though, that recovery time varies from woman to woman. Here's what you can do to speed your recovery after surgery.  Home care     Continue the coughing and deep breathing exercises that you learned in the hospital.    Take your medicines exactly as directed by your healthcare provider.    Avoid constipation.  ? Eat fruits, vegetables, and whole grains.  ? Drink 6 to 8 glasses of water a day, unless told to do otherwise.  ? Use a laxative or a mild stool softener if your doctor says it's OK.    Shower as usual. Wash your incisions with mild soap and water. Pat dry.    Don't use oils, powders, or lotions on your incisions.    Don't put anything in your vagina until your healthcare provider says it's safe to do so. Don't use tampons or douches. Don't have sex.    If you had both ovaries removed, report hot flashes, mood swings, and irritability to your healthcare provider. There may be medicines that can help you.  Activity    Ask your healthcare provider when you can start driving again. It's usually OK to drive as soon as you are free of pain and able to move comfortably from side to side. Don't drive while you are still taking opioid pain medicine.    Ask others to help with chores and errands while you recover.    Don t lift anything heavier than 10 pounds for 6 weeks.    Don t vacuum or do other strenuous activities until the healthcare provider says it's  OK.    Walk as often as you feel able.    Climb stairs slowly and pause after every few steps.  Follow-up care  Make a follow-up appointment, or as directed.     When to call your healthcare provider  Call your doctor right away if you have any of the following:    Fever above 100.4 F (38 C) or chills    Bright red vaginal bleeding or vaginal bleeding that soaks more than one sanitary pad per hour    A foul smelling discharge from the vagina    Trouble urinating or burning when you urinate    Severe pain or bloating in your abdomen    Redness, swelling, or drainage at your incision sites    Shortness of breath or chest pain    Nausea and vomiting   Date Last Reviewed: 11/1/2017 2000-2017 [employer]. [address] [place] [address], [place] [id number]. All rights reserved. This information is not intended as a substitute for professional medical care. Always follow your healthcare professional's instructions.      No heavy lifting greater than 15-20 lb for 4 weeks  No driving while on pain meds  Pelvic rest for 4 weeks  No vigorous activity, exercise, swim, bath for 4 weeks  Schedule Postop check Dr. Garvin or Janee De Jesus NP in 4 weeks  Call Dr. Garvin [phone] as necessary if problems in interim

## 2018-05-03 NOTE — PLAN OF CARE
Face to face report given with opportunity to observe patient.    Report given to Bindu William   5/2/2018  7:00 PM

## 2018-05-03 NOTE — PLAN OF CARE
Problem: Patient Care Overview  Goal: Plan of Care/Patient Progress Review  Outcome: Adequate for Discharge Date Met: 05/03/18  Patient discharged at 11:23 AM via ambulation accompanied by spouse and staff. Prescriptions sent with patient to fill . All belongings sent with patient.     Discharge instructions reviewed with Shazia. Listed belongings gathered and returned to patient. Room cleared with pt at time of discharge    Patient discharged to home.     Core Measures and Vaccines  Core Measures applicable during stay: Yes. If yes, state diagnosis: Hysterectomy  Pneumonia and Influenza given prior to discharge, if indicated: No    Surgical Patient   Surgical Procedures during stay: lap hyst  Did patient receive discharge instruction on wound care and recognition of infection symptoms? Yes    MISC  Follow up appointment made:  Yes  Home and hospital aquired medications returned to patient: N/A  Patient reports pain was well managed at discharge: Yes

## 2018-05-04 LAB — COPATH REPORT: NORMAL

## 2018-05-31 ENCOUNTER — OFFICE VISIT (OUTPATIENT)
Dept: OBGYN | Facility: OTHER | Age: 48
End: 2018-05-31
Attending: NURSE PRACTITIONER
Payer: COMMERCIAL

## 2018-05-31 VITALS
WEIGHT: 165 LBS | OXYGEN SATURATION: 98 % | HEART RATE: 80 BPM | DIASTOLIC BLOOD PRESSURE: 78 MMHG | BODY MASS INDEX: 27.49 KG/M2 | TEMPERATURE: 97.1 F | SYSTOLIC BLOOD PRESSURE: 116 MMHG | HEIGHT: 65 IN

## 2018-05-31 DIAGNOSIS — Z98.890 POSTOPERATIVE STATE: Primary | ICD-10-CM

## 2018-05-31 PROCEDURE — 99024 POSTOP FOLLOW-UP VISIT: CPT | Performed by: NURSE PRACTITIONER

## 2018-05-31 ASSESSMENT — PAIN SCALES - GENERAL: PAINLEVEL: NO PAIN (0)

## 2018-05-31 NOTE — NURSING NOTE
"Chief Complaint   Patient presents with     Surgical Followup     5/2/2018 [facility], Dr Garvin       Initial /78 (BP Location: Right arm, Patient Position: Sitting, Cuff Size: Adult Regular)  Pulse 80  Temp 97.1  F (36.2  C) (Tympanic)  Ht 5' 5\" (1.651 m)  Wt 165 lb (74.8 kg)  LMP 04/10/2018 (LMP Unknown)  SpO2 98%  BMI 27.46 kg/m2 Estimated body mass index is 27.46 kg/(m^2) as calculated from the following:    Height as of this encounter: 5' 5\" (1.651 m).    Weight as of this encounter: 165 lb (74.8 kg).  Medication Reconciliation: complete    Lara Contreras LPN    "

## 2018-05-31 NOTE — MR AVS SNAPSHOT
"              After Visit Summary   5/31/2018    Shazia Verma    MRN: [id number]           Patient Information     Date Of Birth          1970        Visit Information        Provider Department      5/31/2018 9:30 AM Janee De Jesus NP [facility]        Today's Diagnoses     Postoperative state    -  1      Care Instructions    Return annually for gyn exams.     No further restrictions.           Follow-ups after your visit        Who to contact     If you have questions or need follow up information about today's clinic visit or your schedule please contact [facility] directly at [phone].  Normal or non-critical lab and imaging results will be communicated to you by Fin Quiverhart, letter or phone within 4 business days after the clinic has received the results. If you do not hear from us within 7 days, please contact the clinic through [employer] Quiverhart or phone. If you have a critical or abnormal lab result, we will notify you by phone as soon as possible.  Submit refill requests through Rayku or call your pharmacy and they will forward the refill request to us. Please allow 3 business days for your refill to be completed.          Additional Information About Your Visit        MyChart Information     [employer] gives you secure access to your electronic health record. If you see a primary care provider, you can also send messages to your care team and make appointments. If you have questions, please call your primary care clinic.  If you do not have a primary care provider, please call [phone] and they will assist you.        Care EveryWhere ID     This is your Care EveryWhere ID. This could be used by other organizations to access your [facility] medical records  [id number]        Your Vitals Were     Pulse Temperature Height Last Period Pulse Oximetry BMI (Body Mass Index)    80 97.1  F (36.2  C) (Tympanic) 5' 5\" (1.651 m) 04/10/2018 (LMP Unknown) 98% 27.46 kg/m2       Blood Pressure " from Last 3 Encounters:   05/31/18 116/78   05/03/18 118/71   04/26/18 110/70    Weight from Last 3 Encounters:   05/31/18 165 lb (74.8 kg)   05/02/18 165 lb 6.4 oz (75 kg)   04/26/18 155 lb (70.3 kg)              Today, you had the following     No orders found for display       Primary Care Provider Office Phone # Fax #    Mary Ellen MccrayEnderEzeSAL [phone] [facility] [address]        Equal Access to Services     [facility] : Hadii claudia friedman hadashtami Soana rosa, waaxda luqadaha, qaybta kaalmada dianayaevan, jeff osullivan . So [facility] [phone].    ATENCIÓN: Si habla español, tiene a wadsworth disposición servicios gratuitos de asistencia lingüística. [facility] [phone].    We comply with applicable federal civil rights laws and [place] laws. We do not discriminate on the basis of race, color, national origin, age, disability, sex, sexual orientation, or gender identity.            Thank you!     Thank you for choosing [facility]  for your care. Our goal is always to provide you with excellent care. Hearing back from our patients is one way we can continue to improve our services. Please take a few minutes to complete the written survey that you may receive in the mail after your visit with us. Thank you!             Your Updated Medication List - Protect others around you: Learn how to safely use, store and throw away your medicines at www.disposemymeds.org.          This list is accurate as of 5/31/18 10:29 AM.  Always use your most recent med list.                   Brand Name Dispense Instructions for use Diagnosis    calcium carbonate 500 MG tablet    OS-MANUEL 500 mg [place]. Ca          IRON SUPPLEMENT PO      Take 325 mg by mouth daily (with breakfast) 2 tabs daily        Multi-vitamin Tabs tablet      Take 1 tablet by mouth daily        VITAMIN B 12 PO           VITAMIN D (CHOLECALCIFEROL) PO      Take 5,000 Units by mouth  daily 2 tabs daily

## 2018-05-31 NOTE — PROGRESS NOTES
S.Shazia Verma 47 year old female presents for post operative check. She is  4  week(s) status post Laparoscopic supracervical hysterectomy.  She reports doing well and denies significant pain or bleeding. There were no pathological findings.    O.  B/P: 116/78, T: 97.1, P: 80, R: Data Unavailable    Abd: soft, non-tender, non-distended. Incision clear, dry, and intact without evidence of infection.  : vaginal mucosa pink and without discharge.  Cervix midline without CMT    A. Postoperative state       Doing well - released from post op restrictions.     P. Follow up prn or when due for next annual exam.

## 2018-05-31 NOTE — NURSING NOTE
"Chief Complaint   Patient presents with     Surgical Followup     5/2/2018 Dr Bharathi ABBASI       Initial /78 (BP Location: Right arm, Patient Position: Sitting, Cuff Size: Adult Regular)  Pulse 80  Temp 97.1  F (36.2  C) (Tympanic)  Ht 5' 5\" (1.651 m)  Wt 165 lb (74.8 kg)  LMP 04/10/2018 (LMP Unknown)  SpO2 98%  BMI 27.46 kg/m2 Estimated body mass index is 27.46 kg/(m^2) as calculated from the following:    Height as of this encounter: 5' 5\" (1.651 m).    Weight as of this encounter: 165 lb (74.8 kg).  Medication Reconciliation: complete    Lara Contreras LPN    "

## 2018-10-19 NOTE — PROGRESS NOTES
SUBJECTIVE:   CC: Shazia Verma is an 48 year old woman who presents for preventive health visit.     Healthy Habits:    Do you get at least three servings of calcium containing foods daily (dairy, green leafy vegetables, etc.)? yes    Amount of exercise or daily activities, outside of work: 7 day(s) per week / 2000 steps    Problems taking medications regularly No    Medication side effects: No    Have you had an eye exam in the past two years? yes    Do you see a dentist twice per year? yes    Do you have sleep apnea, excessive snoring or daytime drowsiness?no    She had her flu vaccine this year - at work through [employer].     Right hip - pain continues, she is following with [facility] orthopaedic and is scheduled for total hip in January 2019.  Will return for pre-op.        Today's PHQ-2 Score:   PHQ-2 ( 1999 [employer]) 10/9/2017   Q1: Little interest or pleasure in doing things 0   Q2: Feeling down, depressed or hopeless 0   PHQ-2 Score 0   Q1: Little interest or pleasure in doing things Not at all   Q2: Feeling down, depressed or hopeless Not at all   PHQ-2 Score 0       Abuse: Current or Past(Physical, Sexual or Emotional)- No  Do you feel safe in your environment - Yes     Social History   Substance Use Topics     Smoking status: Former Smoker     Smokeless tobacco: Never Used     Alcohol use Yes      Comment: occ     If you drink alcohol do you typically have >3 drinks per day or >7 drinks per week? No                     Reviewed orders with patient.  Reviewed health maintenance and updated orders accordingly - Yes  BP Readings from Last 3 Encounters:   10/22/18 124/70   05/31/18 116/78   05/03/18 118/71    Wt Readings from Last 3 Encounters:   10/22/18 171 lb (77.6 kg)   05/31/18 165 lb (74.8 kg)   05/02/18 165 lb 6.4 oz (75 kg)                  Patient Active Problem List   Diagnosis     ACP (advance care planning)     H/O gastric bypass     Hypoglycemia     S/P hysterectomy     Past Surgical  History:   Procedure Laterality Date     ABDOMEN SURGERY  2012    gastric bypass     APPENDECTOMY  1991     BREAST SURGERY  2002    right bx     COLONOSCOPY  2013     GI SURGERY  2012    gastric by pass     GYN SURGERY  1992 1994    c section x 2     LAPAROSCOPIC HYSTERECTOMY SUPRACERVICAL N/A 5/2/2018    Procedure: LAPAROSCOPIC HYSTERECTOMY SUPRACERVICAL;  LAPAROSCOPIC SUPRACERVICAL HYSTERECTOMY, BILATEARL SALPINGECTOMY, LYSIS OF ADHESIONS;  Surgeon: Jean Marie Garvin MD;  Location: [facility] OR     ORTHOPEDIC SURGERY  2015    right hip labreal tear     ORTHOPEDIC SURGERY  2014    left wrist      SALPINGECTOMY Bilateral 5/2/2018    Procedure: SALPINGECTOMY;;  Surgeon: Jean Marie Garvin MD;  Location: [facility] OR       Social History   Substance Use Topics     Smoking status: Former Smoker     Smokeless tobacco: Never Used     Alcohol use Yes      Comment: occ     Family History   Problem Relation Age of Onset     Cerebrovascular Disease Mother      Hyperlipidemia Mother      Hypertension Mother      Coronary Artery Disease Mother      Migraines Mother      Obesity Mother      Osteoarthritis Mother      Osteoporosis Mother      Diabetes Mother      Hyperlipidemia Father      Hypertension Father      Thyroid Disease Father      Hypertension Brother      Hyperlipidemia Brother      Obesity Brother      Hypertension Sister      Thyroid Disease Sister          Current Outpatient Prescriptions   Medication Sig Dispense Refill     calcium carbonate (OS-MANUEL 500 MG [place]. [place]) 500 MG tablet        Cyanocobalamin (VITAMIN B 12 PO)        Ferrous Sulfate (IRON SUPPLEMENT PO) Take 325 mg by mouth daily (with breakfast) 2 tabs daily       multivitamin, therapeutic with minerals (MULTI-VITAMIN) TABS Take 1 tablet by mouth daily       VITAMIN D, CHOLECALCIFEROL, PO Take 5,000 Units by mouth daily 2 tabs daily       Allergies   Allergen Reactions     Reglan [Metoclopramide] Anxiety     Recent Labs   Lab Test  10/22/18   1414  05/03/18   1942   04/26/18   0930  03/13/18   1853  01/15/18   1542  09/01/17   1154   11/28/16   1048   A1C   --    --    --    --    --   5.7   --   5.2   LDL  122*   --    --    --    --    --    --    --    HDL  58   --    --    --    --    --    --    --    TRIG  227*   --    --    --    --    --    --    --    ALT   --    --   20  22  27   --    < >   --    CR  0.68  0.56  0.58  0.63  0.57  0.57   < >   --    GFRESTIMATED  >90  >90  >90  >90  >90  >90   < >   --    GFRESTBLACK  >90  >90  >90  >90  >90  >90   < >   --    POTASSIUM  3.6  4.3  4.0  4.1  3.7  4.1   < >   --    TSH  1.06   --    --    --    --   2.16   < >   --     < > = values in this interval not displayed.        Mammogram is scheduled for next week.     Pertinent mammograms are reviewed under the imaging tab.  History of abnormal Pap smear: completed earlier this year with Dr Garvin, HPV negative, repeat in 5 years.   PAP / HPV Latest Ref Rng & Units 4/10/2018   PAP - NIL   HPV 16 DNA NEG:Negative Negative   HPV 18 DNA NEG:Negative Negative   OTHER HR HPV NEG:Negative Negative     Reviewed and updated as needed this visit by clinical staff  Tobacco  Allergies  Meds  Problems  Med Hx  Surg Hx  Fam Hx  Soc Hx          Reviewed and updated as needed this visit by Provider          ROS:  CONSTITUTIONAL: NEGATIVE for fever, chills, change in weight  INTEGUMENTARY/SKIN: NEGATIVE for worrisome rashes, moles or lesions  EYES: NEGATIVE for vision changes or irritation  ENT: NEGATIVE for ear, mouth and throat problems  RESP: NEGATIVE for significant cough or SOB  BREAST: NEGATIVE for masses, tenderness or discharge  CV: NEGATIVE for chest pain, palpitations or peripheral edema  GI: NEGATIVE for nausea, abdominal pain, heartburn, or change in bowel habits  : NEGATIVE for unusual urinary or vaginal symptoms. No vaginal bleeding.  MUSCULOSKELETAL: NEGATIVE for significant arthralgias or myalgia  NEURO: NEGATIVE for weakness, dizziness or paresthesias  PSYCHIATRIC:  "NEGATIVE for changes in mood or affect     OBJECTIVE:   /70  Pulse 72  Temp 97.1  F (36.2  C) (Tympanic)  Resp 16  Ht 5' 5\" (1.651 m)  Wt 171 lb (77.6 kg)  LMP 04/10/2018 (LMP Unknown)  SpO2 98%  BMI 28.46 kg/m2  EXAM:  GENERAL: healthy, alert and no distress  EYES: Eyes grossly normal to inspection, PERRL and conjunctivae and sclerae normal  HENT: ear canals and TM's normal, nose and mouth without ulcers or lesions  NECK: no adenopathy, no asymmetry, masses, or scars and thyroid normal to palpation  RESP: lungs clear to auscultation - no rales, rhonchi or wheezes  CV: regular rate and rhythm, normal S1 S2, no S3 or S4, no murmur, click or rub, no peripheral edema and peripheral pulses strong  ABDOMEN: soft, nontender, no hepatosplenomegaly, no masses and bowel sounds normal  MS: no gross musculoskeletal defects noted, no edema  SKIN: no suspicious lesions or rashes  NEURO: Normal strength and tone, mentation intact and speech normal  PSYCH: mentation appears normal, affect normal/bright      ASSESSMENT/PLAN:   1. Annual physical exam  Exam completed    2. H/O gastric bypass  - Lipid Profile  - TSH with free T4 reflex  - CBC with platelets    3. Hypoglycemia  - Basic metabolic panel    COUNSELING:   Reviewed preventive health counseling, as reflected in patient instructions       Regular exercise       Healthy diet/nutrition       Vision screening       Hearing screening       Immunizations     Influenza  Last week            Colon cancer screening    BP Readings from Last 1 Encounters:   10/22/18 124/70     Estimated body mass index is 28.46 kg/(m^2) as calculated from the following:    Height as of this encounter: 5' 5\" (1.651 m).    Weight as of this encounter: 171 lb (77.6 kg).      Weight management plan: Discussed healthy diet and exercise guidelines and patient will follow up in 12 months in clinic to re-evaluate.     reports that she has quit smoking. She has never used smokeless " tobacco.      Counseling Resources:  ATP IV Guidelines  Pooled Cohorts Equation Calculator  Breast Cancer Risk Calculator  FRAX Risk Assessment  ICSI Preventive Guidelines  Dietary Guidelines for Americans, 2010  USDA's MyPlate  ASA Prophylaxis  Lung CA Screening    Mary Ellen Armstrong, NP  [facility]

## 2018-10-22 ENCOUNTER — OFFICE VISIT (OUTPATIENT)
Dept: FAMILY MEDICINE | Facility: OTHER | Age: 48
End: 2018-10-22
Attending: NURSE PRACTITIONER
Payer: COMMERCIAL

## 2018-10-22 VITALS
WEIGHT: 171 LBS | RESPIRATION RATE: 16 BRPM | OXYGEN SATURATION: 98 % | HEART RATE: 72 BPM | DIASTOLIC BLOOD PRESSURE: 70 MMHG | HEIGHT: 65 IN | TEMPERATURE: 97.1 F | BODY MASS INDEX: 28.49 KG/M2 | SYSTOLIC BLOOD PRESSURE: 124 MMHG

## 2018-10-22 DIAGNOSIS — Z00.00 ANNUAL PHYSICAL EXAM: Primary | ICD-10-CM

## 2018-10-22 DIAGNOSIS — E16.2 HYPOGLYCEMIA: ICD-10-CM

## 2018-10-22 DIAGNOSIS — Z98.84 H/O GASTRIC BYPASS: ICD-10-CM

## 2018-10-22 LAB
ANION GAP SERPL CALCULATED.3IONS-SCNC: 8 MMOL/L (ref 3–14)
BUN SERPL-MCNC: 13 MG/DL (ref 7–30)
CALCIUM SERPL-MCNC: 8.6 MG/DL (ref 8.5–10.1)
CHLORIDE SERPL-SCNC: 105 MMOL/L (ref 94–109)
CHOLEST SERPL-MCNC: 225 MG/DL
CO2 SERPL-SCNC: 26 MMOL/L (ref 20–32)
CREAT SERPL-MCNC: 0.68 MG/DL (ref 0.52–1.04)
ERYTHROCYTE [DISTWIDTH] IN BLOOD BY AUTOMATED COUNT: 15.2 % (ref 10–15)
GFR SERPL CREATININE-BSD FRML MDRD: >90 ML/MIN/1.7M2
GLUCOSE SERPL-MCNC: 112 MG/DL (ref 70–99)
HCT VFR BLD AUTO: 38.5 % (ref 35–47)
HDLC SERPL-MCNC: 58 MG/DL
HGB BLD-MCNC: 12.6 G/DL (ref 11.7–15.7)
LDLC SERPL CALC-MCNC: 122 MG/DL
MCH RBC QN AUTO: 27.6 PG (ref 26.5–33)
MCHC RBC AUTO-ENTMCNC: 32.7 G/DL (ref 31.5–36.5)
MCV RBC AUTO: 84 FL (ref 78–100)
NONHDLC SERPL-MCNC: 167 MG/DL
PLATELET # BLD AUTO: 330 10E9/L (ref 150–450)
POTASSIUM SERPL-SCNC: 3.6 MMOL/L (ref 3.4–5.3)
RBC # BLD AUTO: 4.57 10E12/L (ref 3.8–5.2)
SODIUM SERPL-SCNC: 139 MMOL/L (ref 133–144)
TRIGL SERPL-MCNC: 227 MG/DL
TSH SERPL DL<=0.005 MIU/L-ACNC: 1.06 MU/L (ref 0.4–4)
WBC # BLD AUTO: 5.8 10E9/L (ref 4–11)

## 2018-10-22 PROCEDURE — 80048 BASIC METABOLIC PNL TOTAL CA: CPT | Performed by: NURSE PRACTITIONER

## 2018-10-22 PROCEDURE — 85027 COMPLETE CBC AUTOMATED: CPT | Performed by: NURSE PRACTITIONER

## 2018-10-22 PROCEDURE — 80061 LIPID PANEL: CPT | Performed by: NURSE PRACTITIONER

## 2018-10-22 PROCEDURE — 36415 COLL VENOUS BLD VENIPUNCTURE: CPT | Performed by: NURSE PRACTITIONER

## 2018-10-22 PROCEDURE — 99396 PREV VISIT EST AGE 40-64: CPT | Performed by: NURSE PRACTITIONER

## 2018-10-22 PROCEDURE — 84443 ASSAY THYROID STIM HORMONE: CPT | Performed by: NURSE PRACTITIONER

## 2018-10-22 ASSESSMENT — ANXIETY QUESTIONNAIRES
1. FEELING NERVOUS, ANXIOUS, OR ON EDGE: NOT AT ALL
IF YOU CHECKED OFF ANY PROBLEMS ON THIS QUESTIONNAIRE, HOW DIFFICULT HAVE THESE PROBLEMS MADE IT FOR YOU TO DO YOUR WORK, TAKE CARE OF THINGS AT HOME, OR GET ALONG WITH OTHER PEOPLE: NOT DIFFICULT AT ALL
5. BEING SO RESTLESS THAT IT IS HARD TO SIT STILL: NOT AT ALL
3. WORRYING TOO MUCH ABOUT DIFFERENT THINGS: NOT AT ALL
2. NOT BEING ABLE TO STOP OR CONTROL WORRYING: NOT AT ALL
GAD7 TOTAL SCORE: 0
4. TROUBLE RELAXING: NOT AT ALL
7. FEELING AFRAID AS IF SOMETHING AWFUL MIGHT HAPPEN: NOT AT ALL
6. BECOMING EASILY ANNOYED OR IRRITABLE: NOT AT ALL

## 2018-10-22 ASSESSMENT — PAIN SCALES - GENERAL: PAINLEVEL: NO PAIN (0)

## 2018-10-22 NOTE — MR AVS SNAPSHOT
After Visit Summary   10/22/2018    Shazia Verma    MRN: [id number]           Patient Information     Date Of Birth          1970        Visit Information        Provider Department      10/22/2018 1:30 PM Mary Ellen Armtsrong NP [facility]        Today's Diagnoses     Annual physical exam    -  1    H/O gastric bypass        Hypoglycemia          Care Instructions      Preventive Health Recommendations  Female Ages 40 to 49    Yearly exam:     See your health care provider every year in order to  1. Review health changes.   2. Discuss preventive care.    3. Review your medicines if your doctor prescribed any.      Get a Pap test every three years (unless you have an abnormal result and your provider advises testing more often).      If you get Pap tests with HPV test, you only need to test every 5 years, unless you have an abnormal result. You do not need a Pap test if your uterus was removed (hysterectomy) and you have not had cancer.      You should be tested each year for STDs (sexually transmitted diseases), if you're at risk.     Ask your doctor if you should have a mammogram.      Have a colonoscopy (test for colon cancer) if someone in your family has had colon cancer or polyps before age 50.       Have a cholesterol test every 5 years.       Have a diabetes test (fasting glucose) after age 45. If you are at risk for diabetes, you should have this test every 3 years.    Shots: Get a flu shot each year. Get a tetanus shot every 10 years.     Nutrition:     Eat at least 5 servings of fruits and vegetables each day.    Eat whole-grain bread, whole-wheat pasta and brown rice instead of white grains and rice.    Get adequate Calcium and Vitamin D.      Lifestyle    Exercise at least 150 minutes a week (an average of 30 minutes a day, 5 days a week). This will help you control your weight and prevent disease.    Limit alcohol to one drink per day.    No smoking.      Wear sunscreen to prevent skin cancer.    See your dentist every six months for an exam and cleaning.          Follow-ups after your visit        Your next 10 appointments already scheduled     Oct 24, 2018  9:00 AM CDT   (Arrive by 8:45 AM)   MA SCREENING BILATERAL W/ LOGAN with HCMA1   [facility] ([facility] )    [address] [place] [address]   [phone]           How do I prepare for my exam? (Food and drink instructions) No Food and Drink Restrictions.  How do I prepare for my exam? (Other instructions) Do not use any powder, lotion or deodorant under your arms or on your breast. If you do, we will ask you to remove it before your exam.  What should I wear: Wear comfortable, two-piece clothing.  How long does the exam take: Most scans will take 15 minutes.  What should I bring: Bring any previous mammograms from other facilities or have them mailed to the breast center.  Do I need a :  No  is needed.  What do I need to tell my doctor: If you have any allergies, tell your care team.  What should I do after the exam: No restrictions, You may resume normal activities.  What is this test: This test is an x-ray of the breast to look for breast disease. The breast is pressed between two plates to flatten and spread the tissue. An X-ray is taken of the breast from different angles.  Who should I call with questions: If you have any questions, please call the Imaging Department where you will have your exam. Directions, parking instructions, and other information is available on our website, [email or web address].  Other information about my exam Three-dimensional (3D) mammograms are available at [place] locations in [facility], [facility], [place], and [place]. [facility] locations include [place] and the [facility] in [place].  Benefits of 3D mammograms include: * Improved rate of cancer  "detection * Decreases your chance of having to go back for more tests, which means fewer: * \"False-positive\" results (This means that there is an abnormal area but it isn't cancer.) * Invasive testing procedures, such as a biopsy or surgery * Can provide clearer images of the breast if you have dense breast tissue.  *3D mammography is an optional exam that anyone can have with a 2D mammogram. It doesn't replace or take the place of a 2D mammogram. 2D mammograms remain an effective screening test for all women.  Not all insurance companies cover the cost of a 3D mammogram. Check with your insurance.              Who to contact     If you have questions or need follow up information about today's clinic visit or your schedule please contact [facility] directly at [phone].  Normal or non-critical lab and imaging results will be communicated to you by [employer], letter or phone within 4 business days after the clinic has received the results. If you do not hear from us within 7 days, please contact the clinic through [employer] or phone. If you have a critical or abnormal lab result, we will notify you by phone as soon as possible.  Submit refill requests through [employer] or call your pharmacy and they will forward the refill request to us. Please allow 3 business days for your refill to be completed.          Additional Information About Your Visit        [employer] Information     [employer] gives you secure access to your electronic health record. If you see a primary care provider, you can also send messages to your care team and make appointments. If you have questions, please call your primary care clinic.  If you do not have a primary care provider, please call [phone] and they will assist you.        Care EveryWhere ID     This is your Care EveryWhere ID. This could be used by other organizations to access your [facility] medical records  [id number]        Your Vitals Were     Pulse " "Temperature Respirations Height Last Period Pulse Oximetry    72 97.1  F (36.2  C) (Tympanic) 16 5' 5\" (1.651 m) 04/10/2018 (LMP Unknown) 98%    BMI (Body Mass Index)                   28.46 kg/m2            Blood Pressure from Last 3 Encounters:   10/22/18 124/70   05/31/18 116/78   05/03/18 118/71    Weight from Last 3 Encounters:   10/22/18 171 lb (77.6 kg)   05/31/18 165 lb (74.8 kg)   05/02/18 165 lb 6.4 oz (75 kg)              We Performed the Following     Basic metabolic panel     CBC with platelets     Lipid Profile     TSH with free T4 reflex        Primary Care Provider Office Phone # Fax #    Mary Ellen TOCasimiro Armstrong [phone] [facility] [address]        Equal Access to Services     [facility]: Hadii aad ku hadasho Soomaali, waaxda luqadaha, qaybta kaalmada adeegyada, waxay serain hayaan diana osullivan . So [facility] [phone].    ATENCIÓN: Si habla español, tiene a wadsworth disposición servicios gratuitos de asistencia lingüística. Awaiselsie al [phone].    We comply with applicable federal civil rights laws and [place] laws. We do not discriminate on the basis of race, color, national origin, age, disability, sex, sexual orientation, or gender identity.            Thank you!     Thank you for choosing [facility]  for your care. Our goal is always to provide you with excellent care. Hearing back from our patients is one way we can continue to improve our services. Please take a few minutes to complete the written survey that you may receive in the mail after your visit with us. Thank you!             Your Updated Medication List - Protect others around you: Learn how to safely use, store and throw away your medicines at www.disposemymeds.org.          This list is accurate as of 10/22/18  2:13 PM.  Always use your most recent med list.                   Brand Name Dispense Instructions for use Diagnosis    calcium " carbonate 500 mg (elemental) 500 MG tablet    OS-MANUEL          IRON SUPPLEMENT PO      Take 325 mg by mouth daily (with breakfast) 2 tabs daily        Multi-vitamin Tabs tablet      Take 1 tablet by mouth daily        VITAMIN B 12 PO           VITAMIN D (CHOLECALCIFEROL) PO      Take 5,000 Units by mouth daily 2 tabs daily

## 2018-10-23 ASSESSMENT — PATIENT HEALTH QUESTIONNAIRE - PHQ9: SUM OF ALL RESPONSES TO PHQ QUESTIONS 1-9: 1

## 2018-10-23 ASSESSMENT — ANXIETY QUESTIONNAIRES: GAD7 TOTAL SCORE: 0

## 2018-10-24 ENCOUNTER — RADIANT APPOINTMENT (OUTPATIENT)
Dept: MAMMOGRAPHY | Facility: OTHER | Age: 48
End: 2018-10-24
Attending: NURSE PRACTITIONER
Payer: COMMERCIAL

## 2018-10-24 DIAGNOSIS — Z12.31 VISIT FOR SCREENING MAMMOGRAM: ICD-10-CM

## 2018-10-24 PROCEDURE — 77063 BREAST TOMOSYNTHESIS BI: CPT | Mod: TC

## 2018-10-24 PROCEDURE — 77067 SCR MAMMO BI INCL CAD: CPT | Mod: TC

## 2018-12-26 ENCOUNTER — MYC MEDICAL ADVICE (OUTPATIENT)
Dept: FAMILY MEDICINE | Facility: OTHER | Age: 48
End: 2018-12-26

## 2019-01-07 ENCOUNTER — OFFICE VISIT (OUTPATIENT)
Dept: FAMILY MEDICINE | Facility: OTHER | Age: 49
End: 2019-01-07
Attending: NURSE PRACTITIONER
Payer: COMMERCIAL

## 2019-01-07 VITALS
OXYGEN SATURATION: 99 % | DIASTOLIC BLOOD PRESSURE: 76 MMHG | TEMPERATURE: 96.2 F | WEIGHT: 173 LBS | HEIGHT: 65 IN | RESPIRATION RATE: 14 BRPM | BODY MASS INDEX: 28.82 KG/M2 | HEART RATE: 80 BPM | SYSTOLIC BLOOD PRESSURE: 124 MMHG

## 2019-01-07 DIAGNOSIS — Z01.818 PREOP GENERAL PHYSICAL EXAM: Primary | ICD-10-CM

## 2019-01-07 DIAGNOSIS — Z98.84 H/O GASTRIC BYPASS: ICD-10-CM

## 2019-01-07 DIAGNOSIS — M25.551 HIP PAIN, RIGHT: ICD-10-CM

## 2019-01-07 DIAGNOSIS — S73.191D TEAR OF RIGHT ACETABULAR LABRUM, SUBSEQUENT ENCOUNTER: ICD-10-CM

## 2019-01-07 LAB
ALBUMIN SERPL-MCNC: 4 G/DL (ref 3.4–5)
ALP SERPL-CCNC: 96 U/L (ref 40–150)
ALT SERPL W P-5'-P-CCNC: 21 U/L (ref 0–50)
ANION GAP SERPL CALCULATED.3IONS-SCNC: 6 MMOL/L (ref 3–14)
AST SERPL W P-5'-P-CCNC: 16 U/L (ref 0–45)
BASOPHILS # BLD AUTO: 0 10E9/L (ref 0–0.2)
BASOPHILS NFR BLD AUTO: 0.5 %
BILIRUB SERPL-MCNC: 0.6 MG/DL (ref 0.2–1.3)
BUN SERPL-MCNC: 15 MG/DL (ref 7–30)
CALCIUM SERPL-MCNC: 9 MG/DL (ref 8.5–10.1)
CHLORIDE SERPL-SCNC: 106 MMOL/L (ref 94–109)
CO2 SERPL-SCNC: 27 MMOL/L (ref 20–32)
CREAT SERPL-MCNC: 0.68 MG/DL (ref 0.52–1.04)
DIFFERENTIAL METHOD BLD: NORMAL
EOSINOPHIL # BLD AUTO: 0.1 10E9/L (ref 0–0.7)
EOSINOPHIL NFR BLD AUTO: 1 %
ERYTHROCYTE [DISTWIDTH] IN BLOOD BY AUTOMATED COUNT: 13.2 % (ref 10–15)
GFR SERPL CREATININE-BSD FRML MDRD: >90 ML/MIN/{1.73_M2}
GLUCOSE SERPL-MCNC: 89 MG/DL (ref 70–99)
HCT VFR BLD AUTO: 38.8 % (ref 35–47)
HGB BLD-MCNC: 12.6 G/DL (ref 11.7–15.7)
LYMPHOCYTES # BLD AUTO: 2.1 10E9/L (ref 0.8–5.3)
LYMPHOCYTES NFR BLD AUTO: 34.7 %
MCH RBC QN AUTO: 28 PG (ref 26.5–33)
MCHC RBC AUTO-ENTMCNC: 32.5 G/DL (ref 31.5–36.5)
MCV RBC AUTO: 86 FL (ref 78–100)
MONOCYTES # BLD AUTO: 0.5 10E9/L (ref 0–1.3)
MONOCYTES NFR BLD AUTO: 8.8 %
NEUTROPHILS # BLD AUTO: 3.3 10E9/L (ref 1.6–8.3)
NEUTROPHILS NFR BLD AUTO: 55 %
PLATELET # BLD AUTO: 321 10E9/L (ref 150–450)
POTASSIUM SERPL-SCNC: 4.2 MMOL/L (ref 3.4–5.3)
PROT SERPL-MCNC: 7.6 G/DL (ref 6.8–8.8)
RBC # BLD AUTO: 4.5 10E12/L (ref 3.8–5.2)
SODIUM SERPL-SCNC: 139 MMOL/L (ref 133–144)
WBC # BLD AUTO: 6 10E9/L (ref 4–11)

## 2019-01-07 PROCEDURE — 93000 ELECTROCARDIOGRAM COMPLETE: CPT | Performed by: INTERNAL MEDICINE

## 2019-01-07 PROCEDURE — 85025 COMPLETE CBC W/AUTO DIFF WBC: CPT | Performed by: NURSE PRACTITIONER

## 2019-01-07 PROCEDURE — 80053 COMPREHEN METABOLIC PANEL: CPT | Performed by: NURSE PRACTITIONER

## 2019-01-07 PROCEDURE — 36415 COLL VENOUS BLD VENIPUNCTURE: CPT | Performed by: NURSE PRACTITIONER

## 2019-01-07 PROCEDURE — 99214 OFFICE O/P EST MOD 30 MIN: CPT | Performed by: NURSE PRACTITIONER

## 2019-01-07 ASSESSMENT — ANXIETY QUESTIONNAIRES
2. NOT BEING ABLE TO STOP OR CONTROL WORRYING: NOT AT ALL
3. WORRYING TOO MUCH ABOUT DIFFERENT THINGS: NOT AT ALL
6. BECOMING EASILY ANNOYED OR IRRITABLE: NOT AT ALL
GAD7 TOTAL SCORE: 0
1. FEELING NERVOUS, ANXIOUS, OR ON EDGE: NOT AT ALL
7. FEELING AFRAID AS IF SOMETHING AWFUL MIGHT HAPPEN: NOT AT ALL
5. BEING SO RESTLESS THAT IT IS HARD TO SIT STILL: NOT AT ALL

## 2019-01-07 ASSESSMENT — PATIENT HEALTH QUESTIONNAIRE - PHQ9
SUM OF ALL RESPONSES TO PHQ QUESTIONS 1-9: 0
5. POOR APPETITE OR OVEREATING: NOT AT ALL

## 2019-01-07 ASSESSMENT — PAIN SCALES - GENERAL: PAINLEVEL: NO PAIN (0)

## 2019-01-07 ASSESSMENT — MIFFLIN-ST. JEOR: SCORE: 1415.6

## 2019-01-07 NOTE — PATIENT INSTRUCTIONS
1. Preop general physical exam  - Comprehensive metabolic panel  - CBC with platelets and differential  - EKG 12-lead complete w/read - (Clinic Performed)    2. Hip pain, right    3. Tear of right acetabular labrum, subsequent encounter    4. H/O gastric bypass    Before Your Surgery      Call your surgeon if there is any change in your health. This includes signs of a cold or flu (such as a sore throat, runny nose, cough, rash or fever).    Do not smoke, drink alcohol or take over the counter medicine (unless your surgeon or primary care doctor tells you to) for the 24 hours before and after surgery.    If you take prescribed drugs: Follow your doctor s orders about which medicines to take and which to stop until after surgery.    Eating and drinking prior to surgery: follow the instructions from your surgeon    Take a shower or bath the night before surgery. Use the soap your surgeon gave you to gently clean your skin. If you do not have soap from your surgeon, use your regular soap. Do not shave or scrub the surgery site.  Wear clean pajamas and have clean sheets on your bed.

## 2019-01-07 NOTE — NURSING NOTE
"No chief complaint on file.      Initial /76 (BP Location: Left arm, Patient Position: Sitting, Cuff Size: Adult Regular)   Pulse 80   Temp 96.2  F (35.7  C) (Tympanic)   Resp 14   Ht 1.651 m (5' 5\")   Wt 78.5 kg (173 lb)   LMP 04/10/2018 (LMP Unknown)   SpO2 99%   BMI 28.79 kg/m   Estimated body mass index is 28.79 kg/m  as calculated from the following:    Height as of this encounter: 1.651 m (5' 5\").    Weight as of this encounter: 78.5 kg (173 lb).  Medication Reconciliation: complete    Traci Wiley LPN      "

## 2019-01-07 NOTE — PROGRESS NOTES
[facility]  [address]  [place] [address]  [phone]  Dept: [phone]    PRE-OP EVALUATION:  Today's date: 2019    Shazia Verma (: 1970) presents for pre-operative evaluation assessment as requested by Dr. Mello.  She requires evaluation and anesthesia risk assessment prior to undergoing surgery/procedure for treatment of Right Hip Replacement .    Proposed Surgery/ Procedure: Right Hip Replacement  Date of Surgery/ Procedure: 19  Time of Surgery/ Procedure: [facility]  Hospital/Surgical Facility: [facility] [place], [place]    Primary Physician: Mary Ellen Armstrong  Type of Anesthesia Anticipated: to be determined    Patient has a Health Care Directive or Living Will:  NO    1. NO - Do you have a history of heart attack, stroke, stent, bypass or surgery on an artery in the head, neck, heart or legs?  2. NO - Do you ever have any pain or discomfort in your chest?  3. NO - Do you have a history of  Heart Failure?  4. NO - Are you troubled by shortness of breath when: walking on the level, up a slight hill or at night?  5. NO - Do you currently have a cold, bronchitis or other respiratory infection?  6. NO - Do you have a cough, shortness of breath or wheezing?  7. NO - Do you sometimes get pains in the calves of your legs when you walk?  8. YES - Do you or anyone in your family have previous history of blood clots? Father  9. NO - Do you or does anyone in your family have a serious bleeding problem such as prolonged bleeding following surgeries or cuts?  10. YES - Have you ever had problems with anemia or been told to take iron pills?Patient takes iron pills due to history of gastric bypass.    11. NO - Have you had any abnormal blood loss such as black, tarry or bloody stools, or abnormal vaginal bleeding?  12. NO - Have you ever had a blood transfusion?  13. NO - Have you or any of your relatives ever had problems with anesthesia?  14. NO - Do you  have sleep apnea, excessive snoring or daytime drowsiness?  15. NO - Do you have any prosthetic heart valves?  16. NO - Do you have prosthetic joints?  17. NO - Is there any chance that you may be pregnant?      HPI:     HPI related to upcoming procedure: Longstanding right hip pain.  MRI was completed by ortho - I do not have results available for review.  The decision has been made to proceed with surgical correction as pain has not been controlled with conservative measures.      She is otherwise healthy and does not have any new concerns today.       History of gastric bypass.     MEDICAL HISTORY:     Patient Active Problem List    Diagnosis Date Noted     S/P hysterectomy 05/02/2018     Priority: Medium     Hypoglycemia 09/08/2017     Priority: Medium     ACP (advance care planning) 11/28/2016     Priority: Medium     Advance Care Planning 11/28/2016: ACP Review of Chart / Resources Provided:  Reviewed chart for advance care plan.  Shazia Verma has been provided information and resources to begin or update their advance care plan.  Added by WOODY LENNON             H/O gastric bypass 11/28/2016     Priority: Medium     Had type 2 diabetes, hyperlipidemia and hypertension prior to surgery.         No past medical history on file.  Past Surgical History:   Procedure Laterality Date     ABDOMEN SURGERY  2012    gastric bypass     APPENDECTOMY  1991     BREAST SURGERY  2002    right bx     COLONOSCOPY  2013     GI SURGERY  2012    gastric by pass     GYN SURGERY  1992 1994    c section x 2     LAPAROSCOPIC HYSTERECTOMY SUPRACERVICAL N/A 5/2/2018    Procedure: LAPAROSCOPIC HYSTERECTOMY SUPRACERVICAL;  LAPAROSCOPIC SUPRACERVICAL HYSTERECTOMY, BILATEARL SALPINGECTOMY, LYSIS OF ADHESIONS;  Surgeon: Jean Marie Garvin MD;  Location: [facility] OR     ORTHOPEDIC SURGERY  2015    right hip labreal tear     ORTHOPEDIC SURGERY  2014    left wrist      SALPINGECTOMY Bilateral 5/2/2018    Procedure: SALPINGECTOMY;;  Surgeon: Bharathi  "Jean Marie TO MD;  Location: [facility] OR     Current Outpatient Medications   Medication Sig Dispense Refill     calcium carbonate (OS-MANUEL 500 MG [place]) 500 MG tablet        Cyanocobalamin (VITAMIN B 12 PO)        Ferrous Sulfate (IRON SUPPLEMENT PO) Take 325 mg by mouth daily (with breakfast) 2 tabs daily       multivitamin, therapeutic with minerals (MULTI-VITAMIN) TABS Take 1 tablet by mouth daily       VITAMIN D, CHOLECALCIFEROL, PO Take 5,000 Units by mouth daily 2 tabs daily       OTC products: None, except as noted above, no recent use of OTC ASA, NSAIDS or Steroids and no use of herbal medications or other supplements    Allergies   Allergen Reactions     Reglan [Metoclopramide] Anxiety      Latex Allergy: NO    Social History     Tobacco Use     Smoking status: Former Smoker     Smokeless tobacco: Never Used   Substance Use Topics     Alcohol use: Yes     Comment: occ     History   Drug Use No       REVIEW OF SYSTEMS:   CONSTITUTIONAL: NEGATIVE for fever, chills, change in weight  INTEGUMENTARY/SKIN: NEGATIVE for worrisome rashes, moles or lesions  EYES: NEGATIVE for vision changes or irritation  ENT/MOUTH: NEGATIVE for ear, mouth and throat problems  RESP: NEGATIVE for significant cough or SOB  BREAST: NEGATIVE for masses, tenderness or discharge  CV: NEGATIVE for chest pain, palpitations or peripheral edema  GI: NEGATIVE for nausea, abdominal pain, heartburn, or change in bowel habits  : NEGATIVE for frequency, dysuria, or hematuria  MUSCULOSKELETAL:right hip pain  NEURO: NEGATIVE for weakness, dizziness or paresthesias  ENDOCRINE: NEGATIVE for temperature intolerance, skin/hair changes  HEME: NEGATIVE for bleeding problems  PSYCHIATRIC: NEGATIVE for changes in mood or affect    EXAM:   /76 (BP Location: Left arm, Patient Position: Sitting, Cuff Size: Adult Regular)   Pulse 80   Temp 96.2  F (35.7  C) (Tympanic)   Resp 14   Ht 1.651 m (5' 5\")   Wt 78.5 kg (173 lb)   LMP 04/10/2018 (LMP Unknown)   " SpO2 99%   BMI 28.79 kg/m      GENERAL APPEARANCE: healthy, alert and no distress     EYES: EOMI, PERRL     HENT: ear canals and TM's normal and nose and mouth without ulcers or lesions     NECK: no adenopathy, thyroid normal to palpation, trachea midline and normal to palpation and no carotid bruit     RESP: lungs clear to auscultation - no rales, rhonchi or wheezes     CV: regular rates and rhythm, normal S1 S2, no S3 or S4 and no murmur, click or rub     ABDOMEN:  soft, nontender, no HSM or masses and bowel sounds normal     MS: extremities normal- no gross deformities noted, no evidence of inflammation in joints, FROM in all extremities.     SKIN: no suspicious lesions or rashes     NEURO: Normal strength and tone, sensory exam grossly normal, mentation intact and speech normal     PSYCH: mentation appears normal. and affect normal/bright    DIAGNOSTICS:     Results for orders placed or performed in visit on 01/07/19   Comprehensive metabolic panel   Result Value Ref Range    Sodium 139 133 - 144 mmol/L    Potassium 4.2 3.4 - 5.3 mmol/L    Chloride 106 94 - 109 mmol/L    Carbon Dioxide 27 20 - 32 mmol/L    Anion Gap 6 3 - 14 mmol/L    Glucose 89 70 - 99 mg/dL    Urea Nitrogen 15 7 - 30 mg/dL    Creatinine 0.68 0.52 - 1.04 mg/dL    GFR Estimate >90 >60 mL/min/[1.73_m2]    GFR Estimate If Black >90 >60 mL/min/[1.73_m2]    Calcium 9.0 8.5 - 10.1 mg/dL    Bilirubin Total 0.6 0.2 - 1.3 mg/dL    Albumin 4.0 3.4 - 5.0 g/dL    Protein Total 7.6 6.8 - 8.8 g/dL    Alkaline Phosphatase 96 40 - 150 U/L    ALT 21 0 - 50 U/L    AST 16 0 - 45 U/L   CBC with platelets and differential   Result Value Ref Range    WBC 6.0 4.0 - 11.0 10e9/L    RBC Count 4.50 3.8 - 5.2 10e12/L    Hemoglobin 12.6 11.7 - 15.7 g/dL    Hematocrit 38.8 35.0 - 47.0 %    MCV 86 78 - 100 fl    MCH 28.0 26.5 - 33.0 pg    MCHC 32.5 31.5 - 36.5 g/dL    RDW 13.2 10.0 - 15.0 %    Platelet Count 321 150 - 450 10e9/L    % Neutrophils 55.0 %    % Lymphocytes 34.7  %    % Monocytes 8.8 %    % Eosinophils 1.0 %    % Basophils 0.5 %    Absolute Neutrophil 3.3 1.6 - 8.3 10e9/L    Absolute Lymphocytes 2.1 0.8 - 5.3 10e9/L    Absolute Monocytes 0.5 0.0 - 1.3 10e9/L    Absolute Eosinophils 0.1 0.0 - 0.7 10e9/L    Absolute Basophils 0.0 0.0 - 0.2 10e9/L    Diff Method Automated Method          Recent Labs   Lab Test 10/22/18  1414 05/03/18  0525  09/01/17  1154  11/28/16  1048   HGB 12.6 10.3*   < > 11.4*   < > 12.7    245   < > 299   < > 284    138   < > 139   < >  --    POTASSIUM 3.6 4.3   < > 4.1   < >  --    CR 0.68 0.56   < > 0.57   < >  --    A1C  --   --   --  5.7  --  5.2    < > = values in this interval not displayed.           EKG Interpretation:      Interpreted by Mary Ellen Armstrong    Symptoms at time of EKG: None   Rhythm: Normal sinus   Rate: Normal  Axis: Normal  Ectopy: None  Conduction: Normal  ST Segments/ T Waves: No ST-T wave changes and No acute ischemic changes  Q Waves: None  Comparison to prior: Unchanged from 4/26/2018    Clinical Impression: normal EKG      IMPRESSION:   Reason for surgery/procedure: right hip pain  Diagnosis/reason for consult: anesthesia risk assessment     The proposed surgical procedure is considered INTERMEDIATE risk.    REVISED CARDIAC RISK INDEX  The patient has the following serious cardiovascular risks for perioperative complications such as (MI, PE, VFib and 3  AV Block):  No serious cardiac risks  INTERPRETATION: 0 risks: Class I (very low risk - 0.4% complication rate)    The patient has the following additional risks for perioperative complications:  No identified additional risks  The 10-year ASCVD risk score (Rosmerystephanie DORMAN Jr., et al., 2013) is: 2.1%    Values used to calculate the score:      Age: 48 years      Sex: Female      Is Non- : No      Diabetic: Yes      Tobacco smoker: No      Systolic Blood Pressure: 124 mmHg      Is BP treated: No      HDL Cholesterol: 58 mg/dL      Total  Cholesterol: 225 mg/dL      ICD-10-CM    1. Preop general physical exam Z01.818 Comprehensive metabolic panel     CBC with platelets and differential     EKG 12-lead complete w/read - (Clinic Performed)   2. Hip pain, right M25.551    3. Tear of right acetabular labrum, subsequent encounter S73.191D    4. H/O gastric bypass Z98.84        RECOMMENDATIONS:       Cardiovascular Risk  Performs 4 METs exercise without symptoms (Climb a flight of stairs and Walk on level ground at 15 minutes per mile (4 miles/hour)) .       --Patient is to HOLD all scheduled medications on the day of surgery EXCEPT for modifications listed below.    APPROVAL GIVEN to proceed with proposed procedure, without further diagnostic evaluation       Signed Electronically by: Mary Ellen Armstrong NP    Copy of this evaluation report is provided to requesting physician.    [facility] Preop Guidelines    Revised Cardiac Risk Index

## 2019-01-08 NOTE — PROGRESS NOTES
Faxed PreOp ECG and labs to Dr. Ross, [facility]  Ph.  [phone]  Fx. [phone]

## 2019-01-09 ASSESSMENT — ANXIETY QUESTIONNAIRES: GAD7 TOTAL SCORE: 0

## 2019-05-01 ENCOUNTER — OFFICE VISIT (OUTPATIENT)
Dept: FAMILY MEDICINE | Facility: OTHER | Age: 49
End: 2019-05-01
Attending: NURSE PRACTITIONER
Payer: COMMERCIAL

## 2019-05-01 VITALS
TEMPERATURE: 98.6 F | BODY MASS INDEX: 30.24 KG/M2 | RESPIRATION RATE: 18 BRPM | WEIGHT: 181.5 LBS | SYSTOLIC BLOOD PRESSURE: 116 MMHG | HEART RATE: 104 BPM | HEIGHT: 65 IN | DIASTOLIC BLOOD PRESSURE: 76 MMHG | OXYGEN SATURATION: 98 %

## 2019-05-01 DIAGNOSIS — J06.9 VIRAL UPPER RESPIRATORY INFECTION: Primary | ICD-10-CM

## 2019-05-01 PROCEDURE — 99213 OFFICE O/P EST LOW 20 MIN: CPT | Performed by: NURSE PRACTITIONER

## 2019-05-01 ASSESSMENT — PAIN SCALES - GENERAL: PAINLEVEL: SEVERE PAIN (6)

## 2019-05-01 ASSESSMENT — MIFFLIN-ST. JEOR: SCORE: 1454.16

## 2019-05-01 NOTE — NURSING NOTE
"Chief Complaint   Patient presents with     Sinus Problem       Initial /76 (BP Location: Right arm, Patient Position: Chair, Cuff Size: Adult Large)   Pulse 104   Temp 98.6  F (37  C) (Tympanic)   Resp 18   Ht 1.651 m (5' 5\")   Wt 82.3 kg (181 lb 8 oz)   LMP 04/10/2018 (LMP Unknown)   SpO2 98%   BMI 30.20 kg/m   Estimated body mass index is 30.2 kg/m  as calculated from the following:    Height as of this encounter: 1.651 m (5' 5\").    Weight as of this encounter: 82.3 kg (181 lb 8 oz).  Medication Reconciliation: complete    Pamela M. Lechevalier, LPN    "

## 2019-05-01 NOTE — PROGRESS NOTES
SUBJECTIVE:   Shazia Verma is a 48 year old female who presents to clinic today for the following   health issues:        Acute Illness   Acute illness concerns: sinus pressure   Onset: Monday     Fever: no     Chills/Sweats: YES    Headache (location?): YES    Sinus Pressure:YES    Conjunctivitis:  YES: bilateral    Ear Pain: YES: bilateral    Rhinorrhea: YES    Congestion: YES    Sore Throat: no      Cough: YES-non-productive    Wheeze: no     Decreased Appetite: YES    Nausea: no     Vomiting: no     Diarrhea:  no     Dysuria/Freq.: no     Fatigue/Achiness: YES    Sick/Strep Exposure: no      Therapies Tried and outcome: sudafed Dayquil       Additional history: as documented    Reviewed  and updated as needed this visit by clinical staff  Tobacco  Allergies  Meds         Reviewed and updated as needed this visit by Provider         Patient Active Problem List   Diagnosis     ACP (advance care planning)     H/O gastric bypass     Hypoglycemia     S/P hysterectomy     Past Surgical History:   Procedure Laterality Date     ABDOMEN SURGERY  2012    gastric bypass     APPENDECTOMY  1991     BREAST SURGERY  2002    right bx     COLONOSCOPY  2013     GI SURGERY  2012    gastric by pass     GYN SURGERY  1992 1994    c section x 2     LAPAROSCOPIC HYSTERECTOMY SUPRACERVICAL N/A 5/2/2018    Procedure: LAPAROSCOPIC HYSTERECTOMY SUPRACERVICAL;  LAPAROSCOPIC SUPRACERVICAL HYSTERECTOMY, BILATEARL SALPINGECTOMY, LYSIS OF ADHESIONS;  Surgeon: Jean Marie Garvin MD;  Location: [facility]     ORTHOPEDIC SURGERY  2015    right hip labreal tear     ORTHOPEDIC SURGERY  2014    left wrist      SALPINGECTOMY Bilateral 5/2/2018    Procedure: SALPINGECTOMY;;  Surgeon: Jean Marie Garvin MD;  Location: [facility]       Social History     Tobacco Use     Smoking status: Former Smoker     Smokeless tobacco: Never Used   Substance Use Topics     Alcohol use: Yes     Comment: occ     Family History   Problem Relation Age of Onset     Cerebrovascular  Disease Mother      Hyperlipidemia Mother      Hypertension Mother      Coronary Artery Disease Mother      Migraines Mother      Obesity Mother      Osteoarthritis Mother      Osteoporosis Mother      Diabetes Mother      Hyperlipidemia Father      Hypertension Father      Thyroid Disease Father      Hypertension Brother      Hyperlipidemia Brother      Obesity Brother      Hypertension Sister      Thyroid Disease Sister          Current Outpatient Medications   Medication Sig Dispense Refill     calcium carbonate (OS-MANUEL 500 MG [place]. [place]) 500 MG tablet        Cyanocobalamin (VITAMIN B 12 PO)        Ferrous Sulfate (IRON SUPPLEMENT PO) Take 325 mg by mouth daily (with breakfast) 2 tabs daily       multivitamin, therapeutic with minerals (MULTI-VITAMIN) TABS Take 1 tablet by mouth daily       VITAMIN D, CHOLECALCIFEROL, PO Take 5,000 Units by mouth daily 2 tabs daily       Allergies   Allergen Reactions     Reglan [Metoclopramide] Anxiety     Recent Labs   Lab Test 01/07/19  1355 10/22/18  1414  04/26/18  0930 03/13/18  1853  09/01/17  1154  11/28/16  1048   A1C  --   --   --   --   --   --  5.7  --  5.2   LDL  --  122*  --   --   --   --   --   --   --    HDL  --  58  --   --   --   --   --   --   --    TRIG  --  227*  --   --   --   --   --   --   --    ALT 21  --   --  20 22   < >  --    < >  --    CR 0.68 0.68   < > 0.58 0.63   < > 0.57   < >  --    GFRESTIMATED >90 >90   < > >90 >90   < > >90   < >  --    GFRESTBLACK >90 >90   < > >90 >90   < > >90   < >  --    POTASSIUM 4.2 3.6   < > 4.0 4.1   < > 4.1   < >  --    TSH  --  1.06  --   --   --   --  2.16   < >  --     < > = values in this interval not displayed.      BP Readings from Last 3 Encounters:   05/01/19 116/76   01/07/19 124/76   10/22/18 124/70    Wt Readings from Last 3 Encounters:   05/01/19 82.3 kg (181 lb 8 oz)   01/07/19 78.5 kg (173 lb)   10/22/18 77.6 kg (171 lb)                 ROS:  Constitutional, HEENT, cardiovascular, pulmonary, gi and gu  "systems are negative, except as otherwise noted.    OBJECTIVE:     /76 (BP Location: Right arm, Patient Position: Chair, Cuff Size: Adult Large)   Pulse 104   Temp 98.6  F (37  C) (Tympanic)   Resp 18   Ht 1.651 m (5' 5\")   Wt 82.3 kg (181 lb 8 oz)   LMP 04/10/2018 (LMP Unknown)   SpO2 98%   BMI 30.20 kg/m    Body mass index is 30.2 kg/m .  GENERAL: healthy, alert and no distress  HENT: normal cephalic/atraumatic, both ears: dull, nose and mouth without ulcers or lesions, nasal mucosa edematous , rhinorrhea clear and oral mucous membranes moist, throat with clear post nasal drainage - no exudate or erythema noted.   NECK: no adenopathy, no asymmetry, masses, or scars and thyroid normal to palpation  RESP: lungs clear to auscultation - no rales, rhonchi or wheezes  CV: regular rate and rhythm, normal S1 S2, no S3 or S4, no murmur, click or rub, no peripheral edema and peripheral pulses strong  MS: no gross musculoskeletal defects noted, no edema  PSYCH: mentation appears normal, affect normal/bright        ASSESSMENT/PLAN:       1. Viral upper respiratory infection  Symptomatic cares - zyrtec,flonase, dayquil per package directions      FUTURE APPOINTMENTS:       - Follow-up visit as needed     Mary Ellen Armstrong NP  [facility]      "

## 2019-05-01 NOTE — PATIENT INSTRUCTIONS
ASSESSMENT/PLAN:       1. Viral upper respiratory infection  Symptomatic cares - zyrtec,flonase, dayquil per package directions      FUTURE APPOINTMENTS:       - Follow-up visit as needed     Mary Ellen Armstrong NP  [facility]      Patient Education     Viral Upper Respiratory Illness (Adult)    You have a viral upper respiratory illness (URI), which is another term for the common cold. This illness is contagious during the first few days. It is spread through the air by coughing and sneezing. It may also be spread by direct contact (touching the sick person and then touching your own eyes, nose, or mouth). Frequent handwashing will decrease risk of spread. Most viral illnesses go away within 7 to 10 days with rest and simple home remedies. Sometimes the illness may last for several weeks. Antibiotics will not kill a virus, and they are generally not prescribed for this condition.  Home care    If symptoms are severe, rest at home for the first 2 to 3 days. When you resume activity, don't let yourself get too tired.    Don't smoke. If you need help stopping, talk with your healthcare provider.    Avoid being exposed to cigarette smoke (yours or others ).    You may use acetaminophen or ibuprofen to control pain and fever, unless another medicine was prescribed. If you have chronic liver or kidney disease, have ever had a stomach ulcer or gastrointestinal bleeding, or are taking blood-thinning medicines, talk with your healthcare provider before using these medicines. Aspirin should never be given to anyone under 18 years of age who is ill with a viral infection or fever. It may cause severe liver or brain damage.    Your appetite may be poor, so a light diet is fine. Stay well hydrated by drinking 6 to 8 glasses of fluids per day (water, soft drinks, juices, tea, or soup). Extra fluids will help loosen secretions in the nose and lungs.    Over-the-counter cold medicines will not shorten  the length of time you re sick, but they may be helpful for the following symptoms: cough, sore throat, and nasal and sinus congestion. If you take prescription medicines, ask your healthcare provider or pharmacist which over-the-counter medicines are safe to use. (Note: Don't use decongestants if you have high blood pressure.)  Follow-up care  Follow up with your healthcare provider, or as advised.  When to seek medical advice  Call your healthcare provider right away if any of these occur:    Cough with lots of colored sputum (mucus)    Severe headache; face, neck, or ear pain    Difficulty swallowing due to throat pain    Fever of 100.4 F (38 C) or higher, or as directed by your healthcare provider  Call 911  Call 911 if any of these occur:    Chest pain, shortness of breath, wheezing, or difficulty breathing    Coughing up blood    Very severe pain with swallowing, especially if it goes along with a muffled voice   Date Last Reviewed: 6/1/2018 2000-2018 [employer]. [address] [place] [address], [place] [address]. All rights reserved. This information is not intended as a substitute for professional medical care. Always follow your healthcare professional's instructions.

## 2019-06-19 ENCOUNTER — OFFICE VISIT (OUTPATIENT)
Dept: FAMILY MEDICINE | Facility: OTHER | Age: 49
End: 2019-06-19
Attending: NURSE PRACTITIONER
Payer: COMMERCIAL

## 2019-06-19 VITALS
WEIGHT: 185 LBS | OXYGEN SATURATION: 99 % | HEIGHT: 65 IN | HEART RATE: 88 BPM | DIASTOLIC BLOOD PRESSURE: 82 MMHG | BODY MASS INDEX: 30.82 KG/M2 | SYSTOLIC BLOOD PRESSURE: 122 MMHG | TEMPERATURE: 96.4 F | RESPIRATION RATE: 14 BRPM

## 2019-06-19 DIAGNOSIS — R42 DIZZINESS: Primary | ICD-10-CM

## 2019-06-19 DIAGNOSIS — R51.9 NONINTRACTABLE EPISODIC HEADACHE, UNSPECIFIED HEADACHE TYPE: ICD-10-CM

## 2019-06-19 DIAGNOSIS — R47.01 APHASIA: ICD-10-CM

## 2019-06-19 DIAGNOSIS — Z98.84 H/O GASTRIC BYPASS: ICD-10-CM

## 2019-06-19 LAB
ALBUMIN SERPL-MCNC: 3.8 G/DL (ref 3.4–5)
ALP SERPL-CCNC: 111 U/L (ref 40–150)
ALT SERPL W P-5'-P-CCNC: 26 U/L (ref 0–50)
ANION GAP SERPL CALCULATED.3IONS-SCNC: 9 MMOL/L (ref 3–14)
AST SERPL W P-5'-P-CCNC: 19 U/L (ref 0–45)
BILIRUB SERPL-MCNC: 0.5 MG/DL (ref 0.2–1.3)
BUN SERPL-MCNC: 16 MG/DL (ref 7–30)
CALCIUM SERPL-MCNC: 9.1 MG/DL (ref 8.5–10.1)
CHLORIDE SERPL-SCNC: 105 MMOL/L (ref 94–109)
CO2 SERPL-SCNC: 26 MMOL/L (ref 20–32)
CREAT SERPL-MCNC: 0.6 MG/DL (ref 0.52–1.04)
ERYTHROCYTE [DISTWIDTH] IN BLOOD BY AUTOMATED COUNT: 16.6 % (ref 10–15)
GFR SERPL CREATININE-BSD FRML MDRD: >90 ML/MIN/{1.73_M2}
GLUCOSE SERPL-MCNC: 102 MG/DL (ref 70–99)
HCT VFR BLD AUTO: 37.3 % (ref 35–47)
HGB BLD-MCNC: 12.3 G/DL (ref 11.7–15.7)
MAGNESIUM SERPL-MCNC: 2 MG/DL (ref 1.6–2.3)
MCH RBC QN AUTO: 26.4 PG (ref 26.5–33)
MCHC RBC AUTO-ENTMCNC: 33 G/DL (ref 31.5–36.5)
MCV RBC AUTO: 80 FL (ref 78–100)
PLATELET # BLD AUTO: 326 10E9/L (ref 150–450)
POTASSIUM SERPL-SCNC: 3.7 MMOL/L (ref 3.4–5.3)
PROT SERPL-MCNC: 7.7 G/DL (ref 6.8–8.8)
RBC # BLD AUTO: 4.66 10E12/L (ref 3.8–5.2)
SODIUM SERPL-SCNC: 140 MMOL/L (ref 133–144)
TSH SERPL DL<=0.005 MIU/L-ACNC: 1.78 MU/L (ref 0.4–4)
WBC # BLD AUTO: 7 10E9/L (ref 4–11)

## 2019-06-19 PROCEDURE — 99214 OFFICE O/P EST MOD 30 MIN: CPT | Performed by: NURSE PRACTITIONER

## 2019-06-19 PROCEDURE — 84443 ASSAY THYROID STIM HORMONE: CPT | Performed by: NURSE PRACTITIONER

## 2019-06-19 PROCEDURE — 82607 VITAMIN B-12: CPT | Mod: 90 | Performed by: NURSE PRACTITIONER

## 2019-06-19 PROCEDURE — 99000 SPECIMEN HANDLING OFFICE-LAB: CPT | Performed by: NURSE PRACTITIONER

## 2019-06-19 PROCEDURE — 36415 COLL VENOUS BLD VENIPUNCTURE: CPT | Performed by: NURSE PRACTITIONER

## 2019-06-19 PROCEDURE — 80053 COMPREHEN METABOLIC PANEL: CPT | Performed by: NURSE PRACTITIONER

## 2019-06-19 PROCEDURE — 85027 COMPLETE CBC AUTOMATED: CPT | Performed by: NURSE PRACTITIONER

## 2019-06-19 PROCEDURE — 83735 ASSAY OF MAGNESIUM: CPT | Performed by: NURSE PRACTITIONER

## 2019-06-19 ASSESSMENT — PAIN SCALES - GENERAL: PAINLEVEL: NO PAIN (0)

## 2019-06-19 ASSESSMENT — MIFFLIN-ST. JEOR: SCORE: 1470.03

## 2019-06-19 NOTE — PROGRESS NOTES
"Subjective     Shazia Verma is a 48 year old female who presents to clinic today for the following health issues:    HPI   Dizziness      Duration: Saturday night/Sunday morning was the worst - initially noticed when she was turning over in bed - Sunday morning felt like eyes were \"all over inside her head\" - will start talking and is unable to verbalize what she is thinking at the time.     Description        Vision has been getting worse over the past couple of months - really has to try and            focus to see              Dizziness comes on regardless of position that she is in           She sometimes feels unsteady - grabs onto furniture to steady herself           No weakness  Feeling faint:  YES- a little light headed  Feeling like the surroundings are moving: YES  Loss of consciousness or falls: no   No loss of sensation or tremor  No dizziness with Valsalva maneuver  Negative Romberg test and [place] Hallpike test    Intensity:  moderate    Accompanying signs and symptoms:   Nausea/vomitting: no   Palpitations: no   Weakness in arms or legs: no   Vision or speech changes: no   Ringing in ears (Tinnitus): no   Hearing loss related to dizziness: no   Other (fevers/chills/sweating/dyspnea): YES- Cognitive function is off. Patient reports having difficulties putting thoughts into words    History (similar episodes/head trauma/previous evaluation/recent bleeding): None    Precipitating or alleviating factors (new meds/chemicals): None  Worse with activity/head movement: YES    Therapies tried and outcome: Rest and taking it easy    Migraines  Have increased in frequency and intensity the past couple of weeks  Excedrin migraine has helped in the past and it does not help relieve her headaches now  No light sensitivity    Bruises   In the past week she has been noticing more bruising to her extremities - does not think that she has bumped herself       Patient Active Problem List   Diagnosis     ACP (advance " care planning)     H/O gastric bypass     Hypoglycemia     S/P hysterectomy     Past Surgical History:   Procedure Laterality Date     ABDOMEN SURGERY  2012    gastric bypass     APPENDECTOMY  1991     BREAST SURGERY  2002    right bx     COLONOSCOPY  2013     GI SURGERY  2012    gastric by pass     GYN SURGERY  1992 1994    c section x 2     LAPAROSCOPIC HYSTERECTOMY SUPRACERVICAL N/A 5/2/2018    Procedure: LAPAROSCOPIC HYSTERECTOMY SUPRACERVICAL;  LAPAROSCOPIC SUPRACERVICAL HYSTERECTOMY, BILATEARL SALPINGECTOMY, LYSIS OF ADHESIONS;  Surgeon: Jean Marie Garvin MD;  [facility]     ORTHOPEDIC SURGERY  2015    right hip labreal tear     ORTHOPEDIC SURGERY  2014    left wrist      SALPINGECTOMY Bilateral 5/2/2018    Procedure: SALPINGECTOMY;;  Surgeon: Jean Marie Garvin MD;  Location: [facility]       Social History     Tobacco Use     Smoking status: Former Smoker     Smokeless tobacco: Never Used   Substance Use Topics     Alcohol use: Yes     Comment: occ     Family History   Problem Relation Age of Onset     Cerebrovascular Disease Mother      Hyperlipidemia Mother      Hypertension Mother      Coronary Artery Disease Mother      Migraines Mother      Obesity Mother      Osteoarthritis Mother      Osteoporosis Mother      Diabetes Mother      Hyperlipidemia Father      Hypertension Father      Thyroid Disease Father      Hypertension Brother      Hyperlipidemia Brother      Obesity Brother      Hypertension Sister      Thyroid Disease Sister          Current Outpatient Medications   Medication Sig Dispense Refill     calcium carbonate (OS-MANUEL 500 MG [place]. [place]) 500 MG tablet        Cyanocobalamin (VITAMIN B 12 PO)        Ferrous Sulfate (IRON SUPPLEMENT PO) Take 325 mg by mouth daily (with breakfast) 2 tabs daily       multivitamin, therapeutic with minerals (MULTI-VITAMIN) TABS Take 1 tablet by mouth daily       VITAMIN D, CHOLECALCIFEROL, PO Take 5,000 Units by mouth daily 2 tabs daily       Allergies   Allergen  "Reactions     Reglan [Metoclopramide] Anxiety     Recent Labs   Lab Test 01/07/19  1355 10/22/18  1414  04/26/18  0930 03/13/18  1853  09/01/17  1154  11/28/16  1048   A1C  --   --   --   --   --   --  5.7  --  5.2   LDL  --  122*  --   --   --   --   --   --   --    HDL  --  58  --   --   --   --   --   --   --    TRIG  --  227*  --   --   --   --   --   --   --    ALT 21  --   --  20 22   < >  --    < >  --    CR 0.68 0.68   < > 0.58 0.63   < > 0.57   < >  --    GFRESTIMATED >90 >90   < > >90 >90   < > >90   < >  --    GFRESTBLACK >90 >90   < > >90 >90   < > >90   < >  --    POTASSIUM 4.2 3.6   < > 4.0 4.1   < > 4.1   < >  --    TSH  --  1.06  --   --   --   --  2.16   < >  --     < > = values in this interval not displayed.      BP Readings from Last 3 Encounters:   06/19/19 122/82   05/01/19 116/76   01/07/19 124/76    Wt Readings from Last 3 Encounters:   06/19/19 83.9 kg (185 lb)   05/01/19 82.3 kg (181 lb 8 oz)   01/07/19 78.5 kg (173 lb)                      Review of Systems   ROS COMP: Constitutional, HEENT, cardiovascular, pulmonary, gi and gu systems are negative, except as otherwise noted.      Objective    /82 (BP Location: Right arm, Patient Position: Sitting, Cuff Size: Adult Regular)   Pulse 88   Temp 96.4  F (35.8  C) (Tympanic)   Resp 14   Ht 1.651 m (5' 5\")   Wt 83.9 kg (185 lb)   LMP 04/10/2018 (LMP Unknown)   SpO2 99%   BMI 30.79 kg/m    Body mass index is 30.79 kg/m .     Physical Exam   GENERAL: healthy, alert and no distress  HENT: normal cephalic/atraumatic, both ears: TMs dull, canals appear normal, nose and mouth without ulcers or lesions, oropharynx clear and oral mucous membranes moist  NECK: no adenopathy, no asymmetry, masses, or scars and thyroid normal to palpation  RESP: lungs clear to auscultation - no rales, rhonchi or wheezes  CV: regular rate and rhythm, normal S1, S2, no S3 or S4, no murmur  MS: no gross musculoskeletal defects noted, no edema  NEURO: Normal " "strength and tone, sensory exam grossly normal, mentation intact, gait normal, and Romberg normal.  cranial nerves 2-12 intact; key schmitz-pike is negative - however when walking back to chair after getting off exam table, was lightheaded.    PSYCH: mentation appears normal, affect normal/bright        Assessment & Plan     1. Dizziness  - CBC with platelets  - Comprehensive metabolic panel  - TSH with free T4 reflex  - CT Head w/o Contrast; Future    2. Nonintractable episodic headache, unspecified headache type  - CT Head w/o Contrast; Future    3. Aphasia  - CT Head w/o Contrast; Future    4. H/O gastric bypass  - Magnesium  - Vitamin B12     BMI:   Estimated body mass index is 30.79 kg/m  as calculated from the following:    Height as of this encounter: 1.651 m (5' 5\").    Weight as of this encounter: 83.9 kg (185 lb).   Weight management plan: Discussed healthy diet and exercise guidelines        FUTURE APPOINTMENTS:       - Follow-up visit as needed - will notify of results as they are returned.       Della Clarke RN  Family Nurse Practitioner Student    Patient is seen in conjunction with NP student.  History is reviewed with patient and pertinent portions of the exam are repeated.  Assessment and plan is reviewed with the patient.      Mary Ellen Armstrong NP  [facility]      "

## 2019-06-19 NOTE — NURSING NOTE
"Chief Complaint   Patient presents with     Dizziness       Initial /82 (BP Location: Right arm, Patient Position: Sitting, Cuff Size: Adult Regular)   Pulse 88   Temp 96.4  F (35.8  C) (Tympanic)   Resp 14   Ht 1.651 m (5' 5\")   Wt 83.9 kg (185 lb)   LMP 04/10/2018 (LMP Unknown)   SpO2 99%   BMI 30.79 kg/m   Estimated body mass index is 30.79 kg/m  as calculated from the following:    Height as of this encounter: 1.651 m (5' 5\").    Weight as of this encounter: 83.9 kg (185 lb).  Medication Reconciliation: complete   Traci Wiley          "

## 2019-06-19 NOTE — PATIENT INSTRUCTIONS
"  Assessment & Plan     1. Dizziness  - CBC with platelets  - Comprehensive metabolic panel  - TSH with free T4 reflex  - CT Head w/o Contrast; Future    2. Nonintractable episodic headache, unspecified headache type  - CT Head w/o Contrast; Future    3. Aphasia  - CT Head w/o Contrast; Future    4. H/O gastric bypass  - Magnesium  - Vitamin B12     BMI:   Estimated body mass index is 30.79 kg/m  as calculated from the following:    Height as of this encounter: 1.651 m (5' 5\").    Weight as of this encounter: 83.9 kg (185 lb).   Weight management plan: Discussed healthy diet and exercise guidelines        FUTURE APPOINTMENTS:       - Follow-up visit as needed - will notify of results as they are returned.       Mary Ellen Armstrong NP  [facility]        "

## 2019-06-20 LAB — VIT B12 SERPL-MCNC: 320 PG/ML (ref 193–986)

## 2019-07-03 ENCOUNTER — HOSPITAL ENCOUNTER (OUTPATIENT)
Dept: CT IMAGING | Facility: HOSPITAL | Age: 49
Discharge: HOME | End: 2019-07-03
Attending: NURSE PRACTITIONER | Admitting: NURSE PRACTITIONER
Payer: COMMERCIAL

## 2019-07-03 DIAGNOSIS — R42 DIZZINESS: ICD-10-CM

## 2019-07-03 DIAGNOSIS — R51.9 NONINTRACTABLE EPISODIC HEADACHE, UNSPECIFIED HEADACHE TYPE: ICD-10-CM

## 2019-07-03 DIAGNOSIS — R47.01 APHASIA: ICD-10-CM

## 2019-07-03 PROCEDURE — 70450 CT HEAD/BRAIN W/O DYE: CPT | Mod: TC

## 2019-09-05 ENCOUNTER — OFFICE VISIT (OUTPATIENT)
Dept: FAMILY MEDICINE | Facility: OTHER | Age: 49
End: 2019-09-05
Attending: NURSE PRACTITIONER
Payer: COMMERCIAL

## 2019-09-05 VITALS
WEIGHT: 191 LBS | BODY MASS INDEX: 31.82 KG/M2 | RESPIRATION RATE: 16 BRPM | DIASTOLIC BLOOD PRESSURE: 82 MMHG | TEMPERATURE: 97.8 F | HEIGHT: 65 IN | SYSTOLIC BLOOD PRESSURE: 124 MMHG | OXYGEN SATURATION: 98 % | HEART RATE: 75 BPM

## 2019-09-05 DIAGNOSIS — G44.89 OTHER HEADACHE SYNDROME: Primary | ICD-10-CM

## 2019-09-05 PROCEDURE — 99213 OFFICE O/P EST LOW 20 MIN: CPT | Performed by: NURSE PRACTITIONER

## 2019-09-05 ASSESSMENT — PAIN SCALES - GENERAL: PAINLEVEL: NO PAIN (0)

## 2019-09-05 ASSESSMENT — MIFFLIN-ST. JEOR: SCORE: 1492.25

## 2019-09-05 NOTE — PROGRESS NOTES
Subjective     Shazia Verma is a 49 year old female who presents to clinic today for the following health issues:    HPI   Migraine     Since your last clinic visit, how have your headaches changed?  No change    How often are you getting headaches or migraines? 2-3 times a month     Are you able to do normal daily activities when you have a migraine? No    Are you taking rescue/relief medications? (Select all that apply) Excedrin    How helpful is your rescue/relief medication?  The relief is inconsistent    Are you taking any medications to prevent migraines? (Select all that apply)  No    In the past 4 weeks, how often have you gone to urgent care or the emergency room because of your headaches?  0        How many servings of fruits and vegetables do you eat daily?  2-3    On average, how many sweetened beverages do you drink each day (soda, juice, sweet tea, etc)?   0    How many days per week do you miss taking your medication? 0    Needs FMLA forms completed      Patient Active Problem List   Diagnosis     ACP (advance care planning)     H/O gastric bypass     Hypoglycemia     S/P hysterectomy     Other headache syndrome     Past Surgical History:   Procedure Laterality Date     ABDOMEN SURGERY  2012    gastric bypass     APPENDECTOMY  1991     BREAST SURGERY  2002    right bx     COLONOSCOPY  2013     GI SURGERY  2012    gastric by pass     GYN SURGERY  1992 1994    c section x 2     LAPAROSCOPIC HYSTERECTOMY SUPRACERVICAL N/A 5/2/2018    Procedure: LAPAROSCOPIC HYSTERECTOMY SUPRACERVICAL;  LAPAROSCOPIC SUPRACERVICAL HYSTERECTOMY, BILATEARL SALPINGECTOMY, LYSIS OF ADHESIONS;  Surgeon: Jean Marie Garvin MD;  Location: [facility]     ORTHOPEDIC SURGERY  2015    right hip labreal tear     ORTHOPEDIC SURGERY  2014    left wrist      SALPINGECTOMY Bilateral 5/2/2018    Procedure: SALPINGECTOMY;;  Surgeon: Jean Marie Garvin MD;  Location: [facility]       Social History     Tobacco Use     Smoking status: Former Smoker      Smokeless tobacco: Never Used   Substance Use Topics     Alcohol use: Yes     Comment: occ     Family History   Problem Relation Age of Onset     Cerebrovascular Disease Mother      Hyperlipidemia Mother      Hypertension Mother      Coronary Artery Disease Mother      Migraines Mother      Obesity Mother      Osteoarthritis Mother      Osteoporosis Mother      Diabetes Mother      Hyperlipidemia Father      Hypertension Father      Thyroid Disease Father      Hypertension Brother      Hyperlipidemia Brother      Obesity Brother      Hypertension Sister      Thyroid Disease Sister          Current Outpatient Medications   Medication Sig Dispense Refill     calcium carbonate (OS-MANUEL 500 MG [place]. [place]) 500 MG tablet        Cyanocobalamin (VITAMIN B 12 PO)        Ferrous Sulfate (IRON SUPPLEMENT PO) Take 325 mg by mouth daily (with breakfast) 2 tabs daily       multivitamin, therapeutic with minerals (MULTI-VITAMIN) TABS Take 1 tablet by mouth daily       VITAMIN D, CHOLECALCIFEROL, PO Take 5,000 Units by mouth daily 2 tabs daily       Allergies   Allergen Reactions     Reglan [Metoclopramide] Anxiety     Recent Labs   Lab Test 06/19/19  1424 01/07/19  1355 10/22/18  1414  04/26/18  0930  09/01/17  1154  11/28/16  1048   A1C  --   --   --   --   --   --  5.7  --  5.2   LDL  --   --  122*  --   --   --   --   --   --    HDL  --   --  58  --   --   --   --   --   --    TRIG  --   --  227*  --   --   --   --   --   --    ALT 26 21  --   --  20   < >  --    < >  --    CR 0.60 0.68 0.68   < > 0.58   < > 0.57   < >  --    GFRESTIMATED >90 >90 >90   < > >90   < > >90   < >  --    GFRESTBLACK >90 >90 >90   < > >90   < > >90   < >  --    POTASSIUM 3.7 4.2 3.6   < > 4.0   < > 4.1   < >  --    TSH 1.78  --  1.06  --   --   --  2.16   < >  --     < > = values in this interval not displayed.      BP Readings from Last 3 Encounters:   09/05/19 124/82   06/19/19 122/82   05/01/19 116/76    Wt Readings from Last 3 Encounters:   09/05/19  "86.6 kg (191 lb)   06/19/19 83.9 kg (185 lb)   05/01/19 82.3 kg (181 lb 8 oz)               Reviewed and updated as needed this visit by Provider         Review of Systems   ROS COMP: Constitutional, HEENT, cardiovascular, pulmonary, gi and gu systems are negative, except as otherwise noted.      Objective    /82 (BP Location: Right arm, Patient Position: Sitting, Cuff Size: Adult Regular)   Pulse 75   Temp 97.8  F (36.6  C) (Tympanic)   Resp 16   Ht 1.651 m (5' 5\")   Wt 86.6 kg (191 lb)   LMP 04/10/2018 (LMP Unknown)   SpO2 98%   BMI 31.78 kg/m    Body mass index is 31.78 kg/m .  Physical Exam   GENERAL: healthy, alert and no distress  MS: no gross musculoskeletal defects noted, no edema  PSYCH: mentation appears normal, affect normal/bright          Assessment & Plan     1. Other headache syndrome  FMLA forms completed, faxed and sent to scanning.        BMI:   Estimated body mass index is 31.78 kg/m  as calculated from the following:    Height as of this encounter: 1.651 m (5' 5\").    Weight as of this encounter: 86.6 kg (191 lb).      Mary Ellen Armstrong NP  [facility]      "

## 2019-09-05 NOTE — NURSING NOTE
"Chief Complaint   Patient presents with     Headache       Initial /82 (BP Location: Right arm, Patient Position: Sitting, Cuff Size: Adult Regular)   Pulse 75   Temp 97.8  F (36.6  C) (Tympanic)   Resp 16   Ht 1.651 m (5' 5\")   Wt 86.6 kg (191 lb)   LMP 04/10/2018 (LMP Unknown)   SpO2 98%   BMI 31.78 kg/m   Estimated body mass index is 31.78 kg/m  as calculated from the following:    Height as of this encounter: 1.651 m (5' 5\").    Weight as of this encounter: 86.6 kg (191 lb).  Medication Reconciliation: complete   Damaris Rosa LPN    "

## 2019-12-26 NOTE — PATIENT INSTRUCTIONS
ASSESSMENT/PLAN:   1. Routine general medical examination at a health care facility  Exam completed  - Lipid Profile  - TSH with free T4 reflex  - Basic metabolic panel    2. Restless legs syndrome  - pramipexole (MIRAPEX) 0.125 MG tablet; Take 1-2 tablets (0.125-0.25 mg) by mouth nightly as needed (restless legs)  Dispense: 60 tablet; Refill: 1    3. Breast cancer screening  - MA Screen Bilateral w/Paul; Future    COUNSELING:   Reviewed preventive health counseling, as reflected in patient instructions       Regular exercise       Healthy diet/nutrition       Vision screening       Hearing screening       Immunizations       Colon cancer screening - due in 2023    Preventive Health Recommendations  Female Ages 40 to 49    Yearly exam:     See your health care provider every year in order to  1. Review health changes.   2. Discuss preventive care.    3. Review your medicines if your doctor prescribed any.      Get a Pap test every three years (unless you have an abnormal result and your provider advises testing more often).      If you get Pap tests with HPV test, you only need to test every 5 years, unless you have an abnormal result. You do not need a Pap test if your uterus was removed (hysterectomy) and you have not had cancer.      You should be tested each year for STDs (sexually transmitted diseases), if you're at risk.     Ask your doctor if you should have a mammogram.      Have a colonoscopy (test for colon cancer) if someone in your family has had colon cancer or polyps before age 50.       Have a cholesterol test every 5 years.       Have a diabetes test (fasting glucose) after age 45. If you are at risk for diabetes, you should have this test every 3 years.    Shots: Get a flu shot each year. Get a tetanus shot every 10 years.     Nutrition:     Eat at least 5 servings of fruits and vegetables each day.    Eat whole-grain bread, whole-wheat pasta and brown rice instead of white grains and rice.    Get  adequate Calcium and Vitamin D.      Lifestyle    Exercise at least 150 minutes a week (an average of 30 minutes a day, 5 days a week). This will help you control your weight and prevent disease.    Limit alcohol to one drink per day.    No smoking.     Wear sunscreen to prevent skin cancer.    See your dentist every six months for an exam and cleaning.

## 2019-12-26 NOTE — PROGRESS NOTES
SUBJECTIVE:   CC: Shazia Verma is an 49 year old woman who presents for preventive health visit.     Healthy Habits:    Do you get at least three servings of calcium containing foods daily (dairy, green leafy vegetables, etc.)? yes and no, taking calcium and/or vitamin D supplement: yes     Amount of exercise or daily activities, outside of work: none    Problems taking medications regularly No    Medication side effects: No    Have you had an eye exam in the past two years? yes    Do you see a dentist twice per year? yes    Do you have sleep apnea, excessive snoring or daytime drowsiness?no      Concern - restless legs  Onset: over a year     Description:   Restless legs after hip surgery still has once in awhile     Intensity: mild, moderate    Progression of Symptoms:  worsening    Accompanying Signs & Symptoms:  None     Previous history of similar problem:   Yes     Precipitating factors:   Worsened by: unknown     Alleviating factors:  Improved by: nothing     Therapies Tried and outcome: flexeril     Today's PHQ-2 Score:   PHQ-2 ( 1999 Pf[employer]) 9/5/2019 6/19/2019   Q1: Little interest or pleasure in doing things 0 0   Q2: Feeling down, depressed or hopeless 0 0   PHQ-2 Score 0 0   Q1: Little interest or pleasure in doing things - -   Q2: Feeling down, depressed or hopeless - -   PHQ-2 Score - -       Abuse: Current or Past(Physical, Sexual or Emotional)- No  Do you feel safe in your environment? Yes        Social History     Tobacco Use     Smoking status: Former Smoker     Smokeless tobacco: Never Used   Substance Use Topics     Alcohol use: Yes     Comment: occ     If you drink alcohol do you typically have >3 drinks per day or >7 drinks per week? weekends                     Reviewed orders with patient.  Reviewed health maintenance and updated orders accordingly - Yes  BP Readings from Last 3 Encounters:   12/30/19 116/70   09/05/19 124/82   06/19/19 122/82    Wt Readings from Last 3 Encounters:    12/30/19 86.3 kg (190 lb 3.2 oz)   09/05/19 86.6 kg (191 lb)   06/19/19 83.9 kg (185 lb)                  Patient Active Problem List   Diagnosis     ACP (advance care planning)     H/O gastric bypass     Hypoglycemia     S/P hysterectomy     Other headache syndrome     Past Surgical History:   Procedure Laterality Date     ABDOMEN SURGERY  2012    gastric bypass     APPENDECTOMY  1991     BREAST SURGERY  2002    right bx     COLONOSCOPY  2013     GI SURGERY  2012    gastric by pass     GYN SURGERY  1992 1994    c section x 2     LAPAROSCOPIC HYSTERECTOMY SUPRACERVICAL N/A 5/2/2018    Procedure: LAPAROSCOPIC HYSTERECTOMY SUPRACERVICAL;  LAPAROSCOPIC SUPRACERVICAL HYSTERECTOMY, BILATEARL SALPINGECTOMY, LYSIS OF ADHESIONS;  Surgeon: Jean Marie Garvin MD;  Location: [facility]     ORTHOPEDIC SURGERY  2015    right hip labreal tear     ORTHOPEDIC SURGERY  2014    left wrist      SALPINGECTOMY Bilateral 5/2/2018    Procedure: SALPINGECTOMY;;  Surgeon: Jean Marie Garvin MD;  Location: [facility]       Social History     Tobacco Use     Smoking status: Former Smoker     Smokeless tobacco: Never Used   Substance Use Topics     Alcohol use: Yes     Comment: occ     Family History   Problem Relation Age of Onset     Cerebrovascular Disease Mother      Hyperlipidemia Mother      Hypertension Mother      Coronary Artery Disease Mother      Migraines Mother      Obesity Mother      Osteoarthritis Mother      Osteoporosis Mother      Diabetes Mother      Hyperlipidemia Father      Hypertension Father      Thyroid Disease Father      Hypertension Brother      Hyperlipidemia Brother      Obesity Brother      Hypertension Sister      Thyroid Disease Sister          Current Outpatient Medications   Medication Sig Dispense Refill     pramipexole (MIRAPEX) 0.125 MG tablet Take 1-2 tablets (0.125-0.25 mg) by mouth nightly as needed (restless legs) 60 tablet 1     calcium carbonate (OS-MANUEL 500 MG [place]. [place]) 500 MG tablet        Cyanocobalamin (VITAMIN  B 12 PO)        Ferrous Sulfate (IRON SUPPLEMENT PO) Take 325 mg by mouth daily (with breakfast) 2 tabs daily       multivitamin, therapeutic with minerals (MULTI-VITAMIN) TABS Take 1 tablet by mouth daily       VITAMIN D, CHOLECALCIFEROL, PO Take 5,000 Units by mouth daily 2 tabs daily       Allergies   Allergen Reactions     Reglan [Metoclopramide] Anxiety     Recent Labs   Lab Test 06/19/19  1424 01/07/19  1355 10/22/18  1414  04/26/18  0930  09/01/17  1154  11/28/16  1048   A1C  --   --   --   --   --   --  5.7  --  5.2   LDL  --   --  122*  --   --   --   --   --   --    HDL  --   --  58  --   --   --   --   --   --    TRIG  --   --  227*  --   --   --   --   --   --    ALT 26 21  --   --  20   < >  --    < >  --    CR 0.60 0.68 0.68   < > 0.58   < > 0.57   < >  --    GFRESTIMATED >90 >90 >90   < > >90   < > >90   < >  --    GFRESTBLACK >90 >90 >90   < > >90   < > >90   < >  --    POTASSIUM 3.7 4.2 3.6   < > 4.0   < > 4.1   < >  --    TSH 1.78  --  1.06  --   --   --  2.16   < >  --     < > = values in this interval not displayed.        Mammogram is due    Pertinent mammograms are reviewed under the imaging tab.  History of abnormal Pap smear: pap current  PAP / HPV Latest Ref Rng & Units 4/10/2018   PAP - NIL   HPV 16 DNA NEG:Negative Negative   HPV 18 DNA NEG:Negative Negative   OTHER HR HPV NEG:Negative Negative     Reviewed and updated as needed this visit by clinical staff         Reviewed and updated as needed this visit by Provider            ROS:  CONSTITUTIONAL: NEGATIVE for fever, chills, change in weight  INTEGUMENTARY/SKIN: NEGATIVE for worrisome rashes, moles or lesions  EYES: NEGATIVE for vision changes or irritation  ENT: NEGATIVE for ear, mouth and throat problems  RESP: NEGATIVE for significant cough or SOB  BREAST: NEGATIVE for masses, tenderness or discharge  CV: NEGATIVE for chest pain, palpitations or peripheral edema  GI: NEGATIVE for nausea, abdominal pain, heartburn, or change in bowel  "habits  : NEGATIVE for unusual urinary or vaginal symptoms. No vaginal bleeding.  MUSCULOSKELETAL: NEGATIVE for significant arthralgias or myalgia  NEURO: NEGATIVE for weakness, dizziness or paresthesias  PSYCHIATRIC: NEGATIVE for changes in mood or affect     OBJECTIVE:   LMP 04/10/2018 (LMP Unknown)   EXAM:  GENERAL: healthy, alert and no distress  EYES: Eyes grossly normal to inspection, PERRL and conjunctivae and sclerae normal  HENT: ear canals and TM's normal, nose and mouth without ulcers or lesions  NECK: no adenopathy, no asymmetry, masses, or scars, thyroid normal to palpation and no carotid bruits  RESP: lungs clear to auscultation - no rales, rhonchi or wheezes  CV: regular rate and rhythm, normal S1 S2, no S3 or S4, no murmur, click or rub, no peripheral edema and peripheral pulses strong  ABDOMEN: soft, nontender, no hepatosplenomegaly, no masses and bowel sounds normal  MS: no gross musculoskeletal defects noted, no edema  PSYCH: mentation appears normal, affect normal/bright      ASSESSMENT/PLAN:   1. Routine general medical examination at a health care facility  Exam completed  - Lipid Profile  - TSH with free T4 reflex  - Basic metabolic panel    2. Restless legs syndrome  - pramipexole (MIRAPEX) 0.125 MG tablet; Take 1-2 tablets (0.125-0.25 mg) by mouth nightly as needed (restless legs)  Dispense: 60 tablet; Refill: 1    3. Breast cancer screening  - MA Screen Bilateral w/Paul; Future    COUNSELING:   Reviewed preventive health counseling, as reflected in patient instructions       Regular exercise       Healthy diet/nutrition       Vision screening       Hearing screening       Immunizations       Colon cancer screening - due in 2023    Estimated body mass index is 31.78 kg/m  as calculated from the following:    Height as of 9/5/19: 1.651 m (5' 5\").    Weight as of 9/5/19: 86.6 kg (191 lb).    Weight management plan: Discussed healthy diet and exercise guidelines     reports that she has quit " smoking. She has never used smokeless tobacco.      Counseling Resources:  ATP IV Guidelines  Pooled Cohorts Equation Calculator  Breast Cancer Risk Calculator  FRAX Risk Assessment  ICSI Preventive Guidelines  Dietary Guidelines for Americans, 2010  USDA's MyPlate  ASA Prophylaxis  Lung CA Screening    Mary Ellen Armstrong, NP  [facility]

## 2019-12-30 ENCOUNTER — OFFICE VISIT (OUTPATIENT)
Dept: FAMILY MEDICINE | Facility: OTHER | Age: 49
End: 2019-12-30
Attending: NURSE PRACTITIONER
Payer: COMMERCIAL

## 2019-12-30 VITALS
TEMPERATURE: 97.4 F | BODY MASS INDEX: 31.69 KG/M2 | RESPIRATION RATE: 16 BRPM | OXYGEN SATURATION: 98 % | WEIGHT: 190.2 LBS | DIASTOLIC BLOOD PRESSURE: 70 MMHG | HEART RATE: 77 BPM | HEIGHT: 65 IN | SYSTOLIC BLOOD PRESSURE: 116 MMHG

## 2019-12-30 DIAGNOSIS — G25.81 RESTLESS LEGS SYNDROME: ICD-10-CM

## 2019-12-30 DIAGNOSIS — Z00.00 ROUTINE GENERAL MEDICAL EXAMINATION AT A HEALTH CARE FACILITY: Primary | ICD-10-CM

## 2019-12-30 DIAGNOSIS — Z12.39 BREAST CANCER SCREENING: ICD-10-CM

## 2019-12-30 PROCEDURE — 99396 PREV VISIT EST AGE 40-64: CPT | Performed by: NURSE PRACTITIONER

## 2019-12-30 PROCEDURE — 80048 BASIC METABOLIC PNL TOTAL CA: CPT | Performed by: NURSE PRACTITIONER

## 2019-12-30 PROCEDURE — 84443 ASSAY THYROID STIM HORMONE: CPT | Performed by: NURSE PRACTITIONER

## 2019-12-30 PROCEDURE — 80061 LIPID PANEL: CPT | Performed by: NURSE PRACTITIONER

## 2019-12-30 PROCEDURE — 36415 COLL VENOUS BLD VENIPUNCTURE: CPT | Performed by: NURSE PRACTITIONER

## 2019-12-30 RX ORDER — PRAMIPEXOLE DIHYDROCHLORIDE 0.12 MG/1
.125-.25 TABLET ORAL
Qty: 60 TABLET | Refills: 1 | Status: SHIPPED | OUTPATIENT
Start: 2019-12-30 | End: 2020-06-07

## 2019-12-30 ASSESSMENT — PATIENT HEALTH QUESTIONNAIRE - PHQ9: SUM OF ALL RESPONSES TO PHQ QUESTIONS 1-9: 0

## 2019-12-30 ASSESSMENT — ANXIETY QUESTIONNAIRES
5. BEING SO RESTLESS THAT IT IS HARD TO SIT STILL: NOT AT ALL
3. WORRYING TOO MUCH ABOUT DIFFERENT THINGS: NOT AT ALL
7. FEELING AFRAID AS IF SOMETHING AWFUL MIGHT HAPPEN: NOT AT ALL
4. TROUBLE RELAXING: NOT AT ALL
1. FEELING NERVOUS, ANXIOUS, OR ON EDGE: NOT AT ALL
IF YOU CHECKED OFF ANY PROBLEMS ON THIS QUESTIONNAIRE, HOW DIFFICULT HAVE THESE PROBLEMS MADE IT FOR YOU TO DO YOUR WORK, TAKE CARE OF THINGS AT HOME, OR GET ALONG WITH OTHER PEOPLE: NOT DIFFICULT AT ALL
6. BECOMING EASILY ANNOYED OR IRRITABLE: NOT AT ALL
GAD7 TOTAL SCORE: 0
2. NOT BEING ABLE TO STOP OR CONTROL WORRYING: NOT AT ALL

## 2019-12-30 ASSESSMENT — MIFFLIN-ST. JEOR: SCORE: 1488.62

## 2019-12-30 ASSESSMENT — PAIN SCALES - GENERAL: PAINLEVEL: NO PAIN (0)

## 2019-12-30 NOTE — NURSING NOTE
"Chief Complaint   Patient presents with     Physical       Initial /70 (BP Location: Right arm, Patient Position: Chair, Cuff Size: Adult Large)   Pulse 77   Temp 97.4  F (36.3  C) (Tympanic)   Resp 16   Ht 1.651 m (5' 5\")   Wt 86.3 kg (190 lb 3.2 oz)   LMP 04/10/2018 (LMP Unknown)   SpO2 98%   BMI 31.65 kg/m   Estimated body mass index is 31.65 kg/m  as calculated from the following:    Height as of this encounter: 1.651 m (5' 5\").    Weight as of this encounter: 86.3 kg (190 lb 3.2 oz).  Medication Reconciliation: complete  Pamela M. Lechevalier, LPN    "

## 2019-12-31 LAB
ANION GAP SERPL CALCULATED.3IONS-SCNC: 6 MMOL/L (ref 3–14)
BUN SERPL-MCNC: 14 MG/DL (ref 7–30)
CALCIUM SERPL-MCNC: 8.8 MG/DL (ref 8.5–10.1)
CHLORIDE SERPL-SCNC: 107 MMOL/L (ref 94–109)
CHOLEST SERPL-MCNC: 258 MG/DL
CO2 SERPL-SCNC: 27 MMOL/L (ref 20–32)
CREAT SERPL-MCNC: 0.58 MG/DL (ref 0.52–1.04)
GFR SERPL CREATININE-BSD FRML MDRD: >90 ML/MIN/{1.73_M2}
GLUCOSE SERPL-MCNC: 96 MG/DL (ref 70–99)
HDLC SERPL-MCNC: 51 MG/DL
LDLC SERPL CALC-MCNC: 155 MG/DL
NONHDLC SERPL-MCNC: 207 MG/DL
POTASSIUM SERPL-SCNC: 4.1 MMOL/L (ref 3.4–5.3)
SODIUM SERPL-SCNC: 140 MMOL/L (ref 133–144)
TRIGL SERPL-MCNC: 259 MG/DL
TSH SERPL DL<=0.005 MIU/L-ACNC: 1.93 MU/L (ref 0.4–4)

## 2019-12-31 ASSESSMENT — ANXIETY QUESTIONNAIRES: GAD7 TOTAL SCORE: 0

## 2020-01-15 ENCOUNTER — ANCILLARY PROCEDURE (OUTPATIENT)
Dept: MAMMOGRAPHY | Facility: OTHER | Age: 50
End: 2020-01-15
Attending: NURSE PRACTITIONER
Payer: COMMERCIAL

## 2020-01-15 DIAGNOSIS — Z12.31 VISIT FOR SCREENING MAMMOGRAM: ICD-10-CM

## 2020-01-15 DIAGNOSIS — Z12.39 BREAST CANCER SCREENING: ICD-10-CM

## 2020-01-15 PROCEDURE — 77063 BREAST TOMOSYNTHESIS BI: CPT | Mod: TC

## 2020-01-15 PROCEDURE — 77067 SCR MAMMO BI INCL CAD: CPT | Mod: TC

## 2020-02-21 ENCOUNTER — HOSPITAL ENCOUNTER (OUTPATIENT)
Dept: PHYSICAL THERAPY | Facility: HOSPITAL | Age: 50
Setting detail: THERAPIES SERIES
End: 2020-02-21
Attending: ORTHOPAEDIC SURGERY
Payer: COMMERCIAL

## 2020-02-21 PROCEDURE — 97161 PT EVAL LOW COMPLEX 20 MIN: CPT | Mod: GP

## 2020-02-21 PROCEDURE — 97110 THERAPEUTIC EXERCISES: CPT | Mod: GP

## 2020-02-21 PROCEDURE — 97140 MANUAL THERAPY 1/> REGIONS: CPT | Mod: GP,59

## 2020-02-21 ASSESSMENT — ACTIVITIES OF DAILY LIVING (ADL)
ROLLING_OVER_IN_BED: MODERATE DIFFICULTY
STANDING_FOR_15_MINUTES: SLIGHT DIFFICULTY
WALKING_APPROXIMATELY_10_MINUTES: MODERATE DIFFICULTY
HEAVY_WORK: SLIGHT DIFFICULTY
HOS_ADL_COUNT: 17
HOS_ADL_HIGHEST_POTENTIAL_SCORE: 68
SITTING_FOR_15_MINUTES: NO DIFFICULTY AT ALL
TWISTING/PIVOTING_ON_INVOLVED_LEG: SLIGHT DIFFICULTY
DEEP_SQUATTING: SLIGHT DIFFICULTY
GOING_UP_1_FLIGHT_OF_STAIRS: SLIGHT DIFFICULTY
HOS_ADL_ITEM_SCORE_TOTAL: 49
LIGHT_TO_MODERATE_WORK: SLIGHT DIFFICULTY
STEPPING_UP_AND_DOWN_CURBS: SLIGHT DIFFICULTY
WALKING_INITIALLY: MODERATE DIFFICULTY
WALKING_UP_STEEP_HILLS: SLIGHT DIFFICULTY
PUTTING_ON_SOCKS_AND_SHOES: SLIGHT DIFFICULTY
RECREATIONAL_ACTIVITIES: SLIGHT DIFFICULTY
WALKING_15_MINUTES_OR_GREATER: EXTREME DIFFICULTY
WALKING_DOWN_STEEP_HILLS: NO DIFFICULTY AT ALL
GETTING_INTO_AND_OUT_OF_A_BATHTUB: NO DIFFICULTY AT ALL
GOING_DOWN_1_FLIGHT_OF_STAIRS: NO DIFFICULTY AT ALL
HOS_ADL_SCORE(%): 72.06
GETTING_INTO_AND_OUT_OF_AN_AVERAGE_CAR: SLIGHT DIFFICULTY

## 2020-02-21 NOTE — PROGRESS NOTES
Initial Physical Therapy Evaluation      Name: Shazia Verma MRN# [id number]   Age: 49 year old YOB: 1970     Date of Consultation: February 21, 2020  Primary care provider: Mary Ellen Armstrong    Referring Physician: Roberto Yen MD  Orders: Eval and Treat  Medical Diagnosis: L Trochanteric Bursitis  Onset of Illness/Injury: 2/21/20    Reason for PT Visit: Patient is a 48 y/o female who presents with L hip pain.  Patient works at a [employer] center at [employer].  Patient presents w/ L Hip pain, w/ a history of R hip replacement.  Patient notes it is hard for her to sit, lay, or stand for extended periods of time due to this hip pain.  Patient was given flexeril for leg cramps and tylenol which has been minimally helpful.  Her hips do feel better w/ laying on her back, but hard to stay in this position.  Patient is able to walk about 3 laps around [employer] building w/ or about 3 city blocks which significantly increases pain.   Prior Level of Function: Independent in all tasks   Pain: 2/10  Aching and Sharp 10/10     Pain with coughing: no  Pain with sneezing: no  Pain with straining: no  Change in bowel/bladder: no  Numbness or tingling in groin area: no      Community Support/Living Environment/Employment History: Patient lives at home, works at [employer], difficulty doing stairs leading w/ Left leg      Patient/Family Goal: To walk w/ less pain, sit w/ less pain     Fall Screen:   Have you fallen 2 or more times in the last year? No  Have you fallen and had an injury in the last year? No  Timed up & go: n/a  Is patient a fall risk? No    Past Medical History:   No past medical history on file.    Past Surgical History:  Past Surgical History:   Procedure Laterality Date     ABDOMEN SURGERY  2012    gastric bypass     APPENDECTOMY  1991     BREAST SURGERY  2002    right bx     COLONOSCOPY  2013     GI SURGERY  2012    gastric by pass     GYN SURGERY  1992 1994    c section x 2     LAPAROSCOPIC  "HYSTERECTOMY SUPRACERVICAL N/A 5/2/2018    Procedure: LAPAROSCOPIC HYSTERECTOMY SUPRACERVICAL;  LAPAROSCOPIC SUPRACERVICAL HYSTERECTOMY, BILATEARL SALPINGECTOMY, LYSIS OF ADHESIONS;  Surgeon: Jean Marie Garvin MD;  Location: [facility]     ORTHOPEDIC SURGERY  2015    right hip labreal tear     ORTHOPEDIC SURGERY  2014    left wrist      SALPINGECTOMY Bilateral 5/2/2018    Procedure: SALPINGECTOMY;;  Surgeon: Jean Marie Garvin MD;  Location: [facility]       Medications:   Current Outpatient Medications   Medication Sig     calcium carbonate (OS-MANUEL 500 MG [place]. [place]) 500 MG tablet      Cyanocobalamin (VITAMIN B 12 PO)      Ferrous Sulfate (IRON SUPPLEMENT PO) Take 325 mg by mouth daily (with breakfast) 2 tabs daily     multivitamin, therapeutic with minerals (MULTI-VITAMIN) TABS Take 1 tablet by mouth daily     pramipexole (MIRAPEX) 0.125 MG tablet Take 1-2 tablets (0.125-0.25 mg) by mouth nightly as needed (restless legs)     VITAMIN D, CHOLECALCIFEROL, PO Take 5,000 Units by mouth daily 2 tabs daily     No current facility-administered medications for this encounter.        Imaging:     Musculoskeletal Findings:     OBJECTIVE   Imaging: imaging showed trochanteric bursitis     Patient goals: \"to have less hip pain.\"    OBJECTIVE    Observation:   Palpation: Tenderness over L greater trochanter     Gait: normal   Assistive devices:     Functional mobility:  Posture: Normal erect.  Sitting Posture:   Standing Posture:  Correction of posture:     Neurological Assessment:  Reflexes - Right:  Patellar: 2+  Achilles: 2+    Reflexes - Left:  Patellar: 2+  Achilles: 2+    Myotomes:  Right lower extremity: neg  Left lower extremity: neg    Dermatomes:  Right lower extremity: neg  Left lower extremity: neg    Range of Motion:  Active Lumbar Spine:       Flexion: WNL       Extension: Moderately limited - Pain w/ extension       Right sidebend: WNL       Left sidebend: WNL       Right rotation: WNL       Left rotation: " WNL    Strength:  Abdominal Strength:    Right Lower Extremity Strength: 5/5 except hip ext and hip abd 4/5  Left Lower Extremity Strength: 5/5 except hip ext and hip abd 4/5     Special Tests:    Spine Special Tests:  Slump:    Left: neg              Right: neg  Straight Leg Raise:    Left: neg      Right: neg  Traction:   Prone Knee Bend:     Left:       Right:   Compression:    Left:       Right:  Repeated Movement Testing:     Passive Intervertebral Accessory Movements:     Prone Instability Test:       Hip Special Testss  Graciela:             JUANITA:  R side positive - increased hip pain and groin pain at approx 15-20* ER                  Scour Test:  Pos on R side                          Deyvi Test:                      Leg Length: appear equal               Trendelenburg s Sign:      Ely's Test:                    Hip ROM:  L LE: WFL    R LE: WFL    Hip Joint Accessory Motion/Glides:  Assessment:   Patient's Concordant Sign: Walking, Sitting for extended periods then first steps are difficult, steps    Outcome Measures:   HOS: 72.06    Prognosis/Plan of Care: Patient has a good prognosis for the following goals   Appropriate for Physical Therapy Intervention: Yes        GOALS:   Short-term goals:  To be achieved in 2-4 weeks:    Instruct in home program  1.) Patient will understand biomechanical stressors of the hip joint in order to make modifications to activities to reduce further risk of injury.  2.) Patient will be independent with a short-term home exercise program.  3.) Improve hip extension and abduction strength to 5/5  4.) Patient will have decreased antalgic gait when initially standing.   5. Will be able tolerate sitting for up to 1 hour.     Long-term goals:  To be achieved in 8-10 weeks:    Return to previous level of function  Self management of symptoms.  Pain free activities of daily living.  1.) Patient will increase hip abduction and extension strength to 5/5 bilaterally  2.) Patient will be  able to negotiate a flight of stairs with reciprocal pattern independently.  3.) Patient will report 50% improvement in overall symptoms  4.) Patient will be able to lay on L side w/o increase in pain.  5.) patient will be able to tolerate SLS on L side w/o increase in glute med origin to improve tolerance to activity.    Planned Interventions: Therapeutic exercise, manual therapy, therapeutic activity, patient education    Patient presents today with signs and symptoms consistent of greater trochanter bursitis, and likely some glute med tendonitis based on difficulty tolerating SLS w/ pain at the origin of glute med.  Also has some deep ache and anterior groin pain w/ positive JUANITA, may require further imaging if symptoms do not improve based on history of labral tearing on R side. Therapy today consisted of evaluation, patient education, and therapeutic exercise. Patient would benefit from continued PT sessions addressing overall pain and dysfunction with use of appropriate interventions.      Treatment Rendered/Intervention:  Evaluation completed as described above followed by discussion of exam findings and plan of care.    Therapeutic exercise: Patient instructed in and demonstrated proper performance of home exercise program consisting of:  Hector exercises      Clinical Impressions:  Criteria for Skilled Therapeutic Intervention Met: Yes  PT Diagnosis: L Hip Bursitis  Influenced by the following impairments: Hip Pain, Decreased hip strength and ROM  Functional limitations due to impairment: Walking, squatting, Standing extended periods, sitting extended periods  Clinical presentation: Stable/Uncomplicated  Clinical presentation rationale: Therapist Discretion  Clinical Decision making (complexity): Low Complexity  Predicted Duration of Therapy Intervention (days/wks): 2x  Risks and Benefits of therapy have been explained: Yes  Patient, Family & other staff in agreement with plan of care: Yes  Comments:    Frequency: 2x times/week  Date Range: 2/21/20 to 5/21/20      Total Evaluation Time: 15    I certify the need for these services furnished under this plan of treatment and while under my care. (Physician co-signature of this document indicates review and certification of the therapy plan).      _____________________________     __________________________    ____________  Physician's Signature                 Date               Time

## 2020-02-25 ENCOUNTER — HOSPITAL ENCOUNTER (OUTPATIENT)
Dept: PHYSICAL THERAPY | Facility: HOSPITAL | Age: 50
Setting detail: THERAPIES SERIES
End: 2020-02-25
Attending: ORTHOPAEDIC SURGERY
Payer: COMMERCIAL

## 2020-02-25 PROCEDURE — 97110 THERAPEUTIC EXERCISES: CPT | Mod: GP

## 2020-02-25 PROCEDURE — 97140 MANUAL THERAPY 1/> REGIONS: CPT | Mod: GP

## 2020-03-03 ENCOUNTER — HOSPITAL ENCOUNTER (OUTPATIENT)
Dept: PHYSICAL THERAPY | Facility: HOSPITAL | Age: 50
Setting detail: THERAPIES SERIES
End: 2020-03-03
Attending: NURSE PRACTITIONER
Payer: COMMERCIAL

## 2020-03-03 PROCEDURE — 97140 MANUAL THERAPY 1/> REGIONS: CPT | Mod: GP

## 2020-03-03 PROCEDURE — 97110 THERAPEUTIC EXERCISES: CPT | Mod: GP

## 2020-03-05 ENCOUNTER — HOSPITAL ENCOUNTER (OUTPATIENT)
Dept: PHYSICAL THERAPY | Facility: HOSPITAL | Age: 50
Setting detail: THERAPIES SERIES
End: 2020-03-05
Attending: NURSE PRACTITIONER
Payer: COMMERCIAL

## 2020-03-05 PROCEDURE — 97110 THERAPEUTIC EXERCISES: CPT | Mod: GP

## 2020-03-05 PROCEDURE — 97140 MANUAL THERAPY 1/> REGIONS: CPT | Mod: GP

## 2020-03-10 ENCOUNTER — HOSPITAL ENCOUNTER (OUTPATIENT)
Dept: PHYSICAL THERAPY | Facility: HOSPITAL | Age: 50
Setting detail: THERAPIES SERIES
End: 2020-03-10
Attending: NURSE PRACTITIONER
Payer: COMMERCIAL

## 2020-03-10 PROCEDURE — 97110 THERAPEUTIC EXERCISES: CPT | Mod: GP

## 2020-03-13 ENCOUNTER — HOSPITAL ENCOUNTER (OUTPATIENT)
Dept: PHYSICAL THERAPY | Facility: HOSPITAL | Age: 50
Setting detail: THERAPIES SERIES
End: 2020-03-13
Attending: NURSE PRACTITIONER
Payer: COMMERCIAL

## 2020-03-13 PROCEDURE — 97110 THERAPEUTIC EXERCISES: CPT | Mod: GP

## 2020-03-13 PROCEDURE — 97140 MANUAL THERAPY 1/> REGIONS: CPT | Mod: GP

## 2020-03-14 ENCOUNTER — VIRTUAL VISIT (OUTPATIENT)
Dept: FAMILY MEDICINE | Facility: OTHER | Age: 50
End: 2020-03-14

## 2020-03-15 NOTE — PROGRESS NOTES
"Date: 2020 19:18:37  Clinician: Ashlee Cox  Clinician NPI: [id number]  Patient: Shazia Verma  Patient : 1970  Patient Address: [address] [place] [address]  Patient Phone: [phone]  Visit Protocol: TRAVISI  Patient Summary:  Shazia is a 49 year old ( : 1970 ) female who initiated a Visit for COVID-19 (Coronavirus) evaluation and screening. When asked the question \"Please sign me up to receive news, health information and promotions from [employer].\", Shazia responded \"No\".    Shazia states her symptoms started 1-2 days ago.   Her symptoms consist of malaise, myalgia, chills, a cough, and a headache. Shazia also feels feverish.   Symptom details     Cough: Shazia coughs every 5-10 minutes and her cough is not more bothersome at night. Phlegm comes into her throat when she coughs. She believes her cough is caused by post-nasal drip. The color of the phlegm is clear.     Temperature: Her current temperature is 102 degrees Fahrenheit. Shazia is not sure how long she has had a temperature over 100 degrees Fahrenheit.     Headache: She states the headache is moderate (4-6 on a 10 point pain scale).      Shazia denies having ear pain, rhinitis, enlarged lymph nodes, facial pain or pressure, wheezing, sore throat, nasal congestion, and teeth pain. She also denies taking antibiotic medication for the symptoms, having a sinus infection within the past year, and having recent facial or sinus surgery in the past 60 days. She is not experiencing dyspnea.   Precipitating events  She has not recently been exposed to someone with influenza. Shazia has been in close contact with the following high risk individuals: people with asthma, heart disease or diabetes and adults 65 or older.   Pertinent COVID-19 (Coronavirus) information  Shazia has not traveled internationally or to the areas where COVID-19 (Coronavirus) is widespread in the last 14 days before the start of her symptoms.   Shazia has not had close contact with a " suspected or laboratory-confirmed COVID-19 patient within 14 days of symptom onset.   Subhash is not a healthcare worker and does not work in a healthcare facility.   Pertinent medical history  Subhash does not get yeast infections when she takes antibiotics.   Subhash does not need a return to work/school note.   Weight: 178 lbs   Subhash does not smoke or use smokeless tobacco.   She denies pregnancy and denies breastfeeding. She does not menstruate.   Additional information as reported by the patient (free text): subhash works for [employer] in a [facility] and is around a lot of people who frequiantly travel.   Weight: 178 lbs    MEDICATIONS: No current medications, ALLERGIES: Reglan  Clinician Response:  Dear Subhash,  Based on the information provided, you have an influenza-like illness. This is an infection that has the same symptoms of the flu, but the specific virus is not known. Lab testing would be required to confirm the flu virus, but this is often not necessary because the treatment will be the same no matter what is causing your symptoms.  Your symptoms should improve gradually over the next week.  Medication information  I am prescribing:     Benzonatate (Tessalon Perles) 100 mg oral capsule. Take 1-2 capsules by mouth 3 times per day as needed for your cough. There are no refills with this prescription.   The CDC recommends treatment for flu only if antiviral medications can be started within 48 hours of the first flu symptoms or if you fall into one of the high-risk groups that are more likely to get flu complications.  Since you do not meet guidelines for treatment with antiviral medications, antiviral treatment is not recommended for you. Treatment focuses on controlling your symptoms.  Unless you are allergic to the over-the-counter medication(s) below, I recommend using:       Acetaminophen (Tylenol or store brand) oral tablet. Take 1-2 tablets by mouth every 4-6 hours to help with the discomfort.       Ibuprofen (Advil or store brand) 200 mg oral tablet. Take 1-3 tablets (200-600 mg) by mouth every 8 hours to help with the discomfort. Make sure to take the ibuprofen with food. Do not exceed 2400 mg in 24 hours.      Dextromethorphan (Robitussin DM or store brand). This medication is a cough suppressant that works by decreasing the feeling of needing to cough. Please follow the instructions on the package.     Over-the-counter medications do not require a prescription. Ask the pharmacist if you have any questions.  Self care  The following tips will keep you as comfortable as possible while you recover:     Rest    Drink plenty of water and other liquids    Take a hot shower to loosen congestion    Take a spoonful of honey to reduce your cough     If you have a fever, stay home until your temperature has returned to normal for 24 hours and you feel well enough for daily activities. And of course, wash your hands often to prevent spreading the flu and other illnesses. However, the best way to prevent the flu is to get a flu shot before each flu season.  When to seek care  Please be seen in a clinic or urgent care if new symptoms develop, or symptoms become worse.  Additional treatment plan   Dear Shazia,  Based on the information you have provided, it does not appear you need Coronavirus (COVID-19) testing.   At this time, we recommend testing primarily for those people who have symptoms of cough and fever and have either traveled to a known area of infection or have been exposed to someone with laboratory confirmed Coronavirus by close contact.   Coronavirus - General Information:   The coronavirus infection starts within 14 days of an exposure.  Symptoms are those of a respiratory infection (such as fever, cough).   If you have not had symptoms by day 15, you should be considered uninfected by coronavirus.   Coronavirus - Symptoms:    The coronavirus can cause a respiratory illness, such as bronchitis or  pneumonia.  The most common symptoms are: cough, fever, and shortness of breath.   Other symptoms are: body aches, chills, diarrhea, fatigue, headache, runny nose, and sore throat   Coronavirus - Exposure Risk Factors:   Exposure to a person who has been diagnosed with coronavirus.  Travel from an area with recent local transmission of coronavirus.  The CDC (www.cdc.gov) has the most up-to-date list of where the coronavirus outbreak is occurring.   Coronavirus - Spreading:    The virus likely spreads through respiratory droplets produced when a person coughs or sneezes. These respiratory droplets can travel approximately 6 feet and can remain on surfaces. Common disinfectants will kill the virus.  The CDC currently does not recommend healthy people wear masks.   Coronavirus - Protect Yourself:    Avoid close contact with people known to have this new coronavirus infection.  Wash hands often with soap and water or alcohol-based hand .  Avoid touching the eyes, nose or mouth.   Thank you for limiting contact with others, wearing a simple mask to cover your cough, practice good hand hygiene habits and accessing our virtual services where possible to limit the spread of this virus.  For more information about COVID19 and options for caring for yourself at home, please visit the CDC website at https://www.cdc.gov/coronavirus/2019-ncov/about/steps-when-sick.html   For more options for care at [facility], please visit our website at [email or web address]     Diagnosis: Cough  Diagnosis ICD: R05  Additional Clinician Notes: I hope you're feeling better soon!  Prescription: benzonatate (Tessalon Perles) 100 mg oral capsule 30 capsule, 5 days supply. Take 1-2 capsules by mouth 3 times per day as needed. Refills: 0, Refill as needed: no, Allow substitutions: yes  Ph[facility]: [id number] - [phone] - [address] [place] [address]

## 2020-03-17 ENCOUNTER — MYC MEDICAL ADVICE (OUTPATIENT)
Dept: FAMILY MEDICINE | Facility: OTHER | Age: 50
End: 2020-03-17

## 2020-03-17 NOTE — TELEPHONE ENCOUNTER
If she still has a fever, she should be seen (in clinic, may need a CXR).  If her fever has resolved and has been resolved for over 24 hours, she may return to work with usual precautions - wash hands, cover cough, etc.

## 2020-06-07 ENCOUNTER — HOSPITAL ENCOUNTER (EMERGENCY)
Facility: HOSPITAL | Age: 50
Discharge: HOME | End: 2020-06-07
Attending: NURSE PRACTITIONER | Admitting: NURSE PRACTITIONER
Payer: COMMERCIAL

## 2020-06-07 ENCOUNTER — APPOINTMENT (OUTPATIENT)
Dept: GENERAL RADIOLOGY | Facility: HOSPITAL | Age: 50
End: 2020-06-07
Attending: NURSE PRACTITIONER
Payer: COMMERCIAL

## 2020-06-07 VITALS
OXYGEN SATURATION: 100 % | SYSTOLIC BLOOD PRESSURE: 133 MMHG | DIASTOLIC BLOOD PRESSURE: 89 MMHG | RESPIRATION RATE: 16 BRPM | TEMPERATURE: 96.6 F

## 2020-06-07 DIAGNOSIS — S93.402A LEFT ANKLE SPRAIN: Primary | ICD-10-CM

## 2020-06-07 PROCEDURE — 73610 X-RAY EXAM OF ANKLE: CPT | Mod: TC,LT

## 2020-06-07 PROCEDURE — 99212 OFFICE O/P EST SF 10 MIN: CPT | Mod: Z6 | Performed by: NURSE PRACTITIONER

## 2020-06-07 PROCEDURE — G0463 HOSPITAL OUTPT CLINIC VISIT: HCPCS

## 2020-06-07 ASSESSMENT — ENCOUNTER SYMPTOMS
MYALGIAS: 1
JOINT SWELLING: 1

## 2020-06-07 NOTE — ED TRIAGE NOTES
Pt presents today with c/o left ankle pain. Twisted yesterday. Rates pain at 5/10, pt took tylenol yesterday.

## 2020-06-07 NOTE — ED AVS SNAPSHOT
[facility]  [address]  [place] [address]  Phone:  [phone]                                    Shazia Verma   MRN: [id number]    Department:  [facility]   Date of Visit:  6/7/2020           After Visit Summary Signature Page    I have received my discharge instructions, and my questions have been answered. I have discussed any challenges I see with this plan with the nurse or doctor.    ..........................................................................................................................................  Patient/Patient Representative Signature      ..........................................................................................................................................  Patient Representative Print Name and Relationship to Patient    ..................................................               ................................................  Date                                   Time    ..........................................................................................................................................  Reviewed by Signature/Title    ...................................................              ..............................................  Date                                               Time          22EPIC Rev 08/18

## 2020-06-07 NOTE — DISCHARGE INSTRUCTIONS
Rest, apply ice and elevate affected extremity.  Continue taking ibuprofen or Tylenol as needed for pain.    Follow-up with your doctor in 2 weeks as needed if no improvement in symptoms.    Return to emergency department for worsening concerning symptoms.

## 2020-06-07 NOTE — ED PROVIDER NOTES
History     Chief Complaint   Patient presents with     Ankle Pain     HPI  Shazia Verma is a 49 year old female who presents to  for left ankle pain. Onset 1 day ago. She was wearing flip flops when she rolled her ankle. She was able to walk after this happened. She has swelling and pain to left outer ankle. She has been applying ice and elevating it. She took tylenol last night. No history of ankle surgeries.     Allergies:  Allergies   Allergen Reactions     Reglan [Metoclopramide] Anxiety       Problem List:    Patient Active Problem List    Diagnosis Date Noted     Other headache syndrome 09/05/2019     Priority: Medium     S/P hysterectomy 05/02/2018     Priority: Medium     Hypoglycemia 09/08/2017     Priority: Medium     ACP (advance care planning) 11/28/2016     Priority: Medium     Advance Care Planning 11/28/2016: ACP Review of Chart / Resources Provided:  Reviewed chart for advance care plan.  Shazia Verma has been provided information and resources to begin or update their advance care plan.  Added by WOODY LENNON             H/O gastric bypass 11/28/2016     Priority: Medium     Had type 2 diabetes, hyperlipidemia and hypertension prior to surgery.           Past Medical History:    History reviewed. No pertinent past medical history.    Past Surgical History:    Past Surgical History:   Procedure Laterality Date     ABDOMEN SURGERY  2012    gastric bypass     APPENDECTOMY  1991     BREAST SURGERY  2002    right bx     COLONOSCOPY  2013     GI SURGERY  2012    gastric by pass     GYN SURGERY  1992 1994    c section x 2     LAPAROSCOPIC HYSTERECTOMY SUPRACERVICAL N/A 5/2/2018    Procedure: LAPAROSCOPIC HYSTERECTOMY SUPRACERVICAL;  LAPAROSCOPIC SUPRACERVICAL HYSTERECTOMY, BILATEARL SALPINGECTOMY, LYSIS OF ADHESIONS;  Surgeon: Jean Marie Garvin MD;  Location: [facility]     ORTHOPEDIC SURGERY  2015    right hip labreal tear     ORTHOPEDIC SURGERY  2014    left wrist      SALPINGECTOMY Bilateral 5/2/2018     Procedure: SALPINGECTOMY;;  Surgeon: Jean Marie Garvin MD;  Location: [facility]       Family History:    Family History   Problem Relation Age of Onset     Cerebrovascular Disease Mother      Hyperlipidemia Mother      Hypertension Mother      Coronary Artery Disease Mother      Migraines Mother      Obesity Mother      Osteoarthritis Mother      Osteoporosis Mother      Diabetes Mother      Hyperlipidemia Father      Hypertension Father      Thyroid Disease Father      Hypertension Brother      Hyperlipidemia Brother      Obesity Brother      Hypertension Sister      Thyroid Disease Sister        Social History:  Marital Status:   [2]  Social History     Tobacco Use     Smoking status: Former Smoker     Smokeless tobacco: Never Used   Substance Use Topics     Alcohol use: Yes     Comment: occ     Drug use: No        Medications:    calcium carbonate (OS-MANUEL 500 MG [place]. CA) 500 MG tablet  Cyanocobalamin (VITAMIN B 12 PO)  Ferrous Sulfate (IRON SUPPLEMENT PO)  multivitamin, therapeutic with minerals (MULTI-VITAMIN) TABS  VITAMIN D, CHOLECALCIFEROL, PO          Review of Systems   Musculoskeletal: Positive for joint swelling and myalgias. Negative for gait problem.   All other systems reviewed and are negative.      Physical Exam   BP: 133/89  Heart Rate: 86  Temp: 96.6  F (35.9  C)  Resp: 16  SpO2: 100 %      Physical Exam  Vitals signs and nursing note reviewed.   Constitutional:       Appearance: Normal appearance. She is not ill-appearing or toxic-appearing.   HENT:      Head: Normocephalic and atraumatic.   Eyes:      Pupils: Pupils are equal, round, and reactive to light.   Neck:      Musculoskeletal: Normal range of motion and neck supple.   Cardiovascular:      Rate and Rhythm: Normal rate.      Pulses:           Dorsalis pedis pulses are 2+ on the left side.   Pulmonary:      Effort: Pulmonary effort is normal.   Musculoskeletal:      Left ankle: She exhibits decreased range of motion and swelling.  Tenderness. Lateral malleolus tenderness found. No medial malleolus tenderness found. Achilles tendon normal.      Left foot: No deformity.   Skin:     General: Skin is warm and dry.      Capillary Refill: Capillary refill takes less than 2 seconds.   Neurological:      Mental Status: She is alert and oriented to person, place, and time.         ED Course        Procedures             Results for orders placed or performed during the hospital encounter of 06/07/20 (from the past 24 hour(s))   XR Ankle Left G/E 3 Views    Narrative    Exam: XR ANKLE LT G/E 3 VW     History:Female, age 49 years, twisted ankle yesterday. Increased pain  today.    Comparison:  None    Technique: Three views are submitted.    Findings: Bones are normally mineralized. No evidence of acute or  subacute fracture.  No evidence of dislocation.  Plantar spur. Mild  midfoot degenerative change.           Impression    Impression:  No evidence of acute or subacute bony abnormality. Lateral soft tissue  swelling.    EMMETT MEJIA MD       Medications - No data to display    Assessments & Plan (with Medical Decision Making)   Left ankle sprain:  Left pedal pulse 2+.  Cap refill less than 2 seconds.  Moderate swelling and tenderness to palpation to lateral malleolus.  Left ankle x-ray negative for acute fracture or dislocation.  Aircast splint applied in urgent care.  Discussed with patient to rest, apply ice and elevate affected extremity.  Take ibuprofen or Tylenol as needed for pain.  Follow-up with PCP in 2 weeks as needed if no improvement in symptoms.  Return to ED/UC for worsening or concerning symptoms.  Patient verbalized understanding.    I have reviewed the nursing notes.    I have reviewed the findings, diagnosis, plan and need for follow up with the patient.      New Prescriptions    No medications on file       Final diagnoses:   Left ankle sprain       6/7/2020   [facility]     Mpofu, Prudence, CNP  06/07/20 1150

## 2020-06-07 NOTE — ED TRIAGE NOTES
"Patient arrives for evaluation of left ankle pain. Reports \"twisting\" her ankle when wearing flip-flops yesterday. Rates 6/10. OTC Tylenol with some improvement.   "

## 2020-06-25 NOTE — PROGRESS NOTES
Physical Therapy Discharge Note      Name: Shazia Verma MRN# [id number]   Age: 50 year old YOB: 1970        Requesting provider:   Diagnosis: L Trochanteric Bursitis  Prescription: Evaluate and treat  Frequency/duration: Therapists discretion  Services provided: The patient was seen for a total of  visits from  to .  See the previous documentation for treatment details.        Goals:     Short term:         Long term:         Plan  Intervention:  The patient has not contacted the department since the previous visit.  For additional information regarding goals and status as of last, please refer to prior documentation.  It is uncertain if the patient has attained any further goals at this time.  The patient will be formally discharged from physical therapy care.  We will resume physical therapy services as per physician referral and discretion.     Follow up:  Recheck with physician as needed

## 2020-06-30 ENCOUNTER — NURSE TRIAGE (OUTPATIENT)
Dept: FAMILY MEDICINE | Facility: OTHER | Age: 50
End: 2020-06-30

## 2020-06-30 NOTE — TELEPHONE ENCOUNTER
"Breast lump discovered on 6/29/20 when lying in bed. Size of a 50 cent piece. Lump feels hard, reports fixed non-moveable. (see protocol for additional details).    In Clinic appt has been provided with Wilma Pro: (PCP on Vacation)    Next 5 appointments (look out 90 days)    Jul 06, 2020  1:30 PM CDT  (Arrive by 1:15 PM)  SHORT with Wilma Pro, CNP  [facility] [place] ([facility] [place] ) [address]  [place] [address]  [phone]          Additional Information    Negative: Chest pain    Negative: Breastfeeding questions about baby    Negative: Breastfeeding questions about mother (breast symptoms or feeling sick)    Negative: Breastfeeding questions about mother's medicines and diet    Negative: Postpartum breast pain and swelling, not breastfeeding    Negative: Small spot, skin growth or mole    Negative: SEVERE breast pain and fever > 103 F (39.4 C)    Negative: Patient sounds very sick or weak to the triager    Negative: Breast looks infected (spreading redness, feels hot or painful to touch) and fever    Negative: Breast looks infected (spreading redness, feels hot or painful to touch) and no fever    Negative: Painful rash and multiple small blisters grouped together (i.e., dermatomal distribution or \"band\" or \"stripe\")    Negative: Cuts, burns, or bruises of breasts and suspicious history for the injury    Breast lump    Answer Assessment - Initial Assessment Questions  1. SYMPTOM: \"What's the main symptom you're concerned about?\"  (e.g., lump, pain, rash, nipple discharge)      Lump without pain, reports non movable.     2. LOCATION: \"Where is the breast lump located?\"      Top of right breast located at the [address] [facility] region    3. ONSET: \"When did lump start?\"      Discovered on 6/29/20    4. PRIOR HISTORY: \"Do you have any history of prior problems with your breasts?\" (e.g., lumps, cancer, fibrocystic breast disease)      Fibrocystic breast disease, has had " "cysts removed in the past.     5. CAUSE: \"What do you think is causing this symptom?\"      Unknown    6. OTHER SYMPTOMS: \"Do you have any other symptoms?\" (e.g., fever, breast pain, redness or rash, nipple discharge)      No other symptoms    7. PREGNANCY-BREASTFEEDING: \"Is there any chance you are pregnant?\" \"When was your last menstrual period?\" \"Are you breastfeeding?\"      No    Protocols used: BREAST SYMPTOMS-A-[place]      "

## 2020-07-02 NOTE — PROGRESS NOTES
Subjective     Shazia Verma is a 50 year old female who presents to clinic today for the following health issues:    HPI   Lump on Right Breast      Duration: 1 week    Description (location/character/radiation): right breast upper and outer area. .     Intensity:  mild    Accompanying signs and symptoms: There is also a bruise above the area and Shazia is unsure of any injury or anything that caused the bruise    History (similar episodes/previous evaluation): lumpectomy in 2002    Precipitating or alleviating factors: None    Therapies tried and outcome: None       Patient Active Problem List   Diagnosis     ACP (advance care planning)     H/O gastric bypass     Hypoglycemia     S/P hysterectomy     Other headache syndrome     Past Surgical History:   Procedure Laterality Date     ABDOMEN SURGERY  2012    gastric bypass     APPENDECTOMY  1991     BREAST SURGERY  2002    right bx     COLONOSCOPY  2013     GI SURGERY  2012    gastric by pass     GYN SURGERY  1992 1994    c section x 2     LAPAROSCOPIC HYSTERECTOMY SUPRACERVICAL N/A 5/2/2018    Procedure: LAPAROSCOPIC HYSTERECTOMY SUPRACERVICAL;  LAPAROSCOPIC SUPRACERVICAL HYSTERECTOMY, BILATEARL SALPINGECTOMY, LYSIS OF ADHESIONS;  Surgeon: Jean Marie Garvin MD;  [facility]     ORTHOPEDIC SURGERY  2015    right hip labreal tear     ORTHOPEDIC SURGERY  2014    left wrist      SALPINGECTOMY Bilateral 5/2/2018    Procedure: SALPINGECTOMY;;  Surgeon: Jean Marie Garvin MD;  Location: [facility]       Social History     Tobacco Use     Smoking status: Former Smoker     Smokeless tobacco: Never Used   Substance Use Topics     Alcohol use: Yes     Comment: occ     Family History   Problem Relation Age of Onset     Cerebrovascular Disease Mother      Hyperlipidemia Mother      Hypertension Mother      Coronary Artery Disease Mother      Migraines Mother      Obesity Mother      Osteoarthritis Mother      Osteoporosis Mother      Diabetes Mother      Hyperlipidemia Father       "Hypertension Father      Thyroid Disease Father      Hypertension Brother      Hyperlipidemia Brother      Obesity Brother      Hypertension Sister      Thyroid Disease Sister      Stomach Cancer Maternal Grandmother      Cancer Maternal Grandfather         colon or liver: unsure     Leukemia Paternal Grandfather          Current Outpatient Medications   Medication Sig Dispense Refill     multivitamin, therapeutic with minerals (MULTI-VITAMIN) TABS Take 1 tablet by mouth daily       calcium carbonate (OS-MANUEL 500 MG [place]. CA) 500 MG tablet        Cyanocobalamin (VITAMIN B 12 PO)        Ferrous Sulfate (IRON SUPPLEMENT PO) Take 325 mg by mouth daily (with breakfast) 2 tabs daily       VITAMIN D, CHOLECALCIFEROL, PO Take 5,000 Units by mouth daily 2 tabs daily         Reviewed and updated as needed this visit by Provider         Review of Systems   Constitutional, HEENT, cardiovascular, pulmonary, gi and gu systems are negative, except as otherwise noted.      Objective    /76   Pulse 96   Temp 97  F (36.1  C) (Tympanic)   Ht 1.651 m (5' 5\")   Wt 81.6 kg (180 lb)   LMP 04/10/2018 (LMP Unknown)   SpO2 98%   BMI 29.95 kg/m    Body mass index is 29.95 kg/m .     Physical Exam   GENERAL: healthy, alert and no distress  EYES: Eyes grossly normal to inspection, PERRL and conjunctivae and sclerae normal  RESP: lungs clear to auscultation - no rales, rhonchi or wheezes  BREAST:  Between approximately 10-11 O'clock position of right breast approximately 2-3 cm from nipple. Area of increased density to palpation.  CV: regular rate and rhythm, normal S1 S2, no S3 or S4, no murmur, click or rub, no peripheral edema and peripheral pulses strong  MS: no gross musculoskeletal defects noted, no edema  NEURO: Normal strength and tone, mentation intact and speech normal  PSYCH: mentation appears normal, affect normal/bright    No results found for any visits on 07/06/20.          Assessment & Plan    1. Lump of right " breast  -MA DIAGNOSTIC DIGITAL BILATERAL   - US Breast Right Limited 1-3 Quadrants; Future       No follow-ups on file.    Wilma Pro, CNP  [facility]

## 2020-07-06 ENCOUNTER — OFFICE VISIT (OUTPATIENT)
Dept: FAMILY MEDICINE | Facility: OTHER | Age: 50
End: 2020-07-06
Attending: NURSE PRACTITIONER
Payer: COMMERCIAL

## 2020-07-06 VITALS
HEART RATE: 96 BPM | OXYGEN SATURATION: 98 % | DIASTOLIC BLOOD PRESSURE: 76 MMHG | BODY MASS INDEX: 29.99 KG/M2 | TEMPERATURE: 97 F | HEIGHT: 65 IN | WEIGHT: 180 LBS | SYSTOLIC BLOOD PRESSURE: 120 MMHG

## 2020-07-06 DIAGNOSIS — N63.10 LUMP OF RIGHT BREAST: Primary | ICD-10-CM

## 2020-07-06 PROCEDURE — 99214 OFFICE O/P EST MOD 30 MIN: CPT | Performed by: NURSE PRACTITIONER

## 2020-07-06 ASSESSMENT — MIFFLIN-ST. JEOR: SCORE: 1437.35

## 2020-07-06 ASSESSMENT — PAIN SCALES - GENERAL: PAINLEVEL: NO PAIN (0)

## 2020-07-06 ASSESSMENT — PATIENT HEALTH QUESTIONNAIRE - PHQ9: SUM OF ALL RESPONSES TO PHQ QUESTIONS 1-9: 0

## 2020-07-06 NOTE — NURSING NOTE
"Chief Complaint   Patient presents with     Mass     right breast       Initial /76   Pulse 96   Temp 97  F (36.1  C) (Tympanic)   Ht 1.651 m (5' 5\")   Wt 81.6 kg (180 lb)   LMP 04/10/2018 (LMP Unknown)   SpO2 98%   BMI 29.95 kg/m   Estimated body mass index is 29.95 kg/m  as calculated from the following:    Height as of this encounter: 1.651 m (5' 5\").    Weight as of this encounter: 81.6 kg (180 lb).  Medication Reconciliation: complete  Betty Bautista LPN  "

## 2020-07-08 ENCOUNTER — HOSPITAL ENCOUNTER (OUTPATIENT)
Dept: ULTRASOUND IMAGING | Facility: HOSPITAL | Age: 50
End: 2020-07-08
Attending: NURSE PRACTITIONER
Payer: COMMERCIAL

## 2020-07-08 ENCOUNTER — ANCILLARY PROCEDURE (OUTPATIENT)
Dept: MAMMOGRAPHY | Facility: OTHER | Age: 50
End: 2020-07-08
Attending: NURSE PRACTITIONER
Payer: COMMERCIAL

## 2020-07-08 DIAGNOSIS — N63.10 LUMP OF RIGHT BREAST: ICD-10-CM

## 2020-07-08 PROCEDURE — 76642 ULTRASOUND BREAST LIMITED: CPT | Mod: TC,RT

## 2020-07-08 PROCEDURE — G0279 TOMOSYNTHESIS, MAMMO: HCPCS | Mod: TC

## 2020-07-08 PROCEDURE — 77065 DX MAMMO INCL CAD UNI: CPT | Mod: TC

## 2020-07-08 RX ORDER — LIDOCAINE HYDROCHLORIDE 10 MG/ML
10 INJECTION, SOLUTION EPIDURAL; INFILTRATION; INTRACAUDAL; PERINEURAL ONCE
Status: CANCELLED | OUTPATIENT
Start: 2020-07-14

## 2020-07-08 NOTE — LETTER
Shazia Verma  [address] [place] [address]      July 8, 2020      Date of Exam: 7/8/20      Dear Shazia Verma:    Thank you for your recent visit.    Breast Imaging Result: Based on your recent breast imaging, you have a suspicious area that usually requires a biopsy, at which time a small tissue sample would be taken from your breast.      If you have already made these arrangements, please disregard this letter.    A report of your breast imaging results was sent to: [facility]    Your breast imaging will become part of your medical file here at [facility] for at least 10 years. You are responsible for informing any new health care provider or breast imaging facility of the date and location of this examination.    We appreciate the opportunity to participate in your health care.    Sincerely,    Wilder lCifton MD  Interpreting Radiologist  [facility]

## 2020-07-11 ENCOUNTER — OFFICE VISIT (OUTPATIENT)
Dept: FAMILY MEDICINE | Facility: OTHER | Age: 50
End: 2020-07-11
Attending: NURSE PRACTITIONER
Payer: COMMERCIAL

## 2020-07-11 DIAGNOSIS — Z01.818 PRE-OP TESTING: Primary | ICD-10-CM

## 2020-07-11 PROCEDURE — U0003 INFECTIOUS AGENT DETECTION BY NUCLEIC ACID (DNA OR RNA); SEVERE ACUTE RESPIRATORY SYNDROME CORONAVIRUS 2 (SARS-COV-2) (CORONAVIRUS DISEASE [COVID-19]), AMPLIFIED PROBE TECHNIQUE, MAKING USE OF HIGH THROUGHPUT TECHNOLOGIES AS DESCRIBED BY CMS-2020-01-R: HCPCS | Performed by: NURSE PRACTITIONER

## 2020-07-12 LAB
SARS-COV-2 RNA SPEC QL NAA+PROBE: NOT DETECTED
SPECIMEN SOURCE: NORMAL

## 2020-07-13 ENCOUNTER — DOCUMENTATION ONLY (OUTPATIENT)
Dept: INTERVENTIONAL RADIOLOGY/VASCULAR | Facility: HOSPITAL | Age: 50
End: 2020-07-13

## 2020-07-13 NOTE — PROGRESS NOTES
Patient called to remind of breast biopsy tomorrow.  Confirmed with her that we will be doing a right breast biopsy.  Patient to arrive at 10:00for a 10:30 procedure.

## 2020-07-14 ENCOUNTER — ANCILLARY PROCEDURE (OUTPATIENT)
Dept: MAMMOGRAPHY | Facility: OTHER | Age: 50
End: 2020-07-14
Attending: SURGERY
Payer: COMMERCIAL

## 2020-07-14 ENCOUNTER — HOSPITAL ENCOUNTER (OUTPATIENT)
Facility: HOSPITAL | Age: 50
Discharge: HOME | End: 2020-07-14
Attending: RADIOLOGY | Admitting: RADIOLOGY
Payer: COMMERCIAL

## 2020-07-14 ENCOUNTER — HOSPITAL ENCOUNTER (OUTPATIENT)
Dept: ULTRASOUND IMAGING | Facility: HOSPITAL | Age: 50
End: 2020-07-14
Attending: SURGERY
Payer: COMMERCIAL

## 2020-07-14 VITALS — HEART RATE: 71 BPM | OXYGEN SATURATION: 98 % | DIASTOLIC BLOOD PRESSURE: 88 MMHG | SYSTOLIC BLOOD PRESSURE: 130 MMHG

## 2020-07-14 DIAGNOSIS — N63.11 MASS OF UPPER OUTER QUADRANT OF RIGHT BREAST: ICD-10-CM

## 2020-07-14 DIAGNOSIS — Z98.890 STATUS POST BIOPSY: ICD-10-CM

## 2020-07-14 PROCEDURE — 40000986 ZZH STATISTIC EXAM NOT IN OR

## 2020-07-14 PROCEDURE — 25000125 ZZHC RX 250

## 2020-07-14 PROCEDURE — 27210207 US BREAST BIOPSY VACUUM RIGHT: Mod: TC

## 2020-07-14 PROCEDURE — 88305 TISSUE EXAM BY PATHOLOGIST: CPT | Mod: TC | Performed by: RADIOLOGY

## 2020-07-14 RX ORDER — LIDOCAINE HYDROCHLORIDE 10 MG/ML
10 INJECTION, SOLUTION EPIDURAL; INFILTRATION; INTRACAUDAL; PERINEURAL ONCE
Status: COMPLETED | OUTPATIENT
Start: 2020-07-14 | End: 2020-07-14

## 2020-07-14 RX ORDER — LIDOCAINE HYDROCHLORIDE 10 MG/ML
INJECTION, SOLUTION EPIDURAL; INFILTRATION; INTRACAUDAL; PERINEURAL
Status: COMPLETED
Start: 2020-07-14 | End: 2020-07-14

## 2020-07-14 RX ADMIN — LIDOCAINE HYDROCHLORIDE 10 ML: 10 INJECTION, SOLUTION EPIDURAL; INFILTRATION; INTRACAUDAL; PERINEURAL at 10:53

## 2020-07-14 RX ADMIN — LIDOCAINE HYDROCHLORIDE,EPINEPHRINE BITARTRATE 9 ML: 20; .01 INJECTION, SOLUTION INFILTRATION; PERINEURAL at 10:53

## 2020-07-14 NOTE — IP AVS SNAPSHOT
[facility]  [address] [place] [address]  Phone:  [phone]                                    After Visit Summary   7/14/2020    Shazia Verma    MRN: [id number]           After Visit Summary Signature Page    I have received my discharge instructions, and my questions have been answered. I have discussed any challenges I see with this plan with the nurse or doctor.    ..........................................................................................................................................  Patient/Patient Representative Signature      ..........................................................................................................................................  Patient Representative Print Name and Relationship to Patient    ..................................................               ................................................  Date                                   Time    ..........................................................................................................................................  Reviewed by Signature/Title    ...................................................              ..............................................  Date                                               Time          22EPIC Rev 08/18

## 2020-07-14 NOTE — PROGRESS NOTES
Procedure: right breast biopsy    There were  no complications and patient has no symptoms..      Tolerated procedure moderately well, became light headed.  Placed in trendelenberg, feet elevated, cool wash cloth applied.  Dr Birmingham aware.  Feeling passed.  Blood pressure 138/84    Radiologist:Dr Clifton    Time Out: Prior to the start of the procedure and with procedural staff participation, I verbally confirmed the patient s identity using two indicators, relevant allergies, that the procedure was appropriate and matched the consent or emergent situation, and that the correct equipment/implants were available. Immediately prior to starting the procedure I conducted the Time Out with the procedural staff and re-confirmed the patient s name, procedure, and site/side. (The Joint Commission universal protocol was followed.)  Yes    Position:  right side lying    Pain:  3, during procedure    Sedation:None. Local Anesthestic used  No sedation    Estimated Blood Loss: Minimal     Condition: Stable    Comments: See dictated procedure note for full details     Jayne Hall, RN

## 2020-07-14 NOTE — IP AVS SNAPSHOT
MRN:[id number]                      After Visit Summary   7/14/2020    Shazia Verma    MRN: [id number]           Visit Information        Provider Department      7/14/2020 10:30 AM [facility]; [facility]; [facility]           Review of your medicines      Notice    This visit is during an admission. Changes to the med list made in this visit will be reflected in the After Visit Summary of the admission.           Protect others around you: Learn how to safely use, store and throw away your medicines at www.disposemymeds.org.       Follow-ups after your visit       Your next 10 appointments already scheduled    Jul 14, 2020 11:15 AM CDT  (Arrive by 10:00 AM)  MA POST PROCEDURE RIGHT with [facility]  [facility] ) [address] [place] [address]  [phone]   How do I prepare for my exam? (Food and drink instructions)  No Food and Drink Restrictions.    How do I prepare for my exam? (Other instructions)  Do not use any powder, lotion or deodorant under your arms or on your breast. If you do, we will ask you to remove it before your exam.    What should I wear: Wear comfortable, two-piece clothing.    How long does the exam take: Most scans will take 15 minutes.    What should I bring: Bring any previous mammograms from other facilities or have them mailed to the breast center.    Do I need a :  No  is needed.    What do I need to tell my doctor: If you have any allergies, tell your care team.    What should I do after the exam: No restrictions, you may resume normal activities.    What is this test: This test is an x-ray of the breast to look for breast disease. The breast is pressed between two plates to flatten and spread the tissue. An X-ray is taken of the breast from different angles.    Who should I call with questions: If you have any questions, please call the Imaging Department where you will have your exam. Directions, parking  "instructions, and other information are available on our website, [email or web address].    Other information about my exam  Three-dimensional (3D) mammograms are available at [facility] locations in [facility], [place], [place], [facility], [place], [facility] and [place].  Health locations include [facility] and the [facility] in [place].    Benefits of 3D mammograms include:  * Improved rate of cancer detection  * Decreases your chance of having to go back for more tests, which means fewer:  * \"False-positive\" results (This means that there is an abnormal area but it isn't cancer.)  * Invasive testing procedures, such as a biopsy or surgery  * Can provide clearer images of the breast if you have dense breast tissue.    *3D mammography is an optional exam that anyone can have with a 2D mammogram. It doesn't replace or take the place of a 2D mammogram. 2D mammograms remain an effective screening test for all women.  Not all insurance companies cover the cost of a 3D mammogram. Check with your insurance.     Jul 21, 2020  1:00 PM CDT  (Arrive by 12:45 PM)  CONSULT with Geronimo Kapadia MD  [facility] [address]  [facility] [address]  [phone]         Care Instructions       Further instructions from your care team         DISCHARGE INSTRUCTIONS FOR  BREAST BIOPSY    BREAST BIOPSY  A needle was used to locate the breast tissue. Tissue was removed to check the cells and be examined by a Pathologist. This will help your doctor plan any further treatment or follow-up. Your doctor will tell you the results of the tests in 3 to 5 days.    Follow these instructions:    Climb stairs slowly and use the hand rail. Avoid extra trips. No running or jumping.    No pushing, pulling or straining (vacuuming, shoveling, sweeping etc.)    You may shower tomorrow, pat the are dry around the tegaderm dressing.    Wear a bra at all " times until your breast is fully healed.    Apply ice to the breast during the first few hours following the procedure to relieve swelling and bruising.    Steri strips stay in place for 7-10 days or until they fall off. Do not pull them off.    May remove pressure dressing after 24 hours.    Call your doctor if you have:    increased bleeding or drainage from your incision.    swelling, redness or opening of your incision.    foul smell from your incision or dressing.    red streaks from your incision.    chills or fever over 101 degrees (by mouth).    pain not helped by your pain medication.    trouble or pain with breathing.    any new problems or concerns.    Additional Information About Your Visit       Pre[facility] Information    [facility] gives you secure access to your electronic health record. If you see a primary care provider, you can also send messages to your care team and make appointments. If you have questions, please call your primary care clinic.  If you do not have a primary care provider, please call [phone] and they will assist you.       Care EveryWhere ID    This is your Care EveryWhere ID. This could be used by other organizations to access your [place] medical records  [id number]       Your Vitals Were  Most recent update: 7/14/2020 10:19 AM    Blood Pressure   130/88    Pulse   71    Last Period   04/10/2018 (LMP Unknown)    Pulse Oximetry   98%           Primary Care Provider Office Phone # Fax #    Mary Ellen Armstrong [phone]      Equal Access to Services    [facility]: Hadii claudia stewarto Soana rosa, waaxda luqadaha, qaybta kaalmada jeff edmonds . So [facility] [phone].    ATENCIÓN: Si habla español, tiene a wadsworth disposición servicios gratuitos de asistencia lingüística. Llame al [phone].    We comply with applicable federal and state civil rights laws, including the [place] Human Rights Act. We do not discriminate  on the basis of race, color, creed, Bahai, national origin, marital status, age, disability, sex, sexual orientation, or gender identity.       Thank you!    Thank you for choosing [facility] for your care. Our goal is always to provide you with excellent care. Hearing back from our patients is one way we can continue to improve our services. Please take a few minutes to complete the written survey that you may receive in the mail after you visit with us. Thank you!         Medication List     Notice    This visit is during an admission. Changes to the med list made in this visit will be reflected in the After Visit Summary of the admission.

## 2020-07-15 LAB — COPATH REPORT: NORMAL

## 2020-07-20 NOTE — PATIENT INSTRUCTIONS
Thank you for allowing Dr. Kapadia and our surgical team to participate in your care. Please call our health unit coordinator at [phone] with scheduling questions or the nurse at [phone] with any other questions or concerns.

## 2020-07-21 ENCOUNTER — OFFICE VISIT (OUTPATIENT)
Dept: SURGERY | Facility: OTHER | Age: 50
End: 2020-07-21
Attending: SURGERY
Payer: COMMERCIAL

## 2020-07-21 VITALS
TEMPERATURE: 97.3 F | BODY MASS INDEX: 29.99 KG/M2 | WEIGHT: 180 LBS | OXYGEN SATURATION: 98 % | DIASTOLIC BLOOD PRESSURE: 78 MMHG | SYSTOLIC BLOOD PRESSURE: 128 MMHG | HEART RATE: 79 BPM | HEIGHT: 65 IN

## 2020-07-21 DIAGNOSIS — Z98.890 S/P RIGHT BREAST BIOPSY: Primary | ICD-10-CM

## 2020-07-21 PROCEDURE — 99214 OFFICE O/P EST MOD 30 MIN: CPT | Performed by: SURGERY

## 2020-07-21 ASSESSMENT — PAIN SCALES - GENERAL: PAINLEVEL: NO PAIN (0)

## 2020-07-21 ASSESSMENT — MIFFLIN-ST. JEOR: SCORE: 1437.35

## 2020-07-21 NOTE — PROGRESS NOTES
CLINIC NOTE - CONSULT  7/21/2020    Patient: Shazia Verma  Referring Physician: No ref. provider found    Reason for Referral   Biopsy negative right breast cancer    This is a 50 year old female with biopsy negative right breast cancer.  Biopsy was performed on 7/14/2020 and showed.  The patient is here to discuss results and any options if warranted.     Family  History of Breast Cancer: a negative family history for breast cancer.   Personal History of Breast Cancer: NO   Personal history of previous biopsies: YES   Age at Menarche: 15   P2,G2   Age at First Pregnancy: 21   Years of OCP use: 3-4 years   Still having periods: NO   Age at Menopause: 48 (surgical)   Use of HRT: NO    Past Medical History:  No past medical history on file.    Past Surgical History:  Past Surgical History:   Procedure Laterality Date     ABDOMEN SURGERY  2012    gastric bypass     APPENDECTOMY  1991     BREAST SURGERY  2002    right bx     COLONOSCOPY  2013     GI SURGERY  2012    gastric by pass     GYN SURGERY  1992 1994    c section x 2     LAPAROSCOPIC HYSTERECTOMY SUPRACERVICAL N/A 5/2/2018    Procedure: LAPAROSCOPIC HYSTERECTOMY SUPRACERVICAL;  LAPAROSCOPIC SUPRACERVICAL HYSTERECTOMY, BILATEARL SALPINGECTOMY, LYSIS OF ADHESIONS;  Surgeon: Jean Marie Garvin MD;  Location: [facility]     ORTHOPEDIC SURGERY  2015    right hip labreal tear     ORTHOPEDIC SURGERY  2014    left wrist      SALPINGECTOMY Bilateral 5/2/2018    Procedure: SALPINGECTOMY;;  Surgeon: Jean Marie Garvin MD;  Location: [facility]       Family History History:  Family History   Problem Relation Age of Onset     Cerebrovascular Disease Mother      Hyperlipidemia Mother      Hypertension Mother      Coronary Artery Disease Mother      Migraines Mother      Obesity Mother      Osteoarthritis Mother      Osteoporosis Mother      Diabetes Mother      Hyperlipidemia Father      Hypertension Father      Thyroid Disease Father      Hypertension Brother      Hyperlipidemia Brother   "    Obesity Brother      Hypertension Sister      Thyroid Disease Sister      Stomach Cancer Maternal Grandmother      Cancer Maternal Grandfather         colon or liver: unsure     Leukemia Paternal Grandfather        History of Tobacco Use:  History   Smoking Status     Former Smoker   Smokeless Tobacco     Never Used       Current Medications:  Current Outpatient Medications   Medication Sig Dispense Refill     calcium carbonate (OS-MANUEL 500 MG [place]. [place]) 500 MG tablet        Cyanocobalamin (VITAMIN B 12 PO)        Ferrous Sulfate (IRON SUPPLEMENT PO) Take 325 mg by mouth daily (with breakfast) 2 tabs daily       multivitamin, therapeutic with minerals (MULTI-VITAMIN) TABS Take 1 tablet by mouth daily       VITAMIN D, CHOLECALCIFEROL, PO Take 5,000 Units by mouth daily 2 tabs daily         Allergies:  Allergies   Allergen Reactions     Reglan [Metoclopramide] Anxiety       ROS:  Pertinent items are noted in HPI.  All other systems are negative.    PHYSICAL EXAM:     Vital signs: /78 (BP Location: Left arm, Patient Position: Chair, Cuff Size: Adult Large)   Pulse 79   Temp 97.3  F (36.3  C) (Tympanic)   Ht 1.651 m (5' 5\")   Wt 81.6 kg (180 lb)   LMP 04/10/2018 (LMP Unknown)   SpO2 98%   BMI 29.95 kg/m     Weight: [unfilled]   BMI: Body mass index is 29.95 kg/m .   General: Normal, healthy, cooperative, in no acute distress   Skin: no jaundice   HEENT: PERRLA and EOMI   Neck: supple   Lungs: clear to auscultation   CV: Regular rate and rhythm without murmer   Abdominal: abdomen is soft without significant tenderness, masses, organomegaly or guarding   Extremities: No cyanosis, clubbing or edema noted bilaterally in Upper and Lower Extremities   Neurological: without deficit     PATHOLOGY:  Copath Report   Date Value Ref Range Status   07/14/2020   Final    Patient Name: RISA REBOLLEDO  MR#: [id number]  Specimen #: [id number]  Collected: 7/14/2020  Received: 7/14/2020  Reported: 7/15/2020 13:22  Ordering " Phy(s): JIHAN THOMSON  Additional Phy(s): KAY CARDONA    For improved result formatting, select 'View Enhanced Report Format' under   Linked Documents section.    SPECIMEN(S):  Right ultrasound guided breast needle biopsy, 12:00 4 cm FCN radial    FINAL DIAGNOSIS:  Right breast, upper outer quadrant, needle biopsies  - Findings suggestive of fat necrosis (see microscopic description)  - Fibrocystic change    I have personally reviewed all specimens and/or slides, including the   listed special stains, and used them  with my medical judgement to determine or confirm the final diagnosis.    Electronically signed out by:    Deyvi Cabral M.D.    CLINICAL HISTORY:  Masses of upper outer quadrant    GROSS:  There are two cylinders of yellow and tan soft tissue which are each   approximately 3 mm in diameter.  They are  each approximately 1.5 mm long. (2 TE in 1 block). (Dictated by: Deyvi Cabral MD 7/14/2020 01:13 PM)    MICROSCOPIC:  There is breast tissue with a focus of fibrosis, chronic inflammation, and   small vessel proliferation lined by  a layer of epithelioid cells with some foamy macrophages.  The breast   tissue also has areas of fibrosis.    CPT Codes  A: 72377-CD7    COLLECTION SITE:  Client: [facility]  Location: [facility] (B)    The technical component of this testing was completed at the [facility], with the  professional component performed at the [facility], [address] [place] [address]  ([phone])         Did discuss the pathology with the radiologist and he felt that the pathology was consistent with the radiological findings.    ASSESSMENT:  50 year old female with biopsy negative right breast cancer.  Biopsy showed fibrocystic changes and fat necrosis.     PLAN:   At this point there is nothing else to do.  The patient will need a mammogram of the right breast in 6 months to set a new baseline.  This will be  put into recalls and she will obtain that.  She will otherwise follow-up with me PRN.

## 2020-07-21 NOTE — NURSING NOTE
"Chief Complaint   Patient presents with     New Patient     Breast results-s/p-Right breast, upper outer quadrant, needle biopsies 7/14/20       Initial /78 (BP Location: Left arm, Patient Position: Chair, Cuff Size: Adult Large)   Pulse 79   Temp 97.3  F (36.3  C) (Tympanic)   Ht 1.651 m (5' 5\")   Wt 81.6 kg (180 lb)   LMP 04/10/2018 (LMP Unknown)   SpO2 98%   BMI 29.95 kg/m   Estimated body mass index is 29.95 kg/m  as calculated from the following:    Height as of this encounter: 1.651 m (5' 5\").    Weight as of this encounter: 81.6 kg (180 lb).  Medication Reconciliation: complete  DENEEN FELICIANO LPN    "

## 2020-10-13 ENCOUNTER — MYC MEDICAL ADVICE (OUTPATIENT)
Dept: FAMILY MEDICINE | Facility: OTHER | Age: 50
End: 2020-10-13

## 2020-10-13 DIAGNOSIS — M25.551 HIP PAIN, RIGHT: Primary | ICD-10-CM

## 2020-10-13 DIAGNOSIS — Z96.641 S/P HIP REPLACEMENT, RIGHT: ICD-10-CM

## 2020-10-30 ENCOUNTER — HOSPITAL ENCOUNTER (OUTPATIENT)
Dept: PHYSICAL THERAPY | Facility: HOSPITAL | Age: 50
Setting detail: THERAPIES SERIES
End: 2020-10-30
Attending: ORTHOPAEDIC SURGERY
Payer: COMMERCIAL

## 2020-10-30 PROCEDURE — 97110 THERAPEUTIC EXERCISES: CPT | Mod: GP

## 2020-10-30 PROCEDURE — 97161 PT EVAL LOW COMPLEX 20 MIN: CPT | Mod: GP

## 2020-10-30 ASSESSMENT — ACTIVITIES OF DAILY LIVING (ADL)
STEPPING_UP_AND_DOWN_CURBS: EXTREME DIFFICULTY
DEEP_SQUATTING: NO DIFFICULTY AT ALL
SITTING_FOR_15_MINUTES: NO DIFFICULTY AT ALL
HOS_ADL_SCORE(%): 64.71
PUTTING_ON_SOCKS_AND_SHOES: EXTREME DIFFICULTY
RECREATIONAL_ACTIVITIES: MODERATE DIFFICULTY
WALKING_DOWN_STEEP_HILLS: SLIGHT DIFFICULTY
GETTING_INTO_AND_OUT_OF_AN_AVERAGE_CAR: EXTREME DIFFICULTY
WALKING_APPROXIMATELY_10_MINUTES: NO DIFFICULTY AT ALL
WALKING_UP_STEEP_HILLS: EXTREME DIFFICULTY
WALKING_15_MINUTES_OR_GREATER: SLIGHT DIFFICULTY
HOS_ADL_ITEM_SCORE_TOTAL: 44
GETTING_INTO_AND_OUT_OF_A_BATHTUB: SLIGHT DIFFICULTY
HOW_WOULD_YOU_RATE_YOUR_CURRENT_LEVEL_OF_FUNCTION_DURING_YOUR_USUAL_ACTIVITIES_OF_DAILY_LIVING_FROM_0_TO_100_WITH_100_BEING_YOUR_LEVEL_OF_FUNCTION_PRIOR_TO_YOUR_HIP_PROBLEM_AND_0_BEING_THE_INABILITY_TO_PERFORM_ANY_OF_YOUR_USUAL_DAILY_ACTIVITIES?: 60
HEAVY_WORK: MODERATE DIFFICULTY
GOING_DOWN_1_FLIGHT_OF_STAIRS: MODERATE DIFFICULTY
STANDING_FOR_15_MINUTES: SLIGHT DIFFICULTY
HOS_ADL_HIGHEST_POTENTIAL_SCORE: 68
TWISTING/PIVOTING_ON_INVOLVED_LEG: NO DIFFICULTY AT ALL
GOING_UP_1_FLIGHT_OF_STAIRS: EXTREME DIFFICULTY
LIGHT_TO_MODERATE_WORK: SLIGHT DIFFICULTY
HOS_ADL_COUNT: 17
ROLLING_OVER_IN_BED: SLIGHT DIFFICULTY
WALKING_INITIALLY: NO DIFFICULTY AT ALL

## 2020-10-30 NOTE — PROGRESS NOTES
"Initial Physical Therapy Evaluations       Name: Shazia Verma MRN# [id number]   Age: 50 year old YOB: 1970     Date of Consultation: October 30, 2020  Primary care provider: Mary Ellen Armstrong    Referring Physician: Roberto Yen MD  Orders: Eval and Treat  Medical Diagnosis: Iliopsoas tendinitis after total hip arthroplast  Onset of Illness/Injury: 10/30/2020    Reason for PT Visit: Patient is a 49 y/o female who presents with persistent pain during hip flexion following a total hip arthroplasty of the right hip in early 2019. Patient states that she began having this pain in the past few months with increasing intensity in the last \"couple of weeks\". Patient does not point to a single event that caused her increase in pain. Patient was receiving PT care at this clinic for her Left hip earlier this year prior to the COVID-19 social distancing guidelines going into place. Patient was referred to PT here by her surgeon at [facility] who instructed her to attend 4-6 sessions and if she is not seeing any improvements to call back and they will \"try an injection\". Patient states that her greatest impairments are hip ER rotation movements such as: putting on socks, putting on/tying shoes, and going up stairs with reciprocal pattern.   Prior Level of Function: IND    Community Support/Living Environment/Employment History: Lives at home with spouse/ Multistory house with adequate railings throughout/ Patient is employed at the [employer] but is currently working from home.      Patient/Family Goal: Decreased pain with stepping activities and increased ease with performing sit<>supine transfers.     Fall Screen:   Have you fallen 2 or more times in the last year? No  Have you fallen and had an injury in the last year? No  Timed up & go: N/A  Is patient a fall risk? No    Past Medical History:   No past medical history on file.    Past Surgical History:  Past Surgical History:   Procedure " Laterality Date     APPENDECTOMY  1991     BREAST BIOPSY, RT/LT Right 2002    right bx      SECTION  1992      COLONOSCOPY       GASTRIC BYPASS       LAPAROSCOPIC HYSTERECTOMY SUPRACERVICAL N/A 2018    Procedure: LAPAROSCOPIC HYSTERECTOMY SUPRACERVICAL;  LAPAROSCOPIC SUPRACERVICAL HYSTERECTOMY, BILATEARL SALPINGECTOMY, LYSIS OF ADHESIONS;  Surgeon: Jean Marie Garvin MD;  Location: [facility]     ORTHOPEDIC SURGERY      right hip labreal tear     ORTHOPEDIC SURGERY      left wrist      SALPINGECTOMY Bilateral 2018    Procedure: SALPINGECTOMY;;  Surgeon: Jean Marie Garvin MD;  Location: [facility]       Medications:   Current Outpatient Medications   Medication Sig     calcium carbonate (OS-MANUEL 500 MG [place]. [place]) 500 MG tablet      Cyanocobalamin (VITAMIN B 12 PO)      Ferrous Sulfate (IRON SUPPLEMENT PO) Take 325 mg by mouth daily (with breakfast) 2 tabs daily     multivitamin, therapeutic with minerals (MULTI-VITAMIN) TABS Take 1 tablet by mouth daily     VITAMIN D, CHOLECALCIFEROL, PO Take 5,000 Units by mouth daily 2 tabs daily     No current facility-administered medications for this encounter.        Imaging:     Musculoskeletal Findings:   Observation:   Gait: Mildly antalgic gait with right hip hiking during swing phase   Functional mobility :  -Patient using UE assistance for R LE control during sit<>supine tranfers  -Patient required UE support during stepping activities due to pain in R hip  Range of Motion/Strength:   Bilateral lower extremity range of motion and strength within normal limits except:  -Right Hip Flexion 3+/5 due to pain  -Right Hip Flexion limited due to muscle guarding at 90* when tested in supine    Outcome Measures:   HOS ADL %= 64.71%    Goals:   Short-term goals:  To be achieved in 2-3 weeks:    Instruct in home program  1.) Patient will understand biomechanical stressors of the hip joint and relative precautions in order to make modifications to activities to reduce  further risk of tendon inflammation/pain.   2.) Patient will be independent with a short-term home exercise program.  3.) Patient will report a decrease in incidence of pain >5/10 in order to promote relative rest of the tendon.      Long-term goals:  To be achieved in 4-8 weeks:    Return to previous level of function  Self management of symptoms.  Pain free activities of daily living.  1.) Patient will be able to negotiate a flight of stairs with reciprocal pattern independently.  2.) Improve right active hip range of motion within normal limits without pain provocation  3.) Improve right hip strength at least 4+/5 in all planes.    Discharge goals: Patient will be independent with a home program for self-management of symptoms.      Planned Interventions: Therapeutic exercise, manual therapy, therapeutic activity, patient education, gait training    Clinical Impressions:  Criteria for Skilled Therapeutic Intervention Met: Yes  PT Diagnosis: Hip flexion movement dysfunction.   Influenced by the following impairments: Weakness, decreased ROM, pain  Functional limitations due to impairment: Difficulty with ambulation, going up and down stairs, and performing normal ADLs  Clinical presentation: Stable/Uncomplicated  Clinical presentation rationale: Therapist discretion  Clinical Decision making (complexity): Low Complexity  Predicted Duration of Therapy Intervention (days/wks): 2x/wk  Risks and Benefits of therapy have been explained: Yes  Patient, Family & other staff in agreement with plan of care: Yes  Comments: Patient is likely suffering from a hip flexion movement dysfunction likely secondary to an iliopsoas tendonitis. She will benefit from skilled therapy services in order to provide tailored hip flexor muscle strengthening exercise prescription with relative rest of the tendon. Rehab intent at this time is to provide modifications to patient's ADL's and ambulation to avoid aggravation of the iliopsoas tendon  and progress strengthening as tolerated.       Date: 10/30/2020 to 1/28/21    Total Evaluation Time: 25     I certify the need for these services furnished under this plan of treatment and while under my care. (Physician co-signature of this document indicates review and certification of the therapy plan).      _____________________________     __________________________    ____________  Physician's Signature                 Date               Time

## 2020-11-03 ENCOUNTER — HOSPITAL ENCOUNTER (OUTPATIENT)
Dept: PHYSICAL THERAPY | Facility: HOSPITAL | Age: 50
Setting detail: THERAPIES SERIES
End: 2020-11-03
Attending: NURSE PRACTITIONER
Payer: COMMERCIAL

## 2020-11-03 PROCEDURE — 97110 THERAPEUTIC EXERCISES: CPT | Mod: GP

## 2020-11-10 ENCOUNTER — HOSPITAL ENCOUNTER (OUTPATIENT)
Dept: PHYSICAL THERAPY | Facility: HOSPITAL | Age: 50
Setting detail: THERAPIES SERIES
End: 2020-11-10
Attending: NURSE PRACTITIONER
Payer: COMMERCIAL

## 2020-11-10 PROCEDURE — 97140 MANUAL THERAPY 1/> REGIONS: CPT | Mod: GP

## 2020-11-10 PROCEDURE — 97110 THERAPEUTIC EXERCISES: CPT | Mod: GP

## 2020-11-12 NOTE — PROGRESS NOTES
"Subjective     Shazia Verma is a 50 year old female who presents to clinic today for the following health issues:    HPI         Migraine     Since your last clinic visit, how have your headaches changed?  Worsened    How often are you getting headaches or migraines? 2-3 times monthly     Are you able to do normal daily activities when you have a migraine? No, they will start at 3-4am and feel like a tight band around head, she is light and sound sensitive.  States \"takes me out for the day.\"    Are you taking rescue/relief medications? (Select all that apply) Excedrin migraine    How helpful is your rescue/relief medication?  The relief is inconsistent    Are you taking any medications to prevent migraines? (Select all that apply)  No    In the past 4 weeks, how often have you gone to urgent care or the emergency room because of your headaches?  0     Pain has been worsening, has to go to a dark room and cannot complete daily activities.      She was diagnosed many years ago with migraines by neurology.  She had been on imitrex for abortive therapy but has not needed them for several years.        How many servings of fruits and vegetables do you eat daily?  2-3    On average, how many sweetened beverages do you drink each day (Examples: soda, juice, sweet tea, etc.  Do NOT count diet or artificially sweetened beverages)?   0    How many days per week do you exercise enough to make your heart beat faster? 3 or less    How many minutes a day do you exercise enough to make your heart beat faster? 9 or less    How many days per week do you miss taking your medication? 0        Patient Active Problem List   Diagnosis     ACP (advance care planning)     H/O gastric bypass     Hypoglycemia     S/P hysterectomy     Other headache syndrome     History of right hip replacement     Past Surgical History:   Procedure Laterality Date     APPENDECTOMY       BREAST BIOPSY, RT/LT Right     right bx      SECTION  " 1992 1994     COLONOSCOPY  01/01/2013     GASTRIC BYPASS  2012     LAPAROSCOPIC HYSTERECTOMY SUPRACERVICAL N/A 05/02/2018    Procedure: LAPAROSCOPIC HYSTERECTOMY SUPRACERVICAL;  LAPAROSCOPIC SUPRACERVICAL HYSTERECTOMY, BILATEARL SALPINGECTOMY, LYSIS OF ADHESIONS;  Surgeon: Jean Marie Garvin MD;  Location: [facility]     ORTHOPEDIC SURGERY  2015    right hip labreal tear     ORTHOPEDIC SURGERY  2014    left wrist      SALPINGECTOMY Bilateral 05/02/2018    Procedure: SALPINGECTOMY;;  Surgeon: Jean Marie Garvin MD;  Location: [facility]       Social History     Tobacco Use     Smoking status: Former Smoker     Smokeless tobacco: Never Used   Substance Use Topics     Alcohol use: Yes     Comment: occ     Family History   Problem Relation Age of Onset     Cerebrovascular Disease Mother      Hyperlipidemia Mother      Hypertension Mother      Coronary Artery Disease Mother      Migraines Mother      Obesity Mother      Osteoarthritis Mother      Osteoporosis Mother      Diabetes Mother      Hyperlipidemia Father      Hypertension Father      Thyroid Disease Father      Hypertension Brother      Hyperlipidemia Brother      Obesity Brother      Hypertension Sister      Thyroid Disease Sister      Stomach Cancer Maternal Grandmother      Cancer Maternal Grandfather         colon or liver: unsure     Leukemia Paternal Grandfather          Current Outpatient Medications   Medication Sig Dispense Refill     calcium carbonate (OS-MANUEL 500 MG [place]. [place]) 500 MG tablet        celecoxib (CELEBREX) 200 MG capsule TAKE 1 CAPSULE BY MOUTH ONCE DAILY       Cyanocobalamin (VITAMIN B 12 PO)        Ferrous Sulfate (IRON SUPPLEMENT PO) Take 325 mg by mouth daily (with breakfast) 2 tabs daily       multivitamin, therapeutic with minerals (MULTI-VITAMIN) TABS Take 1 tablet by mouth daily       SUMAtriptan (IMITREX) 50 MG tablet Take 1 tablet (50 mg) by mouth at onset of headache for migraine May repeat in 2 hours. Max 4 tablets/24 hours. 12 tablet 3      "VITAMIN D, CHOLECALCIFEROL, PO Take 5,000 Units by mouth daily 2 tabs daily       Allergies   Allergen Reactions     Reglan [Metoclopramide] Anxiety     Recent Labs   Lab Test 12/30/19  1528 06/19/19  1424 01/07/19  1355 10/22/18  1414 04/26/18  0930 04/26/18  0930 09/01/17  1154 09/01/17  1154 11/28/16  1048 11/28/16  1048   A1C  --   --   --   --   --   --   --  5.7  --  5.2   *  --   --  122*  --   --   --   --   --   --    HDL 51  --   --  58  --   --   --   --   --   --    TRIG 259*  --   --  227*  --   --   --   --   --   --    ALT  --  26 21  --   --  20   < >  --    < >  --    CR 0.58 0.60 0.68 0.68   < > 0.58   < > 0.57   < >  --    GFRESTIMATED >90 >90 >90 >90   < > >90   < > >90   < >  --    GFRESTBLACK >90 >90 >90 >90   < > >90   < > >90   < >  --    POTASSIUM 4.1 3.7 4.2 3.6   < > 4.0   < > 4.1   < >  --    TSH 1.93 1.78  --  1.06  --   --   --  2.16   < >  --     < > = values in this interval not displayed.      BP Readings from Last 3 Encounters:   11/13/20 132/80   07/21/20 128/78   07/14/20 130/88    Wt Readings from Last 3 Encounters:   11/13/20 83.9 kg (185 lb)   07/21/20 81.6 kg (180 lb)   07/06/20 81.6 kg (180 lb)                 Review of Systems   Constitutional, HEENT, cardiovascular, pulmonary, gi and gu systems are negative, except as otherwise noted.      Objective    /80 (BP Location: Right arm, Patient Position: Chair, Cuff Size: Adult Regular)   Pulse 86   Temp 97.9  F (36.6  C) (Tympanic)   Ht 1.651 m (5' 5\")   Wt 83.9 kg (185 lb)   LMP 04/10/2018 (LMP Unknown)   SpO2 98%   BMI 30.79 kg/m    Body mass index is 30.79 kg/m .  Physical Exam   GENERAL: healthy, alert and no distress  NECK: no adenopathy, no asymmetry, masses, or scars, thyroid normal to palpation and no carotid bruits  RESP: lungs clear to auscultation - no rales, rhonchi or wheezes  CV: regular rate and rhythm, normal S1 S2, no S3 or S4, no murmur, click or rub, no peripheral edema and peripheral " "pulses strong  MS: no gross musculoskeletal defects noted, no edema  NEURO: Normal strength and tone, mentation intact and speech normal  PSYCH: mentation appears normal, affect normal/bright            Assessment & Plan     1. Migraine without aura and without status migrainosus, not intractable  Increase fluids   - SUMAtriptan (IMITREX) 50 MG tablet; Take 1 tablet (50 mg) by mouth at onset of headache for migraine May repeat in 2 hours. Max 4 tablets/24 hours.  Dispense: 12 tablet; Refill: 3         BMI:   Estimated body mass index is 30.79 kg/m  as calculated from the following:    Height as of this encounter: 1.651 m (5' 5\").    Weight as of this encounter: 83.9 kg (185 lb).          Return if symptoms worsen or fail to improve.    Mary Ellen Armstrnog, NP  [facility] - [facility]    "

## 2020-11-13 ENCOUNTER — OFFICE VISIT (OUTPATIENT)
Dept: FAMILY MEDICINE | Facility: OTHER | Age: 50
End: 2020-11-13
Attending: NURSE PRACTITIONER
Payer: COMMERCIAL

## 2020-11-13 VITALS
HEIGHT: 65 IN | HEART RATE: 86 BPM | DIASTOLIC BLOOD PRESSURE: 80 MMHG | OXYGEN SATURATION: 98 % | BODY MASS INDEX: 30.82 KG/M2 | WEIGHT: 185 LBS | SYSTOLIC BLOOD PRESSURE: 132 MMHG | TEMPERATURE: 97.9 F

## 2020-11-13 DIAGNOSIS — G43.009 MIGRAINE WITHOUT AURA AND WITHOUT STATUS MIGRAINOSUS, NOT INTRACTABLE: Primary | ICD-10-CM

## 2020-11-13 DIAGNOSIS — Z23 NEED FOR PROPHYLACTIC VACCINATION AND INOCULATION AGAINST INFLUENZA: ICD-10-CM

## 2020-11-13 PROBLEM — Z96.641 HISTORY OF RIGHT HIP REPLACEMENT: Status: ACTIVE | Noted: 2019-01-30

## 2020-11-13 PROCEDURE — 99213 OFFICE O/P EST LOW 20 MIN: CPT | Mod: 25 | Performed by: NURSE PRACTITIONER

## 2020-11-13 PROCEDURE — 90682 RIV4 VACC RECOMBINANT DNA IM: CPT | Performed by: NURSE PRACTITIONER

## 2020-11-13 PROCEDURE — 90471 IMMUNIZATION ADMIN: CPT | Performed by: NURSE PRACTITIONER

## 2020-11-13 RX ORDER — CELECOXIB 200 MG/1
CAPSULE ORAL
COMMUNITY
Start: 2020-10-26 | End: 2021-06-26

## 2020-11-13 RX ORDER — SUMATRIPTAN 50 MG/1
50 TABLET, FILM COATED ORAL
Qty: 12 TABLET | Refills: 3 | Status: SHIPPED | OUTPATIENT
Start: 2020-11-13 | End: 2024-07-08

## 2020-11-13 ASSESSMENT — MIFFLIN-ST. JEOR: SCORE: 1460.03

## 2020-11-13 ASSESSMENT — PAIN SCALES - GENERAL: PAINLEVEL: NO PAIN (0)

## 2020-11-13 NOTE — PATIENT INSTRUCTIONS
"    Assessment & Plan     1. Migraine without aura and without status migrainosus, not intractable  Increase fluids   - SUMAtriptan (IMITREX) 50 MG tablet; Take 1 tablet (50 mg) by mouth at onset of headache for migraine May repeat in 2 hours. Max 4 tablets/24 hours.  Dispense: 12 tablet; Refill: 3         BMI:   Estimated body mass index is 30.79 kg/m  as calculated from the following:    Height as of this encounter: 1.651 m (5' 5\").    Weight as of this encounter: 83.9 kg (185 lb).          Return if symptoms worsen or fail to improve.    Mary Ellen Armstrong, NP  [facility] - [facility]    "

## 2020-11-13 NOTE — NURSING NOTE
"Chief Complaint   Patient presents with     Headache       Initial /80 (BP Location: Right arm, Patient Position: Chair, Cuff Size: Adult Regular)   Pulse 86   Temp 97.9  F (36.6  C) (Tympanic)   Ht 1.651 m (5' 5\")   Wt 83.9 kg (185 lb)   LMP 04/10/2018 (LMP Unknown)   SpO2 98%   BMI 30.79 kg/m   Estimated body mass index is 30.79 kg/m  as calculated from the following:    Height as of this encounter: 1.651 m (5' 5\").    Weight as of this encounter: 83.9 kg (185 lb).  Medication Reconciliation: complete  Isa Medrano LPN    "

## 2020-11-19 ENCOUNTER — HOSPITAL ENCOUNTER (OUTPATIENT)
Dept: PHYSICAL THERAPY | Facility: HOSPITAL | Age: 50
Setting detail: THERAPIES SERIES
End: 2020-11-19
Attending: NURSE PRACTITIONER
Payer: COMMERCIAL

## 2020-11-19 PROCEDURE — 97110 THERAPEUTIC EXERCISES: CPT | Mod: GP

## 2020-11-19 PROCEDURE — 97140 MANUAL THERAPY 1/> REGIONS: CPT | Mod: GP

## 2020-11-25 ENCOUNTER — HOSPITAL ENCOUNTER (OUTPATIENT)
Dept: PHYSICAL THERAPY | Facility: HOSPITAL | Age: 50
Setting detail: THERAPIES SERIES
End: 2020-11-25
Attending: NURSE PRACTITIONER
Payer: COMMERCIAL

## 2020-11-25 PROCEDURE — 97140 MANUAL THERAPY 1/> REGIONS: CPT | Mod: GP

## 2020-11-25 PROCEDURE — 97110 THERAPEUTIC EXERCISES: CPT | Mod: GP

## 2020-11-30 ENCOUNTER — MYC MEDICAL ADVICE (OUTPATIENT)
Dept: FAMILY MEDICINE | Facility: OTHER | Age: 50
End: 2020-11-30

## 2020-11-30 NOTE — PROGRESS NOTES
Outpatient Physical Therapy Progress Note     Patient: Shazia Verma  : 1970    Beginning/End Dates of Reporting Period:  10/30/20 to 2020     Referring Provider: Roberto Yen MD    Therapy Diagnosis: Iliopsoas tendinitis after total hip arthroplasty     Client Self Report: Patient has slightly improving hip pain, less debilitating, less difficult w/ stairs.     Objective Measurements:  SLR able to tolerate performing half ROM     Outcome Measures (most recent score):  NT    Goals:  1.) Patient will be able to negotiate a flight of stairs with reciprocal pattern independently.  2.) Improve right active hip range of motion within normal limits without pain provocation  3.) Improve right hip strength at least 4+/5 in all planes.  Goals not met at this time    Progress:  Patient at this time beginning to show minimal progress.  Soft tissue work around tendon and thigh improved straight leg raise ability and hip flexor activation w/ less pain.  Patient working on isometrics and gentle ROM to the hip in non painful way in order to gradual progress tendon stress.  As she is starting to show some improvements, she may benefit from additional PT at this time.  Will defer to doctor's recommendations.     Discharge:  No.

## 2020-12-02 ENCOUNTER — HOSPITAL ENCOUNTER (OUTPATIENT)
Dept: PHYSICAL THERAPY | Facility: HOSPITAL | Age: 50
Setting detail: THERAPIES SERIES
End: 2020-12-02
Attending: NURSE PRACTITIONER
Payer: COMMERCIAL

## 2020-12-02 PROCEDURE — 97140 MANUAL THERAPY 1/> REGIONS: CPT | Mod: GP

## 2020-12-02 PROCEDURE — 97110 THERAPEUTIC EXERCISES: CPT | Mod: GP

## 2020-12-08 ENCOUNTER — TELEPHONE (OUTPATIENT)
Dept: INTERVENTIONAL RADIOLOGY/VASCULAR | Facility: HOSPITAL | Age: 50
End: 2020-12-08

## 2020-12-08 NOTE — TELEPHONE ENCOUNTER
Left message informing patient not to have a flu shot/immunization two weeks before/after injection, reschedule if waiting on covid test results/experiencing symptoms.

## 2020-12-10 ENCOUNTER — TELEPHONE (OUTPATIENT)
Dept: FAMILY MEDICINE | Facility: OTHER | Age: 50
End: 2020-12-10

## 2020-12-10 DIAGNOSIS — Z02.6 ENCOUNTER FOR INSURANCE EXAMINATION: Primary | ICD-10-CM

## 2020-12-11 DIAGNOSIS — Z02.6 ENCOUNTER FOR INSURANCE EXAMINATION: ICD-10-CM

## 2020-12-11 LAB
EST. AVERAGE GLUCOSE BLD GHB EST-MCNC: 117 MG/DL
HBA1C MFR BLD: 5.7 % (ref 0–5.6)

## 2020-12-11 PROCEDURE — 36415 COLL VENOUS BLD VENIPUNCTURE: CPT | Performed by: NURSE PRACTITIONER

## 2020-12-11 PROCEDURE — 80323 ALKALOIDS NOS: CPT | Mod: 90 | Performed by: NURSE PRACTITIONER

## 2020-12-11 PROCEDURE — 83036 HEMOGLOBIN GLYCOSYLATED A1C: CPT | Performed by: NURSE PRACTITIONER

## 2020-12-11 PROCEDURE — 99N1182 PR STATISTIC ESTIMATED AVERAGE GLUCOSE: Performed by: NURSE PRACTITIONER

## 2020-12-23 LAB
COTININE SERPL-MCNC: <1 NG/ML
NICOTINE SERPL-MCNC: <1 NG/ML

## 2020-12-24 ENCOUNTER — HOSPITAL ENCOUNTER (OUTPATIENT)
Dept: INTERVENTIONAL RADIOLOGY/VASCULAR | Facility: HOSPITAL | Age: 50
End: 2020-12-24
Attending: ORTHOPAEDIC SURGERY
Payer: COMMERCIAL

## 2020-12-24 ENCOUNTER — HOSPITAL ENCOUNTER (OUTPATIENT)
Facility: HOSPITAL | Age: 50
Discharge: HOME | End: 2020-12-24
Attending: RADIOLOGY | Admitting: RADIOLOGY
Payer: COMMERCIAL

## 2020-12-24 DIAGNOSIS — M25.559 JOINT PAIN, HIP: ICD-10-CM

## 2020-12-24 PROCEDURE — 250N000009 HC RX 250: Performed by: RADIOLOGY

## 2020-12-24 PROCEDURE — 250N000011 HC RX IP 250 OP 636: Performed by: RADIOLOGY

## 2020-12-24 PROCEDURE — 77002 NEEDLE LOCALIZATION BY XRAY: CPT

## 2020-12-24 PROCEDURE — 255N000002 HC RX 255 OP 636: Performed by: RADIOLOGY

## 2020-12-24 RX ORDER — LIDOCAINE HYDROCHLORIDE 10 MG/ML
10 INJECTION, SOLUTION INFILTRATION; PERINEURAL ONCE
Status: COMPLETED | OUTPATIENT
Start: 2020-12-24 | End: 2020-12-24

## 2020-12-24 RX ORDER — METHYLPREDNISOLONE ACETATE 80 MG/ML
80 INJECTION, SUSPENSION INTRA-ARTICULAR; INTRALESIONAL; INTRAMUSCULAR; SOFT TISSUE ONCE
Status: COMPLETED | OUTPATIENT
Start: 2020-12-24 | End: 2020-12-24

## 2020-12-24 RX ORDER — LIDOCAINE HYDROCHLORIDE 10 MG/ML
INJECTION, SOLUTION EPIDURAL; INFILTRATION; INTRACAUDAL; PERINEURAL
Status: DISPENSED
Start: 2020-12-24 | End: 2020-12-24

## 2020-12-24 RX ORDER — IOPAMIDOL 612 MG/ML
50 INJECTION, SOLUTION INTRAVASCULAR ONCE
Status: COMPLETED | OUTPATIENT
Start: 2020-12-24 | End: 2020-12-24

## 2020-12-24 RX ORDER — METHYLPREDNISOLONE ACETATE 80 MG/ML
INJECTION, SUSPENSION INTRA-ARTICULAR; INTRALESIONAL; INTRAMUSCULAR; SOFT TISSUE
Status: DISPENSED
Start: 2020-12-24 | End: 2020-12-24

## 2020-12-24 RX ADMIN — LIDOCAINE HYDROCHLORIDE 9 ML: 10 INJECTION, SOLUTION EPIDURAL; INFILTRATION; INTRACAUDAL; PERINEURAL at 08:55

## 2020-12-24 RX ADMIN — IOPAMIDOL 2 ML: 612 INJECTION, SOLUTION INTRAVENOUS at 08:56

## 2020-12-24 RX ADMIN — METHYLPREDNISOLONE ACETATE 80 MG: 80 INJECTION, SUSPENSION INTRA-ARTICULAR; INTRALESIONAL; INTRAMUSCULAR; SOFT TISSUE at 08:55

## 2020-12-24 NOTE — IP AVS SNAPSHOT
[facility]  [address] [address]  Phone: [phone]  Fax: [phone]                                    After Visit Summary   12/24/2020    Shazia Verma    MRN: [id number]           After Visit Summary Signature Page    I have received my discharge instructions, and my questions have been answered. I have discussed any challenges I see with this plan with the nurse or doctor.    ..........................................................................................................................................  Patient/Patient Representative Signature      ..........................................................................................................................................  Patient Representative Print Name and Relationship to Patient    ..................................................               ................................................  Date                                   Time    ..........................................................................................................................................  Reviewed by Signature/Title    ...................................................              ..............................................  Date                                               Time          22EPIC Rev 08/18

## 2020-12-24 NOTE — DISCHARGE INSTRUCTIONS
Discharge Instructions for an Radiology Injection    Home number on file [phone] (home)  Is it ok to leave a message at this number(s) Yes    Dr. Mendiola completed your procedure on 12/24/2020.    Current Pain Level (0-10 Scale): 3/10  Post Pain Level (0-10):  3/10      Activity Level:     Do not do any heavy activity or exercise for 24 hours.   Hip Injections:  Do not drive for 4 hours after your injection.  Diet:   Return to your normal diet.  Medications:   If you have stopped taking your Aspirin, Coumadin/Warfarin, Ibuprofen, or any   other blood thinner for this procedure you may resume in the morning unless   your primary care provider has given you other instructions.    Diabetics may see an increase in blood sugar after steroid injections. If you are concerned about your blood sugar, please contact your family doctor.    Site Care:  Remove the bandage and bathe or shower the morning after the procedure.      Please allow two weeks to experience improvement in your pain.  If you have any further issues, please contact your provider.  Call your Primary Care Provider if you have the following (if your primary care provider is not available please seek emergency care):   Nausea with vomiting   Severe headache   Drowsiness or confusion   Redness or drainage at the injection or puncture site   Temperature over 101 degrees F   Other concerns   Increasing joint pain

## 2021-01-06 NOTE — PATIENT INSTRUCTIONS
"  ASSESSMENT/PLAN:   1. Routine general medical examination at a health care facility  - Lipid Profile  - Comprehensive metabolic panel    2. Fatigue, unspecified type  - TSH with free T4 reflex  - CBC with platelets differential  - Vitamin D Deficiency    3. H/O gastric bypass  - Vitamin B12  - Ferritin    4. Other headache syndrome  - butalbital-acetaminophen-caffeine (ESGIC) -40 MG tablet; Take 1 tablet by mouth every 4 hours as needed for headaches  Dispense: 20 tablet; Refill: 1    5. Hip pain, right  - ORTHOPEDIC ADULT REFERRAL; Future      Patient has been advised of split billing requirements and indicates understanding: Yes  COUNSELING:   Reviewed preventive health counseling, as reflected in patient instructions       Regular exercise       Healthy diet/nutrition       Vision screening       Hearing screening       Immunizations       Colon cancer screening    Estimated body mass index is 31.82 kg/m  as calculated from the following:    Height as of this encounter: 1.651 m (5' 5\").    Weight as of this encounter: 86.7 kg (191 lb 3.2 oz).    Weight management plan: Discussed healthy diet and exercise guidelines    She reports that she has quit smoking. She has never used smokeless tobacco.      Counseling Resources:  ATP IV Guidelines  Pooled Cohorts Equation Calculator  Breast Cancer Risk Calculator  BRCA-Related Cancer Risk Assessment: FHS-7 Tool  FRAX Risk Assessment  ICSI Preventive Guidelines  Dietary Guidelines for Americans, 2010  SYLLETA's MyPlate  ASA Prophylaxis  Lung CA Screening    Mary Ellen Armstrong NP  [facility]      Preventive Health Recommendations  Female Ages 50 - 64    Yearly exam: See your health care provider every year in order to  o Review health changes.   o Discuss preventive care.    o Review your medicines if your doctor has prescribed any.      Get a Pap test every three years (unless you have an abnormal result and your provider advises testing " more often).    If you get Pap tests with HPV test, you only need to test every 5 years, unless you have an abnormal result.     You do not need a Pap test if your uterus was removed (hysterectomy) and you have not had cancer.    You should be tested each year for STDs (sexually transmitted diseases) if you're at risk.     Have a mammogram every 1 to 2 years.    Have a colonoscopy at age 50, or have a yearly FIT test (stool test). These exams screen for colon cancer.      Have a cholesterol test every 5 years, or more often if advised.    Have a diabetes test (fasting glucose) every three years. If you are at risk for diabetes, you should have this test more often.     If you are at risk for osteoporosis (brittle bone disease), think about having a bone density scan (DEXA).    Shots: Get a flu shot each year. Get a tetanus shot every 10 years.    Nutrition:     Eat at least 5 servings of fruits and vegetables each day.    Eat whole-grain bread, whole-wheat pasta and brown rice instead of white grains and rice.    Get adequate Calcium and Vitamin D.     Lifestyle    Exercise at least 150 minutes a week (30 minutes a day, 5 days a week). This will help you control your weight and prevent disease.    Limit alcohol to one drink per day.    No smoking.     Wear sunscreen to prevent skin cancer.     See your dentist every six months for an exam and cleaning.    See your eye doctor every 1 to 2 years.

## 2021-01-06 NOTE — PROGRESS NOTES
Answers for HPI/ROS submitted by the patient on 1/11/2021   Annual Exam:  Frequency of exercise:: None  Getting at least 3 servings of Calcium per day:: NO  Diet:: Regular (no restrictions)  Taking medications regularly:: Yes  Bi-annual eye exam:: Yes  Dental care twice a year:: Yes  Sleep apnea or symptoms of sleep apnea:: None  Additional concerns today:: Yes     SUBJECTIVE:   CC: Shazia Verma is an 50 year old woman who presents for preventive health visit.   Patient has been advised of split billing requirements and indicates understanding: Yes  Healthy Habits:  Frequency of exercise:: None  Getting at least 3 servings of Calcium per day:: NO  Diet:: Regular (no restrictions)  Taking medications regularly:: Yes  Bi-annual eye exam:: Yes  Dental care twice a year:: Yes  Sleep apnea or symptoms of sleep apnea:: None  Additional concerns today:: Yes    Headache:  Has Imitrex at home, has taken it without relief.  Usually that or Excedrin migraine resolves headache but they have not been as effective as it has in the past.     Her other concern is ongoing fatigue.  She is sleeping well at night, just feels run down and no energy.  She has been gaining weight; history of gastric bypass.  She is planning on joining a gym with her  to assist with weight loss.      Today's PHQ-2 Score:   PHQ-2 ( 1999 Pfizer) 1/14/2021 1/11/2021   Q1: Little interest or pleasure in doing things 0 0   Q2: Feeling down, depressed or hopeless 0 0   PHQ-2 Score 0 0   Q1: Little interest or pleasure in doing things - Not at all   Q2: Feeling down, depressed or hopeless - Not at all   PHQ-2 Score - 0       Abuse: Current or Past(Physical, Sexual or Emotional)- No  Do you feel safe in your environment? Yes        Social History     Tobacco Use     Smoking status: Former Smoker     Smokeless tobacco: Never Used   Substance Use Topics     Alcohol use: Yes     Comment: occ     If you drink alcohol do you typically have >3 drinks per day  or >7 drinks per week? No                     Reviewed orders with patient.  Reviewed health maintenance and updated orders accordingly - Yes  BP Readings from Last 3 Encounters:   21 124/84   20 132/80   20 128/78    Wt Readings from Last 3 Encounters:   21 86.7 kg (191 lb 3.2 oz)   20 83.9 kg (185 lb)   20 81.6 kg (180 lb)                  Patient Active Problem List   Diagnosis     ACP (advance care planning)     H/O gastric bypass     Hypoglycemia     S/P hysterectomy     Other headache syndrome     History of right hip replacement     Past Surgical History:   Procedure Laterality Date     APPENDECTOMY       BREAST BIOPSY, RT/LT Right     right bx      SECTION  1992     COLONOSCOPY  2013     GASTRIC BYPASS       LAPAROSCOPIC HYSTERECTOMY SUPRACERVICAL N/A 2018    Procedure: LAPAROSCOPIC HYSTERECTOMY SUPRACERVICAL;  LAPAROSCOPIC SUPRACERVICAL HYSTERECTOMY, BILATEARL SALPINGECTOMY, LYSIS OF ADHESIONS;  Surgeon: Jean Marie Garvin MD;  Location: [facility]     ORTHOPEDIC SURGERY      right hip labreal tear     ORTHOPEDIC SURGERY      left wrist      SALPINGECTOMY Bilateral 2018    Procedure: SALPINGECTOMY;;  Surgeon: Jean Marie Garvin MD;  Location: [facility]       Social History     Tobacco Use     Smoking status: Former Smoker     Smokeless tobacco: Never Used   Substance Use Topics     Alcohol use: Yes     Comment: occ     Family History   Problem Relation Age of Onset     Cerebrovascular Disease Mother      Hyperlipidemia Mother      Hypertension Mother      Coronary Artery Disease Mother      Migraines Mother      Obesity Mother      Osteoarthritis Mother      Osteoporosis Mother      Diabetes Mother      Hyperlipidemia Father      Hypertension Father      Thyroid Disease Father      Hypertension Brother      Hyperlipidemia Brother      Obesity Brother      Hypertension Sister      Thyroid Disease Sister      Stomach Cancer Maternal  Grandmother      Cancer Maternal Grandfather         colon or liver: unsure     Leukemia Paternal Grandfather          Current Outpatient Medications   Medication Sig Dispense Refill     butalbital-acetaminophen-caffeine (ESGIC) -40 MG tablet Take 1 tablet by mouth every 4 hours as needed for headaches 20 tablet 1     calcium carbonate (OS-MANUEL 500 MG [place]. [place]) 500 MG tablet        celecoxib (CELEBREX) 200 MG capsule TAKE 1 CAPSULE BY MOUTH ONCE DAILY       Cyanocobalamin (VITAMIN B 12 PO)        Ferrous Sulfate (IRON SUPPLEMENT PO) Take 325 mg by mouth daily (with breakfast) 2 tabs daily       multivitamin, therapeutic with minerals (MULTI-VITAMIN) TABS Take 1 tablet by mouth daily       SUMAtriptan (IMITREX) 50 MG tablet Take 1 tablet (50 mg) by mouth at onset of headache for migraine May repeat in 2 hours. Max 4 tablets/24 hours. 12 tablet 3     VITAMIN D, CHOLECALCIFEROL, PO Take 5,000 Units by mouth daily 2 tabs daily       Allergies   Allergen Reactions     Reglan [Metoclopramide] Anxiety     Recent Labs   Lab Test 01/14/21  0924 12/11/20  1446 12/30/19  1528 06/19/19  1424 01/07/19  1355 10/22/18  1414 09/01/17  1154 09/01/17  1154   A1C 5.8* 5.7*  --   --   --   --   --  5.7   *  --  155*  --   --  122*  --   --    HDL 66  --  51  --   --  58  --   --    TRIG 210*  --  259*  --   --  227*  --   --    ALT 22  --   --  26 21  --    < >  --    CR 0.58  --  0.58 0.60 0.68 0.68   < > 0.57   GFRESTIMATED >90  --  >90 >90 >90 >90   < > >90   GFRESTBLACK >90  --  >90 >90 >90 >90   < > >90   POTASSIUM 4.3  --  4.1 3.7 4.2 3.6   < > 4.1   TSH 2.25  --  1.93 1.78  --  1.06  --  2.16    < > = values in this interval not displayed.        Mammogram scheduled for next week.     Pertinent mammograms are reviewed under the imaging tab.  History of abnormal Pap smear: history of hysterectomy  PAP / HPV Latest Ref Rng & Units 4/10/2018   PAP - NIL   HPV 16 DNA NEG:Negative Negative   HPV 18 DNA NEG:Negative  "Negative   OTHER HR HPV NEG:Negative Negative     Reviewed and updated as needed this visit by clinical staff  Tobacco  Allergies  Meds              Reviewed and updated as needed this visit by Provider                    ROS:  CONSTITUTIONAL:malaise and weight gain  INTEGUMENTARY/SKIN: NEGATIVE for worrisome rashes, moles or lesions  EYES: NEGATIVE for vision changes or irritation  ENT: NEGATIVE for ear, mouth and throat problems  RESP: NEGATIVE for significant cough or SOB  BREAST: NEGATIVE for masses, tenderness or discharge  CV: NEGATIVE for chest pain, palpitations or peripheral edema  GI: NEGATIVE for nausea, abdominal pain, heartburn, or change in bowel habits  : NEGATIVE for unusual urinary or vaginal symptoms. No vaginal bleeding.  MUSCULOSKELETAL: NEGATIVE for significant arthralgias or myalgia  NEURO: NEGATIVE for weakness, dizziness or paresthesias  PSYCHIATRIC: NEGATIVE for changes in mood or affect     OBJECTIVE:   /84 (BP Location: Left arm, Patient Position: Chair, Cuff Size: Adult Large)   Pulse 78   Temp 97.2  F (36.2  C) (Tympanic)   Resp 16   Ht 1.651 m (5' 5\")   Wt 86.7 kg (191 lb 3.2 oz)   LMP 04/10/2018 (LMP Unknown)   SpO2 100%   BMI 31.82 kg/m    EXAM:  GENERAL: healthy, alert and no distress  EYES: Eyes grossly normal to inspection, PERRL and conjunctivae and sclerae normal  HENT: ear canals and TM's normal, nose and mouth without ulcers or lesions  NECK: no adenopathy, no asymmetry, masses, or scars, thyroid normal to palpation and no carotid bruits  RESP: lungs clear to auscultation - no rales, rhonchi or wheezes  BREAST: declined   CV: regular rate and rhythm, normal S1 S2, no S3 or S4, no murmur, click or rub, no peripheral edema and peripheral pulses strong  ABDOMEN: soft, nontender, no hepatosplenomegaly, no masses and bowel sounds normal  MS: no gross musculoskeletal defects noted, no edema  NEURO: Normal strength and tone, mentation intact and speech " "normal  PSYCH: mentation appears normal, affect normal/bright        ASSESSMENT/PLAN:   1. Routine general medical examination at a health care facility  - Lipid Profile  - Comprehensive metabolic panel  - will complete forms as lab results are returned and fax as requested.     2. Fatigue, unspecified type  - TSH with free T4 reflex  - CBC with platelets differential  - Vitamin D Deficiency    3. H/O gastric bypass  - Vitamin B12  - Ferritin    4. Other headache syndrome  - butalbital-acetaminophen-caffeine (ESGIC) -40 MG tablet; Take 1 tablet by mouth every 4 hours as needed for headaches  Dispense: 20 tablet; Refill: 1    5. Hip pain, right  - ORTHOPEDIC ADULT REFERRAL; Future      Patient has been advised of split billing requirements and indicates understanding: Yes  COUNSELING:   Reviewed preventive health counseling, as reflected in patient instructions       Regular exercise       Healthy diet/nutrition       Vision screening       Hearing screening       Immunizations       Colon cancer screening    Estimated body mass index is 31.82 kg/m  as calculated from the following:    Height as of this encounter: 1.651 m (5' 5\").    Weight as of this encounter: 86.7 kg (191 lb 3.2 oz).    Weight management plan: Discussed healthy diet and exercise guidelines    She reports that she has quit smoking. She has never used smokeless tobacco.      Counseling Resources:  ATP IV Guidelines  Pooled Cohorts Equation Calculator  Breast Cancer Risk Calculator  BRCA-Related Cancer Risk Assessment: FHS-7 Tool  FRAX Risk Assessment  ICSI Preventive Guidelines  Dietary Guidelines for Americans, 2010  USDA's MyPlate  ASA Prophylaxis  Lung CA Screening    Mary Ellen Armstrong, NP  [facility]  "

## 2021-01-14 ENCOUNTER — APPOINTMENT (OUTPATIENT)
Dept: LAB | Facility: OTHER | Age: 51
End: 2021-01-14
Attending: NURSE PRACTITIONER
Payer: COMMERCIAL

## 2021-01-14 ENCOUNTER — OFFICE VISIT (OUTPATIENT)
Dept: FAMILY MEDICINE | Facility: OTHER | Age: 51
End: 2021-01-14
Attending: NURSE PRACTITIONER
Payer: COMMERCIAL

## 2021-01-14 VITALS
DIASTOLIC BLOOD PRESSURE: 84 MMHG | TEMPERATURE: 97.2 F | BODY MASS INDEX: 31.86 KG/M2 | HEART RATE: 78 BPM | OXYGEN SATURATION: 100 % | SYSTOLIC BLOOD PRESSURE: 124 MMHG | WEIGHT: 191.2 LBS | RESPIRATION RATE: 16 BRPM | HEIGHT: 65 IN

## 2021-01-14 DIAGNOSIS — R53.83 FATIGUE, UNSPECIFIED TYPE: ICD-10-CM

## 2021-01-14 DIAGNOSIS — E55.9 VITAMIN D DEFICIENCY: ICD-10-CM

## 2021-01-14 DIAGNOSIS — G44.89 OTHER HEADACHE SYNDROME: ICD-10-CM

## 2021-01-14 DIAGNOSIS — M25.551 HIP PAIN, RIGHT: ICD-10-CM

## 2021-01-14 DIAGNOSIS — Z98.84 H/O GASTRIC BYPASS: ICD-10-CM

## 2021-01-14 DIAGNOSIS — Z13.1 SCREENING FOR DIABETES MELLITUS: ICD-10-CM

## 2021-01-14 DIAGNOSIS — Z00.00 ROUTINE GENERAL MEDICAL EXAMINATION AT A HEALTH CARE FACILITY: Primary | ICD-10-CM

## 2021-01-14 LAB
ALBUMIN SERPL-MCNC: 3.5 G/DL (ref 3.4–5)
ALP SERPL-CCNC: 111 U/L (ref 40–150)
ALT SERPL W P-5'-P-CCNC: 22 U/L (ref 0–50)
ANION GAP SERPL CALCULATED.3IONS-SCNC: 7 MMOL/L (ref 3–14)
AST SERPL W P-5'-P-CCNC: 15 U/L (ref 0–45)
BASOPHILS # BLD AUTO: 0 10E9/L (ref 0–0.2)
BASOPHILS NFR BLD AUTO: 0.4 %
BILIRUB SERPL-MCNC: 0.8 MG/DL (ref 0.2–1.3)
BUN SERPL-MCNC: 14 MG/DL (ref 7–30)
CALCIUM SERPL-MCNC: 8.5 MG/DL (ref 8.5–10.1)
CHLORIDE SERPL-SCNC: 106 MMOL/L (ref 94–109)
CHOLEST SERPL-MCNC: 283 MG/DL
CO2 SERPL-SCNC: 26 MMOL/L (ref 20–32)
CREAT SERPL-MCNC: 0.58 MG/DL (ref 0.52–1.04)
DEPRECATED CALCIDIOL+CALCIFEROL SERPL-MC: 19 UG/L (ref 20–75)
DIFFERENTIAL METHOD BLD: ABNORMAL
EOSINOPHIL # BLD AUTO: 0 10E9/L (ref 0–0.7)
EOSINOPHIL NFR BLD AUTO: 0.6 %
ERYTHROCYTE [DISTWIDTH] IN BLOOD BY AUTOMATED COUNT: 15.7 % (ref 10–15)
EST. AVERAGE GLUCOSE BLD GHB EST-MCNC: 120 MG/DL
FERRITIN SERPL-MCNC: 5 NG/ML (ref 8–252)
GFR SERPL CREATININE-BSD FRML MDRD: >90 ML/MIN/{1.73_M2}
GLUCOSE SERPL-MCNC: 92 MG/DL (ref 70–99)
HBA1C MFR BLD: 5.8 % (ref 0–5.6)
HCT VFR BLD AUTO: 36.7 % (ref 35–47)
HDLC SERPL-MCNC: 66 MG/DL
HGB BLD-MCNC: 11.7 G/DL (ref 11.7–15.7)
LDLC SERPL CALC-MCNC: 175 MG/DL
LYMPHOCYTES # BLD AUTO: 1.6 10E9/L (ref 0.8–5.3)
LYMPHOCYTES NFR BLD AUTO: 31.8 %
MCH RBC QN AUTO: 25.5 PG (ref 26.5–33)
MCHC RBC AUTO-ENTMCNC: 31.9 G/DL (ref 31.5–36.5)
MCV RBC AUTO: 80 FL (ref 78–100)
MONOCYTES # BLD AUTO: 0.3 10E9/L (ref 0–1.3)
MONOCYTES NFR BLD AUTO: 6.2 %
NEUTROPHILS # BLD AUTO: 3 10E9/L (ref 1.6–8.3)
NEUTROPHILS NFR BLD AUTO: 61 %
NONHDLC SERPL-MCNC: 217 MG/DL
PLATELET # BLD AUTO: 326 10E9/L (ref 150–450)
POTASSIUM SERPL-SCNC: 4.3 MMOL/L (ref 3.4–5.3)
PROT SERPL-MCNC: 7.6 G/DL (ref 6.8–8.8)
RBC # BLD AUTO: 4.59 10E12/L (ref 3.8–5.2)
SODIUM SERPL-SCNC: 139 MMOL/L (ref 133–144)
TRIGL SERPL-MCNC: 210 MG/DL
TSH SERPL DL<=0.005 MIU/L-ACNC: 2.25 MU/L (ref 0.4–4)
VIT B12 SERPL-MCNC: 260 PG/ML (ref 193–986)
WBC # BLD AUTO: 5 10E9/L (ref 4–11)

## 2021-01-14 PROCEDURE — 36415 COLL VENOUS BLD VENIPUNCTURE: CPT | Performed by: NURSE PRACTITIONER

## 2021-01-14 PROCEDURE — 82306 VITAMIN D 25 HYDROXY: CPT | Performed by: NURSE PRACTITIONER

## 2021-01-14 PROCEDURE — 80061 LIPID PANEL: CPT | Performed by: NURSE PRACTITIONER

## 2021-01-14 PROCEDURE — 99213 OFFICE O/P EST LOW 20 MIN: CPT | Mod: 25 | Performed by: NURSE PRACTITIONER

## 2021-01-14 PROCEDURE — 99396 PREV VISIT EST AGE 40-64: CPT | Performed by: NURSE PRACTITIONER

## 2021-01-14 PROCEDURE — 83036 HEMOGLOBIN GLYCOSYLATED A1C: CPT | Performed by: NURSE PRACTITIONER

## 2021-01-14 PROCEDURE — 80050 GENERAL HEALTH PANEL: CPT | Performed by: NURSE PRACTITIONER

## 2021-01-14 PROCEDURE — 99N1182 PR STATISTIC ESTIMATED AVERAGE GLUCOSE: Performed by: NURSE PRACTITIONER

## 2021-01-14 PROCEDURE — 82728 ASSAY OF FERRITIN: CPT | Performed by: NURSE PRACTITIONER

## 2021-01-14 PROCEDURE — 82607 VITAMIN B-12: CPT | Performed by: NURSE PRACTITIONER

## 2021-01-14 RX ORDER — BUTALBITAL, ACETAMINOPHEN AND CAFFEINE 50; 325; 40 MG/1; MG/1; MG/1
1 TABLET ORAL EVERY 4 HOURS PRN
Qty: 20 TABLET | Refills: 1 | Status: SHIPPED | OUTPATIENT
Start: 2021-01-14 | End: 2021-08-17

## 2021-01-14 ASSESSMENT — PAIN SCALES - GENERAL: PAINLEVEL: MODERATE PAIN (4)

## 2021-01-14 ASSESSMENT — MIFFLIN-ST. JEOR: SCORE: 1488.16

## 2021-01-14 NOTE — NURSING NOTE
"Chief Complaint   Patient presents with     Physical       Initial /84 (BP Location: Left arm, Patient Position: Chair, Cuff Size: Adult Large)   Pulse 78   Temp 97.2  F (36.2  C) (Tympanic)   Resp 16   Ht 1.651 m (5' 5\")   Wt 86.7 kg (191 lb 3.2 oz)   LMP 04/10/2018 (LMP Unknown)   SpO2 100%   BMI 31.82 kg/m   Estimated body mass index is 31.82 kg/m  as calculated from the following:    Height as of this encounter: 1.651 m (5' 5\").    Weight as of this encounter: 86.7 kg (191 lb 3.2 oz).  Medication Reconciliation: complete  Pamela M. Lechevalier, LPN    "

## 2021-01-15 RX ORDER — ERGOCALCIFEROL 1.25 MG/1
CAPSULE, LIQUID FILLED ORAL
Qty: 36 CAPSULE | Refills: 0 | Status: SHIPPED | OUTPATIENT
Start: 2021-01-15 | End: 2021-04-15

## 2021-01-17 ENCOUNTER — MYC MEDICAL ADVICE (OUTPATIENT)
Dept: FAMILY MEDICINE | Facility: OTHER | Age: 51
End: 2021-01-17

## 2021-01-21 ENCOUNTER — HOSPITAL ENCOUNTER (OUTPATIENT)
Dept: MAMMOGRAPHY | Facility: OTHER | Age: 51
End: 2021-01-21
Attending: SURGERY
Payer: COMMERCIAL

## 2021-01-21 DIAGNOSIS — Z98.890 S/P RIGHT BREAST BIOPSY: ICD-10-CM

## 2021-01-21 PROCEDURE — 77065 DX MAMMO INCL CAD UNI: CPT | Mod: TC | Performed by: RADIOLOGY

## 2021-01-21 PROCEDURE — G0279 TOMOSYNTHESIS, MAMMO: HCPCS | Mod: TC | Performed by: RADIOLOGY

## 2021-03-17 ENCOUNTER — IMMUNIZATION (OUTPATIENT)
Dept: FAMILY MEDICINE | Facility: OTHER | Age: 51
End: 2021-03-17
Attending: NURSE PRACTITIONER
Payer: COMMERCIAL

## 2021-03-17 PROCEDURE — 0001A PR COVID VAC PFIZER DIL RECON 30 MCG/0.3 ML IM: CPT

## 2021-03-17 PROCEDURE — 91300 PR COVID VAC PFIZER DIL RECON 30 MCG/0.3 ML IM: CPT

## 2021-03-25 NOTE — PROGRESS NOTES
[facility]  [address]  [facility] [address]  Phone: [phone]  Primary Provider: Kay Armstrong  Pre-op Performing Provider: KAY ARMSTRONG      PREOPERATIVE EVALUATION:  Today's date: 4/9/2021    Shazia Verma is a 50 year old female who presents for a preoperative evaluation.    Surgical Information:  Surgery/Procedure: right  PSOAS release, right hip  Surgery Location: [facility]  Surgeon: Dr. Tani Thomson  Surgery Date: 4/15/21  Time of Surgery: tbd  Where patient plans to recover: At home with family  Fax number for surgical facility:     Type of Anesthesia Anticipated: to be determined    Assessment & Plan     The proposed surgical procedure is considered INTERMEDIATE risk.    1. Pre-operative examination  - Comprehensive metabolic panel  - CBC with platelets differential  - *UA reflex to Microscopic and Culture - [facility]  - EKG 12-lead complete w/read - (Clinic Performed)    2. Hip pain, right    3. History of right hip replacement    4. Other headache syndrome    5. H/O gastric bypass        Risks and Recommendations:  The patient has the following additional risks and recommendations for perioperative complications:   - No identified additional risk factors other than previously addressed    Medication Instructions:  she will hold all medications the day of procedure.     RECOMMENDATION:  APPROVAL GIVEN to proceed with proposed procedure, without further diagnostic evaluation.    Review of the result(s) of each unique test - EKG            Subjective     HPI related to upcoming procedure: right hip pain that is thought to be from impingement of flexer tendon.  She has been to physical therapy, no improvement.  The decision has been made to proceed with surgical correction.         Preop Questions 4/8/2021   1. Have you ever had a heart attack or stroke? No   2. Have you ever had surgery on your  heart or blood vessels, such as a stent placement, a coronary artery bypass, or surgery on an artery in your head, neck, heart, or legs? No   3. Do you have chest pain with activity? No   4. Do you have a history of  heart failure? No   5. Do you currently have a cold, bronchitis or symptoms of other infection? No   6. Do you have a cough, shortness of breath, or wheezing? No   7. Do you or anyone in your family have previous history of blood clots? YES - father   8. Do you or does anyone in your family have a serious bleeding problem such as prolonged bleeding following surgeries or cuts? No   9. Have you ever had problems with anemia or been told to take iron pills? YES - iron deficiency    10. Have you had any abnormal blood loss such as black, tarry or bloody stools, or abnormal vaginal bleeding? No   11. Have you ever had a blood transfusion? No   12. Are you willing to have a blood transfusion if it is medically needed before, during, or after your surgery? Yes   13. Have you or any of your relatives ever had problems with anesthesia? No   14. Do you have sleep apnea, excessive snoring or daytime drowsiness? No   15. Do you have any artifical heart valves or other implanted medical devices like a pacemaker, defibrillator, or continuous glucose monitor? No   16. Do you have artificial joints? YES - right hip   17. Are you allergic to latex? No   18. Is there any chance that you may be pregnant? No     Health Care Directive:  Patient does not have a Health Care Directive or Living Will: Discussed advance care planning with patient; however, patient declined at this time.    Preoperative Review of :   reviewed - controlled substances reflected in medication list.      Status of Chronic Conditions:  See problem list for active medical problems.  Problems all longstanding and stable, except as noted/documented.  See ROS for pertinent symptoms related to these conditions.      Review of  Systems  CONSTITUTIONAL: NEGATIVE for fever, chills, change in weight  INTEGUMENTARY/SKIN: NEGATIVE for worrisome rashes, moles or lesions  EYES: NEGATIVE for vision changes or irritation  ENT/MOUTH: NEGATIVE for ear, mouth and throat problems  RESP: NEGATIVE for significant cough or SOB  CV: NEGATIVE for chest pain, palpitations or peripheral edema  GI: NEGATIVE for nausea, abdominal pain, heartburn, or change in bowel habits  : NEGATIVE for frequency, dysuria, or hematuria  MUSCULOSKELETAL: right hip pain  NEURO: NEGATIVE for weakness, dizziness or paresthesias  ENDOCRINE: NEGATIVE for temperature intolerance, skin/hair changes  HEME: NEGATIVE for bleeding problems  PSYCHIATRIC: NEGATIVE for changes in mood or affect    Patient Active Problem List    Diagnosis Date Noted     Other headache syndrome 2019     Priority: Medium     History of right hip replacement 2019     Priority: Medium     S/P hysterectomy 2018     Priority: Medium     Hypoglycemia 2017     Priority: Medium     ACP (advance care planning) 2016     Priority: Medium     Advance Care Planning 2016: ACP Review of Chart / Resources Provided:  Reviewed chart for advance care plan.  Shazia Verma has been provided information and resources to begin or update their advance care plan.  Added by WOODY ELNNON             H/O gastric bypass 2016     Priority: Medium     Had type 2 diabetes, hyperlipidemia and hypertension prior to surgery.         No past medical history on file.  Past Surgical History:   Procedure Laterality Date     APPENDECTOMY       BREAST BIOPSY, RT/LT Right     right bx      SECTION  1992     COLONOSCOPY  2013     GASTRIC BYPASS       LAPAROSCOPIC HYSTERECTOMY SUPRACERVICAL N/A 2018    Procedure: LAPAROSCOPIC HYSTERECTOMY SUPRACERVICAL;  LAPAROSCOPIC SUPRACERVICAL HYSTERECTOMY, BILATEARL SALPINGECTOMY, LYSIS OF ADHESIONS;  Surgeon: Jean Marie Garvin MD;   "Location: [facility]     ORTHOPEDIC SURGERY  2015    right hip labreal tear     ORTHOPEDIC SURGERY  2014    left wrist      SALPINGECTOMY Bilateral 05/02/2018    Procedure: SALPINGECTOMY;;  Surgeon: Jean Marie Garvin MD;  Location: [facility]     Current Outpatient Medications   Medication Sig Dispense Refill     butalbital-acetaminophen-caffeine (ESGIC) -40 MG tablet Take 1 tablet by mouth every 4 hours as needed for headaches 20 tablet 1     calcium carbonate (OS-MANUEL 500 MG [place]. [place]) 500 MG tablet        celecoxib (CELEBREX) 200 MG capsule TAKE 1 CAPSULE BY MOUTH ONCE DAILY       Cyanocobalamin (VITAMIN B 12 PO)        Ferrous Sulfate (IRON SUPPLEMENT PO) Take 325 mg by mouth daily (with breakfast) 2 tabs daily       multivitamin, therapeutic with minerals (MULTI-VITAMIN) TABS Take 1 tablet by mouth daily       SUMAtriptan (IMITREX) 50 MG tablet Take 1 tablet (50 mg) by mouth at onset of headache for migraine May repeat in 2 hours. Max 4 tablets/24 hours. 12 tablet 3     VITAMIN D, CHOLECALCIFEROL, PO Take 5,000 Units by mouth daily 2 tabs daily       vitamin D2 (ERGOCALCIFEROL) 11079 units (1250 mcg) capsule Take one capsule every Monday, Wednesday and Friday for three months. 36 capsule 0       Allergies   Allergen Reactions     Reglan [Metoclopramide] Anxiety        Social History     Tobacco Use     Smoking status: Former Smoker     Smokeless tobacco: Never Used   Substance Use Topics     Alcohol use: Yes     Comment: occ       History   Drug Use No         Objective     /70 (BP Location: Right arm, Patient Position: Chair, Cuff Size: Adult Regular)   Pulse 85   Temp 96.3  F (35.7  C) (Tympanic)   Ht 1.651 m (5' 5\")   Wt 87.5 kg (193 lb)   LMP 04/10/2018 (LMP Unknown)   SpO2 98%   BMI 32.12 kg/m      Physical Exam    GENERAL APPEARANCE: healthy, alert and no distress     EYES: EOMI, PERRL     HENT: ear canals and TM's normal and nose and mouth without ulcers or lesions     NECK: no adenopathy, no " asymmetry, masses, or scars and thyroid normal to palpation     RESP: lungs clear to auscultation - no rales, rhonchi or wheezes     CV: regular rates and rhythm, normal S1 S2, no S3 or S4 and no murmur, click or rub     ABDOMEN:  soft, nontender, no HSM or masses and bowel sounds normal     MS: extremities normal- no gross deformities noted, no evidence of inflammation in joints, FROM in all extremities.     SKIN: no suspicious lesions or rashes     NEURO: Normal strength and tone, sensory exam grossly normal, mentation intact and speech normal     PSYCH: mentation appears normal. and affect normal/bright     LYMPHATICS: No cervical adenopathy    Recent Labs   Lab Test 01/14/21  0924 12/11/20  1446 12/30/19  1528 06/19/19  1424   HGB 11.7  --   --  12.3     --   --  326     --  140 140   POTASSIUM 4.3  --  4.1 3.7   CR 0.58  --  0.58 0.60   A1C 5.8* 5.7*  --   --         Diagnostics:  Results for orders placed or performed in visit on 04/09/21   Comprehensive metabolic panel     Status: None   Result Value Ref Range    Sodium 137 133 - 144 mmol/L    Potassium 4.5 3.4 - 5.3 mmol/L    Chloride 105 94 - 109 mmol/L    Carbon Dioxide 28 20 - 32 mmol/L    Anion Gap 4 3 - 14 mmol/L    Glucose 99 70 - 99 mg/dL    Urea Nitrogen 15 7 - 30 mg/dL    Creatinine 0.55 0.52 - 1.04 mg/dL    GFR Estimate >90 >60 mL/min/[1.73_m2]    GFR Estimate If Black >90 >60 mL/min/[1.73_m2]    Calcium 8.9 8.5 - 10.1 mg/dL    Bilirubin Total 0.5 0.2 - 1.3 mg/dL    Albumin 3.7 3.4 - 5.0 g/dL    Protein Total 7.5 6.8 - 8.8 g/dL    Alkaline Phosphatase 107 40 - 150 U/L    ALT 28 0 - 50 U/L    AST 20 0 - 45 U/L   CBC with platelets differential     Status: Abnormal   Result Value Ref Range    WBC 5.6 4.0 - 11.0 10e9/L    RBC Count 4.49 3.8 - 5.2 10e12/L    Hemoglobin 12.0 11.7 - 15.7 g/dL    Hematocrit 36.8 35.0 - 47.0 %    MCV 82 78 - 100 fl    MCH 26.7 26.5 - 33.0 pg    MCHC 32.6 31.5 - 36.5 g/dL    RDW 16.2 (H) 10.0 - 15.0 %     Platelet Count 359 150 - 450 10e9/L    % Neutrophils 56.6 %    % Lymphocytes 33.2 %    % Monocytes 9.1 %    % Eosinophils 0.7 %    % Basophils 0.4 %    Absolute Neutrophil 3.2 1.6 - 8.3 10e9/L    Absolute Lymphocytes 1.9 0.8 - 5.3 10e9/L    Absolute Monocytes 0.5 0.0 - 1.3 10e9/L    Absolute Eosinophils 0.0 0.0 - 0.7 10e9/L    Absolute Basophils 0.0 0.0 - 0.2 10e9/L    Diff Method Automated Method    *UA reflex to Microscopic and Culture - [facility]     Status: None    Specimen: Midstream Urine   Result Value Ref Range    Color Urine Yellow     Appearance Urine Clear     Glucose Urine Negative NEG^Negative mg/dL    Bilirubin Urine Negative NEG^Negative    Ketones Urine Negative NEG^Negative mg/dL    Specific Gravity Urine 1.020 1.003 - 1.035    Blood Urine Negative NEG^Negative    pH Urine 7.0 5.0 - 7.0 pH    Protein Albumin Urine Negative NEG^Negative mg/dL    Urobilinogen Urine 0.2 0.2 - 1.0 EU/dL    Nitrite Urine Negative NEG^Negative    Leukocyte Esterase Urine Negative NEG^Negative    Source Midstream Urine         EKG: appears normal, NSR, normal axis, normal intervals, no acute ST/T changes c/w ischemia, no LVH by voltage criteria, unchanged from previous tracings    Revised Cardiac Risk Index (RCRI):  The patient has the following serious cardiovascular risks for perioperative complications:   - No serious cardiac risks = 0 points     RCRI Interpretation: 0 points: Class I (very low risk - 0.4% complication rate)           Signed Electronically by: Mary Ellen Armstrong NP  Copy of this evaluation report is provided to requesting physician.

## 2021-04-05 ENCOUNTER — IMMUNIZATION (OUTPATIENT)
Dept: FAMILY MEDICINE | Facility: OTHER | Age: 51
End: 2021-04-05
Attending: FAMILY MEDICINE
Payer: COMMERCIAL

## 2021-04-05 PROCEDURE — 91300 PR COVID VAC PFIZER DIL RECON 30 MCG/0.3 ML IM: CPT

## 2021-04-05 PROCEDURE — 0002A PR COVID VAC PFIZER DIL RECON 30 MCG/0.3 ML IM: CPT

## 2021-04-09 ENCOUNTER — OFFICE VISIT (OUTPATIENT)
Dept: FAMILY MEDICINE | Facility: OTHER | Age: 51
End: 2021-04-09
Attending: NURSE PRACTITIONER
Payer: COMMERCIAL

## 2021-04-09 VITALS
SYSTOLIC BLOOD PRESSURE: 134 MMHG | TEMPERATURE: 96.3 F | BODY MASS INDEX: 32.15 KG/M2 | DIASTOLIC BLOOD PRESSURE: 70 MMHG | WEIGHT: 193 LBS | OXYGEN SATURATION: 98 % | HEART RATE: 85 BPM | HEIGHT: 65 IN

## 2021-04-09 DIAGNOSIS — Z96.641 HISTORY OF RIGHT HIP REPLACEMENT: ICD-10-CM

## 2021-04-09 DIAGNOSIS — Z98.84 H/O GASTRIC BYPASS: ICD-10-CM

## 2021-04-09 DIAGNOSIS — M25.551 HIP PAIN, RIGHT: ICD-10-CM

## 2021-04-09 DIAGNOSIS — Z01.818 PRE-OPERATIVE EXAMINATION: Primary | ICD-10-CM

## 2021-04-09 DIAGNOSIS — G44.89 OTHER HEADACHE SYNDROME: ICD-10-CM

## 2021-04-09 LAB
ALBUMIN SERPL-MCNC: 3.7 G/DL (ref 3.4–5)
ALBUMIN UR-MCNC: NEGATIVE MG/DL
ALP SERPL-CCNC: 107 U/L (ref 40–150)
ALT SERPL W P-5'-P-CCNC: 28 U/L (ref 0–50)
ANION GAP SERPL CALCULATED.3IONS-SCNC: 4 MMOL/L (ref 3–14)
APPEARANCE UR: CLEAR
AST SERPL W P-5'-P-CCNC: 20 U/L (ref 0–45)
BASOPHILS # BLD AUTO: 0 10E9/L (ref 0–0.2)
BASOPHILS NFR BLD AUTO: 0.4 %
BILIRUB SERPL-MCNC: 0.5 MG/DL (ref 0.2–1.3)
BILIRUB UR QL STRIP: NEGATIVE
BUN SERPL-MCNC: 15 MG/DL (ref 7–30)
CALCIUM SERPL-MCNC: 8.9 MG/DL (ref 8.5–10.1)
CHLORIDE SERPL-SCNC: 105 MMOL/L (ref 94–109)
CO2 SERPL-SCNC: 28 MMOL/L (ref 20–32)
COLOR UR AUTO: YELLOW
CREAT SERPL-MCNC: 0.55 MG/DL (ref 0.52–1.04)
DIFFERENTIAL METHOD BLD: ABNORMAL
EOSINOPHIL # BLD AUTO: 0 10E9/L (ref 0–0.7)
EOSINOPHIL NFR BLD AUTO: 0.7 %
ERYTHROCYTE [DISTWIDTH] IN BLOOD BY AUTOMATED COUNT: 16.2 % (ref 10–15)
GFR SERPL CREATININE-BSD FRML MDRD: >90 ML/MIN/{1.73_M2}
GLUCOSE SERPL-MCNC: 99 MG/DL (ref 70–99)
GLUCOSE UR STRIP-MCNC: NEGATIVE MG/DL
HCT VFR BLD AUTO: 36.8 % (ref 35–47)
HGB BLD-MCNC: 12 G/DL (ref 11.7–15.7)
HGB UR QL STRIP: NEGATIVE
KETONES UR STRIP-MCNC: NEGATIVE MG/DL
LEUKOCYTE ESTERASE UR QL STRIP: NEGATIVE
LYMPHOCYTES # BLD AUTO: 1.9 10E9/L (ref 0.8–5.3)
LYMPHOCYTES NFR BLD AUTO: 33.2 %
MCH RBC QN AUTO: 26.7 PG (ref 26.5–33)
MCHC RBC AUTO-ENTMCNC: 32.6 G/DL (ref 31.5–36.5)
MCV RBC AUTO: 82 FL (ref 78–100)
MONOCYTES # BLD AUTO: 0.5 10E9/L (ref 0–1.3)
MONOCYTES NFR BLD AUTO: 9.1 %
NEUTROPHILS # BLD AUTO: 3.2 10E9/L (ref 1.6–8.3)
NEUTROPHILS NFR BLD AUTO: 56.6 %
NITRATE UR QL: NEGATIVE
PH UR STRIP: 7 PH (ref 5–7)
PLATELET # BLD AUTO: 359 10E9/L (ref 150–450)
POTASSIUM SERPL-SCNC: 4.5 MMOL/L (ref 3.4–5.3)
PROT SERPL-MCNC: 7.5 G/DL (ref 6.8–8.8)
RBC # BLD AUTO: 4.49 10E12/L (ref 3.8–5.2)
SODIUM SERPL-SCNC: 137 MMOL/L (ref 133–144)
SOURCE: NORMAL
SP GR UR STRIP: 1.02 (ref 1–1.03)
UROBILINOGEN UR STRIP-ACNC: 0.2 EU/DL (ref 0.2–1)
WBC # BLD AUTO: 5.6 10E9/L (ref 4–11)

## 2021-04-09 PROCEDURE — 36415 COLL VENOUS BLD VENIPUNCTURE: CPT | Performed by: NURSE PRACTITIONER

## 2021-04-09 PROCEDURE — 99214 OFFICE O/P EST MOD 30 MIN: CPT | Performed by: NURSE PRACTITIONER

## 2021-04-09 PROCEDURE — 93000 ELECTROCARDIOGRAM COMPLETE: CPT | Performed by: NURSE PRACTITIONER

## 2021-04-09 PROCEDURE — 80053 COMPREHEN METABOLIC PANEL: CPT | Performed by: NURSE PRACTITIONER

## 2021-04-09 PROCEDURE — 85025 COMPLETE CBC W/AUTO DIFF WBC: CPT | Performed by: NURSE PRACTITIONER

## 2021-04-09 PROCEDURE — 81003 URINALYSIS AUTO W/O SCOPE: CPT | Performed by: NURSE PRACTITIONER

## 2021-04-09 ASSESSMENT — MIFFLIN-ST. JEOR: SCORE: 1496.32

## 2021-04-09 ASSESSMENT — PAIN SCALES - GENERAL: PAINLEVEL: MODERATE PAIN (4)

## 2021-04-09 NOTE — PATIENT INSTRUCTIONS
1. Pre-operative examination  - Comprehensive metabolic panel  - CBC with platelets differential  - *UA reflex to Microscopic and Culture - [facility]  - EKG 12-lead complete w/read - (Clinic Performed)      Follow-up as neede

## 2021-04-12 NOTE — PROGRESS NOTES
Evaluation dictation, lab results and EKG tracing faxed to [facility] at [phone].

## 2021-05-03 ENCOUNTER — MYC MEDICAL ADVICE (OUTPATIENT)
Dept: FAMILY MEDICINE | Facility: OTHER | Age: 51
End: 2021-05-03

## 2021-05-03 NOTE — TELEPHONE ENCOUNTER
Please schedule office visit, we can check her iron and ferritin levels and develop a plan for her migraines.

## 2021-05-06 ENCOUNTER — OFFICE VISIT (OUTPATIENT)
Dept: FAMILY MEDICINE | Facility: OTHER | Age: 51
End: 2021-05-06
Attending: NURSE PRACTITIONER
Payer: COMMERCIAL

## 2021-05-06 VITALS
TEMPERATURE: 97.3 F | BODY MASS INDEX: 32.04 KG/M2 | HEART RATE: 76 BPM | SYSTOLIC BLOOD PRESSURE: 126 MMHG | RESPIRATION RATE: 16 BRPM | OXYGEN SATURATION: 98 % | WEIGHT: 192.3 LBS | DIASTOLIC BLOOD PRESSURE: 76 MMHG | HEIGHT: 65 IN

## 2021-05-06 DIAGNOSIS — E55.9 VITAMIN D DEFICIENCY: ICD-10-CM

## 2021-05-06 DIAGNOSIS — Z98.84 H/O GASTRIC BYPASS: ICD-10-CM

## 2021-05-06 DIAGNOSIS — G43.009 MIGRAINE WITHOUT AURA AND WITHOUT STATUS MIGRAINOSUS, NOT INTRACTABLE: Primary | ICD-10-CM

## 2021-05-06 DIAGNOSIS — R79.0 LOW FERRITIN: ICD-10-CM

## 2021-05-06 DIAGNOSIS — L65.9 HAIR LOSS: ICD-10-CM

## 2021-05-06 LAB
BASOPHILS # BLD AUTO: 0 10E9/L (ref 0–0.2)
BASOPHILS NFR BLD AUTO: 0.2 %
DIFFERENTIAL METHOD BLD: ABNORMAL
EOSINOPHIL # BLD AUTO: 0.1 10E9/L (ref 0–0.7)
EOSINOPHIL NFR BLD AUTO: 1 %
ERYTHROCYTE [DISTWIDTH] IN BLOOD BY AUTOMATED COUNT: 15.1 % (ref 10–15)
FERRITIN SERPL-MCNC: 10 NG/ML (ref 8–252)
HCT VFR BLD AUTO: 38 % (ref 35–47)
HGB BLD-MCNC: 12.4 G/DL (ref 11.7–15.7)
LYMPHOCYTES # BLD AUTO: 2.3 10E9/L (ref 0.8–5.3)
LYMPHOCYTES NFR BLD AUTO: 47.9 %
MAGNESIUM SERPL-MCNC: 2.4 MG/DL (ref 1.6–2.3)
MCH RBC QN AUTO: 27 PG (ref 26.5–33)
MCHC RBC AUTO-ENTMCNC: 32.6 G/DL (ref 31.5–36.5)
MCV RBC AUTO: 83 FL (ref 78–100)
MONOCYTES # BLD AUTO: 0.5 10E9/L (ref 0–1.3)
MONOCYTES NFR BLD AUTO: 9.4 %
NEUTROPHILS # BLD AUTO: 2 10E9/L (ref 1.6–8.3)
NEUTROPHILS NFR BLD AUTO: 41.5 %
PLATELET # BLD AUTO: 279 10E9/L (ref 150–450)
RBC # BLD AUTO: 4.59 10E12/L (ref 3.8–5.2)
TSH SERPL DL<=0.005 MIU/L-ACNC: 1.78 MU/L (ref 0.4–4)
WBC # BLD AUTO: 4.9 10E9/L (ref 4–11)

## 2021-05-06 PROCEDURE — 82306 VITAMIN D 25 HYDROXY: CPT | Performed by: NURSE PRACTITIONER

## 2021-05-06 PROCEDURE — 36415 COLL VENOUS BLD VENIPUNCTURE: CPT | Performed by: NURSE PRACTITIONER

## 2021-05-06 PROCEDURE — 84443 ASSAY THYROID STIM HORMONE: CPT | Performed by: NURSE PRACTITIONER

## 2021-05-06 PROCEDURE — 82728 ASSAY OF FERRITIN: CPT | Performed by: NURSE PRACTITIONER

## 2021-05-06 PROCEDURE — 83735 ASSAY OF MAGNESIUM: CPT | Performed by: NURSE PRACTITIONER

## 2021-05-06 PROCEDURE — 85025 COMPLETE CBC W/AUTO DIFF WBC: CPT | Performed by: NURSE PRACTITIONER

## 2021-05-06 PROCEDURE — 99214 OFFICE O/P EST MOD 30 MIN: CPT | Performed by: NURSE PRACTITIONER

## 2021-05-06 RX ORDER — PROPRANOLOL HCL 60 MG
60 CAPSULE, EXTENDED RELEASE 24HR ORAL DAILY
Qty: 30 CAPSULE | Refills: 1 | Status: SHIPPED | OUTPATIENT
Start: 2021-05-06 | End: 2023-02-20

## 2021-05-06 ASSESSMENT — PAIN SCALES - GENERAL: PAINLEVEL: NO PAIN (0)

## 2021-05-06 ASSESSMENT — MIFFLIN-ST. JEOR: SCORE: 1493.15

## 2021-05-06 NOTE — NURSING NOTE
"Chief Complaint   Patient presents with     Headache       Initial /76 (BP Location: Left arm, Patient Position: Chair, Cuff Size: Adult Large)   Pulse 76   Temp 97.3  F (36.3  C) (Tympanic)   Resp 16   Ht 1.651 m (5' 5\")   Wt 87.2 kg (192 lb 4.8 oz)   LMP 04/10/2018 (LMP Unknown)   SpO2 98%   BMI 32.00 kg/m   Estimated body mass index is 32 kg/m  as calculated from the following:    Height as of this encounter: 1.651 m (5' 5\").    Weight as of this encounter: 87.2 kg (192 lb 4.8 oz).  Medication Reconciliation: complete  Pamela M. Lechevalier, LPN    "

## 2021-05-06 NOTE — PATIENT INSTRUCTIONS
Assessment & Plan     1. Migraine without aura and without status migrainosus, not intractable  Continue butalbital  - propranolol ER (INDERAL [place]) 60 MG 24 hr capsule; Take 1 capsule (60 mg) by mouth daily  Dispense: 30 capsule; Refill: 1    2. H/O gastric bypass  Continue vitamin as reviewed   - Magnesium    3. Low ferritin  Continue iron supplement  Could be a cause of hair loss  - Ferritin    4. Hair loss  Start biotin  - TSH with free T4 reflex  - CBC with platelets and differential    5. Vitamin D deficiency  Continue D supplement  Repeat D level today.       Review of the result(s) of each unique test - previous labs      Return in about 1 month (around 6/6/2021) for migraine.    Mary Ellen Armstrong NP  [facility]

## 2021-05-07 LAB — DEPRECATED CALCIDIOL+CALCIFEROL SERPL-MC: 44 UG/L (ref 20–75)

## 2021-05-13 ENCOUNTER — MYC MEDICAL ADVICE (OUTPATIENT)
Dept: FAMILY MEDICINE | Facility: OTHER | Age: 51
End: 2021-05-13

## 2021-06-04 NOTE — PATIENT INSTRUCTIONS
1. Preop general physical exam  - Comprehensive metabolic panel  - CBC with platelets differential  - *UA reflex to Microscopic and Culture - [facility]  - HCG qualitative urine    2. Menorrhagia with irregular cycle    3. H/O gastric bypass    Before Your Surgery      Call your surgeon if there is any change in your health. This includes signs of a cold or flu (such as a sore throat, runny nose, cough, rash or fever).    Do not smoke, drink alcohol or take over the counter medicine (unless your surgeon or primary care doctor tells you to) for the 24 hours before and after surgery.    If you take prescribed drugs: Follow your doctor s orders about which medicines to take and which to stop until after surgery.    Eating and drinking prior to surgery: follow the instructions from your surgeon    Take a shower or bath the night before surgery. Use the soap your surgeon gave you to gently clean your skin. If you do not have soap from your surgeon, use your regular soap. Do not shave or scrub the surgery site.  Wear clean pajamas and have clean sheets on your bed.    Appt made.

## 2021-06-15 NOTE — PROGRESS NOTES
Assessment & Plan     1. Chronic fatigue  - Oncology/Hematology Adult Referral; Future    2. Hair loss  - Oncology/Hematology Adult Referral; Future    3. Low ferritin  - Oncology/Hematology Adult Referral; Future    4. H/O gastric bypass  - Oncology/Hematology Adult Referral; Future      Referral is made to hematology to see if she would qualify for venofer infusions to help with hair loss, bring ferritin level up and decrease fatigue.            No follow-ups on file.    Mary Ellen Armstrong, NP  [facility]    Subjective   subhash is a 50 year old who presents for the following health issues     HPI     Review Lab results     She is S/P gastric bypass.  She has been on iron supplements for several months, ferritin remains low.  HGB is normal.    Hair loss continues, fatigue remains and she is getting frustrated with ongoing symptoms.      Patient Active Problem List   Diagnosis     ACP (advance care planning)     H/O gastric bypass     Hypoglycemia     S/P hysterectomy     Other headache syndrome     History of right hip replacement     Migraine without aura and without status migrainosus, not intractable     Vitamin D deficiency     Past Surgical History:   Procedure Laterality Date     APPENDECTOMY       BREAST BIOPSY, RT/LT Right     right bx      SECTION  1992 1994     COLONOSCOPY  2013     GASTRIC BYPASS       LAPAROSCOPIC HYSTERECTOMY SUPRACERVICAL N/A 2018    Procedure: LAPAROSCOPIC HYSTERECTOMY SUPRACERVICAL;  LAPAROSCOPIC SUPRACERVICAL HYSTERECTOMY, BILATEARL SALPINGECTOMY, LYSIS OF ADHESIONS;  Surgeon: Jean Marie Garvin MD;  Location: [facility]     ORTHOPEDIC SURGERY      right hip labreal tear     ORTHOPEDIC SURGERY      left wrist      SALPINGECTOMY Bilateral 2018    Procedure: SALPINGECTOMY;;  Surgeon: Jean Marie Garvin MD;  Location: [facility]       Social History     Tobacco Use     Smoking status: Former Smoker     Smokeless tobacco: Never  Used   Substance Use Topics     Alcohol use: Yes     Comment: occ     Family History   Problem Relation Age of Onset     Cerebrovascular Disease Mother      Hyperlipidemia Mother      Hypertension Mother      Coronary Artery Disease Mother      Migraines Mother      Obesity Mother      Osteoarthritis Mother      Osteoporosis Mother      Diabetes Mother      Hyperlipidemia Father      Hypertension Father      Thyroid Disease Father      Hypertension Brother      Hyperlipidemia Brother      Obesity Brother      Hypertension Sister      Thyroid Disease Sister      Stomach Cancer Maternal Grandmother      Cancer Maternal Grandfather         colon or liver: unsure     Leukemia Paternal Grandfather          Current Outpatient Medications   Medication Sig Dispense Refill     butalbital-acetaminophen-caffeine (ESGIC) -40 MG tablet Take 1 tablet by mouth every 4 hours as needed for headaches 20 tablet 1     calcium carbonate (OS-MANUEL 500 MG [place]. [place]) 500 MG tablet        Cyanocobalamin (VITAMIN B 12 PO)        Ferrous Sulfate (IRON SUPPLEMENT PO) Take 325 mg by mouth daily (with breakfast) 2 tabs daily       multivitamin, therapeutic with minerals (MULTI-VITAMIN) TABS Take 1 tablet by mouth daily       propranolol ER (INDERAL LA) 60 MG 24 hr capsule Take 1 capsule (60 mg) by mouth daily 30 capsule 1     SUMAtriptan (IMITREX) 50 MG tablet Take 1 tablet (50 mg) by mouth at onset of headache for migraine May repeat in 2 hours. Max 4 tablets/24 hours. 12 tablet 3     VITAMIN D, CHOLECALCIFEROL, PO Take 5,000 Units by mouth daily 2 tabs daily       Allergies   Allergen Reactions     Reglan [Metoclopramide] Anxiety     Recent Labs   Lab Test 06/26/21  1426 05/06/21  1017 04/09/21  0937 01/14/21  0924 12/11/20  1446 12/30/19  1528 10/22/18  1414 10/22/18  1414 09/01/17  1154 09/01/17  1154   A1C  --   --   --  5.8* 5.7*  --   --   --   --  5.7   LDL  --   --   --  175*  --  155*  --  122*  --   --    HDL  --   --   --  66  --   "51  --  58  --   --    TRIG  --   --   --  210*  --  259*  --  227*  --   --    ALT 30  --  28 22  --   --    < >  --    < >  --    CR 0.58  --  0.55 0.58  --  0.58   < > 0.68   < > 0.57   GFRESTIMATED >90  --  >90 >90  --  >90   < > >90   < > >90   GFRESTBLACK >90  --  >90 >90  --  >90   < > >90   < > >90   POTASSIUM 3.8  --  4.5 4.3  --  4.1   < > 3.6   < > 4.1   TSH  --  1.78  --  2.25  --  1.93   < > 1.06  --  2.16    < > = values in this interval not displayed.      BP Readings from Last 3 Encounters:   06/26/21 136/90   06/21/21 118/76   05/06/21 126/76    Wt Readings from Last 3 Encounters:   06/21/21 87.5 kg (193 lb)   05/06/21 87.2 kg (192 lb 4.8 oz)   04/09/21 87.5 kg (193 lb)                 Review of Systems   Constitutional, HEENT, cardiovascular, pulmonary, gi and gu systems are negative, except as otherwise noted.      Objective    /76 (BP Location: Right arm, Patient Position: Chair, Cuff Size: Adult Large)   Pulse 77   Temp 98.2  F (36.8  C) (Tympanic)   Resp 16   Ht 1.651 m (5' 5\")   Wt 87.5 kg (193 lb)   LMP 04/10/2018 (LMP Unknown)   SpO2 98%   BMI 32.12 kg/m    Body mass index is 32.12 kg/m .  Physical Exam   GENERAL: healthy, alert and no distress  RESP: lungs clear to auscultation - no rales, rhonchi or wheezes  CV: regular rate and rhythm, normal S1 S2, no S3 or S4, no murmur, click or rub, no peripheral edema and peripheral pulses strong  MS: no gross musculoskeletal defects noted, no edema  SKIN: hair is thinning.    PSYCH: mentation appears normal, affect normal/bright        Results for orders placed or performed in visit on 06/21/21   Ferritin     Status: None   Result Value Ref Range    Ferritin 12 8 - 252 ng/mL   CBC with platelets and differential     Status: Abnormal   Result Value Ref Range    WBC 6.6 4.0 - 11.0 10e9/L    RBC Count 4.24 3.8 - 5.2 10e12/L    Hemoglobin 12.1 11.7 - 15.7 g/dL    Hematocrit 36.2 35.0 - 47.0 %    MCV 85 78 - 100 fl    MCH 28.5 26.5 - 33.0 " pg    MCHC 33.4 31.5 - 36.5 g/dL    RDW 15.7 (H) 10.0 - 15.0 %    Platelet Count 278 150 - 450 10e9/L    % Neutrophils 56.0 %    % Lymphocytes 35.5 %    % Monocytes 7.4 %    % Eosinophils 0.8 %    % Basophils 0.3 %    Absolute Neutrophil 3.7 1.6 - 8.3 10e9/L    Absolute Lymphocytes 2.3 0.8 - 5.3 10e9/L    Absolute Monocytes 0.5 0.0 - 1.3 10e9/L    Absolute Eosinophils 0.1 0.0 - 0.7 10e9/L    Absolute Basophils 0.0 0.0 - 0.2 10e9/L    Diff Method Automated Method

## 2021-06-21 ENCOUNTER — OFFICE VISIT (OUTPATIENT)
Dept: FAMILY MEDICINE | Facility: OTHER | Age: 51
End: 2021-06-21
Attending: NURSE PRACTITIONER
Payer: COMMERCIAL

## 2021-06-21 ENCOUNTER — APPOINTMENT (OUTPATIENT)
Dept: LAB | Facility: OTHER | Age: 51
End: 2021-06-21
Attending: NURSE PRACTITIONER
Payer: COMMERCIAL

## 2021-06-21 VITALS
HEART RATE: 77 BPM | SYSTOLIC BLOOD PRESSURE: 118 MMHG | RESPIRATION RATE: 16 BRPM | BODY MASS INDEX: 32.15 KG/M2 | HEIGHT: 65 IN | TEMPERATURE: 98.2 F | DIASTOLIC BLOOD PRESSURE: 76 MMHG | OXYGEN SATURATION: 98 % | WEIGHT: 193 LBS

## 2021-06-21 DIAGNOSIS — R79.0 LOW FERRITIN: Primary | ICD-10-CM

## 2021-06-21 DIAGNOSIS — L65.9 HAIR LOSS: ICD-10-CM

## 2021-06-21 DIAGNOSIS — R79.0 LOW FERRITIN: ICD-10-CM

## 2021-06-21 DIAGNOSIS — Z98.84 H/O GASTRIC BYPASS: ICD-10-CM

## 2021-06-21 DIAGNOSIS — R53.82 CHRONIC FATIGUE: Primary | ICD-10-CM

## 2021-06-21 LAB
BASOPHILS # BLD AUTO: 0 10E9/L (ref 0–0.2)
BASOPHILS NFR BLD AUTO: 0.3 %
DIFFERENTIAL METHOD BLD: ABNORMAL
EOSINOPHIL # BLD AUTO: 0.1 10E9/L (ref 0–0.7)
EOSINOPHIL NFR BLD AUTO: 0.8 %
ERYTHROCYTE [DISTWIDTH] IN BLOOD BY AUTOMATED COUNT: 15.7 % (ref 10–15)
FERRITIN SERPL-MCNC: 12 NG/ML (ref 8–252)
HCT VFR BLD AUTO: 36.2 % (ref 35–47)
HGB BLD-MCNC: 12.1 G/DL (ref 11.7–15.7)
LYMPHOCYTES # BLD AUTO: 2.3 10E9/L (ref 0.8–5.3)
LYMPHOCYTES NFR BLD AUTO: 35.5 %
MCH RBC QN AUTO: 28.5 PG (ref 26.5–33)
MCHC RBC AUTO-ENTMCNC: 33.4 G/DL (ref 31.5–36.5)
MCV RBC AUTO: 85 FL (ref 78–100)
MONOCYTES # BLD AUTO: 0.5 10E9/L (ref 0–1.3)
MONOCYTES NFR BLD AUTO: 7.4 %
NEUTROPHILS # BLD AUTO: 3.7 10E9/L (ref 1.6–8.3)
NEUTROPHILS NFR BLD AUTO: 56 %
PLATELET # BLD AUTO: 278 10E9/L (ref 150–450)
RBC # BLD AUTO: 4.24 10E12/L (ref 3.8–5.2)
WBC # BLD AUTO: 6.6 10E9/L (ref 4–11)

## 2021-06-21 PROCEDURE — 36415 COLL VENOUS BLD VENIPUNCTURE: CPT | Performed by: NURSE PRACTITIONER

## 2021-06-21 PROCEDURE — 82728 ASSAY OF FERRITIN: CPT | Performed by: NURSE PRACTITIONER

## 2021-06-21 PROCEDURE — 99213 OFFICE O/P EST LOW 20 MIN: CPT | Performed by: NURSE PRACTITIONER

## 2021-06-21 PROCEDURE — 85025 COMPLETE CBC W/AUTO DIFF WBC: CPT | Performed by: NURSE PRACTITIONER

## 2021-06-21 ASSESSMENT — MIFFLIN-ST. JEOR: SCORE: 1496.32

## 2021-06-21 ASSESSMENT — PAIN SCALES - GENERAL: PAINLEVEL: SEVERE PAIN (7)

## 2021-06-21 NOTE — NURSING NOTE
"Chief Complaint   Patient presents with     Results       Initial /76 (BP Location: Right arm, Patient Position: Chair, Cuff Size: Adult Large)   Pulse 77   Temp 98.2  F (36.8  C) (Tympanic)   Resp 16   Ht 1.651 m (5' 5\")   Wt 87.5 kg (193 lb)   LMP 04/10/2018 (LMP Unknown)   SpO2 98%   BMI 32.12 kg/m   Estimated body mass index is 32.12 kg/m  as calculated from the following:    Height as of this encounter: 1.651 m (5' 5\").    Weight as of this encounter: 87.5 kg (193 lb).  Medication Reconciliation: complete  Pamela M. Lechevalier, LPN    "

## 2021-06-26 ENCOUNTER — HOSPITAL ENCOUNTER (EMERGENCY)
Facility: HOSPITAL | Age: 51
Discharge: HOME | End: 2021-06-26
Attending: PHYSICIAN ASSISTANT | Admitting: PHYSICIAN ASSISTANT
Payer: COMMERCIAL

## 2021-06-26 VITALS
TEMPERATURE: 96.4 F | RESPIRATION RATE: 17 BRPM | HEART RATE: 65 BPM | DIASTOLIC BLOOD PRESSURE: 90 MMHG | OXYGEN SATURATION: 99 % | SYSTOLIC BLOOD PRESSURE: 136 MMHG

## 2021-06-26 DIAGNOSIS — I49.3 PVC'S (PREMATURE VENTRICULAR CONTRACTIONS): ICD-10-CM

## 2021-06-26 DIAGNOSIS — R00.2 PALPITATIONS: ICD-10-CM

## 2021-06-26 LAB
ALBUMIN SERPL-MCNC: 3.8 G/DL (ref 3.4–5)
ALP SERPL-CCNC: 99 U/L (ref 40–150)
ALT SERPL W P-5'-P-CCNC: 30 U/L (ref 0–50)
ANION GAP SERPL CALCULATED.3IONS-SCNC: 8 MMOL/L (ref 3–14)
AST SERPL W P-5'-P-CCNC: 17 U/L (ref 0–45)
BASOPHILS # BLD AUTO: 0 10E9/L (ref 0–0.2)
BASOPHILS NFR BLD AUTO: 0.5 %
BILIRUB SERPL-MCNC: 0.6 MG/DL (ref 0.2–1.3)
BUN SERPL-MCNC: 16 MG/DL (ref 7–30)
CALCIUM SERPL-MCNC: 8.7 MG/DL (ref 8.5–10.1)
CHLORIDE SERPL-SCNC: 109 MMOL/L (ref 94–109)
CO2 SERPL-SCNC: 22 MMOL/L (ref 20–32)
CREAT SERPL-MCNC: 0.58 MG/DL (ref 0.52–1.04)
DIFFERENTIAL METHOD BLD: ABNORMAL
EOSINOPHIL # BLD AUTO: 0 10E9/L (ref 0–0.7)
EOSINOPHIL NFR BLD AUTO: 0.5 %
ERYTHROCYTE [DISTWIDTH] IN BLOOD BY AUTOMATED COUNT: 16.2 % (ref 10–15)
GFR SERPL CREATININE-BSD FRML MDRD: >90 ML/MIN/{1.73_M2}
GLUCOSE SERPL-MCNC: 96 MG/DL (ref 70–99)
HCT VFR BLD AUTO: 39.4 % (ref 35–47)
HGB BLD-MCNC: 12.7 G/DL (ref 11.7–15.7)
IMM GRANULOCYTES # BLD: 0 10E9/L (ref 0–0.4)
IMM GRANULOCYTES NFR BLD: 0.2 %
LYMPHOCYTES # BLD AUTO: 2.4 10E9/L (ref 0.8–5.3)
LYMPHOCYTES NFR BLD AUTO: 28.3 %
MAGNESIUM SERPL-MCNC: 2.2 MG/DL (ref 1.6–2.3)
MCH RBC QN AUTO: 27.7 PG (ref 26.5–33)
MCHC RBC AUTO-ENTMCNC: 32.2 G/DL (ref 31.5–36.5)
MCV RBC AUTO: 86 FL (ref 78–100)
MONOCYTES # BLD AUTO: 0.7 10E9/L (ref 0–1.3)
MONOCYTES NFR BLD AUTO: 7.7 %
NEUTROPHILS # BLD AUTO: 5.3 10E9/L (ref 1.6–8.3)
NEUTROPHILS NFR BLD AUTO: 62.8 %
NRBC # BLD AUTO: 0 10*3/UL
NRBC BLD AUTO-RTO: 0 /100
PLATELET # BLD AUTO: 306 10E9/L (ref 150–450)
POTASSIUM SERPL-SCNC: 3.8 MMOL/L (ref 3.4–5.3)
PROT SERPL-MCNC: 7.7 G/DL (ref 6.8–8.8)
RBC # BLD AUTO: 4.59 10E12/L (ref 3.8–5.2)
SODIUM SERPL-SCNC: 139 MMOL/L (ref 133–144)
TROPONIN I SERPL-MCNC: <0.015 UG/L (ref 0–0.04)
WBC # BLD AUTO: 8.5 10E9/L (ref 4–11)

## 2021-06-26 PROCEDURE — 36415 COLL VENOUS BLD VENIPUNCTURE: CPT

## 2021-06-26 PROCEDURE — 80053 COMPREHEN METABOLIC PANEL: CPT | Performed by: PHYSICIAN ASSISTANT

## 2021-06-26 PROCEDURE — 85025 COMPLETE CBC W/AUTO DIFF WBC: CPT | Performed by: PHYSICIAN ASSISTANT

## 2021-06-26 PROCEDURE — 93005 ELECTROCARDIOGRAM TRACING: CPT

## 2021-06-26 PROCEDURE — 99284 EMERGENCY DEPT VISIT MOD MDM: CPT

## 2021-06-26 PROCEDURE — 84484 ASSAY OF TROPONIN QUANT: CPT | Performed by: PHYSICIAN ASSISTANT

## 2021-06-26 PROCEDURE — 93010 ELECTROCARDIOGRAM REPORT: CPT | Performed by: INTERNAL MEDICINE

## 2021-06-26 PROCEDURE — 83735 ASSAY OF MAGNESIUM: CPT | Performed by: PHYSICIAN ASSISTANT

## 2021-06-26 PROCEDURE — 99284 EMERGENCY DEPT VISIT MOD MDM: CPT | Performed by: PHYSICIAN ASSISTANT

## 2021-06-26 ASSESSMENT — ENCOUNTER SYMPTOMS
NAUSEA: 0
ACTIVITY CHANGE: 0
FEVER: 0
SHORTNESS OF BREATH: 0
PSYCHIATRIC NEGATIVE: 1
NECK PAIN: 0
CHEST TIGHTNESS: 0
LIGHT-HEADEDNESS: 0
BACK PAIN: 0
VOMITING: 0
PALPITATIONS: 1
ABDOMINAL PAIN: 0
COUGH: 0
APPETITE CHANGE: 0
DIAPHORESIS: 0
DIZZINESS: 0
HEADACHES: 0

## 2021-06-26 NOTE — ED NOTES
"Patient reports palpations that last for a \"few seconds\", multiple times today starting this morning. Report \"3 times in the waiting room\". Reports having palpitations in the past although denies any medical work up for this and normally resolves quickly. Denies chest pain, SOB, dizziness. Reports when the episode occurs it feels like \"I cant catch my breath and I am nauseous\". Denies Hx of anxiety. Nothing provokes the palpitations. Denies personal cardiac Hx or arrhythmias.  "

## 2021-06-26 NOTE — ED PROVIDER NOTES
History     Chief Complaint   Patient presents with     Palpitations     c/o palpitations off and on all day. denies pain     The history is provided by the patient.     Shazia Verma is a 51 year old female who presented to the emergency department ambulatory for evaluation of palpitations.  Palpitations have been intermittent over the last 1 month.  Lasted virtually all day today.  She was at work and elected to come to the emergency department for further evaluation.  Denies any dyspnea or chest pain.  Denies any chest tightness.  Denies any fevers.  Denies cough.  Denies any symptoms at this time.    Allergies:  Allergies   Allergen Reactions     Reglan [Metoclopramide] Anxiety       Problem List:    Patient Active Problem List    Diagnosis Date Noted     Migraine without aura and without status migrainosus, not intractable 2021     Priority: Medium     Vitamin D deficiency 2021     Priority: Medium     Other headache syndrome 2019     Priority: Medium     History of right hip replacement 2019     Priority: Medium     S/P hysterectomy 2018     Priority: Medium     Hypoglycemia 2017     Priority: Medium     ACP (advance care planning) 2016     Priority: Medium     Advance Care Planning 2016: ACP Review of Chart / Resources Provided:  Reviewed chart for advance care plan.  Shazia Verma has been provided information and resources to begin or update their advance care plan.  Added by WOODY LENNON             H/O gastric bypass 2016     Priority: Medium     Had type 2 diabetes, hyperlipidemia and hypertension prior to surgery.           Past Medical History:    No past medical history on file.    Past Surgical History:    Past Surgical History:   Procedure Laterality Date     APPENDECTOMY       BREAST BIOPSY, RT/LT Right 2002    right bx      SECTION  1992 1994     COLONOSCOPY  2013     GASTRIC BYPASS       LAPAROSCOPIC HYSTERECTOMY  SUPRACERVICAL N/A 05/02/2018    Procedure: LAPAROSCOPIC HYSTERECTOMY SUPRACERVICAL;  LAPAROSCOPIC SUPRACERVICAL HYSTERECTOMY, BILATEARL SALPINGECTOMY, LYSIS OF ADHESIONS;  Surgeon: Jean Marie Garvin MD;  Location: [facility]     ORTHOPEDIC SURGERY  2015    right hip labreal tear     ORTHOPEDIC SURGERY  2014    left wrist      SALPINGECTOMY Bilateral 05/02/2018    Procedure: SALPINGECTOMY;;  Surgeon: Jean Marie Garvin MD;  Location: [facility]       Family History:    Family History   Problem Relation Age of Onset     Cerebrovascular Disease Mother      Hyperlipidemia Mother      Hypertension Mother      Coronary Artery Disease Mother      Migraines Mother      Obesity Mother      Osteoarthritis Mother      Osteoporosis Mother      Diabetes Mother      Hyperlipidemia Father      Hypertension Father      Thyroid Disease Father      Hypertension Brother      Hyperlipidemia Brother      Obesity Brother      Hypertension Sister      Thyroid Disease Sister      Stomach Cancer Maternal Grandmother      Cancer Maternal Grandfather         colon or liver: unsure     Leukemia Paternal Grandfather        Social History:  Marital Status:   [2]  Social History     Tobacco Use     Smoking status: Former Smoker     Smokeless tobacco: Never Used   Substance Use Topics     Alcohol use: Yes     Comment: occ     Drug use: No        Medications:    calcium carbonate (OS-MANUEL 500 MG [place]. [place]) 500 MG tablet  Cyanocobalamin (VITAMIN B 12 PO)  Ferrous Sulfate (IRON SUPPLEMENT PO)  multivitamin, therapeutic with minerals (MULTI-VITAMIN) TABS  propranolol ER (INDERAL LA) 60 MG 24 hr capsule  VITAMIN D, CHOLECALCIFEROL, PO  butalbital-acetaminophen-caffeine (ESGIC) -40 MG tablet  SUMAtriptan (IMITREX) 50 MG tablet          Review of Systems   Constitutional: Negative for activity change, appetite change, diaphoresis and fever.   Respiratory: Negative for cough, chest tightness and shortness of breath.    Cardiovascular: Positive for palpitations.  Negative for chest pain and leg swelling.   Gastrointestinal: Negative for abdominal pain, nausea and vomiting.   Genitourinary: Negative.    Musculoskeletal: Negative for back pain and neck pain.   Skin: Negative.    Allergic/Immunologic: Negative for immunocompromised state.   Neurological: Negative for dizziness, light-headedness and headaches.   Psychiatric/Behavioral: Negative.        Physical Exam   BP: 159/96  Pulse: 66  Temp: 96.4  F (35.8  C)  Resp: 16  SpO2: 98 %      Physical Exam  Vitals signs and nursing note reviewed.   Constitutional:       General: She is not in acute distress.     Appearance: Normal appearance. She is not ill-appearing, toxic-appearing or diaphoretic.      Comments: Pleasant and talkative 51-year-old female found semireclined on the exam bed in no distress.   Cardiovascular:      Rate and Rhythm: Normal rate and regular rhythm.   Pulmonary:      Effort: Pulmonary effort is normal.      Breath sounds: Normal breath sounds.   Abdominal:      General: There is no distension.      Palpations: Abdomen is soft.      Tenderness: There is no abdominal tenderness. There is no guarding.   Musculoskeletal:      Right lower leg: No edema.      Left lower leg: No edema.   Skin:     General: Skin is warm and dry.      Capillary Refill: Capillary refill takes less than 2 seconds.   Neurological:      General: No focal deficit present.      Mental Status: She is alert and oriented to person, place, and time.   Psychiatric:         Mood and Affect: Mood normal.         ED Course     ED Course as of Jun 26 1556   Sat Jun 26, 2021   1454 PVCs noted on cardiac monitoring during the patient's palpitation episodes.        Procedures  EKG shows a normal sinus rhythm at a rate of 69.  Normal WI interval.  Normal QRS duration.  Normal QTC.  Normal axis.  Normal P wave duration.  There are no concerning ST segments.  There are no concerning T waves.  There is no evidence of preexcitation or ischemia.  There  are frequent unifocal PVCs.             Critical Care time:  none               Results for orders placed or performed during the hospital encounter of 06/26/21 (from the past 24 hour(s))   CBC with platelets differential   Result Value Ref Range    WBC 8.5 4.0 - 11.0 10e9/L    RBC Count 4.59 3.8 - 5.2 10e12/L    Hemoglobin 12.7 11.7 - 15.7 g/dL    Hematocrit 39.4 35.0 - 47.0 %    MCV 86 78 - 100 fl    MCH 27.7 26.5 - 33.0 pg    MCHC 32.2 31.5 - 36.5 g/dL    RDW 16.2 (H) 10.0 - 15.0 %    Platelet Count 306 150 - 450 10e9/L    Diff Method Automated Method     % Neutrophils 62.8 %    % Lymphocytes 28.3 %    % Monocytes 7.7 %    % Eosinophils 0.5 %    % Basophils 0.5 %    % Immature Granulocytes 0.2 %    Nucleated RBCs 0 0 /100    Absolute Neutrophil 5.3 1.6 - 8.3 10e9/L    Absolute Lymphocytes 2.4 0.8 - 5.3 10e9/L    Absolute Monocytes 0.7 0.0 - 1.3 10e9/L    Absolute Eosinophils 0.0 0.0 - 0.7 10e9/L    Absolute Basophils 0.0 0.0 - 0.2 10e9/L    Abs Immature Granulocytes 0.0 0 - 0.4 10e9/L    Absolute Nucleated RBC 0.0    Comprehensive metabolic panel   Result Value Ref Range    Sodium 139 133 - 144 mmol/L    Potassium 3.8 3.4 - 5.3 mmol/L    Chloride 109 94 - 109 mmol/L    Carbon Dioxide 22 20 - 32 mmol/L    Anion Gap 8 3 - 14 mmol/L    Glucose 96 70 - 99 mg/dL    Urea Nitrogen 16 7 - 30 mg/dL    Creatinine 0.58 0.52 - 1.04 mg/dL    GFR Estimate >90 >60 mL/min/[1.73_m2]    GFR Estimate If Black >90 >60 mL/min/[1.73_m2]    Calcium 8.7 8.5 - 10.1 mg/dL    Bilirubin Total 0.6 0.2 - 1.3 mg/dL    Albumin 3.8 3.4 - 5.0 g/dL    Protein Total 7.7 6.8 - 8.8 g/dL    Alkaline Phosphatase 99 40 - 150 U/L    ALT 30 0 - 50 U/L    AST 17 0 - 45 U/L   Troponin I   Result Value Ref Range    Troponin I ES <0.015 0.000 - 0.045 ug/L   Magnesium   Result Value Ref Range    Magnesium 2.2 1.6 - 2.3 mg/dL       Medications - No data to display    Assessments & Plan (with Medical Decision Making)   Findings as above.  She has been  experiencing palpitations for the majority of the day and intermittently throughout the last several weeks. These coincide with PVCs noted on cardiac monitoring as well as EKG.  No other concerning findings.  Laboratory evaluation is unremarkable.  No high risk features.  Discussed clinic visit next week for recheck and possible Zio patch.  Return here for any worsening symptoms, new symptoms, chest pains, shortness of breath, or other questions or concerns.    This document was prepared using a combination of typing and voice generated software.  While every attempt was made for accuracy, spelling and grammatical errors may exist.    I have reviewed the nursing notes.    I have reviewed the findings, diagnosis, plan and need for follow up with the patient.       Discharge Medication List as of 6/26/2021  3:15 PM          Final diagnoses:   Palpitations   PVC's (premature ventricular contractions)       6/26/2021   [facility]     Alberto Jarquin PA-C  06/26/21 1553

## 2021-06-26 NOTE — ED NOTES
Patient given discharge instructions to follow up with PCP. Return for any new or worsening symptoms, chest pain, ect.     Patient verbalized understanding. Patient condition stable upon discharge.

## 2021-06-26 NOTE — ED NOTES
Patient pressed call light button due to feeling palpitations. Cardiac monitor showing occasional PVCs during this time. PA notified.

## 2021-06-26 NOTE — DISCHARGE INSTRUCTIONS
During her evaluation in the emergency department PVCs were noted on your cardiac monitoring.  These are benign ectopic beats that can cause symptoms of palpitations or fluttering.  The remainder of your work-up was unrevealing for an emergent cause of your symptoms.  Follow-up in the clinic next week for recheck.  Return here for any worsening symptoms, new symptoms, or other concerns.

## 2021-08-09 ENCOUNTER — ONCOLOGY VISIT (OUTPATIENT)
Dept: ONCOLOGY | Facility: OTHER | Age: 51
End: 2021-08-09
Attending: INTERNAL MEDICINE
Payer: COMMERCIAL

## 2021-08-09 VITALS
RESPIRATION RATE: 20 BRPM | TEMPERATURE: 98.3 F | BODY MASS INDEX: 31.74 KG/M2 | HEIGHT: 65 IN | HEART RATE: 92 BPM | OXYGEN SATURATION: 98 % | WEIGHT: 190.48 LBS | DIASTOLIC BLOOD PRESSURE: 80 MMHG | SYSTOLIC BLOOD PRESSURE: 120 MMHG

## 2021-08-09 DIAGNOSIS — Z98.84 H/O GASTRIC BYPASS: ICD-10-CM

## 2021-08-09 DIAGNOSIS — R79.0 LOW FERRITIN: ICD-10-CM

## 2021-08-09 DIAGNOSIS — L65.9 HAIR LOSS: ICD-10-CM

## 2021-08-09 DIAGNOSIS — R53.82 CHRONIC FATIGUE: ICD-10-CM

## 2021-08-09 LAB
ALBUMIN SERPL-MCNC: 3.7 G/DL (ref 3.4–5)
ALP SERPL-CCNC: 100 U/L (ref 40–150)
ALT SERPL W P-5'-P-CCNC: 28 U/L (ref 0–50)
ANION GAP SERPL CALCULATED.3IONS-SCNC: 4 MMOL/L (ref 3–14)
AST SERPL W P-5'-P-CCNC: 15 U/L (ref 0–45)
BASOPHILS # BLD AUTO: 0 10E3/UL (ref 0–0.2)
BASOPHILS NFR BLD AUTO: 1 %
BILIRUB SERPL-MCNC: 0.4 MG/DL (ref 0.2–1.3)
BUN SERPL-MCNC: 15 MG/DL (ref 7–30)
CALCIUM SERPL-MCNC: 8.7 MG/DL (ref 8.5–10.1)
CHLORIDE BLD-SCNC: 109 MMOL/L (ref 94–109)
CO2 SERPL-SCNC: 28 MMOL/L (ref 20–32)
CREAT SERPL-MCNC: 0.54 MG/DL (ref 0.52–1.04)
EOSINOPHIL # BLD AUTO: 0 10E3/UL (ref 0–0.7)
EOSINOPHIL NFR BLD AUTO: 1 %
ERYTHROCYTE [DISTWIDTH] IN BLOOD BY AUTOMATED COUNT: 14.6 % (ref 10–15)
ERYTHROCYTE [SEDIMENTATION RATE] IN BLOOD BY WESTERGREN METHOD: 4 MM/HR (ref 0–30)
FERRITIN SERPL-MCNC: 21 NG/ML (ref 8–252)
GFR SERPL CREATININE-BSD FRML MDRD: >90 ML/MIN/1.73M2
GLUCOSE BLD-MCNC: 84 MG/DL (ref 70–99)
HCT VFR BLD AUTO: 38.4 % (ref 35–47)
HGB BLD-MCNC: 12.8 G/DL (ref 11.7–15.7)
IMM GRANULOCYTES # BLD: 0 10E3/UL
IMM GRANULOCYTES NFR BLD: 0 %
IRON SATN MFR SERPL: 16 % (ref 15–46)
IRON SERPL-MCNC: 65 UG/DL (ref 35–180)
LYMPHOCYTES # BLD AUTO: 1.8 10E3/UL (ref 0.8–5.3)
LYMPHOCYTES NFR BLD AUTO: 32 %
MCH RBC QN AUTO: 29.1 PG (ref 26.5–33)
MCHC RBC AUTO-ENTMCNC: 33.3 G/DL (ref 31.5–36.5)
MCV RBC AUTO: 87 FL (ref 78–100)
MONOCYTES # BLD AUTO: 0.5 10E3/UL (ref 0–1.3)
MONOCYTES NFR BLD AUTO: 8 %
NEUTROPHILS # BLD AUTO: 3.3 10E3/UL (ref 1.6–8.3)
NEUTROPHILS NFR BLD AUTO: 58 %
NRBC # BLD AUTO: 0 10E3/UL
NRBC BLD AUTO-RTO: 0 /100
PLATELET # BLD AUTO: 329 10E3/UL (ref 150–450)
POTASSIUM BLD-SCNC: 4.2 MMOL/L (ref 3.4–5.3)
PROT SERPL-MCNC: 7.3 G/DL (ref 6.8–8.8)
RBC # BLD AUTO: 4.4 10E6/UL (ref 3.8–5.2)
SODIUM SERPL-SCNC: 141 MMOL/L (ref 133–144)
TIBC SERPL-MCNC: 405 UG/DL (ref 240–430)
TSH SERPL DL<=0.005 MIU/L-ACNC: 1.24 MU/L (ref 0.4–4)
WBC # BLD AUTO: 5.6 10E3/UL (ref 4–11)

## 2021-08-09 PROCEDURE — 82607 VITAMIN B-12: CPT | Performed by: INTERNAL MEDICINE

## 2021-08-09 PROCEDURE — 86038 ANTINUCLEAR ANTIBODIES: CPT | Performed by: INTERNAL MEDICINE

## 2021-08-09 PROCEDURE — 36415 COLL VENOUS BLD VENIPUNCTURE: CPT | Performed by: INTERNAL MEDICINE

## 2021-08-09 PROCEDURE — 85652 RBC SED RATE AUTOMATED: CPT | Performed by: INTERNAL MEDICINE

## 2021-08-09 PROCEDURE — 84165 PROTEIN E-PHORESIS SERUM: CPT | Mod: 26 | Performed by: PATHOLOGY

## 2021-08-09 PROCEDURE — 84155 ASSAY OF PROTEIN SERUM: CPT | Performed by: INTERNAL MEDICINE

## 2021-08-09 PROCEDURE — 82746 ASSAY OF FOLIC ACID SERUM: CPT | Performed by: INTERNAL MEDICINE

## 2021-08-09 PROCEDURE — 82728 ASSAY OF FERRITIN: CPT | Performed by: INTERNAL MEDICINE

## 2021-08-09 PROCEDURE — 86431 RHEUMATOID FACTOR QUANT: CPT | Performed by: INTERNAL MEDICINE

## 2021-08-09 PROCEDURE — 99204 OFFICE O/P NEW MOD 45 MIN: CPT | Performed by: INTERNAL MEDICINE

## 2021-08-09 PROCEDURE — 86039 ANTINUCLEAR ANTIBODIES (ANA): CPT | Performed by: INTERNAL MEDICINE

## 2021-08-09 PROCEDURE — 83550 IRON BINDING TEST: CPT | Performed by: INTERNAL MEDICINE

## 2021-08-09 PROCEDURE — 80050 GENERAL HEALTH PANEL: CPT | Performed by: INTERNAL MEDICINE

## 2021-08-09 ASSESSMENT — PAIN SCALES - GENERAL: PAINLEVEL: MODERATE PAIN (4)

## 2021-08-09 ASSESSMENT — PATIENT HEALTH QUESTIONNAIRE - PHQ9: SUM OF ALL RESPONSES TO PHQ QUESTIONS 1-9: 3

## 2021-08-09 ASSESSMENT — MIFFLIN-ST. JEOR: SCORE: 1479.88

## 2021-08-09 NOTE — PATIENT INSTRUCTIONS
We would like to see you back in 2 weeks after you complete your iron infusion with labs prior. Please come 30minute(s) prior for lab work.      We are are ordering fereheme infusions, this is given 2 times 1 week apart.          When you are in need of a refill of your medications, please call your pharmacy and they will send us the request. If you have any questions please call [phone]

## 2021-08-09 NOTE — PROGRESS NOTES
Visit Date: 08/09/2021    REASON FOR CONSULTATION:  Iron deficiency, history of gastric bypass surgery.    REQUESTING PHYSICIAN:  Ms. Mary Ellen Armstrong, Nurse Practitioner.    HISTORY OF PRESENT ILLNESS:  Ms. Verma is a 51-year-old white female status post gastric bypass surgery, history of migraine headaches who we were asked to evaluate concerning iron deficiency.  Apparently, the patient had complained of feeling fatigued and having chronic hair loss.  Ferritin was drawn by Ms. Mary Ellen Armstrong which came back 6, which was repeated again and it came back still low at 12.  Hemoglobin was normal.  Nonetheless, she is now referred for possible iron infusions.  The patient states she is due for colonoscopy this year.  She had one approximately 10 years ago in [place].  She had gastric bypass surgery done in [place] in 2012 in the [place] area due to obesity and prediabetes.  She denies any bright red blood per rectum, hematemesis, family history of anemia or hematologic malignancy.  She has no fevers, night sweats, weight loss, other than related to gastric bypass surgery.    PAST MEDICAL HISTORY:  Migraine headaches, prediabetes, vitamin D deficiency, history of right hip replacement, status post hysterectomy, hypoglycemia, history of gastric bypass surgery.    ALLERGIES:  SHE IS ALLERGIC TO REGLAN.    MEDICATIONS:  She is currently on include:   1.  Inderal LA 60 mg daily.    2.  Esgic Plus 1 tablet by mouth every 4 hours as needed for headaches.    3.  Imitrex 50 mg p.r.n. headaches.    4.  Vitamin B12 1000 mcg daily.  5.  Ferrous sulfate 325 mg daily.  6.  Multivitamin daily.    7.  Vitamin D 5000 units daily.  8.  Calcium carbonate 500 mg daily.    SOCIAL HISTORY:  Tobacco is negative.  Alcohol is negative.  She works at [employer].  She previously lived in [place] and moved here approximately 5 years ago.    FAMILY HISTORY:  Significant for grandmother with stomach cancer.   Grandfather with pancreatic cancer.    REVIEW OF SYSTEMS:   CENTRAL NERVOUS SYSTEM:  Negative for headaches except for occasional migraine headaches.  ENT:  Negative for hearing loss.  RESPIRATORY:  Negative for cough, shortness of breath, hemoptysis.  CARDIAC:  No chest pain, palpitations, orthopnea, PND, ankle edema.  GASTROINTESTINAL:  Negative for abdominal pain, bright red blood per rectum, hematemesis, reflux symptoms.  MUSCULOSKELETAL:  Unremarkable.  GENITOURINARY:  Negative for nocturia, urgency, frequency, hematuria.  CONSTITUTIONAL:  Negative for fevers, night sweats.  She has had dietary weight loss since the gastric bypass procedure.  HEMATOLOGIC:  Negative for easy bruisability, gingival bleeding, epistaxis.     PHYSICAL EXAMINATION:    GENERAL:  She is a middle-aged white female in no acute distress.  VITAL SIGNS:  Reveal blood pressure 120/80, pulse 92, respirations 20, temperature 98.3.  HEENT:  Atraumatic, normocephalic.  Oropharynx nonerythematous.  NECK:  Supple.  LUNGS:  Clear to auscultation and percussion.  HEART:  Regular rhythm.  S1, S2 normal.  ABDOMEN:  Obese, soft, normoactive bowel sounds.  No mass, no tenderness.  LYMPHATICS:  Axillary, supraclavicular, axillary or inguinal nodes.  EXTREMITIES:  Without edema.  NEUROLOGIC:  Nonfocal.    LABORATORY DATA:  Pending from today.    ASSESSMENT AND PLAN:  Iron deficiency, likely due to malabsorption secondary to gastric bypass procedure.  We will empirically treat with IV Feraheme 510 mg IV x 2 doses.  We also like to repeat iron studies, B12, folate, CBC, CMP, LDH.  Obtain SPEP, TSH, sed rate, rheumatoid factor, PHUC.  The patient will need a colonoscopy this year.  She is due for one.  It is  probably reasonable given her history of iron deficiency.  Nonetheless, we empirically treat with IV iron as above.      95 minutes were spent on this patient.  Time was spent reviewing lab work, physician provider notes, performing history and physical,  documenting history and physical and ordering followup labs as well as ordering IV Feraheme.    German Ceballos MD        D: 2021   T: 2021   MT: JACK    Name:     RISA REBOLLEDO  MRN:      [id number]        Account:    [id number]   :      1970           Visit Date: 2021     Document: [id number]    cc:  Mary Ellen Armstrong NP

## 2021-08-09 NOTE — NURSING NOTE
"Oncology Rooming Note    August 9, 2021 11:20 AM   Shazia Verma is a 51 year old female who presents for:    Chief Complaint   Patient presents with     Consult     Consult history of gastric bypass, chronic fatigue, hair loss, low ferritin     Initial Vitals: /80   Pulse 92   Temp 98.3  F (36.8  C) (Tympanic)   Resp 20   Ht 1.651 m (5' 5\")   Wt 86.4 kg (190 lb 7.6 oz)   LMP 04/10/2018 (LMP Unknown)   SpO2 98%   BMI 31.70 kg/m   Estimated body mass index is 31.7 kg/m  as calculated from the following:    Height as of this encounter: 1.651 m (5' 5\").    Weight as of this encounter: 86.4 kg (190 lb 7.6 oz). Body surface area is 1.99 meters squared.  Moderate Pain (4) Comment: Data Unavailable   Patient's last menstrual period was 04/10/2018 (lmp unknown).  Allergies reviewed: Yes  Medications reviewed: Yes    Medications: Medication refills not needed today.  Pharmacy name entered into [employer]:    [id number] - [place], [place] - [address] AT [facility] [address]  [id number] [place] [address] [id number] - [place], [place] - [address] [facility]    Patient was assessed using the NCCN psychosocial distress thermometer. Patient rated the score as a 0. Patient rated current stressors as per scanned form. Stressors will be brought to the attention of provider or Oncology RN Care Coordinator for a score of 6 or greater or per nurses discretion.                     Morenita Hwang LPN              "

## 2021-08-10 DIAGNOSIS — E61.1 IRON DEFICIENCY: ICD-10-CM

## 2021-08-10 DIAGNOSIS — R79.0 LOW FERRITIN: Primary | ICD-10-CM

## 2021-08-10 LAB
FOLATE SERPL-MCNC: 13.5 NG/ML
TOTAL PROTEIN SERUM FOR ELP: 7 G/DL (ref 6.8–8.8)
VIT B12 SERPL-MCNC: 781 PG/ML (ref 193–986)

## 2021-08-11 ENCOUNTER — TELEPHONE (OUTPATIENT)
Dept: ONCOLOGY | Facility: OTHER | Age: 51
End: 2021-08-11

## 2021-08-11 LAB
ALBUMIN SERPL ELPH-MCNC: 4.2 G/DL (ref 3.7–5.1)
ALPHA1 GLOB SERPL ELPH-MCNC: 0.3 G/DL (ref 0.2–0.4)
ALPHA2 GLOB SERPL ELPH-MCNC: 0.8 G/DL (ref 0.5–0.9)
ANA PAT SER IF-IMP: ABNORMAL
ANA SER QL IF: ABNORMAL
ANA TITR SER IF: ABNORMAL {TITER}
B-GLOBULIN SERPL ELPH-MCNC: 0.8 G/DL (ref 0.6–1)
GAMMA GLOB SERPL ELPH-MCNC: 1 G/DL (ref 0.7–1.6)
M PROTEIN SERPL ELPH-MCNC: 0 G/DL
PROT PATTERN SERPL ELPH-IMP: NORMAL
RHEUMATOID FACT SER NEPH-ACNC: <7 IU/ML

## 2021-08-11 RX ORDER — NALOXONE HYDROCHLORIDE 0.4 MG/ML
0.2 INJECTION, SOLUTION INTRAMUSCULAR; INTRAVENOUS; SUBCUTANEOUS
Status: CANCELLED | OUTPATIENT
Start: 2021-08-16

## 2021-08-11 RX ORDER — HEPARIN SODIUM (PORCINE) LOCK FLUSH IV SOLN 100 UNIT/ML 100 UNIT/ML
5 SOLUTION INTRAVENOUS
Status: CANCELLED | OUTPATIENT
Start: 2021-08-16

## 2021-08-11 RX ORDER — ALBUTEROL SULFATE 90 UG/1
1-2 AEROSOL, METERED RESPIRATORY (INHALATION)
Status: CANCELLED
Start: 2021-08-16

## 2021-08-11 RX ORDER — MEPERIDINE HYDROCHLORIDE 25 MG/ML
25 INJECTION INTRAMUSCULAR; INTRAVENOUS; SUBCUTANEOUS EVERY 30 MIN PRN
Status: CANCELLED | OUTPATIENT
Start: 2021-08-16

## 2021-08-11 RX ORDER — ALBUTEROL SULFATE 0.83 MG/ML
2.5 SOLUTION RESPIRATORY (INHALATION)
Status: CANCELLED | OUTPATIENT
Start: 2021-08-16

## 2021-08-11 RX ORDER — EPINEPHRINE 1 MG/ML
0.3 INJECTION, SOLUTION, CONCENTRATE INTRAVENOUS EVERY 5 MIN PRN
Status: CANCELLED | OUTPATIENT
Start: 2021-08-16

## 2021-08-11 RX ORDER — DIPHENHYDRAMINE HYDROCHLORIDE 50 MG/ML
50 INJECTION INTRAMUSCULAR; INTRAVENOUS
Status: CANCELLED
Start: 2021-08-16

## 2021-08-11 RX ORDER — METHYLPREDNISOLONE SODIUM SUCCINATE 125 MG/2ML
125 INJECTION, POWDER, LYOPHILIZED, FOR SOLUTION INTRAMUSCULAR; INTRAVENOUS
Status: CANCELLED
Start: 2021-08-16

## 2021-08-11 RX ORDER — HEPARIN SODIUM,PORCINE 10 UNIT/ML
5 VIAL (ML) INTRAVENOUS
Status: CANCELLED | OUTPATIENT
Start: 2021-08-16

## 2021-08-11 NOTE — TELEPHONE ENCOUNTER
Clinic Care Coordination Contact  Care Team Conversations    TORB from Dr. Lin erwin to change feraheme to venofer to weekly x 5 weeks, with labs and follow up 2 weeks after completion. Patient will need to change to venofer. Left VM with patient to inform her of the change. Instructed to call PAC to schedule beyond the next 2 weeks.

## 2021-08-16 DIAGNOSIS — G44.89 OTHER HEADACHE SYNDROME: ICD-10-CM

## 2021-08-17 RX ORDER — BUTALBITAL, ACETAMINOPHEN AND CAFFEINE 50; 325; 40 MG/1; MG/1; MG/1
1 TABLET ORAL EVERY 4 HOURS PRN
Qty: 20 TABLET | Refills: 0 | Status: SHIPPED | OUTPATIENT
Start: 2021-08-17 | End: 2022-02-24

## 2021-08-17 NOTE — TELEPHONE ENCOUNTER
butalbital-acetaminophen-caffeine (ESGIC) -40 MG tablet  Last Written Prescription Date:  1/14/21  Last Fill Quantity: 20,  # refills: 1   Last office visit: 6/21/2021   Future Office Visit:   Next 5 appointments (look out 90 days)    Oct 11, 2021  3:30 PM  (Arrive by 3:15 PM)  Return Visit with German Ceballos MD  [facility] ([facility] ) [address] [place] [id number]  [phone]           Routing refill request to provider for review/approval because:  Drug not on the [facility] refill protocol

## 2021-08-18 ENCOUNTER — TELEPHONE (OUTPATIENT)
Dept: FAMILY MEDICINE | Facility: OTHER | Age: 51
End: 2021-08-18

## 2021-08-18 NOTE — TELEPHONE ENCOUNTER
Left message for patient to call back in regards to FMLA papers need to know what we are filling out for Low iron or migraines?

## 2021-08-23 ENCOUNTER — INFUSION THERAPY VISIT (OUTPATIENT)
Dept: INFUSION THERAPY | Facility: OTHER | Age: 51
End: 2021-08-23
Attending: NURSE PRACTITIONER
Payer: COMMERCIAL

## 2021-08-23 VITALS
WEIGHT: 192.24 LBS | HEIGHT: 65 IN | DIASTOLIC BLOOD PRESSURE: 80 MMHG | BODY MASS INDEX: 32.03 KG/M2 | HEART RATE: 86 BPM | OXYGEN SATURATION: 97 % | SYSTOLIC BLOOD PRESSURE: 129 MMHG | RESPIRATION RATE: 16 BRPM | TEMPERATURE: 98.1 F

## 2021-08-23 DIAGNOSIS — E61.1 IRON DEFICIENCY: Primary | ICD-10-CM

## 2021-08-23 DIAGNOSIS — R79.0 LOW FERRITIN: ICD-10-CM

## 2021-08-23 DIAGNOSIS — Z98.84 H/O GASTRIC BYPASS: ICD-10-CM

## 2021-08-23 PROCEDURE — 96365 THER/PROPH/DIAG IV INF INIT: CPT | Performed by: INTERNAL MEDICINE

## 2021-08-23 RX ORDER — ALBUTEROL SULFATE 90 UG/1
1-2 AEROSOL, METERED RESPIRATORY (INHALATION)
Status: CANCELLED
Start: 2021-08-25

## 2021-08-23 RX ORDER — HEPARIN SODIUM (PORCINE) LOCK FLUSH IV SOLN 100 UNIT/ML 100 UNIT/ML
5 SOLUTION INTRAVENOUS
Status: CANCELLED | OUTPATIENT
Start: 2021-08-25

## 2021-08-23 RX ORDER — EPINEPHRINE 1 MG/ML
0.3 INJECTION, SOLUTION, CONCENTRATE INTRAVENOUS EVERY 5 MIN PRN
Status: CANCELLED | OUTPATIENT
Start: 2021-08-25

## 2021-08-23 RX ORDER — MEPERIDINE HYDROCHLORIDE 25 MG/ML
25 INJECTION INTRAMUSCULAR; INTRAVENOUS; SUBCUTANEOUS EVERY 30 MIN PRN
Status: CANCELLED | OUTPATIENT
Start: 2021-08-25

## 2021-08-23 RX ORDER — NALOXONE HYDROCHLORIDE 0.4 MG/ML
0.2 INJECTION, SOLUTION INTRAMUSCULAR; INTRAVENOUS; SUBCUTANEOUS
Status: CANCELLED | OUTPATIENT
Start: 2021-08-25

## 2021-08-23 RX ORDER — ALBUTEROL SULFATE 0.83 MG/ML
2.5 SOLUTION RESPIRATORY (INHALATION)
Status: CANCELLED | OUTPATIENT
Start: 2021-08-25

## 2021-08-23 RX ORDER — METHYLPREDNISOLONE SODIUM SUCCINATE 125 MG/2ML
125 INJECTION, POWDER, LYOPHILIZED, FOR SOLUTION INTRAMUSCULAR; INTRAVENOUS
Status: CANCELLED
Start: 2021-08-25

## 2021-08-23 RX ORDER — DIPHENHYDRAMINE HYDROCHLORIDE 50 MG/ML
50 INJECTION INTRAMUSCULAR; INTRAVENOUS
Status: CANCELLED
Start: 2021-08-25

## 2021-08-23 RX ORDER — HEPARIN SODIUM,PORCINE 10 UNIT/ML
5 VIAL (ML) INTRAVENOUS
Status: CANCELLED | OUTPATIENT
Start: 2021-08-25

## 2021-08-23 RX ADMIN — Medication 250 ML: at 14:12

## 2021-08-23 ASSESSMENT — PAIN SCALES - GENERAL: PAINLEVEL: NO PAIN (0)

## 2021-08-23 ASSESSMENT — MIFFLIN-ST. JEOR: SCORE: 1487.88

## 2021-08-23 NOTE — PROGRESS NOTES
Patient is a 51 year old female here accompanied by self today for infusion of Venofer per order of Dr Ceballos.  Patient identified with two identifiers, order verified, and verbal consent for today's infusion obtained from patient.      24 gauge angio cath inserted into left inner forearm.  Immediate blood return noted.  IV secured with sterile, transparent dressing and tape.  Patient tolerated well, denies pain or discomfort at this time.  Flushes easily without resistance, no signs or symptoms of infiltration or infection.   Patient denies questions or concerns regarding infusion and/or medication(s) being administered.    IV pump verified with dose, drug, and rate of administration.  Infusion administered per protocol.  Patient tolerated infusion well, no signs or symptoms of adverse reaction noted.  Patient denies pain nor discomfort.     IV removed, catheter intact.  Site clean, dry and intact.  No signs or symptoms of infiltration or infection.  Covered with a sterile bandage, slight pressure applied for 30 seconds.  Pt instructed to leave bandage intact for a minimum of one hour, and to call with questions or concerns.  Copy of appointments, discharge instructions, and after visit summary (AVS) provided to patient.  Patient states understanding, discharged.

## 2021-08-30 ENCOUNTER — INFUSION THERAPY VISIT (OUTPATIENT)
Dept: INFUSION THERAPY | Facility: OTHER | Age: 51
End: 2021-08-30
Attending: INTERNAL MEDICINE
Payer: COMMERCIAL

## 2021-08-30 VITALS
WEIGHT: 192.46 LBS | DIASTOLIC BLOOD PRESSURE: 85 MMHG | HEART RATE: 93 BPM | OXYGEN SATURATION: 98 % | SYSTOLIC BLOOD PRESSURE: 130 MMHG | TEMPERATURE: 97.6 F | RESPIRATION RATE: 18 BRPM | BODY MASS INDEX: 32.03 KG/M2

## 2021-08-30 DIAGNOSIS — Z12.31 VISIT FOR SCREENING MAMMOGRAM: Primary | ICD-10-CM

## 2021-08-30 DIAGNOSIS — E61.1 IRON DEFICIENCY: Primary | ICD-10-CM

## 2021-08-30 DIAGNOSIS — Z98.84 H/O GASTRIC BYPASS: ICD-10-CM

## 2021-08-30 DIAGNOSIS — R79.0 LOW FERRITIN: ICD-10-CM

## 2021-08-30 PROCEDURE — 96365 THER/PROPH/DIAG IV INF INIT: CPT | Performed by: INTERNAL MEDICINE

## 2021-08-30 RX ORDER — DIPHENHYDRAMINE HYDROCHLORIDE 50 MG/ML
50 INJECTION INTRAMUSCULAR; INTRAVENOUS
Status: CANCELLED
Start: 2021-08-31

## 2021-08-30 RX ORDER — ALBUTEROL SULFATE 0.83 MG/ML
2.5 SOLUTION RESPIRATORY (INHALATION)
Status: CANCELLED | OUTPATIENT
Start: 2021-08-31

## 2021-08-30 RX ORDER — EPINEPHRINE 1 MG/ML
0.3 INJECTION, SOLUTION, CONCENTRATE INTRAVENOUS EVERY 5 MIN PRN
Status: CANCELLED | OUTPATIENT
Start: 2021-08-31

## 2021-08-30 RX ORDER — ALBUTEROL SULFATE 90 UG/1
1-2 AEROSOL, METERED RESPIRATORY (INHALATION)
Status: CANCELLED
Start: 2021-08-31

## 2021-08-30 RX ORDER — METHYLPREDNISOLONE SODIUM SUCCINATE 125 MG/2ML
125 INJECTION, POWDER, LYOPHILIZED, FOR SOLUTION INTRAMUSCULAR; INTRAVENOUS
Status: CANCELLED
Start: 2021-08-31

## 2021-08-30 RX ORDER — MEPERIDINE HYDROCHLORIDE 25 MG/ML
25 INJECTION INTRAMUSCULAR; INTRAVENOUS; SUBCUTANEOUS EVERY 30 MIN PRN
Status: CANCELLED | OUTPATIENT
Start: 2021-08-31

## 2021-08-30 RX ORDER — NALOXONE HYDROCHLORIDE 0.4 MG/ML
0.2 INJECTION, SOLUTION INTRAMUSCULAR; INTRAVENOUS; SUBCUTANEOUS
Status: CANCELLED | OUTPATIENT
Start: 2021-08-31

## 2021-08-30 RX ORDER — HEPARIN SODIUM,PORCINE 10 UNIT/ML
5 VIAL (ML) INTRAVENOUS
Status: CANCELLED | OUTPATIENT
Start: 2021-08-31

## 2021-08-30 RX ORDER — HEPARIN SODIUM (PORCINE) LOCK FLUSH IV SOLN 100 UNIT/ML 100 UNIT/ML
5 SOLUTION INTRAVENOUS
Status: CANCELLED | OUTPATIENT
Start: 2021-08-31

## 2021-08-30 RX ADMIN — Medication 250 ML: at 14:23

## 2021-08-30 NOTE — PROGRESS NOTES
Patient is a 51 year old here today for infusion of Venofer per order of dr Ceballos.  Patient identified with two identifiers, order verified, and verbal consent for today's infusion obtained from patient.      Patient meets order parameters for today's treatment.     Patient denies questions or concerns regarding infusion and/or medication(s) being administered.    IV pump verified with dose, drug, and rate of administration.  Infusion administered per protocol.  Patient tolerated infusion well, no signs or symptoms of adverse reaction noted.  Patient denies pain nor discomfort.     IV removed, catheter intact.  Site clean, dry and intact.  No signs or symptoms of infiltration or infection.  Covered with a sterile bandage, slight pressure applied for 30 seconds.  Pt instructed to leave bandage intact for a minimum of one hour, and to call with questions or concerns.   Patient states understanding, discharged.

## 2021-08-30 NOTE — PATIENT INSTRUCTIONS

## 2021-08-30 NOTE — PROGRESS NOTES
24 gauge angio cath inserted into right arm.  Immediate blood return noted.  IV secured with sterile, transparent dressing and tape.  Patient tolerated well, denies pain or discomfort at this time.  Flushes easily without resistance, no signs or symptoms of infiltration or infection. Flushed with 3mL normal saline to clear line. Patient denies questions or concerns regarding infusion and/or medication(s) being administered.

## 2021-09-04 ENCOUNTER — HEALTH MAINTENANCE LETTER (OUTPATIENT)
Age: 51
End: 2021-09-04

## 2021-09-07 ENCOUNTER — INFUSION THERAPY VISIT (OUTPATIENT)
Dept: INFUSION THERAPY | Facility: OTHER | Age: 51
End: 2021-09-07
Attending: INTERNAL MEDICINE
Payer: COMMERCIAL

## 2021-09-07 VITALS
HEIGHT: 65 IN | DIASTOLIC BLOOD PRESSURE: 82 MMHG | HEART RATE: 75 BPM | WEIGHT: 194.89 LBS | SYSTOLIC BLOOD PRESSURE: 125 MMHG | OXYGEN SATURATION: 98 % | TEMPERATURE: 97.5 F | RESPIRATION RATE: 18 BRPM | BODY MASS INDEX: 32.47 KG/M2

## 2021-09-07 DIAGNOSIS — R79.0 LOW FERRITIN: ICD-10-CM

## 2021-09-07 DIAGNOSIS — E61.1 IRON DEFICIENCY: Primary | ICD-10-CM

## 2021-09-07 DIAGNOSIS — Z98.84 H/O GASTRIC BYPASS: ICD-10-CM

## 2021-09-07 PROCEDURE — 96365 THER/PROPH/DIAG IV INF INIT: CPT | Performed by: INTERNAL MEDICINE

## 2021-09-07 RX ORDER — ALBUTEROL SULFATE 90 UG/1
1-2 AEROSOL, METERED RESPIRATORY (INHALATION)
Status: CANCELLED
Start: 2021-09-09

## 2021-09-07 RX ORDER — HEPARIN SODIUM (PORCINE) LOCK FLUSH IV SOLN 100 UNIT/ML 100 UNIT/ML
5 SOLUTION INTRAVENOUS
Status: CANCELLED | OUTPATIENT
Start: 2021-09-09

## 2021-09-07 RX ORDER — EPINEPHRINE 1 MG/ML
0.3 INJECTION, SOLUTION, CONCENTRATE INTRAVENOUS EVERY 5 MIN PRN
Status: CANCELLED | OUTPATIENT
Start: 2021-09-09

## 2021-09-07 RX ORDER — ALBUTEROL SULFATE 0.83 MG/ML
2.5 SOLUTION RESPIRATORY (INHALATION)
Status: CANCELLED | OUTPATIENT
Start: 2021-09-09

## 2021-09-07 RX ORDER — DIPHENHYDRAMINE HYDROCHLORIDE 50 MG/ML
50 INJECTION INTRAMUSCULAR; INTRAVENOUS
Status: CANCELLED
Start: 2021-09-09

## 2021-09-07 RX ORDER — METHYLPREDNISOLONE SODIUM SUCCINATE 125 MG/2ML
125 INJECTION, POWDER, LYOPHILIZED, FOR SOLUTION INTRAMUSCULAR; INTRAVENOUS
Status: CANCELLED
Start: 2021-09-09

## 2021-09-07 RX ORDER — HEPARIN SODIUM,PORCINE 10 UNIT/ML
5 VIAL (ML) INTRAVENOUS
Status: CANCELLED | OUTPATIENT
Start: 2021-09-09

## 2021-09-07 RX ORDER — NALOXONE HYDROCHLORIDE 0.4 MG/ML
0.2 INJECTION, SOLUTION INTRAMUSCULAR; INTRAVENOUS; SUBCUTANEOUS
Status: CANCELLED | OUTPATIENT
Start: 2021-09-09

## 2021-09-07 RX ORDER — MEPERIDINE HYDROCHLORIDE 25 MG/ML
25 INJECTION INTRAMUSCULAR; INTRAVENOUS; SUBCUTANEOUS EVERY 30 MIN PRN
Status: CANCELLED | OUTPATIENT
Start: 2021-09-09

## 2021-09-07 RX ADMIN — Medication 250 ML: at 08:57

## 2021-09-07 ASSESSMENT — MIFFLIN-ST. JEOR: SCORE: 1499.88

## 2021-09-07 ASSESSMENT — PAIN SCALES - GENERAL: PAINLEVEL: NO PAIN (0)

## 2021-09-07 NOTE — PROGRESS NOTES
Patient is 51 years old, here accompanied by self today for infusion of Venofer per order of Dr Ceballos.  Patient identified with two identifiers, order verified, and verbal consent for today's infusion obtained from patient.       24 gauge angio cath inserted into right arm.  Immediate blood return noted.  IV secured with sterile, transparent dressing and tape.  Patient tolerated well, denies pain or discomfort at this time.  Flushes easily without resistance, no signs or symptoms of infiltration or infection.   Patient denies questions or concerns regarding infusion and/or medication(s) being administered.    IV pump verified with dose, drug, and rate of administration.  Infusion administered per protocol.  Patient tolerated infusion well, no signs or symptoms of adverse reaction noted.  Patient denies pain nor discomfort.     IV removed, catheter intact.  Site clean, dry and intact.  No signs or symptoms of infiltration or infection.  Covered with a sterile bandage, slight pressure applied for 30 seconds.  Pt instructed to leave bandage intact for a minimum of one hour, and to call with questions or concerns. Patient states understanding, discharged.

## 2021-09-09 ENCOUNTER — ANCILLARY PROCEDURE (OUTPATIENT)
Dept: MAMMOGRAPHY | Facility: OTHER | Age: 51
End: 2021-09-09
Attending: NURSE PRACTITIONER
Payer: COMMERCIAL

## 2021-09-09 DIAGNOSIS — Z12.31 VISIT FOR SCREENING MAMMOGRAM: ICD-10-CM

## 2021-09-09 PROCEDURE — 77067 SCR MAMMO BI INCL CAD: CPT | Mod: TC | Performed by: RADIOLOGY

## 2021-09-09 PROCEDURE — 77063 BREAST TOMOSYNTHESIS BI: CPT | Mod: TC | Performed by: RADIOLOGY

## 2021-09-13 ENCOUNTER — NURSE TRIAGE (OUTPATIENT)
Dept: FAMILY MEDICINE | Facility: OTHER | Age: 51
End: 2021-09-13

## 2021-09-13 ENCOUNTER — TELEPHONE (OUTPATIENT)
Dept: FAMILY MEDICINE | Facility: OTHER | Age: 51
End: 2021-09-13

## 2021-09-13 ENCOUNTER — INFUSION THERAPY VISIT (OUTPATIENT)
Dept: INFUSION THERAPY | Facility: OTHER | Age: 51
End: 2021-09-13
Attending: INTERNAL MEDICINE
Payer: COMMERCIAL

## 2021-09-13 VITALS
DIASTOLIC BLOOD PRESSURE: 72 MMHG | WEIGHT: 194 LBS | SYSTOLIC BLOOD PRESSURE: 112 MMHG | RESPIRATION RATE: 17 BRPM | OXYGEN SATURATION: 99 % | BODY MASS INDEX: 32.28 KG/M2 | HEART RATE: 69 BPM | TEMPERATURE: 98.1 F

## 2021-09-13 DIAGNOSIS — Z98.84 H/O GASTRIC BYPASS: ICD-10-CM

## 2021-09-13 DIAGNOSIS — Z20.822 COVID-19 RULED OUT: Primary | ICD-10-CM

## 2021-09-13 DIAGNOSIS — E61.1 IRON DEFICIENCY: Primary | ICD-10-CM

## 2021-09-13 DIAGNOSIS — R79.0 LOW FERRITIN: ICD-10-CM

## 2021-09-13 PROCEDURE — 96365 THER/PROPH/DIAG IV INF INIT: CPT | Performed by: INTERNAL MEDICINE

## 2021-09-13 RX ORDER — HEPARIN SODIUM (PORCINE) LOCK FLUSH IV SOLN 100 UNIT/ML 100 UNIT/ML
5 SOLUTION INTRAVENOUS
Status: CANCELLED | OUTPATIENT
Start: 2021-09-15

## 2021-09-13 RX ORDER — EPINEPHRINE 1 MG/ML
0.3 INJECTION, SOLUTION, CONCENTRATE INTRAVENOUS EVERY 5 MIN PRN
Status: CANCELLED | OUTPATIENT
Start: 2021-09-15

## 2021-09-13 RX ORDER — METHYLPREDNISOLONE SODIUM SUCCINATE 125 MG/2ML
125 INJECTION, POWDER, LYOPHILIZED, FOR SOLUTION INTRAMUSCULAR; INTRAVENOUS
Status: CANCELLED
Start: 2021-09-15

## 2021-09-13 RX ORDER — DIPHENHYDRAMINE HYDROCHLORIDE 50 MG/ML
50 INJECTION INTRAMUSCULAR; INTRAVENOUS
Status: CANCELLED
Start: 2021-09-15

## 2021-09-13 RX ORDER — MEPERIDINE HYDROCHLORIDE 25 MG/ML
25 INJECTION INTRAMUSCULAR; INTRAVENOUS; SUBCUTANEOUS EVERY 30 MIN PRN
Status: CANCELLED | OUTPATIENT
Start: 2021-09-15

## 2021-09-13 RX ORDER — NALOXONE HYDROCHLORIDE 0.4 MG/ML
0.2 INJECTION, SOLUTION INTRAMUSCULAR; INTRAVENOUS; SUBCUTANEOUS
Status: CANCELLED | OUTPATIENT
Start: 2021-09-15

## 2021-09-13 RX ORDER — ALBUTEROL SULFATE 90 UG/1
1-2 AEROSOL, METERED RESPIRATORY (INHALATION)
Status: CANCELLED
Start: 2021-09-15

## 2021-09-13 RX ORDER — HEPARIN SODIUM,PORCINE 10 UNIT/ML
5 VIAL (ML) INTRAVENOUS
Status: CANCELLED | OUTPATIENT
Start: 2021-09-15

## 2021-09-13 RX ORDER — ALBUTEROL SULFATE 0.83 MG/ML
2.5 SOLUTION RESPIRATORY (INHALATION)
Status: CANCELLED | OUTPATIENT
Start: 2021-09-15

## 2021-09-13 RX ADMIN — Medication 250 ML: at 08:25

## 2021-09-13 NOTE — PROGRESS NOTES
Patient is a 51 year old female here accompanied by self today for infusion of IV venofer per order of Dr. Ceballos under the supervision of   Patient identified with two identifiers, order verified, and verbal consent for today's infusion obtained from patient.      Patient meets order parameters for today's treatment.     IV pump verified with dose, drug, and rate of administration.  Infusion administered per protocol.  Patient tolerated infusion well, no signs or symptoms of adverse reaction noted.  Patient denies pain nor discomfort.     IV removed, catheter intact.  Site clean, dry and intact.  No signs or symptoms of infiltration or infection.  Covered with a sterile bandage, slight pressure applied for 30 seconds.  Pt instructed to leave bandage intact for a minimum of one hour, and to call with questions or concerns.  Copy of appointments, discharge instructions, and after visit summary (AVS) provided to patient.  Patient states understanding, discharged.

## 2021-09-13 NOTE — PROGRESS NOTES
24 gauge angio cath inserted into RT HAND.  Immediate blood return noted.  IV secured with sterile, transparent dressing and tape.  Patient tolerated well, denies pain or discomfort at this time.  Flushes easily without resistance, no signs or symptoms of infiltration or infection.  Flushed with 3mL normal saline to clear line. Patient denies questions or concerns regarding infusion and/or medication(s) being administered.

## 2021-09-13 NOTE — TELEPHONE ENCOUNTER
" tested positive with a Rapid test.  Patient tested negative last week with a rapid test and positive this week with a rapid test.  Requesting a PCR test,  their daughter is getting  this weekend and they need accurate results  Reason for Disposition    [1] COVID-19 vaccine series completed (fully vaccinated) AND [2] COVID-19 EXPOSURE AND [3] no symptoms    Answer Assessment - Initial Assessment Questions  1. COVID-19 CLOSE CONTACT: \"Who is the person with the confirmed or suspected COVID-19 infection that you were exposed to?\"      family  2. PLACE of CONTACT: \"Where were you when you were exposed to COVID-19?\" (e.g., home, school, medical waiting room; which city?)      house  3. TYPE of CONTACT: \"How much contact was there?\" (e.g., sitting next to, live in same house, work in same office, same building)      Lives with  4. DURATION of CONTACT: \"How long were you in contact with the COVID-19 patient?\" (e.g., a few seconds, passed by person, a few minutes, 15 minutes or longer, live with the patient)      All day  5. MASK: \"Were you wearing a mask?\" \"Was the other person wearing a mask?\" Note: wearing a mask reduces the risk of an otherwise close contact.      no  6. DATE of CONTACT: \"When did you have contact with a COVID-19 patient?\" (e.g., how many days ago)      today  7. COMMUNITY SPREAD: \"Are there lots of cases of COVID-19 (community spread) where you live?\" (See public health department website, if unsure)        yes  8. SYMPTOMS: \"Do you have any symptoms?\" (e.g., fever, cough, breathing difficulty, loss of taste or smell)      no  9. PREGNANCY OR POSTPARTUM: \"Is there any chance you are pregnant?\" \"When was your last menstrual period?\" \"Did you deliver in the last 2 weeks?\"      no  10. HIGH RISK: \"Do you have any heart or lung problems?\" \"Do you have a weak immune system?\" (e.g., heart failure, COPD, asthma, HIV positive, chemotherapy, renal failure, diabetes mellitus, sickle cell anemia, " "obesity)        no  11. TRAVEL: \"Have you traveled out of the country recently?\" If so, \"When and where?\" Also ask about out-of-state travel, since the CDC has identified some high-risk cities for community spread in the . Note: Travel becomes less relevant if there is widespread community transmission where the patient lives.        no    Protocols used: CORONAVIRUS (COVID-19) EXPOSURE-A- 3.25      "

## 2021-09-14 ENCOUNTER — OFFICE VISIT (OUTPATIENT)
Dept: FAMILY MEDICINE | Facility: OTHER | Age: 51
End: 2021-09-14
Attending: NURSE PRACTITIONER
Payer: COMMERCIAL

## 2021-09-14 DIAGNOSIS — Z20.822 COVID-19 RULED OUT: ICD-10-CM

## 2021-09-14 PROCEDURE — U0003 INFECTIOUS AGENT DETECTION BY NUCLEIC ACID (DNA OR RNA); SEVERE ACUTE RESPIRATORY SYNDROME CORONAVIRUS 2 (SARS-COV-2) (CORONAVIRUS DISEASE [COVID-19]), AMPLIFIED PROBE TECHNIQUE, MAKING USE OF HIGH THROUGHPUT TECHNOLOGIES AS DESCRIBED BY CMS-2020-01-R: HCPCS

## 2021-09-14 PROCEDURE — U0005 INFEC AGEN DETEC AMPLI PROBE: HCPCS

## 2021-09-15 LAB — SARS-COV-2 RNA RESP QL NAA+PROBE: POSITIVE

## 2021-10-04 ENCOUNTER — LAB (OUTPATIENT)
Dept: LAB | Facility: OTHER | Age: 51
End: 2021-10-04
Payer: COMMERCIAL

## 2021-10-04 DIAGNOSIS — R79.0 LOW FERRITIN: ICD-10-CM

## 2021-10-04 DIAGNOSIS — E61.1 IRON DEFICIENCY: ICD-10-CM

## 2021-10-04 LAB
ALBUMIN SERPL-MCNC: 3.6 G/DL (ref 3.4–5)
ALP SERPL-CCNC: 100 U/L (ref 40–150)
ALT SERPL W P-5'-P-CCNC: 26 U/L (ref 0–50)
ANION GAP SERPL CALCULATED.3IONS-SCNC: 2 MMOL/L (ref 3–14)
AST SERPL W P-5'-P-CCNC: 14 U/L (ref 0–45)
BASOPHILS # BLD AUTO: 0 10E3/UL (ref 0–0.2)
BASOPHILS NFR BLD AUTO: 0 %
BILIRUB SERPL-MCNC: 0.8 MG/DL (ref 0.2–1.3)
BUN SERPL-MCNC: 17 MG/DL (ref 7–30)
CALCIUM SERPL-MCNC: 9.1 MG/DL (ref 8.5–10.1)
CHLORIDE BLD-SCNC: 108 MMOL/L (ref 94–109)
CO2 SERPL-SCNC: 30 MMOL/L (ref 20–32)
CREAT SERPL-MCNC: 0.49 MG/DL (ref 0.52–1.04)
EOSINOPHIL # BLD AUTO: 0.1 10E3/UL (ref 0–0.7)
EOSINOPHIL NFR BLD AUTO: 1 %
ERYTHROCYTE [DISTWIDTH] IN BLOOD BY AUTOMATED COUNT: 13.2 % (ref 10–15)
FERRITIN SERPL-MCNC: 155 NG/ML (ref 8–252)
GFR SERPL CREATININE-BSD FRML MDRD: >90 ML/MIN/1.73M2
GLUCOSE BLD-MCNC: 102 MG/DL (ref 70–99)
HCT VFR BLD AUTO: 39.1 % (ref 35–47)
HGB BLD-MCNC: 13.1 G/DL (ref 11.7–15.7)
IMM GRANULOCYTES # BLD: 0 10E3/UL
IMM GRANULOCYTES NFR BLD: 0 %
IRON SATN MFR SERPL: 40 % (ref 15–46)
IRON SERPL-MCNC: 121 UG/DL (ref 35–180)
LDH SERPL L TO P-CCNC: 131 U/L (ref 81–234)
LYMPHOCYTES # BLD AUTO: 1.6 10E3/UL (ref 0.8–5.3)
LYMPHOCYTES NFR BLD AUTO: 28 %
MCH RBC QN AUTO: 30 PG (ref 26.5–33)
MCHC RBC AUTO-ENTMCNC: 33.5 G/DL (ref 31.5–36.5)
MCV RBC AUTO: 90 FL (ref 78–100)
MONOCYTES # BLD AUTO: 0.4 10E3/UL (ref 0–1.3)
MONOCYTES NFR BLD AUTO: 7 %
NEUTROPHILS # BLD AUTO: 3.6 10E3/UL (ref 1.6–8.3)
NEUTROPHILS NFR BLD AUTO: 64 %
NRBC # BLD AUTO: 0 10E3/UL
NRBC BLD AUTO-RTO: 0 /100
PLATELET # BLD AUTO: 269 10E3/UL (ref 150–450)
POTASSIUM BLD-SCNC: 4.5 MMOL/L (ref 3.4–5.3)
PROT SERPL-MCNC: 6.7 G/DL (ref 6.8–8.8)
RBC # BLD AUTO: 4.36 10E6/UL (ref 3.8–5.2)
SODIUM SERPL-SCNC: 140 MMOL/L (ref 133–144)
TIBC SERPL-MCNC: 299 UG/DL (ref 240–430)
VIT B12 SERPL-MCNC: 484 PG/ML (ref 193–986)
WBC # BLD AUTO: 5.7 10E3/UL (ref 4–11)

## 2021-10-04 PROCEDURE — 83550 IRON BINDING TEST: CPT

## 2021-10-04 PROCEDURE — 36415 COLL VENOUS BLD VENIPUNCTURE: CPT

## 2021-10-04 PROCEDURE — 83615 LACTATE (LD) (LDH) ENZYME: CPT

## 2021-10-04 PROCEDURE — 82607 VITAMIN B-12: CPT

## 2021-10-04 PROCEDURE — 82728 ASSAY OF FERRITIN: CPT

## 2021-10-04 PROCEDURE — 85025 COMPLETE CBC W/AUTO DIFF WBC: CPT

## 2021-10-04 PROCEDURE — 80053 COMPREHEN METABOLIC PANEL: CPT

## 2021-10-11 ENCOUNTER — ONCOLOGY VISIT (OUTPATIENT)
Dept: ONCOLOGY | Facility: OTHER | Age: 51
End: 2021-10-11
Attending: NURSE PRACTITIONER
Payer: COMMERCIAL

## 2021-10-11 VITALS
RESPIRATION RATE: 20 BRPM | TEMPERATURE: 97.6 F | DIASTOLIC BLOOD PRESSURE: 82 MMHG | BODY MASS INDEX: 31.99 KG/M2 | HEART RATE: 89 BPM | WEIGHT: 192.02 LBS | HEIGHT: 65 IN | OXYGEN SATURATION: 98 % | SYSTOLIC BLOOD PRESSURE: 130 MMHG

## 2021-10-11 DIAGNOSIS — E61.1 IRON DEFICIENCY: Primary | ICD-10-CM

## 2021-10-11 PROCEDURE — 99213 OFFICE O/P EST LOW 20 MIN: CPT | Performed by: INTERNAL MEDICINE

## 2021-10-11 ASSESSMENT — PAIN SCALES - GENERAL: PAINLEVEL: NO PAIN (0)

## 2021-10-11 ASSESSMENT — MIFFLIN-ST. JEOR: SCORE: 1486.88

## 2021-10-11 NOTE — NURSING NOTE
"Oncology Rooming Note    October 11, 2021 3:12 PM   Shazia Verma is a 51 year old female who presents for:    Chief Complaint   Patient presents with     Hematology     Follow up gastric bypass and iron deficiency     Initial Vitals: /82   Pulse 89   Temp 97.6  F (36.4  C) (Tympanic)   Resp 20   Ht 1.651 m (5' 5\")   Wt 87.1 kg (192 lb 0.3 oz)   LMP 04/10/2018 (LMP Unknown)   SpO2 98%   BMI 31.95 kg/m   Estimated body mass index is 31.95 kg/m  as calculated from the following:    Height as of this encounter: 1.651 m (5' 5\").    Weight as of this encounter: 87.1 kg (192 lb 0.3 oz). Body surface area is 2 meters squared.  No Pain (0) Comment: Data Unavailable   Patient's last menstrual period was 04/10/2018 (lmp unknown).  Allergies reviewed: Yes  Medications reviewed: Yes    Medications: Medication refills not needed today.  Pharmacy name entered into [facility]:    [id number] - [place], [place] - [address] AT [facility] [address]  [facility] [id number] [place] [facility] [place] - [id number] - [place], [place] - [address] Hwang LPN            "

## 2021-10-11 NOTE — PROGRESS NOTES
Visit Date: 10/11/2021    HEMATOLOGY ONCOLOGY CLINIC NOTE      Mrs. Verma returns for followup of iron deficiency and history of gastric bypass surgery.  We had seen the patient in consultation at the request of Mary Ellen Armstrong, nurse practitioner on 08/09/2021.  At that time, Mrs. Verma was a 51-year-old white female status post gastric bypass surgery, history of migraine headaches we were asked to evaluate concerning iron deficiency.  Apparently, the patient complained of feeling fatigued, having chronic hair loss.  Ferritin was drawn by Mary Ellen Armstrong, which came back at 6, which was repeated again and came back still low at 12.  Hemoglobin was normal.  Nonetheless, she was now referred for possible iron infusions.  She had a colonoscopy approximately 10 years ago in [place].  She had gastric bypass surgery done in [place] in 2012 in the [place] area due to obesity and prediabetes.  When we saw the patient, we felt she likely had malabsorption secondary to gastric bypass procedure requiring IV infusions.  We recommended IV Venofer 200 mg x5 doses.  She completed this and feels much better.  She has less fatigue.  She denies any further hair loss.  No bright red blood per rectum.  She would like to discuss colonoscopy with her primary care physician, but for now she would like to hold off.  Denies shortness of breath, chest pain, headaches, bone pain.    PHYSICAL EXAMINATION:  GENERAL:  She is a middle-aged white female in no acute distress.  VITAL SIGNS:  Reveal blood pressure 130/82, pulse 79, respirations 20, temp 97.6.  HEENT:  Atraumatic, normocephalic.  Oropharynx is nonerythematous.  NECK:  Supple.  LUNGS:  Clear to auscultation and percussion.  HEART:  Regular rhythm.  S1, S2 normal.  ABDOMEN:  Soft, normoactive bowel sounds.  No mass, nontender.  LYMPHATICS:  No cervical, supraclavicular, axillary or inguinal nodes.  EXTREMITIES:  No edema.  NEUROLOGIC:  Nonfocal.    LABORATORY DATA:   CBC:  White count 5.7, H and H 13.1 and 39.1, platelet count 269.  B12 is 484.  BUN 17, creatinine 0.49.  Ferritin is 155.  Iron 121, percent saturation 40%.       IMPRESSION:  Iron deficiency, likely due to malabsorption secondary to gastric bypass procedure.  The patient received Venofer x5 doses and now her iron levels have normalized.  Plan is continue oral B12.  We will see the patient in 3 months and obtain CBC, CMP, LDH, iron studies.  She was encouraged to have a colonoscopy this year.  She will discuss this with her primary care provider, Shy Armstrong.    Fifteen minutes were spent on this patient; time was spent reviewing lab results, performing history and physical, documenting history and physical.    German Ceballos MD        D: 10/11/2021   T: 10/11/2021   MT: [id number]    Name:     RISA REBOLLEDO  MRN:      [id number]        Account:    [id number]   :      1970           Visit Date: 10/11/2021     Document: [id number]

## 2021-10-11 NOTE — PATIENT INSTRUCTIONS
We would like to see you back in 3 months. Please come 2day(s) prior for lab work.  When you are in need of a refill of your medications, please call your pharmacy and they will send us the request. If you have any questions please call [phone]

## 2022-01-10 ENCOUNTER — LAB (OUTPATIENT)
Dept: LAB | Facility: OTHER | Age: 52
End: 2022-01-10
Payer: COMMERCIAL

## 2022-01-10 DIAGNOSIS — E61.1 IRON DEFICIENCY: ICD-10-CM

## 2022-01-10 LAB
ALBUMIN SERPL-MCNC: 3.9 G/DL (ref 3.4–5)
ALP SERPL-CCNC: 96 U/L (ref 40–150)
ALT SERPL W P-5'-P-CCNC: 27 U/L (ref 0–50)
ANION GAP SERPL CALCULATED.3IONS-SCNC: 6 MMOL/L (ref 3–14)
AST SERPL W P-5'-P-CCNC: 14 U/L (ref 0–45)
BASOPHILS # BLD AUTO: 0 10E3/UL (ref 0–0.2)
BASOPHILS NFR BLD AUTO: 1 %
BILIRUB SERPL-MCNC: 0.4 MG/DL (ref 0.2–1.3)
BUN SERPL-MCNC: 20 MG/DL (ref 7–30)
CALCIUM SERPL-MCNC: 8.9 MG/DL (ref 8.5–10.1)
CHLORIDE BLD-SCNC: 109 MMOL/L (ref 94–109)
CO2 SERPL-SCNC: 25 MMOL/L (ref 20–32)
CREAT SERPL-MCNC: 0.59 MG/DL (ref 0.52–1.04)
EOSINOPHIL # BLD AUTO: 0.1 10E3/UL (ref 0–0.7)
EOSINOPHIL NFR BLD AUTO: 1 %
ERYTHROCYTE [DISTWIDTH] IN BLOOD BY AUTOMATED COUNT: 12.4 % (ref 10–15)
FERRITIN SERPL-MCNC: 53 NG/ML (ref 8–252)
GFR SERPL CREATININE-BSD FRML MDRD: >90 ML/MIN/1.73M2
GLUCOSE BLD-MCNC: 101 MG/DL (ref 70–99)
HCT VFR BLD AUTO: 39.7 % (ref 35–47)
HGB BLD-MCNC: 13 G/DL (ref 11.7–15.7)
IRON SATN MFR SERPL: 27 % (ref 15–46)
IRON SERPL-MCNC: 92 UG/DL (ref 35–180)
LDH SERPL L TO P-CCNC: 146 U/L (ref 81–234)
LYMPHOCYTES # BLD AUTO: 1.7 10E3/UL (ref 0.8–5.3)
LYMPHOCYTES NFR BLD AUTO: 41 %
MCH RBC QN AUTO: 29.6 PG (ref 26.5–33)
MCHC RBC AUTO-ENTMCNC: 32.7 G/DL (ref 31.5–36.5)
MCV RBC AUTO: 90 FL (ref 78–100)
MONOCYTES # BLD AUTO: 0.3 10E3/UL (ref 0–1.3)
MONOCYTES NFR BLD AUTO: 8 %
NEUTROPHILS # BLD AUTO: 2.1 10E3/UL (ref 1.6–8.3)
NEUTROPHILS NFR BLD AUTO: 49 %
PLATELET # BLD AUTO: 303 10E3/UL (ref 150–450)
POTASSIUM BLD-SCNC: 4 MMOL/L (ref 3.4–5.3)
PROT SERPL-MCNC: 7.2 G/DL (ref 6.8–8.8)
RBC # BLD AUTO: 4.39 10E6/UL (ref 3.8–5.2)
SODIUM SERPL-SCNC: 140 MMOL/L (ref 133–144)
TIBC SERPL-MCNC: 346 UG/DL (ref 240–430)
VIT B12 SERPL-MCNC: 406 PG/ML (ref 193–986)
WBC # BLD AUTO: 4.2 10E3/UL (ref 4–11)

## 2022-01-10 PROCEDURE — 85025 COMPLETE CBC W/AUTO DIFF WBC: CPT

## 2022-01-10 PROCEDURE — 83615 LACTATE (LD) (LDH) ENZYME: CPT

## 2022-01-10 PROCEDURE — 80053 COMPREHEN METABOLIC PANEL: CPT

## 2022-01-10 PROCEDURE — 83550 IRON BINDING TEST: CPT

## 2022-01-10 PROCEDURE — 82728 ASSAY OF FERRITIN: CPT

## 2022-01-10 PROCEDURE — 82607 VITAMIN B-12: CPT

## 2022-01-10 PROCEDURE — 36415 COLL VENOUS BLD VENIPUNCTURE: CPT

## 2022-01-11 ENCOUNTER — ONCOLOGY VISIT (OUTPATIENT)
Dept: ONCOLOGY | Facility: OTHER | Age: 52
End: 2022-01-11
Attending: NURSE PRACTITIONER
Payer: COMMERCIAL

## 2022-01-11 VITALS
DIASTOLIC BLOOD PRESSURE: 80 MMHG | WEIGHT: 193.34 LBS | SYSTOLIC BLOOD PRESSURE: 120 MMHG | HEIGHT: 65 IN | HEART RATE: 81 BPM | BODY MASS INDEX: 32.21 KG/M2 | TEMPERATURE: 96.9 F | OXYGEN SATURATION: 98 % | RESPIRATION RATE: 20 BRPM

## 2022-01-11 DIAGNOSIS — E61.1 IRON DEFICIENCY: Primary | ICD-10-CM

## 2022-01-11 PROCEDURE — 99213 OFFICE O/P EST LOW 20 MIN: CPT | Performed by: NURSE PRACTITIONER

## 2022-01-11 ASSESSMENT — MIFFLIN-ST. JEOR: SCORE: 1492.88

## 2022-01-11 ASSESSMENT — PAIN SCALES - GENERAL: PAINLEVEL: NO PAIN (0)

## 2022-01-11 NOTE — PROGRESS NOTES
Hematology Follow-up Visit:  January 11, 2022    Reason for Visit:  Patient presents with:  Oncology Clinic Visit: Follow up Iron deficiency, Vitamin D Defiency, History of gastric bypass      Nursing Notes and Documentation reviewed:  yes    HPI:   This is a 51-year-old female patient who presents to the clinic today in follow-up of iron deficiency.  Patient is post gastric bypass surgery 2012.  She was evaluated initially by Dr. Ceballos on 8/9/2021.  At that time ferritin had been drawn by her PCP showing a level of 6.  Hemoglobin was within normal limits.  It was felt this was likely related to malabsorption secondary to her gastric bypass procedure and she was treated with 2 doses of Feraheme.  Repeat iron studies showed improvement with the normalization of her ferritin level.  Additional studies including SPEP, TSH, sed rate and rheumatoid factor along with PHUC were completed that were negative.  It was recommended she pursue a colonoscopy.    She presents to the clinic today stating she is feeling pretty good.  Her energy is still good.  Her headaches have improved but she does continue with headaches at times and this is not new for her.  She does continue with some hair loss but feels this is also greatly improved.  She continues on oral vitamin B12 but admits to not taking it consistently on a daily basis.  She plans to follow-up with her PCP this month as she is due for preventative healthcare visit and will discuss colonoscopy at that time.    Treatment: IV Feraheme 500 mg x 2; vitamin B12 1000 mcg orally daily      Past Medical History:   Diagnosis Date     Breast mass 2002    benign right     Diabetes (H)      Hypertension        Social History     Socioeconomic History     Marital status:      Spouse name: Not on file     Number of children: Not on file     Years of education: Not on file     Highest education level: Not on file   Occupational History     Not on file   Tobacco Use     Smoking  status: Former Smoker     Types: Cigarettes     Start date: 1986     Quit date:      Years since quittin.0     Smokeless tobacco: Never Used   Substance and Sexual Activity     Alcohol use: Yes     Comment: occ     Drug use: No     Sexual activity: Not on file   Other Topics Concern     Parent/sibling w/ CABG, MI or angioplasty before 65F 55M? Yes     Comment: mother   Social History Narrative     Not on file     Social Determinants of Health     Financial Resource Strain: Not on file   Food Insecurity: Not on file   Transportation Needs: Not on file   Physical Activity: Not on file   Stress: Not on file   Social Connections: Not on file   Intimate Partner Violence: Not on file   Housing Stability: Not on file       Past Surgical History:   Procedure Laterality Date     APPENDECTOMY  1991     BIOPSY BREAST Right 2002    benign     BIOPSY BREAST Right 2020    US Bx benign     BREAST BIOPSY, RT/LT Right 2002    right bx      SECTION  1992     COLONOSCOPY  2013     GASTRIC BYPASS  2012     gatric bypass       LAPAROSCOPIC HYSTERECTOMY SUPRACERVICAL N/A 2018    Procedure: LAPAROSCOPIC HYSTERECTOMY SUPRACERVICAL;  LAPAROSCOPIC SUPRACERVICAL HYSTERECTOMY, BILATEARL SALPINGECTOMY, LYSIS OF ADHESIONS;  Surgeon: Jean Marie Garvin MD;  Location: [facility]     ORTHOPEDIC SURGERY      right hip labreal tear     ORTHOPEDIC SURGERY  2014    left wrist      SALPINGECTOMY Bilateral 2018    Procedure: SALPINGECTOMY;;  Surgeon: Jean Marie Garvin MD;  Location: [facility]       Family History   Problem Relation Age of Onset     Cerebrovascular Disease Mother      Hyperlipidemia Mother      Hypertension Mother      Coronary Artery Disease Mother      Migraines Mother      Obesity Mother      Osteoarthritis Mother      Osteoporosis Mother      Diabetes Mother      Myocardial Infarction Mother      Hyperlipidemia Father      Hypertension Father      Thyroid Disease Father      Hypertension  Brother      Hyperlipidemia Brother      Obesity Brother      Hypertension Sister      Thyroid Disease Sister      Stomach Cancer Maternal Grandmother      Cancer Maternal Grandfather         colon or liver: unsure     Leukemia Paternal Grandfather      Breast Cancer No family hx of      Colon Cancer No family hx of      Prostate Cancer No family hx of      Anesthesia Reaction No family hx of      Asthma No family hx of      Genetic Disorder No family hx of        Allergies:  Allergies as of 01/11/2022 - Reviewed 01/11/2022   Allergen Reaction Noted     Reglan [metoclopramide] Anxiety 11/14/2016       Current Medications:  Current Outpatient Medications   Medication Sig Dispense Refill     butalbital-acetaminophen-caffeine (ESGIC) -40 MG tablet TAKE 1 TABLET BY MOUTH EVERY 4 HOURS AS NEEDED FOR HEADACHES 20 tablet 0     calcium carbonate (OS-MANUEL 500 MG [place]. [place]) 500 MG tablet        Cyanocobalamin (VITAMIN B 12 PO)        multivitamin, therapeutic with minerals (MULTI-VITAMIN) TABS Take 1 tablet by mouth daily       propranolol ER (INDERAL LA) 60 MG 24 hr capsule Take 1 capsule (60 mg) by mouth daily 30 capsule 1     VITAMIN D, CHOLECALCIFEROL, PO Take 5,000 Units by mouth daily 2 tabs daily       Ferrous Sulfate (IRON SUPPLEMENT PO) Take 325 mg by mouth daily (with breakfast) 2 tabs daily (Patient not taking: Reported on 1/11/2022)       SUMAtriptan (IMITREX) 50 MG tablet Take 1 tablet (50 mg) by mouth at onset of headache for migraine May repeat in 2 hours. Max 4 tablets/24 hours. (Patient not taking: Reported on 10/11/2021) 12 tablet 3          Review Of Systems:  Constitutional: denies fever, weight changes  Eyes: denies blurred or double vision  Ears/Nose/Throat: denies ear pain, nose problems, difficulty swallowing  Respiratory: denies shortness of breath, cough  Skin: denies rash, lesions  Cardiovascular: denies chest pain, palpitations, edema  Gastrointestinal: denies abdominal pain, bloating, nausea,  "early satiety  Genitourinary: denies difficulty with urination, blood in urine  Musculoskeletal: denies new muscle pain, bone pain  Neurologic: denies lightheadedness,  numbness orTingling, has occasional headaches  Hematologic/Lymphatic/Immunologic: denies easy bruising, easy bleeding, lumps or bumps noted  Endocrine: Denies increased thirst, night sweats      Physical Exam:  /80   Pulse 81   Temp 96.9  F (36.1  C) (Tympanic)   Resp 20   Ht 1.651 m (5' 5\")   Wt 87.7 kg (193 lb 5.5 oz)   LMP 04/10/2018 (LMP Unknown)   SpO2 98%   BMI 32.17 kg/m    GENERAL APPEARANCE: Healthy, alert and in no acute distress.  HEENT: Normocephalic, Sclerae anicteric. Oropharynx without ulcers, lesions, or thrush.  NECK:  No asymmetry or masses, no thyromegaly.  LYMPHATICS: No palpable cervical, supraclavicular, axillary, or inguinal nodes   RESP: Lungs clear to auscultation bilaterally, respirations regular and easy  CARDIOVASCULAR: Regular rate and rhythm. Normal S1, S2; no murmur, gallop, or rub.  NEURO: Alert and oriented x 3.  Gait steady.  PSYCHIATRIC: Mentation and affect appear normal.  Mood appropriate.    Laboratory/Imaging Studies:  Component      Latest Ref Rng & Units 1/10/2022   WBC      4.0 - 11.0 10e3/uL 4.2   RBC Count      3.80 - 5.20 10e6/uL 4.39   Hemoglobin      11.7 - 15.7 g/dL 13.0   Hematocrit      35.0 - 47.0 % 39.7   MCV      78 - 100 fL 90   MCH      26.5 - 33.0 pg 29.6   MCHC      31.5 - 36.5 g/dL 32.7   RDW      10.0 - 15.0 % 12.4   Platelet Count      150 - 450 10e3/uL 303   % Neutrophils      % 49   % Lymphocytes      % 41   % Monocytes      % 8   % Eosinophils      % 1   % Basophils      % 1   Absolute Neutrophils      1.6 - 8.3 10e3/uL 2.1   Absolute Lymphocytes      0.8 - 5.3 10e3/uL 1.7   Absolute Monocytes      0.0 - 1.3 10e3/uL 0.3   Absolute Eosinophils      0.0 - 0.7 10e3/uL 0.1   Absolute Basophils      0.0 - 0.2 10e3/uL 0.0   Sodium      133 - 144 mmol/L 140   Potassium      3.4 - " 5.3 mmol/L 4.0   Chloride      94 - 109 mmol/L 109   Carbon Dioxide      20 - 32 mmol/L 25   Anion Gap      3 - 14 mmol/L 6   Urea Nitrogen      7 - 30 mg/dL 20   Creatinine      0.52 - 1.04 mg/dL 0.59   Calcium      8.5 - 10.1 mg/dL 8.9   Glucose      70 - 99 mg/dL 101 (H)   Alkaline Phosphatase      40 - 150 U/L 96   AST      0 - 45 U/L 14   ALT      0 - 50 U/L 27   Protein Total      6.8 - 8.8 g/dL 7.2   Albumin      3.4 - 5.0 g/dL 3.9   Bilirubin Total      0.2 - 1.3 mg/dL 0.4   GFR Estimate      >60 mL/min/1.73m2 >90   Iron      35 - 180 ug/dL 92   Iron Binding Cap      240 - 430 ug/dL 346   Iron Saturation Index      15 - 46 % 27   Vitamin B12      193 - 986 pg/mL 406   Ferritin      8 - 252 ng/mL 53   Lactate Dehydrogenase      81 - 234 U/L 146         ASSESSMENT/PLAN:    #1 Iron deficiency: Ferritin at 6 prior to infusion of Feraheme x2.  Ferritin level remains within normal limits but has decreased.  We will plan to recheck her iron studies to include an iron/TIBC and ferritin and a CBC in 3 months and we will call her with the results and plan.  Patient will call earlier if she develops increased fatigue or symptoms of deficiency again.    I encouraged patient to call with any questions or concerns.      Megan Zepeda, NP APRN, FNP-BC, AOCNP

## 2022-01-11 NOTE — PATIENT INSTRUCTIONS
We will schedule you for labwork in 3 months in [facility].  We will call you with the results and plan.    If you have any questions please call [phone]    Other instructions:  none

## 2022-01-11 NOTE — NURSING NOTE
"Oncology Rooming Note    January 11, 2022 9:43 AM   Shazia Verma is a 51 year old female who presents for:    Chief Complaint   Patient presents with     Oncology Clinic Visit     Follow up Iron deficiency, Vitamin D Defiency, History of gastric bypass     Initial Vitals: /80   Pulse 81   Temp 96.9  F (36.1  C) (Tympanic)   Resp 20   Ht 1.651 m (5' 5\")   Wt 87.7 kg (193 lb 5.5 oz)   LMP 04/10/2018 (LMP Unknown)   SpO2 98%   BMI 32.17 kg/m   Estimated body mass index is 32.17 kg/m  as calculated from the following:    Height as of this encounter: 1.651 m (5' 5\").    Weight as of this encounter: 87.7 kg (193 lb 5.5 oz). Body surface area is 2.01 meters squared.  No Pain (0) Comment: Data Unavailable   Patient's last menstrual period was 04/10/2018 (lmp unknown).  Allergies reviewed: Yes  Medications reviewed: Yes    Medications: Medication refills not needed today.  Pharmacy name entered into [facility]:    [id number] - [place], [place] - [address] AT [facility]  [address]  [facility] [address] [place] [address] [id number] - [place] - [address]          Morenita Hwang LPN            "

## 2022-01-20 NOTE — PROGRESS NOTES
SUBJECTIVE:   CC: Shazia Verma is an 51 year old woman who presents for preventive health visit.       Patient has been advised of split billing requirements and indicates understanding: Yes  HPI      Shazia is here today for annual exam.      Mammogram current, colonoscopy due, pap current - due next year.      Overall she is feeling well,  Headaches are controlled.  Right hip pain continues post replacement.  She had been working with ortho but previous insurance company denies her surgery.  She has new insurance and is requesting referral back to Dr Thomson for follow-up.      She is otherwise doing well, no new concerns.       Today's PHQ-2 Score:   PHQ-2 (  [employer]) 2022   Q1: Little interest or pleasure in doing things 0   Q2: Feeling down, depressed or hopeless 0   PHQ-2 Score 0   PHQ-2 Total Score (12-17 Years)- Positive if 3 or more points; Administer PHQ-A if positive 0   Q1: Little interest or pleasure in doing things -   Q2: Feeling down, depressed or hopeless -   PHQ-2 Score -       Abuse: Current or Past (Physical, Sexual or Emotional) - No  Do you feel safe in your environment? Yes    Have you ever done Advance Care Planning? (For example, a Health Directive, POLST, or a discussion with a medical provider or your loved ones about your wishes): No, advance care planning information given to patient to review.  Patient plans to discuss their wishes with loved ones or provider.      Social History     Tobacco Use     Smoking status: Former Smoker     Types: Cigarettes     Start date: 1986     Quit date: 1988     Years since quittin.0     Smokeless tobacco: Never Used   Substance Use Topics     Alcohol use: Yes     Comment: occ     If you drink alcohol do you typically have >3 drinks per day or >7 drinks per week? No    Alcohol Use 2021   Prescreen: >3 drinks/day or >7 drinks/week? No   No flowsheet data found.    Reviewed orders with patient.  Reviewed health maintenance and  updated orders accordingly - Yes  BP Readings from Last 3 Encounters:   22 116/78   22 120/80   10/11/21 130/82    Wt Readings from Last 3 Encounters:   22 87.4 kg (192 lb 9.6 oz)   22 87.7 kg (193 lb 5.5 oz)   10/11/21 87.1 kg (192 lb 0.3 oz)                  Patient Active Problem List   Diagnosis     ACP (advance care planning)     H/O gastric bypass     Hypoglycemia     S/P hysterectomy     Other headache syndrome     History of right hip replacement     Migraine without aura and without status migrainosus, not intractable     Vitamin D deficiency     Low ferritin     Iron deficiency     Past Surgical History:   Procedure Laterality Date     APPENDECTOMY       BIOPSY BREAST Right     benign     BIOPSY BREAST Right 2020    US Bx benign     BREAST BIOPSY, RT/LT Right 2002    right bx      SECTION  1992     COLONOSCOPY  2013     GASTRIC BYPASS  2012     gatric bypass       LAPAROSCOPIC HYSTERECTOMY SUPRACERVICAL N/A 2018    Procedure: LAPAROSCOPIC HYSTERECTOMY SUPRACERVICAL;  LAPAROSCOPIC SUPRACERVICAL HYSTERECTOMY, BILATEARL SALPINGECTOMY, LYSIS OF ADHESIONS;  Surgeon: Jean Marie Garvin MD;  Location: [facility]     ORTHOPEDIC SURGERY      right hip labreal tear     ORTHOPEDIC SURGERY      left wrist      SALPINGECTOMY Bilateral 2018    Procedure: SALPINGECTOMY;;  Surgeon: Jean Marie Garvin MD;  Location: [facility]       Social History     Tobacco Use     Smoking status: Former Smoker     Types: Cigarettes     Start date: 1986     Quit date:      Years since quittin.0     Smokeless tobacco: Never Used   Substance Use Topics     Alcohol use: Yes     Comment: occ     Family History   Problem Relation Age of Onset     Cerebrovascular Disease Mother      Hyperlipidemia Mother      Hypertension Mother      Coronary Artery Disease Mother      Migraines Mother      Obesity Mother      Osteoarthritis Mother      Osteoporosis Mother       Diabetes Mother      Myocardial Infarction Mother      Hyperlipidemia Father      Hypertension Father      Thyroid Disease Father      Hypertension Brother      Hyperlipidemia Brother      Obesity Brother      Hypertension Sister      Thyroid Disease Sister      Stomach Cancer Maternal Grandmother      Cancer Maternal Grandfather         colon or liver: unsure     Leukemia Paternal Grandfather      Breast Cancer No family hx of      Colon Cancer No family hx of      Prostate Cancer No family hx of      Anesthesia Reaction No family hx of      Asthma No family hx of      Genetic Disorder No family hx of          Current Outpatient Medications   Medication Sig Dispense Refill     butalbital-acetaminophen-caffeine (ESGIC) -40 MG tablet TAKE 1 TABLET BY MOUTH EVERY 4 HOURS AS NEEDED FOR HEADACHES 20 tablet 0     calcium carbonate (OS-MANUEL 500 MG [place]. [place]) 500 MG tablet        Cyanocobalamin (VITAMIN B 12 PO)        multivitamin, therapeutic with minerals (MULTI-VITAMIN) TABS Take 1 tablet by mouth daily       propranolol ER (INDERAL LA) 60 MG 24 hr capsule Take 1 capsule (60 mg) by mouth daily 30 capsule 1     SUMAtriptan (IMITREX) 50 MG tablet Take 1 tablet (50 mg) by mouth at onset of headache for migraine May repeat in 2 hours. Max 4 tablets/24 hours. 12 tablet 3     VITAMIN D, CHOLECALCIFEROL, PO Take 5,000 Units by mouth daily 2 tabs daily       Allergies   Allergen Reactions     Reglan [Metoclopramide] Anxiety     Recent Labs   Lab Test 01/10/22  0808 10/04/21  0746 08/09/21  1247 08/09/21  1247 06/26/21  1426 05/06/21  1017 04/09/21  0937 01/14/21  0924 12/11/20  1446 12/30/19  1528 01/07/19  1355 10/22/18  1414 01/15/18  1542 09/01/17  1154   A1C  --   --   --   --   --   --   --  5.8* 5.7*  --   --   --   --  5.7   LDL  --   --   --   --   --   --   --  175*  --  155*  --  122*  --   --    HDL  --   --   --   --   --   --   --  66  --  51  --  58  --   --    TRIG  --   --   --   --   --   --   --  210*  --   259*  --  227*  --   --    ALT 27 26  --  28 30  --  28 22  --   --    < >  --    < >  --    CR 0.59 0.49*   < > 0.54 0.58  --  0.55 0.58  --  0.58   < > 0.68   < > 0.57   GFRESTIMATED >90 >90   < > >90 >90  --  >90 >90  --  >90   < > >90   < > >90   GFRESTBLACK  --   --   --   --  >90  --  >90 >90  --  >90   < > >90   < > >90   POTASSIUM 4.0 4.5   < > 4.2 3.8  --  4.5 4.3  --  4.1   < > 3.6   < > 4.1   TSH  --   --   --  1.24  --  1.78  --  2.25  --  1.93   < > 1.06  --  2.16    < > = values in this interval not displayed.        Breast Cancer Screening:  Any new diagnosis of family breast, ovarian, or bowel cancer? No    FHS-7:   Breast CA Risk Assessment (FHS-7) 9/9/2021   Did any of your first-degree relatives have breast or ovarian cancer? No   Did any of your relatives have bilateral breast cancer? No   Did any man in your family have breast cancer? No   Did any woman in your family have breast and ovarian cancer? No   Did any woman in your family have breast cancer before age 50 y? No   Do you have 2 or more relatives with breast and/or ovarian cancer? No   Do you have 2 or more relatives with breast and/or bowel cancer? No         Pertinent mammograms are reviewed under the imaging tab. Due in October.      History of abnormal Pap smear: pap due next year  PAP / HPV Latest Ref Rng & Units 4/10/2018   PAP (Historical) - NIL   HPV16 NEG:Negative Negative   HPV18 NEG:Negative Negative   HRHPV NEG:Negative Negative     Reviewed and updated as needed this visit by clinical staff  Tobacco  Allergies              Reviewed and updated as needed this visit by Provider                    Patient Active Problem List   Diagnosis     ACP (advance care planning)     H/O gastric bypass     Hypoglycemia     S/P hysterectomy     Other headache syndrome     History of right hip replacement     Migraine without aura and without status migrainosus, not intractable     Vitamin D deficiency     Low ferritin     Iron deficiency      Past Surgical History:   Procedure Laterality Date     APPENDECTOMY  1991     BIOPSY BREAST Right 2002    benign     BIOPSY BREAST Right 2020    US Bx benign     BREAST BIOPSY, RT/LT Right 2002    right bx      SECTION  1992 1994     COLONOSCOPY  2013     GASTRIC BYPASS       gatric bypass       LAPAROSCOPIC HYSTERECTOMY SUPRACERVICAL N/A 2018    Procedure: LAPAROSCOPIC HYSTERECTOMY SUPRACERVICAL;  LAPAROSCOPIC SUPRACERVICAL HYSTERECTOMY, BILATEARL SALPINGECTOMY, LYSIS OF ADHESIONS;  Surgeon: Jean Marie Garvin MD;  Location: [facility]     ORTHOPEDIC SURGERY      right hip labreal tear     ORTHOPEDIC SURGERY      left wrist      SALPINGECTOMY Bilateral 2018    Procedure: SALPINGECTOMY;;  Surgeon: Jean Marie Garvin MD;  Location: [facility]       Social History     Tobacco Use     Smoking status: Former Smoker     Types: Cigarettes     Start date: 1986     Quit date:      Years since quittin.0     Smokeless tobacco: Never Used   Substance Use Topics     Alcohol use: Yes     Comment: occ     Family History   Problem Relation Age of Onset     Cerebrovascular Disease Mother      Hyperlipidemia Mother      Hypertension Mother      Coronary Artery Disease Mother      Migraines Mother      Obesity Mother      Osteoarthritis Mother      Osteoporosis Mother      Diabetes Mother      Myocardial Infarction Mother      Hyperlipidemia Father      Hypertension Father      Thyroid Disease Father      Hypertension Brother      Hyperlipidemia Brother      Obesity Brother      Hypertension Sister      Thyroid Disease Sister      Stomach Cancer Maternal Grandmother      Cancer Maternal Grandfather         colon or liver: unsure     Leukemia Paternal Grandfather      Breast Cancer No family hx of      Colon Cancer No family hx of      Prostate Cancer No family hx of      Anesthesia Reaction No family hx of      Asthma No family hx of      Genetic Disorder No family hx of          Current  Outpatient Medications   Medication Sig Dispense Refill     butalbital-acetaminophen-caffeine (ESGIC) -40 MG tablet TAKE 1 TABLET BY MOUTH EVERY 4 HOURS AS NEEDED FOR HEADACHES 20 tablet 0     calcium carbonate (OS-MANUEL 500 MG [place]. [place]) 500 MG tablet        Cyanocobalamin (VITAMIN B 12 PO)        multivitamin, therapeutic with minerals (MULTI-VITAMIN) TABS Take 1 tablet by mouth daily       propranolol ER (INDERAL LA) 60 MG 24 hr capsule Take 1 capsule (60 mg) by mouth daily 30 capsule 1     SUMAtriptan (IMITREX) 50 MG tablet Take 1 tablet (50 mg) by mouth at onset of headache for migraine May repeat in 2 hours. Max 4 tablets/24 hours. 12 tablet 3     VITAMIN D, CHOLECALCIFEROL, PO Take 5,000 Units by mouth daily 2 tabs daily       Allergies   Allergen Reactions     Reglan [Metoclopramide] Anxiety     Recent Labs   Lab Test 01/10/22  0808 10/04/21  0746 08/09/21  1247 08/09/21  1247 06/26/21  1426 05/06/21  1017 04/09/21  0937 01/14/21  0924 12/11/20  1446 12/30/19  1528 01/07/19  1355 10/22/18  1414 01/15/18  1542 09/01/17  1154   A1C  --   --   --   --   --   --   --  5.8* 5.7*  --   --   --   --  5.7   LDL  --   --   --   --   --   --   --  175*  --  155*  --  122*  --   --    HDL  --   --   --   --   --   --   --  66  --  51  --  58  --   --    TRIG  --   --   --   --   --   --   --  210*  --  259*  --  227*  --   --    ALT 27 26  --  28 30  --  28 22  --   --    < >  --    < >  --    CR 0.59 0.49*   < > 0.54 0.58  --  0.55 0.58  --  0.58   < > 0.68   < > 0.57   GFRESTIMATED >90 >90   < > >90 >90  --  >90 >90  --  >90   < > >90   < > >90   GFRESTBLACK  --   --   --   --  >90  --  >90 >90  --  >90   < > >90   < > >90   POTASSIUM 4.0 4.5   < > 4.2 3.8  --  4.5 4.3  --  4.1   < > 3.6   < > 4.1   TSH  --   --   --  1.24  --  1.78  --  2.25  --  1.93   < > 1.06  --  2.16    < > = values in this interval not displayed.      BP Readings from Last 3 Encounters:   01/24/22 116/78   01/11/22 120/80   10/11/21 130/82  "   Wt Readings from Last 3 Encounters:   01/24/22 87.4 kg (192 lb 9.6 oz)   01/11/22 87.7 kg (193 lb 5.5 oz)   10/11/21 87.1 kg (192 lb 0.3 oz)                      Review of Systems  CONSTITUTIONAL: NEGATIVE for fever, chills, change in weight  INTEGUMENTARY/SKIN: NEGATIVE for worrisome rashes, moles or lesions  EYES: NEGATIVE for vision changes or irritation  ENT: NEGATIVE for ear, mouth and throat problems  RESP: NEGATIVE for significant cough or SOB  BREAST: NEGATIVE for masses, tenderness or discharge  CV: NEGATIVE for chest pain, palpitations or peripheral edema  GI: NEGATIVE for nausea, abdominal pain, heartburn, or change in bowel habits  : NEGATIVE for unusual urinary or vaginal symptoms. No vaginal bleeding.  MUSCULOSKELETAL:right hip pain  NEURO: NEGATIVE for weakness, dizziness or paresthesias  PSYCHIATRIC: NEGATIVE for changes in mood or affect      OBJECTIVE:   /78 (BP Location: Right arm, Patient Position: Chair, Cuff Size: Adult Large)   Pulse 60   Temp 97.4  F (36.3  C) (Tympanic)   Resp 18   Ht 1.651 m (5' 5\")   Wt 87.4 kg (192 lb 9.6 oz)   LMP 04/10/2018 (LMP Unknown)   SpO2 98%   BMI 32.05 kg/m    Physical Exam  GENERAL: healthy, alert and no distress  EYES: Eyes grossly normal to inspection, PERRL and conjunctivae and sclerae normal  HENT: ear canals and TM's normal, nose and mouth without ulcers or lesions  NECK: no adenopathy, no asymmetry, masses, or scars and thyroid normal to palpation  RESP: lungs clear to auscultation - no rales, rhonchi or wheezes  CV: regular rate and rhythm, normal S1 S2, no S3 or S4, no murmur, click or rub, no peripheral edema and peripheral pulses strong  ABDOMEN: soft, nontender, no hepatosplenomegaly, no masses and bowel sounds normal  MS: no gross musculoskeletal defects noted, no edema  NEURO: Normal strength and tone, mentation intact and speech normal  PSYCH: mentation appears normal, affect normal/bright        ASSESSMENT/PLAN:   1. Routine " "general medical examination at a health care facility  Exam completed     2. Encounter for screening colonoscopy  EGD and colonoscopy.    - Adult General Surg Referral    3. Low ferritin  - Adult General Surg Referral    4. Other iron deficiency anemia  - Adult General Surg Referral    5. H/O gastric bypass  - Adult General Surg Referral    6. Lipid screening  - Lipid Profile (Chol, Trig, HDL, LDL calc); Future    7. Hip pain, right  [facility]  - Orthopedic  Referral; Future    8. History of right hip replacement  - Orthopedic  Referral; Future          COUNSELING:  Reviewed preventive health counseling, as reflected in patient instructions       Regular exercise       Healthy diet/nutrition       Vision screening       Hearing screening       Immunization       Osteoporosis prevention/bone health       Colon cancer screening    Estimated body mass index is 32.05 kg/m  as calculated from the following:    Height as of this encounter: 1.651 m (5' 5\").    Weight as of this encounter: 87.4 kg (192 lb 9.6 oz).    Weight management plan: Discussed healthy diet and exercise guidelines    She reports that she quit smoking about 34 years ago. Her smoking use included cigarettes. She started smoking about 35 years ago. She has never used smokeless tobacco.      Counseling Resources:  ATP IV Guidelines  Pooled Cohorts Equation Calculator  Breast Cancer Risk Calculator  BRCA-Related Cancer Risk Assessment: FHS-7 Tool  FRAX Risk Assessment  ICSI Preventive Guidelines  Dietary Guidelines for Americans, 2010  USDA's MyPlate  ASA Prophylaxis  Lung CA Screening    Mary Ellen Armstrong NP  [facility]  "

## 2022-01-20 NOTE — H&P (VIEW-ONLY)
SUBJECTIVE:   CC: Shazia Verma is an 51 year old woman who presents for preventive health visit.       Patient has been advised of split billing requirements and indicates understanding: Yes  HPI      Shazia is here today for annual exam.      Mammogram current, colonoscopy due, pap current - due next year.      Overall she is feeling well,  Headaches are controlled.  Right hip pain continues post replacement.  She had been working with ortho but previous insurance company denies her surgery.  She has new insurance and is requesting referral back to Dr Thomson for follow-up.      She is otherwise doing well, no new concerns.       Today's PHQ-2 Score:   PHQ-2 (  [employer]) 2022   Q1: Little interest or pleasure in doing things 0   Q2: Feeling down, depressed or hopeless 0   PHQ-2 Score 0   PHQ-2 Total Score (12-17 Years)- Positive if 3 or more points; Administer PHQ-A if positive 0   Q1: Little interest or pleasure in doing things -   Q2: Feeling down, depressed or hopeless -   PHQ-2 Score -       Abuse: Current or Past (Physical, Sexual or Emotional) - No  Do you feel safe in your environment? Yes    Have you ever done Advance Care Planning? (For example, a Health Directive, POLST, or a discussion with a medical provider or your loved ones about your wishes): No, advance care planning information given to patient to review.  Patient plans to discuss their wishes with loved ones or provider.      Social History     Tobacco Use     Smoking status: Former Smoker     Types: Cigarettes     Start date: 1986     Quit date: 1988     Years since quittin.0     Smokeless tobacco: Never Used   Substance Use Topics     Alcohol use: Yes     Comment: occ     If you drink alcohol do you typically have >3 drinks per day or >7 drinks per week? No    Alcohol Use 2021   Prescreen: >3 drinks/day or >7 drinks/week? No   No flowsheet data found.    Reviewed orders with patient.  Reviewed health maintenance and  updated orders accordingly - Yes  BP Readings from Last 3 Encounters:   22 116/78   22 120/80   10/11/21 130/82    Wt Readings from Last 3 Encounters:   22 87.4 kg (192 lb 9.6 oz)   22 87.7 kg (193 lb 5.5 oz)   10/11/21 87.1 kg (192 lb 0.3 oz)                  Patient Active Problem List   Diagnosis     ACP (advance care planning)     H/O gastric bypass     Hypoglycemia     S/P hysterectomy     Other headache syndrome     History of right hip replacement     Migraine without aura and without status migrainosus, not intractable     Vitamin D deficiency     Low ferritin     Iron deficiency     Past Surgical History:   Procedure Laterality Date     APPENDECTOMY       BIOPSY BREAST Right     benign     BIOPSY BREAST Right 2020    US Bx benign     BREAST BIOPSY, RT/LT Right 2002    right bx      SECTION  1992     COLONOSCOPY  2013     GASTRIC BYPASS  2012     gatric bypass       LAPAROSCOPIC HYSTERECTOMY SUPRACERVICAL N/A 2018    Procedure: LAPAROSCOPIC HYSTERECTOMY SUPRACERVICAL;  LAPAROSCOPIC SUPRACERVICAL HYSTERECTOMY, BILATEARL SALPINGECTOMY, LYSIS OF ADHESIONS;  Surgeon: Jean Marie Garvin MD;  Location: [facility]     ORTHOPEDIC SURGERY      right hip labreal tear     ORTHOPEDIC SURGERY      left wrist      SALPINGECTOMY Bilateral 2018    Procedure: SALPINGECTOMY;;  Surgeon: Jean Marie Garvin MD;  Location: [facility]       Social History     Tobacco Use     Smoking status: Former Smoker     Types: Cigarettes     Start date: 1986     Quit date:      Years since quittin.0     Smokeless tobacco: Never Used   Substance Use Topics     Alcohol use: Yes     Comment: occ     Family History   Problem Relation Age of Onset     Cerebrovascular Disease Mother      Hyperlipidemia Mother      Hypertension Mother      Coronary Artery Disease Mother      Migraines Mother      Obesity Mother      Osteoarthritis Mother      Osteoporosis Mother       Diabetes Mother      Myocardial Infarction Mother      Hyperlipidemia Father      Hypertension Father      Thyroid Disease Father      Hypertension Brother      Hyperlipidemia Brother      Obesity Brother      Hypertension Sister      Thyroid Disease Sister      Stomach Cancer Maternal Grandmother      Cancer Maternal Grandfather         colon or liver: unsure     Leukemia Paternal Grandfather      Breast Cancer No family hx of      Colon Cancer No family hx of      Prostate Cancer No family hx of      Anesthesia Reaction No family hx of      Asthma No family hx of      Genetic Disorder No family hx of          Current Outpatient Medications   Medication Sig Dispense Refill     butalbital-acetaminophen-caffeine (ESGIC) -40 MG tablet TAKE 1 TABLET BY MOUTH EVERY 4 HOURS AS NEEDED FOR HEADACHES 20 tablet 0     calcium carbonate (OS-MANUEL 500 MG [place]. [place]) 500 MG tablet        Cyanocobalamin (VITAMIN B 12 PO)        multivitamin, therapeutic with minerals (MULTI-VITAMIN) TABS Take 1 tablet by mouth daily       propranolol ER (INDERAL LA) 60 MG 24 hr capsule Take 1 capsule (60 mg) by mouth daily 30 capsule 1     SUMAtriptan (IMITREX) 50 MG tablet Take 1 tablet (50 mg) by mouth at onset of headache for migraine May repeat in 2 hours. Max 4 tablets/24 hours. 12 tablet 3     VITAMIN D, CHOLECALCIFEROL, PO Take 5,000 Units by mouth daily 2 tabs daily       Allergies   Allergen Reactions     Reglan [Metoclopramide] Anxiety     Recent Labs   Lab Test 01/10/22  0808 10/04/21  0746 08/09/21  1247 08/09/21  1247 06/26/21  1426 05/06/21  1017 04/09/21  0937 01/14/21  0924 12/11/20  1446 12/30/19  1528 01/07/19  1355 10/22/18  1414 01/15/18  1542 09/01/17  1154   A1C  --   --   --   --   --   --   --  5.8* 5.7*  --   --   --   --  5.7   LDL  --   --   --   --   --   --   --  175*  --  155*  --  122*  --   --    HDL  --   --   --   --   --   --   --  66  --  51  --  58  --   --    TRIG  --   --   --   --   --   --   --  210*  --   259*  --  227*  --   --    ALT 27 26  --  28 30  --  28 22  --   --    < >  --    < >  --    CR 0.59 0.49*   < > 0.54 0.58  --  0.55 0.58  --  0.58   < > 0.68   < > 0.57   GFRESTIMATED >90 >90   < > >90 >90  --  >90 >90  --  >90   < > >90   < > >90   GFRESTBLACK  --   --   --   --  >90  --  >90 >90  --  >90   < > >90   < > >90   POTASSIUM 4.0 4.5   < > 4.2 3.8  --  4.5 4.3  --  4.1   < > 3.6   < > 4.1   TSH  --   --   --  1.24  --  1.78  --  2.25  --  1.93   < > 1.06  --  2.16    < > = values in this interval not displayed.        Breast Cancer Screening:  Any new diagnosis of family breast, ovarian, or bowel cancer? No    FHS-7:   Breast CA Risk Assessment (FHS-7) 9/9/2021   Did any of your first-degree relatives have breast or ovarian cancer? No   Did any of your relatives have bilateral breast cancer? No   Did any man in your family have breast cancer? No   Did any woman in your family have breast and ovarian cancer? No   Did any woman in your family have breast cancer before age 50 y? No   Do you have 2 or more relatives with breast and/or ovarian cancer? No   Do you have 2 or more relatives with breast and/or bowel cancer? No         Pertinent mammograms are reviewed under the imaging tab. Due in October.      History of abnormal Pap smear: pap due next year  PAP / HPV Latest Ref Rng & Units 4/10/2018   PAP (Historical) - NIL   HPV16 NEG:Negative Negative   HPV18 NEG:Negative Negative   HRHPV NEG:Negative Negative     Reviewed and updated as needed this visit by clinical staff  Tobacco  Allergies              Reviewed and updated as needed this visit by Provider                    Patient Active Problem List   Diagnosis     ACP (advance care planning)     H/O gastric bypass     Hypoglycemia     S/P hysterectomy     Other headache syndrome     History of right hip replacement     Migraine without aura and without status migrainosus, not intractable     Vitamin D deficiency     Low ferritin     Iron deficiency      Past Surgical History:   Procedure Laterality Date     APPENDECTOMY  1991     BIOPSY BREAST Right 2002    benign     BIOPSY BREAST Right 2020    US Bx benign     BREAST BIOPSY, RT/LT Right 2002    right bx      SECTION  1992 1994     COLONOSCOPY  2013     GASTRIC BYPASS       gatric bypass       LAPAROSCOPIC HYSTERECTOMY SUPRACERVICAL N/A 2018    Procedure: LAPAROSCOPIC HYSTERECTOMY SUPRACERVICAL;  LAPAROSCOPIC SUPRACERVICAL HYSTERECTOMY, BILATEARL SALPINGECTOMY, LYSIS OF ADHESIONS;  Surgeon: Jean Marie Garvin MD;  Location: [facility]     ORTHOPEDIC SURGERY      right hip labreal tear     ORTHOPEDIC SURGERY      left wrist      SALPINGECTOMY Bilateral 2018    Procedure: SALPINGECTOMY;;  Surgeon: Jean Marie Garvin MD;  Location: [facility]       Social History     Tobacco Use     Smoking status: Former Smoker     Types: Cigarettes     Start date: 1986     Quit date:      Years since quittin.0     Smokeless tobacco: Never Used   Substance Use Topics     Alcohol use: Yes     Comment: occ     Family History   Problem Relation Age of Onset     Cerebrovascular Disease Mother      Hyperlipidemia Mother      Hypertension Mother      Coronary Artery Disease Mother      Migraines Mother      Obesity Mother      Osteoarthritis Mother      Osteoporosis Mother      Diabetes Mother      Myocardial Infarction Mother      Hyperlipidemia Father      Hypertension Father      Thyroid Disease Father      Hypertension Brother      Hyperlipidemia Brother      Obesity Brother      Hypertension Sister      Thyroid Disease Sister      Stomach Cancer Maternal Grandmother      Cancer Maternal Grandfather         colon or liver: unsure     Leukemia Paternal Grandfather      Breast Cancer No family hx of      Colon Cancer No family hx of      Prostate Cancer No family hx of      Anesthesia Reaction No family hx of      Asthma No family hx of      Genetic Disorder No family hx of          Current  Outpatient Medications   Medication Sig Dispense Refill     butalbital-acetaminophen-caffeine (ESGIC) -40 MG tablet TAKE 1 TABLET BY MOUTH EVERY 4 HOURS AS NEEDED FOR HEADACHES 20 tablet 0     calcium carbonate (OS-MANUEL 500 MG [place]. [place]) 500 MG tablet        Cyanocobalamin (VITAMIN B 12 PO)        multivitamin, therapeutic with minerals (MULTI-VITAMIN) TABS Take 1 tablet by mouth daily       propranolol ER (INDERAL LA) 60 MG 24 hr capsule Take 1 capsule (60 mg) by mouth daily 30 capsule 1     SUMAtriptan (IMITREX) 50 MG tablet Take 1 tablet (50 mg) by mouth at onset of headache for migraine May repeat in 2 hours. Max 4 tablets/24 hours. 12 tablet 3     VITAMIN D, CHOLECALCIFEROL, PO Take 5,000 Units by mouth daily 2 tabs daily       Allergies   Allergen Reactions     Reglan [Metoclopramide] Anxiety     Recent Labs   Lab Test 01/10/22  0808 10/04/21  0746 08/09/21  1247 08/09/21  1247 06/26/21  1426 05/06/21  1017 04/09/21  0937 01/14/21  0924 12/11/20  1446 12/30/19  1528 01/07/19  1355 10/22/18  1414 01/15/18  1542 09/01/17  1154   A1C  --   --   --   --   --   --   --  5.8* 5.7*  --   --   --   --  5.7   LDL  --   --   --   --   --   --   --  175*  --  155*  --  122*  --   --    HDL  --   --   --   --   --   --   --  66  --  51  --  58  --   --    TRIG  --   --   --   --   --   --   --  210*  --  259*  --  227*  --   --    ALT 27 26  --  28 30  --  28 22  --   --    < >  --    < >  --    CR 0.59 0.49*   < > 0.54 0.58  --  0.55 0.58  --  0.58   < > 0.68   < > 0.57   GFRESTIMATED >90 >90   < > >90 >90  --  >90 >90  --  >90   < > >90   < > >90   GFRESTBLACK  --   --   --   --  >90  --  >90 >90  --  >90   < > >90   < > >90   POTASSIUM 4.0 4.5   < > 4.2 3.8  --  4.5 4.3  --  4.1   < > 3.6   < > 4.1   TSH  --   --   --  1.24  --  1.78  --  2.25  --  1.93   < > 1.06  --  2.16    < > = values in this interval not displayed.      BP Readings from Last 3 Encounters:   01/24/22 116/78   01/11/22 120/80   10/11/21 130/82  "   Wt Readings from Last 3 Encounters:   01/24/22 87.4 kg (192 lb 9.6 oz)   01/11/22 87.7 kg (193 lb 5.5 oz)   10/11/21 87.1 kg (192 lb 0.3 oz)                      Review of Systems  CONSTITUTIONAL: NEGATIVE for fever, chills, change in weight  INTEGUMENTARY/SKIN: NEGATIVE for worrisome rashes, moles or lesions  EYES: NEGATIVE for vision changes or irritation  ENT: NEGATIVE for ear, mouth and throat problems  RESP: NEGATIVE for significant cough or SOB  BREAST: NEGATIVE for masses, tenderness or discharge  CV: NEGATIVE for chest pain, palpitations or peripheral edema  GI: NEGATIVE for nausea, abdominal pain, heartburn, or change in bowel habits  : NEGATIVE for unusual urinary or vaginal symptoms. No vaginal bleeding.  MUSCULOSKELETAL:right hip pain  NEURO: NEGATIVE for weakness, dizziness or paresthesias  PSYCHIATRIC: NEGATIVE for changes in mood or affect      OBJECTIVE:   /78 (BP Location: Right arm, Patient Position: Chair, Cuff Size: Adult Large)   Pulse 60   Temp 97.4  F (36.3  C) (Tympanic)   Resp 18   Ht 1.651 m (5' 5\")   Wt 87.4 kg (192 lb 9.6 oz)   LMP 04/10/2018 (LMP Unknown)   SpO2 98%   BMI 32.05 kg/m    Physical Exam  GENERAL: healthy, alert and no distress  EYES: Eyes grossly normal to inspection, PERRL and conjunctivae and sclerae normal  HENT: ear canals and TM's normal, nose and mouth without ulcers or lesions  NECK: no adenopathy, no asymmetry, masses, or scars and thyroid normal to palpation  RESP: lungs clear to auscultation - no rales, rhonchi or wheezes  CV: regular rate and rhythm, normal S1 S2, no S3 or S4, no murmur, click or rub, no peripheral edema and peripheral pulses strong  ABDOMEN: soft, nontender, no hepatosplenomegaly, no masses and bowel sounds normal  MS: no gross musculoskeletal defects noted, no edema  NEURO: Normal strength and tone, mentation intact and speech normal  PSYCH: mentation appears normal, affect normal/bright        ASSESSMENT/PLAN:   1. Routine " "general medical examination at a health care facility  Exam completed     2. Encounter for screening colonoscopy  EGD and colonoscopy.    - Adult General Surg Referral    3. Low ferritin  - Adult General Surg Referral    4. Other iron deficiency anemia  - Adult General Surg Referral    5. H/O gastric bypass  - Adult General Surg Referral    6. Lipid screening  - Lipid Profile (Chol, Trig, HDL, LDL calc); Future    7. Hip pain, right  [facility]  - Orthopedic  Referral; Future    8. History of right hip replacement  - Orthopedic  Referral; Future          COUNSELING:  Reviewed preventive health counseling, as reflected in patient instructions       Regular exercise       Healthy diet/nutrition       Vision screening       Hearing screening       Immunization       Osteoporosis prevention/bone health       Colon cancer screening    Estimated body mass index is 32.05 kg/m  as calculated from the following:    Height as of this encounter: 1.651 m (5' 5\").    Weight as of this encounter: 87.4 kg (192 lb 9.6 oz).    Weight management plan: Discussed healthy diet and exercise guidelines    She reports that she quit smoking about 34 years ago. Her smoking use included cigarettes. She started smoking about 35 years ago. She has never used smokeless tobacco.      Counseling Resources:  ATP IV Guidelines  Pooled Cohorts Equation Calculator  Breast Cancer Risk Calculator  BRCA-Related Cancer Risk Assessment: FHS-7 Tool  FRAX Risk Assessment  ICSI Preventive Guidelines  Dietary Guidelines for Americans, 2010  USDA's MyPlate  ASA Prophylaxis  Lung CA Screening    Mary Ellen Armstrong NP  [facility]  "

## 2022-01-20 NOTE — PATIENT INSTRUCTIONS
"  ASSESSMENT/PLAN:   1. Routine general medical examination at a health care facility  Exam completed     2. Encounter for screening colonoscopy  EGD and colonoscopy.    - Adult General Surg Referral    3. Low ferritin  - Adult General Surg Referral    4. Other iron deficiency anemia  - Adult General Surg Referral    5. H/O gastric bypass  - Adult General Surg Referral    6. Lipid screening  - Lipid Profile (Chol, Trig, HDL, LDL calc); Future    7. Hip pain, right  [facility]  - Orthopedic  Referral; Future    8. History of right hip replacement  - Orthopedic  Referral; Future          COUNSELING:  Reviewed preventive health counseling, as reflected in patient instructions       Regular exercise       Healthy diet/nutrition       Vision screening       Hearing screening       Immunization       Osteoporosis prevention/bone health       Colon cancer screening    Estimated body mass index is 32.05 kg/m  as calculated from the following:    Height as of this encounter: 1.651 m (5' 5\").    Weight as of this encounter: 87.4 kg (192 lb 9.6 oz).    Weight management plan: Discussed healthy diet and exercise guidelines    She reports that she quit smoking about 34 years ago. Her smoking use included cigarettes. She started smoking about 35 years ago. She has never used smokeless tobacco.      Preventive Health Recommendations  Female Ages 50 - 64    Yearly exam: See your health care provider every year in order to  o Review health changes.   o Discuss preventive care.    o Review your medicines if your doctor has prescribed any.      Get a Pap test every three years (unless you have an abnormal result and your provider advises testing more often).    If you get Pap tests with HPV test, you only need to test every 5 years, unless you have an abnormal result.     You do not need a Pap test if your uterus was removed (hysterectomy) and you have not had cancer.    You should be tested each year for STDs " (sexually transmitted diseases) if you're at risk.     Have a mammogram every 1 to 2 years.    Have a colonoscopy at age 50, or have a yearly FIT test (stool test). These exams screen for colon cancer.      Have a cholesterol test every 5 years, or more often if advised.    Have a diabetes test (fasting glucose) every three years. If you are at risk for diabetes, you should have this test more often.     If you are at risk for osteoporosis (brittle bone disease), think about having a bone density scan (DEXA).    Shots: Get a flu shot each year. Get a tetanus shot every 10 years.    Nutrition:     Eat at least 5 servings of fruits and vegetables each day.    Eat whole-grain bread, whole-wheat pasta and brown rice instead of white grains and rice.    Get adequate Calcium and Vitamin D.     Lifestyle    Exercise at least 150 minutes a week (30 minutes a day, 5 days a week). This will help you control your weight and prevent disease.    Limit alcohol to one drink per day.    No smoking.     Wear sunscreen to prevent skin cancer.     See your dentist every six months for an exam and cleaning.    See your eye doctor every 1 to 2 years.

## 2022-01-24 ENCOUNTER — OFFICE VISIT (OUTPATIENT)
Dept: FAMILY MEDICINE | Facility: OTHER | Age: 52
End: 2022-01-24
Attending: NURSE PRACTITIONER
Payer: COMMERCIAL

## 2022-01-24 VITALS
OXYGEN SATURATION: 98 % | SYSTOLIC BLOOD PRESSURE: 116 MMHG | RESPIRATION RATE: 18 BRPM | HEART RATE: 60 BPM | WEIGHT: 192.6 LBS | TEMPERATURE: 97.4 F | DIASTOLIC BLOOD PRESSURE: 78 MMHG | BODY MASS INDEX: 32.09 KG/M2 | HEIGHT: 65 IN

## 2022-01-24 DIAGNOSIS — D50.8 OTHER IRON DEFICIENCY ANEMIA: ICD-10-CM

## 2022-01-24 DIAGNOSIS — M25.551 HIP PAIN, RIGHT: ICD-10-CM

## 2022-01-24 DIAGNOSIS — Z96.641 HISTORY OF RIGHT HIP REPLACEMENT: ICD-10-CM

## 2022-01-24 DIAGNOSIS — Z13.220 LIPID SCREENING: ICD-10-CM

## 2022-01-24 DIAGNOSIS — R79.0 LOW FERRITIN: ICD-10-CM

## 2022-01-24 DIAGNOSIS — Z12.11 ENCOUNTER FOR SCREENING COLONOSCOPY: ICD-10-CM

## 2022-01-24 DIAGNOSIS — Z00.00 ROUTINE GENERAL MEDICAL EXAMINATION AT A HEALTH CARE FACILITY: Primary | ICD-10-CM

## 2022-01-24 DIAGNOSIS — Z98.84 H/O GASTRIC BYPASS: ICD-10-CM

## 2022-01-24 DIAGNOSIS — G43.009 MIGRAINE WITHOUT AURA AND WITHOUT STATUS MIGRAINOSUS, NOT INTRACTABLE: ICD-10-CM

## 2022-01-24 PROCEDURE — 99396 PREV VISIT EST AGE 40-64: CPT | Performed by: NURSE PRACTITIONER

## 2022-01-24 PROCEDURE — 99213 OFFICE O/P EST LOW 20 MIN: CPT | Mod: 25 | Performed by: NURSE PRACTITIONER

## 2022-01-24 ASSESSMENT — PAIN SCALES - GENERAL: PAINLEVEL: NO PAIN (0)

## 2022-01-24 ASSESSMENT — MIFFLIN-ST. JEOR: SCORE: 1489.51

## 2022-01-24 NOTE — NURSING NOTE
"Chief Complaint   Patient presents with     Physical       Initial /78 (BP Location: Right arm, Patient Position: Chair, Cuff Size: Adult Large)   Pulse 60   Temp 97.4  F (36.3  C) (Tympanic)   Resp 18   Ht 1.651 m (5' 5\")   Wt 87.4 kg (192 lb 9.6 oz)   LMP 04/10/2018 (LMP Unknown)   SpO2 98%   BMI 32.05 kg/m   Estimated body mass index is 32.05 kg/m  as calculated from the following:    Height as of this encounter: 1.651 m (5' 5\").    Weight as of this encounter: 87.4 kg (192 lb 9.6 oz).  Medication Reconciliation: complete  Pamela M. Lechevalier, LPN    "

## 2022-02-02 ENCOUNTER — PREP FOR PROCEDURE (OUTPATIENT)
Dept: SURGERY | Facility: OTHER | Age: 52
End: 2022-02-02

## 2022-02-02 ENCOUNTER — OFFICE VISIT (OUTPATIENT)
Dept: SURGERY | Facility: OTHER | Age: 52
End: 2022-02-02
Attending: NURSE PRACTITIONER
Payer: COMMERCIAL

## 2022-02-02 VITALS
HEART RATE: 90 BPM | HEIGHT: 65 IN | TEMPERATURE: 96.6 F | OXYGEN SATURATION: 98 % | SYSTOLIC BLOOD PRESSURE: 118 MMHG | WEIGHT: 190 LBS | DIASTOLIC BLOOD PRESSURE: 64 MMHG | BODY MASS INDEX: 31.65 KG/M2

## 2022-02-02 DIAGNOSIS — R79.0 LOW FERRITIN: ICD-10-CM

## 2022-02-02 DIAGNOSIS — E61.1 IRON DEFICIENCY: Primary | ICD-10-CM

## 2022-02-02 DIAGNOSIS — Z12.11 ENCOUNTER FOR SCREENING COLONOSCOPY: ICD-10-CM

## 2022-02-02 DIAGNOSIS — D50.8 OTHER IRON DEFICIENCY ANEMIA: ICD-10-CM

## 2022-02-02 DIAGNOSIS — Z98.84 H/O GASTRIC BYPASS: ICD-10-CM

## 2022-02-02 PROCEDURE — 99213 OFFICE O/P EST LOW 20 MIN: CPT | Performed by: SURGERY

## 2022-02-02 ASSESSMENT — PAIN SCALES - GENERAL: PAINLEVEL: NO PAIN (0)

## 2022-02-02 ASSESSMENT — MIFFLIN-ST. JEOR: SCORE: 1477.71

## 2022-02-02 NOTE — NURSING NOTE
"Chief Complaint   Patient presents with     Consult For     colon cancer screening, low ferritin, iron deficiency anemia. Referred by Manuel Armstrong.       Initial /64 (BP Location: Left arm, Patient Position: Sitting, Cuff Size: Adult Large)   Pulse 90   Temp (!) 96.6  F (35.9  C) (Tympanic)   Ht 1.651 m (5' 5\")   Wt 86.2 kg (190 lb)   LMP 04/10/2018 (LMP Unknown)   SpO2 98%   BMI 31.62 kg/m   Estimated body mass index is 31.62 kg/m  as calculated from the following:    Height as of this encounter: 1.651 m (5' 5\").    Weight as of this encounter: 86.2 kg (190 lb).  Medication Reconciliation: complete  AMILCAR LOONEY LPN  "

## 2022-02-02 NOTE — PATIENT INSTRUCTIONS
We want to your Upper Endoscopy and Colonoscopy to be as pleasant as possible. Please review the instructions below. If you have questions, you may contact us at the any of the following numbers:   Clinic Health Unit Coordinator: [phone]  Clinic Nurse ([place]): [phone]  Surgery Education Nurse: [phone]      Date of procedure: 2/11 with Dr. Bui  Admit Time: Hospital Surgery will call you the day before your procedure by 5pm with your arrival time. If your surgery is on Monday, expect a call on Friday.  If you are not contacted before 5PM, please call admitting at [phone].   After hours or on weekends, please call [phone] to postpone.   Call the clinic nurse if you become ill within 2 weeks of your procedure to reschedule.     COVID-19 test is needed 4 days before procedure. This testing is done at the upper level of [facility] (weekdays and weekends-East entrance) or at the [facility] (weekday mornings only).  Follow the signage for parking and bring your mobile phone (if you have one) to call the phone number ([phone]) on the sign outside the testing site for check-in. Stay in your vehicle until you are directed to enter. If you do not have a cell phone, please call [phone] prior to leaving home with a description of the vehicle you are taking.   This test has been scheduled for 2/7 at 4:05 at the [place] site.      Please  the following over the counter items for your bowel prep:    Two 5 mg Dulcolax (bisacodyl) tablets   One 8.3 ounce (238 g) bottle of Miralax   One 10 ounce bottle of liquid Magnesium Citrate-not capsules.   One 64 ounce bottle of Gatorade-Not red, purple, or powdered.   (you will need additional sports drink for the 2 days before colonoscopy)    7 DAYS BEFORE THE EXAM:   2/3  Call the Surgery Education Nurse at [phone] and have a medication list ready.   Stop Aspirin or NSAIDS (Ibuprofen, Celebrex, Naproxen, etc).  Stop fiber  supplements, herbals, vitamins, and iron. Stop eating corn, nuts and seeds.    2 DAYS BEFORE THE EXAM:   2/9  Low fiber diet.   See table below.  Drink at least 4-6 large glasses of sports drink today and tomorrow (separate from bowel prep).   Avoid red and purple.  Do not eat after 11:59 pm tonight.    Low Fiber Diet    You may have Do NOT have   Starches:        White breads (tortillas, biscuits, toast, pancakes, waffles, etc.), white rice, pasta (not whole grain), cooked and peeled potatoes, plain or saltine crackers, cooked farina or cream of rice, puffed rice, corn flakes, Rice Krispies, Special K.   Starches:       Whole grain breads; Breads containing nuts, seeds or fruit, or more than 1 gram fiber per slice, cornbread, corn or whole wheat tortillas, potatoes with skin, brown rice, wild rice, kasha/buckwheat.   Vegetables:       Tender, well cooked and canned vegetables without seeds or peels including carrots, asparagus tips, green beans, wax  beans, spinach; vegetable broth Vegetables:       Any raw or steamed vegetables, vegetables with seeds, corn in any form   Fruits/Juices:        Strained fruit juice, canned fruit without seeds or skin (not pineapple,) applesauce, pear, ripe bananas, melons (not watermelon) Fruit/Juices:        Prunes, prune juice, raisins or other dried fruits, berries and other fruits with seeds, pineapple, fresh or frozen fruits not listed in other column   Milk Products:       Milk, cheese, cottage cheese, yogurt without berries, custard, ice cream without nuts/fruit Milk Products:       Any dairy product with nuts, seeds or berries   Proteins:       Tender, well-cooked ground beef, lamb, veal, ham, pork, chicken, turkey, fish or organ meats; eggs, creamy peanut butter Proteins:        Tough, fibrous meats with gristle, cooked dried beans, peas or lentils, crunchy peanut butter          Fats and condiments:        Margarine, butter, oils, osorio, sour cream, salad dressing, plain  gravy, spices, cooked herbs, sugar, clear jelly, honey, syrup Fats and condiments:        Pickles, olives, relish, horseradish, jam, marmalade, preserves   Snacks, sweets and drinks:       Pretzels, hard candy, plain cakes and cookies without nuts or seeds, gelatin, plain pudding, sherbet, popsicles, coffee, tea, carbonated beverages Snacks, sweets and drinks:       Popcorn, nuts, seeds, granola, coconut, candies or desserts with nuts/seeds and raisins/dried fruits or whole grains.         1 DAY BEFORE THE EXAM:   2/10  No solid food/milk products after 12:00 AM, midnight.   Drink only clear liquids all day, at least 8-10 glasses, including 4-6 glasses sports drink.   See table below.    Avoid anything red or purple. No alcohol.            AT 12:00 PM NOON THE DAY BEFORE EXAM:  Take 2 Dulcolax tablets by mouth with clear liquids.            AT 6:00 PM THE DAY BEFORE EXAM:  Mix the bottle of Miralax and 64 oz. of Gatorade in a pitcher.   Drink one 8 oz. glass every 10-15 minutes until gone. Stay near a toilet.     Clear Liquid Diet  You may have: Do NOT have:     ? Tea, coffee (no cream)   Milk or milk products such as ice cream, malts or shakes     ? Water, Vitamin Water, Smart Water, Coconut Water, PowerAde, Propel, Soda pop, (Sprite, 7 UP, Ginger Ale, Gatorade (not red or purple)   Red or purple drinks of any kind such as  Cranberry juice or grape juice.     ? Clear nutrition drinks (Resource Breeze, Ensure Active protein drink (peach flavor)   Red or purple Jell-O, Popsicles, Mateo-Aid, Sorbet and candy.     ? Jell-O, Popsicles (no milk or fruit pieces) or Italian ice (not red or purple)   Juices with pulp such as orange, grapefruit, pineapple or tomato juice     ? Honey/Sugar   Cream soups of any kind     ? Fat-free soup broth or bouillon   Alcohol     ? Plain hard candy, such as clear Life Savers (not red or purple)      ? Powdered Lemonade such as Crystal Light, Country Time      ? Clear juices and  fruit-flavored drinks such as apple juice, white grape juice, Hi-C and Mateo-Aid (not red or purple)            TIPS FOR COLON CLEANSING BEFORE YOUR COLONOSCOPY  To get accurate results from your exam, your colon must be completely empty or you may need to repeat the colon prep and exam. If you followed instructions and your stool is clear or yellow liquid, you are ready. If you are not sure if your colon is clean, please call the clinic nurse.    You may use Tucks wipes, hemorrhoid treatments, hydrocortisone cream or alcohol-free baby wipes to ease anal irritation. You may also use Vaseline to help protect the skin.     Quickly drink each glass. Even when you are sitting on the toilet, keep drinking every 15 minutes. If you have nausea or vomiting, take a break for 30 minutes and then resume drinking.    You will have loose watery stools and may also have chills. Dress for comfort. Expect to feel bloating, nausea and other discomfort until the stool clears from your colon.       DAY OF COLONOSCOPY/UPPER ENDOSCOPY:   2/11            6 HOURS PRIOR TO EXAM: Drink the bottle of Magnesium Citrate followed by a full glass of water.   You may have clear liquids up until 4 hours before arrival, then nothing by mouth.  If you need to take any medications after this, take them with a tiny sip of water.   If you have asthma, bring your inhaler with you.  Shower before arrival and wear clean, comfortable clothes.   No jewelry, make-up, nail polish, hair spray, lotions, or perfumes.   [place] in Admitting through the [place] Entrance.   You must have a responsible adult to drive and to stay with you for 4 hours at home.                         SURGERY HANDBOOK            Your surgery is scheduled:    Date: 2/11  ________________________________    Time: hospital surgery will call the day prior to your procedure with arrival time  ________________________________      Surgeon's Name: Hao  _______________________        Pre-Op  Physical Fax Numbers:          Pre-Admissions  [phone]        Your surgery is located at:  [address] [address]         Before Your Surgery  For Patients and Visitors at [place]    Welcome  As you get ready for surgery, you may have a lot of questions.  This brochure will help you know what to expect before and after surgery.  You and your family are the most important members of your health care team.  You will need to take an active role in your care.    Be sure to ask questions and learn all that you can about your surgery.  If you have any safety concerns, we urge you to tell a nurse as soon as possible.   This brochure is for information only.  It does not replace the advice of your doctor.  Always follow your doctor's advice.    If you or your  are deaf or hard of hearing, or prefer a language other than English, please let us know.  We have many free services, including interpreters and other aids to help you communicate. You may ask for help  through any member of your care team or by calling Language Services at [phone], option 2.    GETTING READY FOR SURGERY  Always follow your surgeon's instructions.  If you don't, your surgery could be canceled.  Please use the following checklist.  You have been scheduled for surgery and we would like to give you some information that will assist in helping get the best possible outcome.      Before Surgery:   If for any reason you decide not to have the surgery, please contact your surgeon's office.  We can easily cancel or reschedule the procedure. If it is after hours, please call [phone].    Please keep in mind that the time of surgery is subject to change.  Make sure you have nothing to eat after midnight. If your surgery is later in the afternoon, this recommendation might change, but not until the day before surgery after the actual time of the surgery has been established.    Tell  the doctor if:    You are allergic to latex or rubber  (Latex and rubber gloves are often used in medical care).    You are taking any medicines (including aspirin), vitamins (Vitamin E, Fish Oil, Omegas) or herbal products.  You will need to stop taking some medicines before surgery.    You have any medical problems (allergies, diabetes or heart disease, for example).    You have a pacemaker or an AICD (automatic implanted cardiac defibrillator).  If you do, please bring the ID card with you on the day of surgery.    You are a smoker.  People who smoke have a higher risk of infection after surgery.  Ask your doctor how you can quit smoking.    Within 7 days of Surgery:  Prior to your surgical procedure, a nurse will be contacting you to obtain a health history. A nurse will call you within a few days of surgery to go over these and other instructions.  If you do not hear from them, please call them at [phone].        Call your insurance company.  Ask if you need pre-approval for your surgery.  If you do not have insurance, please let us know.    Arrange for someone to drive you home after surgery.  If you will have same-day surgery, you may not drive or take public transportation home by yourself.    Arrange for someone to stay with you for 24 hours after you go home.  This person must be a responsible adult (ie- Family member or friend).    The Day Before Surgery:     Call your surgeon if there are any changes in your health.  This includes signs of a cold or flu (such as a sore throat, runny nose, cough, rash or fever).    Do not smoke, drink alcohol or take over-the-counter medicine (unless your surgeon tells you to) for 24 hours before and after surgery.    If you take prescribed drugs:  You may need to stop them until after the surgery.  Follow your doctor's orders.  You may resume Aspirin and/or blood thinners after your surgery as directed by your physician/surgeon.    NO SOLID FOOD. CLEAR LIQUIDS ONLY.   Follow your surgeon's orders for eating and drinking.  You need to have an empty stomach before surgery.  This will make the surgery as safe as possible.  If you don't follow your doctor's orders, your surgery could be changed to another date.    The Day of Surgery:    Please remove deodorant, cologne, scented lotion, makeup, nail polish and jewelry (including rings and body piercings).  If you wear artificial nails, please remove at least one nail before coming to the hospital.    Wear clean, loose clothing to the hospital.    Bring these items to the hospital:  1. Your insurance card.  2. A list of all the medicines you take.  Include vitamins, minerals, herbs and over-the-counter drugs.  Note any drug allergies.  3. A copy of your advance health care directive, if you have one.  This tells us what treatment you would want -- and who would make health care decisions -- if you could no longer speak for yourself.  You may request this form in advance or download it from [email or web address].  4. A case for any glasses, contact lenses, hearing aids or dentures.  5. Your inhaler or CPAP machine, if you use these at home.  Leave extra cash, jewelry and other valuables at home.    When You Arrive:  When you get to the hospital, you will:    Check in.  If you are under age 18, you must be with a parent or legal guardian.    Electronically sign consent forms, if you haven't already.  These electronic forms state that you know the risks and benefits of surgery.  When you sign the forms, you give us permission to do the surgery.  Do not sign them unless you understand what will happen during and after your surgery.  If you have any questions about your surgery, ask to speak with your doctor before you sign the forms.  If you don't understand the answers, ask again.    Receive a copy of the Patients Bill of Rights.  If you do not receive a copy, please ask for one.    Change into hospital clothes.  Your belongings will  be placed in a bag.  We will return them to you after surgery.    Meet with the anesthesia provider.  He or she will tell you what kind of anesthesia (medicine) will be used to keep you comfortable during surgery.  Remember: It's okay to remind doctors and nurses to wash their hands before touching you.   In most cases, your surgeon will use a marker to write his or her initials on the surgery site.  This ensures that the exact site is operated on.  For safety reasons, we will ask you the same questions many times.  For example, we may ask your name and birth date over and over again.  Friends and family can stay with you until it's time for surgery.  While you're in surgery, they will be in the waiting area.  Please note that cell phones are not allowed in some patient care areas.  If you have questions about what will happen in the operating room, talk to your care team.    After Surgery:    We will move you to a recovery room where we will watch you closely.  If you have any pain or discomfort, tell your nurse.  He or she will try to make you comfortable.      If you are staying overnight we will move you to your hospital room after you are awake.    If you are going home we will move you to another room.  Friends and family may be able to join you.  The length of time you spend in recovery depends on the type of medicine you received, your medical condition, and the type of surgery you had.    Going Home:  We will let you know when you're ready to leave the hospital.  Before you leave, we will tell you how to care for yourself at home.  If you do not understand something, please say so.  We will answer any questions you have.  We will then help you get ready to leave.  When you are discharged from the recovery room, the nurses will review instructions with you and your caregiver.  You must have an adult with you for the first 4 hours after you leave the hospital. Take it easy when you get home.  You will need  some time to recover -- you may be more tired than you realize at first.  Rest and relax for rest of the day at home.  You'll feel better and heal faster if you take good care of yourself.  Symptoms of infection need to be reported to your surgery office. Please call your Surgeon.      Thank you for following these important instructions.

## 2022-02-03 NOTE — PROGRESS NOTES
Surgery Consult Clinic Note      RE: Shazia Verma  : 1970  J LUIS: 2022      Chief Complaint:  Iron deficiency   Colon Cancer screening   History of R-n-Y gastric bypass     History of Present Illness:  Shazia Verma is a very pleasant 51 year old year old female who I am seeing at the request of Manuel Loo for evaluation of screening colon malignant neoplasm and consideration for colonoscopy.  She denies family history of colon or rectal cancer, blood in stool, changes in bowel habits, weight loss, abdominal pain.  Surgical hx: R-n-Y, she did have a colonoscopy and upper endoscopy aprox 10 years ago in [place].   She specifically denies fever, chills, nausea, vomiting, chest pain, shortness of breath or palpitations.      Medical history:  Past Medical History:   Diagnosis Date     Breast mass     benign right     Diabetes (H)      Hypertension        Surgical history:  Past Surgical History:   Procedure Laterality Date     APPENDECTOMY       BIOPSY BREAST Right     benign     BIOPSY BREAST Right 2020    US Bx benign     BREAST BIOPSY, RT/LT Right     right bx      SECTION  1992      COLONOSCOPY  2013     GASTRIC BYPASS  2012     gatric bypass       LAPAROSCOPIC HYSTERECTOMY SUPRACERVICAL N/A 2018    Procedure: LAPAROSCOPIC HYSTERECTOMY SUPRACERVICAL;  LAPAROSCOPIC SUPRACERVICAL HYSTERECTOMY, BILATEARL SALPINGECTOMY, LYSIS OF ADHESIONS;  Surgeon: Jean Marie Garvin MD;  Location: [facility] OR     ORTHOPEDIC SURGERY      right hip labreal tear     ORTHOPEDIC SURGERY  2014    left wrist      SALPINGECTOMY Bilateral 2018    Procedure: SALPINGECTOMY;;  Surgeon: Jean Marie Garvin MD;  Location: [facility] OR       Family history:  Family History   Problem Relation Age of Onset     Cerebrovascular Disease Mother      Hyperlipidemia Mother      Hypertension Mother      Coronary Artery Disease Mother      Migraines Mother      Obesity Mother      Osteoarthritis Mother       Osteoporosis Mother      Diabetes Mother      Myocardial Infarction Mother      Hyperlipidemia Father      Hypertension Father      Thyroid Disease Father      Hypertension Brother      Hyperlipidemia Brother      Obesity Brother      Hypertension Sister      Thyroid Disease Sister      Stomach Cancer Maternal Grandmother      Cancer Maternal Grandfather         colon or liver: unsure     Leukemia Paternal Grandfather      Breast Cancer No family hx of      Colon Cancer No family hx of      Prostate Cancer No family hx of      Anesthesia Reaction No family hx of      Asthma No family hx of      Genetic Disorder No family hx of        Medications:  Current Outpatient Medications   Medication Sig Dispense Refill     butalbital-acetaminophen-caffeine (ESGIC) -40 MG tablet TAKE 1 TABLET BY MOUTH EVERY 4 HOURS AS NEEDED FOR HEADACHES 20 tablet 0     calcium carbonate (OS-MANUEL 500 MG [place]. [place]) 500 MG tablet        Cyanocobalamin (VITAMIN B 12 PO)        multivitamin, therapeutic with minerals (MULTI-VITAMIN) TABS Take 1 tablet by mouth daily       propranolol ER (INDERAL LA) 60 MG 24 hr capsule Take 1 capsule (60 mg) by mouth daily 30 capsule 1     SUMAtriptan (IMITREX) 50 MG tablet Take 1 tablet (50 mg) by mouth at onset of headache for migraine May repeat in 2 hours. Max 4 tablets/24 hours. 12 tablet 3     VITAMIN D, CHOLECALCIFEROL, PO Take 5,000 Units by mouth daily 2 tabs daily       Allergies:  The patientis allergic to reglan [metoclopramide].  .  Social history:  Social History     Tobacco Use     Smoking status: Former Smoker     Types: Cigarettes     Start date: 1986     Quit date:      Years since quittin.1     Smokeless tobacco: Never Used   Substance Use Topics     Alcohol use: Yes     Comment: occ     Marital status: .  Occupation: Works for [employer]     Review of Systems:  10 point review of systems was obtained and negative other than what previously mentioned in HPI    Physical  "Examination:  /64 (BP Location: Left arm, Patient Position: Sitting, Cuff Size: Adult Large)   Pulse 90   Temp (!) 96.6  F (35.9  C) (Tympanic)   Ht 1.651 m (5' 5\")   Wt 86.2 kg (190 lb)   LMP 04/10/2018 (LMP Unknown)   SpO2 98%   BMI 31.62 kg/m    General: AAOx4, NAD, WN/WD, ambulating without assistance  HEENT:NCAT, EOMI, PERRL Sclerae anicteric; Trachea mideline,   Chest:  No acute distress, CTAB    Cardiac:  regular rate and rhythm, no murmur   Abdomen: non-distended   Extremities: Cursory exam unremarkable.  Skin: Warm, dry,   Neuro: no focal deficit,   Psych: happy, calm, asks appropriate questions      Assessment/Plan:  51 y.o. female currently being worked up for iron deficency, she is not anemic form this. This is most likely related to her R-n-Y bypass. She is due for colon cancer screening. Will plan on an upper endoscopy as well to rule out marginal ulcer though not having symptoms I would find consistent with this.   Satisfactory candidate for colonoscopy and upper endoscopy.  The indications, risks, benefits and technical aspects of whole colon colonoscopy were outlined with risks including, but not limited to, perforation, bleeding and inability to visualize entire colon.  Management of each was reviewed.  The need of mechanical preparation of the colon was reviewed along with the use of monitored anesthetic care.  The patient's questions were asked and answered.      Jaguar Bui MD      "

## 2022-02-04 ENCOUNTER — ANESTHESIA EVENT (OUTPATIENT)
Dept: SURGERY | Facility: HOSPITAL | Age: 52
End: 2022-02-04
Payer: COMMERCIAL

## 2022-02-04 ASSESSMENT — LIFESTYLE VARIABLES: TOBACCO_USE: 1

## 2022-02-07 ENCOUNTER — OFFICE VISIT (OUTPATIENT)
Dept: FAMILY MEDICINE | Facility: OTHER | Age: 52
End: 2022-02-07
Attending: SURGERY
Payer: COMMERCIAL

## 2022-02-07 DIAGNOSIS — R79.0 LOW FERRITIN: ICD-10-CM

## 2022-02-07 PROCEDURE — U0005 INFEC AGEN DETEC AMPLI PROBE: HCPCS

## 2022-02-07 PROCEDURE — U0003 INFECTIOUS AGENT DETECTION BY NUCLEIC ACID (DNA OR RNA); SEVERE ACUTE RESPIRATORY SYNDROME CORONAVIRUS 2 (SARS-COV-2) (CORONAVIRUS DISEASE [COVID-19]), AMPLIFIED PROBE TECHNIQUE, MAKING USE OF HIGH THROUGHPUT TECHNOLOGIES AS DESCRIBED BY CMS-2020-01-R: HCPCS

## 2022-02-09 LAB — SARS-COV-2 RNA RESP QL NAA+PROBE: NEGATIVE

## 2022-02-11 ENCOUNTER — ANESTHESIA (OUTPATIENT)
Dept: SURGERY | Facility: HOSPITAL | Age: 52
End: 2022-02-11
Payer: COMMERCIAL

## 2022-02-11 ENCOUNTER — HOSPITAL ENCOUNTER (OUTPATIENT)
Facility: HOSPITAL | Age: 52
Discharge: HOME | End: 2022-02-11
Attending: SURGERY | Admitting: SURGERY
Payer: COMMERCIAL

## 2022-02-11 VITALS
TEMPERATURE: 97.1 F | SYSTOLIC BLOOD PRESSURE: 118 MMHG | HEART RATE: 72 BPM | OXYGEN SATURATION: 98 % | RESPIRATION RATE: 16 BRPM | DIASTOLIC BLOOD PRESSURE: 74 MMHG

## 2022-02-11 PROCEDURE — 370N000017 HC ANESTHESIA TECHNICAL FEE, PER MIN: Performed by: SURGERY

## 2022-02-11 PROCEDURE — 250N000009 HC RX 250: Performed by: NURSE ANESTHETIST, CERTIFIED REGISTERED

## 2022-02-11 PROCEDURE — 43239 EGD BIOPSY SINGLE/MULTIPLE: CPT | Performed by: NURSE ANESTHETIST, CERTIFIED REGISTERED

## 2022-02-11 PROCEDURE — 710N000012 HC RECOVERY PHASE 2, PER MINUTE: Performed by: SURGERY

## 2022-02-11 PROCEDURE — 360N000075 HC SURGERY LEVEL 2, PER MIN: Performed by: SURGERY

## 2022-02-11 PROCEDURE — 272N000001 HC OR GENERAL SUPPLY STERILE: Performed by: SURGERY

## 2022-02-11 PROCEDURE — 250N000009 HC RX 250: Performed by: SURGERY

## 2022-02-11 PROCEDURE — 999N000141 HC STATISTIC PRE-PROCEDURE NURSING ASSESSMENT: Performed by: SURGERY

## 2022-02-11 PROCEDURE — 43235 EGD DIAGNOSTIC BRUSH WASH: CPT | Performed by: SURGERY

## 2022-02-11 PROCEDURE — 258N000003 HC RX IP 258 OP 636: Performed by: NURSE ANESTHETIST, CERTIFIED REGISTERED

## 2022-02-11 PROCEDURE — 45378 DIAGNOSTIC COLONOSCOPY: CPT | Performed by: SURGERY

## 2022-02-11 PROCEDURE — 250N000011 HC RX IP 250 OP 636: Performed by: NURSE ANESTHETIST, CERTIFIED REGISTERED

## 2022-02-11 RX ORDER — MEPERIDINE HYDROCHLORIDE 25 MG/ML
12.5 INJECTION INTRAMUSCULAR; INTRAVENOUS; SUBCUTANEOUS
Status: DISCONTINUED | OUTPATIENT
Start: 2022-02-11 | End: 2022-02-11 | Stop reason: HOSPADM

## 2022-02-11 RX ORDER — ONDANSETRON 4 MG/1
4 TABLET, ORALLY DISINTEGRATING ORAL EVERY 30 MIN PRN
Status: DISCONTINUED | OUTPATIENT
Start: 2022-02-11 | End: 2022-02-11 | Stop reason: HOSPADM

## 2022-02-11 RX ORDER — FLUMAZENIL 0.1 MG/ML
0.2 INJECTION, SOLUTION INTRAVENOUS
Status: DISCONTINUED | OUTPATIENT
Start: 2022-02-11 | End: 2022-02-11 | Stop reason: HOSPADM

## 2022-02-11 RX ORDER — FENTANYL CITRATE 50 UG/ML
INJECTION, SOLUTION INTRAMUSCULAR; INTRAVENOUS PRN
Status: DISCONTINUED | OUTPATIENT
Start: 2022-02-11 | End: 2022-02-11

## 2022-02-11 RX ORDER — PROPOFOL 10 MG/ML
INJECTION, EMULSION INTRAVENOUS PRN
Status: DISCONTINUED | OUTPATIENT
Start: 2022-02-11 | End: 2022-02-11

## 2022-02-11 RX ORDER — NALOXONE HYDROCHLORIDE 0.4 MG/ML
0.2 INJECTION, SOLUTION INTRAMUSCULAR; INTRAVENOUS; SUBCUTANEOUS
Status: DISCONTINUED | OUTPATIENT
Start: 2022-02-11 | End: 2022-02-11 | Stop reason: HOSPADM

## 2022-02-11 RX ORDER — ONDANSETRON 2 MG/ML
4 INJECTION INTRAMUSCULAR; INTRAVENOUS EVERY 30 MIN PRN
Status: DISCONTINUED | OUTPATIENT
Start: 2022-02-11 | End: 2022-02-11 | Stop reason: HOSPADM

## 2022-02-11 RX ORDER — SODIUM CHLORIDE, SODIUM LACTATE, POTASSIUM CHLORIDE, CALCIUM CHLORIDE 600; 310; 30; 20 MG/100ML; MG/100ML; MG/100ML; MG/100ML
INJECTION, SOLUTION INTRAVENOUS CONTINUOUS
Status: DISCONTINUED | OUTPATIENT
Start: 2022-02-11 | End: 2022-02-11 | Stop reason: HOSPADM

## 2022-02-11 RX ORDER — LIDOCAINE 40 MG/G
CREAM TOPICAL
Status: DISCONTINUED | OUTPATIENT
Start: 2022-02-11 | End: 2022-02-11 | Stop reason: HOSPADM

## 2022-02-11 RX ORDER — LIDOCAINE HYDROCHLORIDE 20 MG/ML
INJECTION, SOLUTION INFILTRATION; PERINEURAL PRN
Status: DISCONTINUED | OUTPATIENT
Start: 2022-02-11 | End: 2022-02-11

## 2022-02-11 RX ORDER — NALOXONE HYDROCHLORIDE 0.4 MG/ML
0.4 INJECTION, SOLUTION INTRAMUSCULAR; INTRAVENOUS; SUBCUTANEOUS
Status: DISCONTINUED | OUTPATIENT
Start: 2022-02-11 | End: 2022-02-11 | Stop reason: HOSPADM

## 2022-02-11 RX ADMIN — PROPOFOL 100 MG: 10 INJECTION, EMULSION INTRAVENOUS at 08:47

## 2022-02-11 RX ADMIN — FENTANYL CITRATE 50 MCG: 50 INJECTION, SOLUTION INTRAMUSCULAR; INTRAVENOUS at 08:47

## 2022-02-11 RX ADMIN — SODIUM CHLORIDE, POTASSIUM CHLORIDE, SODIUM LACTATE AND CALCIUM CHLORIDE: 600; 310; 30; 20 INJECTION, SOLUTION INTRAVENOUS at 08:14

## 2022-02-11 RX ADMIN — PROPOFOL 50 MG: 10 INJECTION, EMULSION INTRAVENOUS at 09:05

## 2022-02-11 RX ADMIN — FENTANYL CITRATE 50 MCG: 50 INJECTION, SOLUTION INTRAMUSCULAR; INTRAVENOUS at 08:40

## 2022-02-11 RX ADMIN — MIDAZOLAM 1 MG: 1 INJECTION INTRAMUSCULAR; INTRAVENOUS at 08:47

## 2022-02-11 RX ADMIN — PROPOFOL 50 MG: 10 INJECTION, EMULSION INTRAVENOUS at 09:03

## 2022-02-11 RX ADMIN — PROPOFOL 50 MG: 10 INJECTION, EMULSION INTRAVENOUS at 08:56

## 2022-02-11 RX ADMIN — PROPOFOL 50 MG: 10 INJECTION, EMULSION INTRAVENOUS at 08:49

## 2022-02-11 RX ADMIN — MIDAZOLAM 1 MG: 1 INJECTION INTRAMUSCULAR; INTRAVENOUS at 08:40

## 2022-02-11 RX ADMIN — PROPOFOL 50 MG: 10 INJECTION, EMULSION INTRAVENOUS at 08:53

## 2022-02-11 RX ADMIN — PROPOFOL 50 MG: 10 INJECTION, EMULSION INTRAVENOUS at 08:46

## 2022-02-11 RX ADMIN — LIDOCAINE HYDROCHLORIDE 40 MG: 20 INJECTION, SOLUTION INFILTRATION; PERINEURAL at 08:46

## 2022-02-11 NOTE — ANESTHESIA CARE TRANSFER NOTE
Patient: Shazia Verma    Procedure: Procedure(s):  upper endoscopy and colonoscopy       Diagnosis: Iron deficiency [E61.1]  Diagnosis Additional Information: No value filed.    Anesthesia Type:   MAC     Note:    Oropharynx: oropharynx clear of all foreign objects and spontaneously breathing  Level of Consciousness: awake and drowsy  Oxygen Supplementation: nasal cannula  Level of Supplemental Oxygen (L/min / FiO2): 3  Independent Airway: airway patency satisfactory and stable  Dentition: dentition unchanged  Vital Signs Stable: post-procedure vital signs reviewed and stable  Report to RN Given: handoff report given  Patient transferred to: Phase II    Handoff Report: Identifed the Patient, Identified the Reponsible Provider, Reviewed the pertinent medical history, Discussed the surgical course, Reviewed Intra-OP anesthesia mangement and issues during anesthesia, Set expectations for post-procedure period and Allowed opportunity for questions and acknowledgement of understanding      Vitals:  Vitals Value Taken Time   BP     Temp     Pulse     Resp     SpO2         Electronically Signed By: LIA Barnett CRNA  February 11, 2022  9:12 AM

## 2022-02-11 NOTE — ANESTHESIA POSTPROCEDURE EVALUATION
Patient: Shazia Verma    Procedure: Procedure(s):  upper endoscopy and colonoscopy       Diagnosis:Iron deficiency [E61.1]  Diagnosis Additional Information: No value filed.    Anesthesia Type:  MAC    Note:  Disposition: Outpatient   Postop Pain Control: Uneventful            Sign Out: Well controlled pain   PONV: No   Neuro/Psych: Uneventful            Sign Out: Acceptable/Baseline neuro status   Airway/Respiratory: Uneventful            Sign Out: Acceptable/Baseline resp. status   CV/Hemodynamics: Uneventful            Sign Out: Acceptable CV status; No obvious hypovolemia; No obvious fluid overload   Other NRE: NONE   DID A NON-ROUTINE EVENT OCCUR? No           Last vitals:  Vitals Value Taken Time   /74 02/11/22 0930   Temp 97.1  F (36.2  C) 02/11/22 0929   Pulse 72 02/11/22 0930   Resp 16 02/11/22 0930   SpO2 98 % 02/11/22 0942   Vitals shown include unvalidated device data.    Electronically Signed By: Rayray Yee  February 11, 2022  10:01 AM

## 2022-02-11 NOTE — OP NOTE
Shazia Verma MRN# [id number]   YOB: 1970 Age: 51 year old      Date of Admission:  2/11/2022  Date of Service:   2/11/22    Primary care provider: Mary Ellen Armstrong    PREOPERATIVE DIAGNOSIS:  Iron deficiency, colon cancer screening        POSTOPERATIVE DIAGNOSIS:  R-n-Y, normal colon           PROCEDURE:  Colonoscopy and upper endoscopy       INDICATIONS:  See clinic notes     Specimen: * No specimens in log *    SURGEON: Jaguar Bui MD    PROCEDURE:  With the patient in the supine position on the transport cart, IV sedation was administered by the nurse anesthetist.  The patient's correct identity and planned procedure were confirmed during a requisite timeout pause.  A bite block was placed and the fiberoptic endoscope was introduced and negotiated through the cricopharyngeus without difficulty.  The length of the esophagus was examined without findings.  The gastroesophageal junction was noted 37 cm from the incisors; the Z line was regular without ulceration, bleeding source or stricture.  There was no evidence of Hill's change.  The stomach pouch was entered and the opal limb was traversed for aprox 20 cm without finding.  A second circumferential examination of the esophagus was performed on slow withdrawal of the endoscope without additional finding.  The procedure was satisfactorially tolerated.    Our attention then turned to the colonoscopy, the external anus was inspected and was noted to have circumferential hemorrhoids. Digital rectal exam was normal. The colonoscope was inserted and advanced under direct visualization to the level of the cecum which was identified by the appendiceal orifice and the ileocecal valve. The prep was excellent.. Upon slow withdrawal of the colonoscope, approximately 95% of the mucosa was directly visualized. The colon was without mucosal abnormality. There was no evidence of polyps, inflammation, bleeding or AVMs. Retroflexion of the rectum  was normal. The extra air was removed from the colon, and the colonoscope withdrawn. The patient tolerated the procedure well and was taken to postanesthesia care unit.     We invite the patient to return in 10 years for follow up screening evaluation.     Jaguar Bui MD

## 2022-02-11 NOTE — BRIEF OP NOTE
[facility]    Brief Operative Note    Pre-operative diagnosis: Iron deficiency [E61.1]  Post-operative diagnosis R-n-Y, normal colon     Procedure: Procedure(s):  upper endoscopy and colonoscopy  Surgeon: Surgeon(s) and Role:     * Jaguar Bui MD - Primary  Anesthesia: Monitor Anesthesia Care   Estimated Blood Loss: None    Drains: None  Specimens: * No specimens in log *  Findings:   r-n-y no ulcers, normal colon   Complications: None.  Implants: * No implants in log *

## 2022-02-11 NOTE — PLAN OF CARE
Patient and responsible adult given discharge instructions with no questions regarding instructions. Mary score 18/18. Pain level 0/10.  Discharged from unit via ambulation, tolerating PO diet. Patient discharged to home w/.

## 2022-02-11 NOTE — DISCHARGE INSTRUCTIONS
UPPER ENDOSCOPY    AFTER THE PROCEDURE    You will return to Same Day Surgery to rest for about an hour before you go home.    The doctor will talk with you and your family.    A family member/friend may visit with you.    You may burp up any air remaining in your stomach.    You may feel dizzy or light-headed from the medicine.    Your nurse will go over the discharge instructions with you and your caregiver and answer any of your questions.      You will be contacted the next day to check on how you are doing.    If biopsies were taken, you will be contacted with the results usually within 3 days.  BACK AT HOME    Rest for an hour or two after you get home.    When your throat is no longer numb and you have a gag reflex, take a few sips of cool water.  If you can swallow comfortably, you may start eating again.    You may have a mild sore throat for the rest of the day.    You will be contacted with results by the surgeons office within a week. If not contacted in one week, call the surgeons office.  WHAT TO WATCH FOR:  Problems rarely occur after the exam, but it is important to be aware of the early signs of a complication.  Call your doctor immediately if you have:    Difficulty swallowing or breathing    Unusual pain in your stomach or chest    Vomiting blood or dark material that looks like coffee grounds    Black or tarry stools    Temperature over 101.5 degrees    MORE QUESTIONS?  Please ask your doctor or nurse before the exam begins  or call your doctor at the clinic.    IF YOU MUST CANCEL YOUR PROCEDURE THE EVENING/NIGHT BEFORE, PLEASE CALL HOSPITAL ADMITTING -[phone] OR TOLL FREE [phone], [phone].    Phone Numbers:  [facility] - [phone] [phone]  [facility] - [phone]  Surgery Patient Education - [phone] or toll free [phone]    INSTRUCTIONS AFTER COLONOSCOPY    WHEN YOU ARE BACK HOME:    Plan to rest for an hour or two after you get home.    You may have  some cramping or pressure until you pass gas.    You may resume your regular medications.    Eat a small, light meal at first, and then gradually return to normal meal sizes.    You will be contacted the next day to see how you are doing.  If you had a polyp removed:    Slight bleeding may occur.  You may have a slight blood stain on the toilet paper after a bowel movement.    To lessen the chance of bleeding, avoid heavy exercise for ONE WEEK.  This includes heavy lifting, vigorous sport activities, and heavy physical labor.  You may resume your normal sexual activity.      Avoid aspirin or aspirin products if instructed by your doctor.    If there is a polyp or biopsy, you will be contacted with results within one week.     WHAT TO WATCH FOR:  Problems rarely occur after the exam; however, it is important for you to watch for early signs of possible problems.  If you have     Unusual pain in your abdomen    Nausea and vomiting that persists    Excessive bleeding    Black or bloody bowel movements    Fever or temperature above 100.6 F  Please call your doctor ([facility] [phone]) or go to the nearest hospital emergency room.    Post-Anesthesia Patient Instructions    IMMEDIATELY FOLLOWING SURGERY:  Do not drive or operate machinery for the first twenty four hours after surgery.  Do not make any important decisions for twenty four hours after surgery or while taking narcotic pain medications or sedatives.  If you develop intractable nausea and vomiting or a severe headache please notify your doctor immediately.    FOLLOW-UP:  Please make an appointment with your surgeon as instructed. You do not need to follow up with anesthesia unless specifically instructed to do so.    WOUND CARE INSTRUCTIONS (if applicable):  Keep a dry clean dressing on the anesthesia/puncture wound site if there is drainage.  Once the wound has quit draining you may leave it open to air.  Generally you should leave the bandage intact  for twenty four hours unless there is drainage.  If the epidural site drains for more than 36-48 hours please call the anesthesia department.    QUESTIONS?:  Please feel free to call your physician or the hospital  if you have any questions, and they will be happy to assist you.

## 2022-02-24 ENCOUNTER — MYC REFILL (OUTPATIENT)
Dept: FAMILY MEDICINE | Facility: OTHER | Age: 52
End: 2022-02-24
Payer: COMMERCIAL

## 2022-02-24 DIAGNOSIS — G44.89 OTHER HEADACHE SYNDROME: ICD-10-CM

## 2022-02-24 RX ORDER — BUTALBITAL, ACETAMINOPHEN AND CAFFEINE 50; 325; 40 MG/1; MG/1; MG/1
1 TABLET ORAL EVERY 4 HOURS PRN
Qty: 20 TABLET | Refills: 0 | Status: SHIPPED | OUTPATIENT
Start: 2022-02-24 | End: 2022-07-25

## 2022-02-24 NOTE — TELEPHONE ENCOUNTER
butalbital-acetaminophen-caffeine (ESGIC) -40 MG tablet  Last Written Prescription Date:  8/17/2021  Last Fill Quantity: 20,   # refills: 0  Last Office Visit: 6/12/2021  Future Office visit:       Routing refill request to provider for review/approval because:

## 2022-03-04 ENCOUNTER — MYC MEDICAL ADVICE (OUTPATIENT)
Dept: ONCOLOGY | Facility: OTHER | Age: 52
End: 2022-03-04
Payer: COMMERCIAL

## 2022-03-07 ENCOUNTER — LAB (OUTPATIENT)
Dept: LAB | Facility: OTHER | Age: 52
End: 2022-03-07
Payer: COMMERCIAL

## 2022-03-07 DIAGNOSIS — E61.1 IRON DEFICIENCY: ICD-10-CM

## 2022-03-07 LAB
BASOPHILS # BLD AUTO: 0 10E3/UL (ref 0–0.2)
BASOPHILS NFR BLD AUTO: 0 %
EOSINOPHIL # BLD AUTO: 0.1 10E3/UL (ref 0–0.7)
EOSINOPHIL NFR BLD AUTO: 1 %
ERYTHROCYTE [DISTWIDTH] IN BLOOD BY AUTOMATED COUNT: 13.4 % (ref 10–15)
FERRITIN SERPL-MCNC: 36 NG/ML (ref 8–252)
HCT VFR BLD AUTO: 38.6 % (ref 35–47)
HGB BLD-MCNC: 12.7 G/DL (ref 11.7–15.7)
IRON SATN MFR SERPL: 40 % (ref 15–46)
IRON SERPL-MCNC: 147 UG/DL (ref 35–180)
LYMPHOCYTES # BLD AUTO: 1.9 10E3/UL (ref 0.8–5.3)
LYMPHOCYTES NFR BLD AUTO: 37 %
MCH RBC QN AUTO: 28.9 PG (ref 26.5–33)
MCHC RBC AUTO-ENTMCNC: 32.9 G/DL (ref 31.5–36.5)
MCV RBC AUTO: 88 FL (ref 78–100)
MONOCYTES # BLD AUTO: 0.4 10E3/UL (ref 0–1.3)
MONOCYTES NFR BLD AUTO: 9 %
NEUTROPHILS # BLD AUTO: 2.8 10E3/UL (ref 1.6–8.3)
NEUTROPHILS NFR BLD AUTO: 54 %
PLATELET # BLD AUTO: 267 10E3/UL (ref 150–450)
RBC # BLD AUTO: 4.39 10E6/UL (ref 3.8–5.2)
TIBC SERPL-MCNC: 367 UG/DL (ref 240–430)
WBC # BLD AUTO: 5.1 10E3/UL (ref 4–11)

## 2022-03-07 PROCEDURE — 85025 COMPLETE CBC W/AUTO DIFF WBC: CPT

## 2022-03-07 PROCEDURE — 36415 COLL VENOUS BLD VENIPUNCTURE: CPT

## 2022-03-07 PROCEDURE — 82728 ASSAY OF FERRITIN: CPT

## 2022-03-07 PROCEDURE — 83550 IRON BINDING TEST: CPT

## 2022-03-07 NOTE — TELEPHONE ENCOUNTER
TC to patient to adjust labs to day.  Just had received voicemail from patient about 10 minutes prior to [employer] message. Patient agreeable, will do labs at [facility].

## 2022-03-11 ENCOUNTER — MYC MEDICAL ADVICE (OUTPATIENT)
Dept: FAMILY MEDICINE | Facility: OTHER | Age: 52
End: 2022-03-11
Payer: COMMERCIAL

## 2022-03-11 DIAGNOSIS — M25.551 HIP PAIN, RIGHT: Primary | ICD-10-CM

## 2022-04-15 ENCOUNTER — TELEPHONE (OUTPATIENT)
Dept: ONCOLOGY | Facility: OTHER | Age: 52
End: 2022-04-15
Payer: COMMERCIAL

## 2022-04-15 DIAGNOSIS — E61.1 IRON DEFICIENCY: Primary | ICD-10-CM

## 2022-04-15 NOTE — TELEPHONE ENCOUNTER
I spoke with patient she would like to continue to see Gisel. I will forward to Bernadette to schedule labs day prior and Follow up with Gisel for June. She uses her my chart and will check appointments via my chart.  Morenita Hwang LPN

## 2022-04-15 NOTE — TELEPHONE ENCOUNTER
Please call and let her know that we should see her back in June with labs the day prior.  If she is having her PCP draw her iron labs that is fine also.  Which ever she would like to do.

## 2022-05-15 NOTE — PROGRESS NOTES
[facility]  [address]  [facility] [address]  Phone: [phone]  Primary Provider: Mary Ellen Cardona  Pre-op Performing Provider: MARY ELLEN CARDONA      PREOPERATIVE EVALUATION:  Today's date: 5/19/2022    Shazia Verma is a 51 year old female who presents for a preoperative evaluation.    Surgical Information:  Surgery/Procedure: Flexor Tendon Release right hip  Surgery Location: [facility]   Surgeon:Dr. Thomson   Surgery Date: 6/2/22  Time of Surgery: TBD  Where patient plans to recover: At home with family  Fax number for surgical facility: on file    Type of Anesthesia Anticipated: to be determined    Assessment & Plan     The proposed surgical procedure is considered INTERMEDIATE risk.    1. Pre-op exam  - EKG 12-lead complete w/read - (Clinic Performed)  - Comprehensive metabolic panel  - CBC with platelets and differential    2. Hip pain, right    3. Other iron deficiency anemia  HGB normal    4. Class 1 obesity without serious comorbidity with body mass index (BMI) of 32.0 to 32.9 in adult, unspecified obesity type  Continue weight loss efforts     5. H/O gastric bypass         Risks and Recommendations:  The patient has the following additional risks and recommendations for perioperative complications:   - No identified additional risk factors other than previously addressed    Medication Instructions:  Patient is on no chronic medications    RECOMMENDATION:  APPROVAL GIVEN to proceed with proposed procedure, without further diagnostic evaluation.          Subjective     HPI related to upcoming procedure: chronic right hip pain despite hip replacement. .       Preop Questions 5/19/2022   1. Have you ever had a heart attack or stroke? No   2. Have you ever had surgery on your heart or blood vessels, such as a stent placement, a coronary artery bypass, or surgery on an artery in your head, neck, heart, or legs? No   3. Do you have chest pain  with activity? No   4. Do you have a history of  heart failure? No   5. Do you currently have a cold, bronchitis or symptoms of other infection? No   6. Do you have a cough, shortness of breath, or wheezing? No   7. Do you or anyone in your family have previous history of blood clots? YES - father - due to trauma    8. Do you or does anyone in your family have a serious bleeding problem such as prolonged bleeding following surgeries or cuts? No   9. Have you ever had problems with anemia or been told to take iron pills? YES - due to gastric bypass    10. Have you had any abnormal blood loss such as black, tarry or bloody stools, or abnormal vaginal bleeding? No   11. Have you ever had a blood transfusion? No   12. Are you willing to have a blood transfusion if it is medically needed before, during, or after your surgery? Yes   13. Have you or any of your relatives ever had problems with anesthesia? No   14. Do you have sleep apnea, excessive snoring or daytime drowsiness? No   15. Do you have any artifical heart valves or other implanted medical devices like a pacemaker, defibrillator, or continuous glucose monitor? No   16. Do you have artificial joints? YES - right hip   17. Are you allergic to latex? No   18. Is there any chance that you may be pregnant? No     Health Care Directive:  Patient does not have a Health Care Directive or Living Will: Patient states has Advance Directive and will bring in a copy to clinic.    Preoperative Review of :   reviewed - controlled substances reflected in medication list.      Status of Chronic Conditions:  See problem list for active medical problems.  Problems all longstanding and stable, except as noted/documented.  See ROS for pertinent symptoms related to these conditions.      Review of Systems  CONSTITUTIONAL: NEGATIVE for fever, chills, change in weight  INTEGUMENTARY/SKIN: NEGATIVE for worrisome rashes, moles or lesions  EYES: NEGATIVE for vision changes or  irritation  ENT/MOUTH: NEGATIVE for ear, mouth and throat problems  RESP: NEGATIVE for significant cough or SOB  CV: NEGATIVE for chest pain, palpitations or peripheral edema  GI: NEGATIVE for nausea, abdominal pain, heartburn, or change in bowel habits  : NEGATIVE for frequency, dysuria, or hematuria  MUSCULOSKELETAL:as above  NEURO: NEGATIVE for weakness, dizziness or paresthesias  ENDOCRINE: NEGATIVE for temperature intolerance, skin/hair changes  HEME: NEGATIVE for bleeding problems  PSYCHIATRIC: NEGATIVE for changes in mood or affect    Patient Active Problem List    Diagnosis Date Noted     Iron deficiency 08/10/2021     Priority: Medium     Low ferritin 2021     Priority: Medium     Migraine without aura and without status migrainosus, not intractable 2021     Priority: Medium     Vitamin D deficiency 2021     Priority: Medium     Other headache syndrome 2019     Priority: Medium     History of right hip replacement 2019     Priority: Medium     S/P hysterectomy 2018     Priority: Medium     Hypoglycemia 2017     Priority: Medium     ACP (advance care planning) 2016     Priority: Medium     Advance Care Planning 2016: ACP Review of Chart / Resources Provided:  Reviewed chart for advance care plan.  Shazia Verma has been provided information and resources to begin or update their advance care plan.  Added by WOODY LENNON             H/O gastric bypass 2016     Priority: Medium     Had type 2 diabetes, hyperlipidemia and hypertension prior to surgery.         Past Medical History:   Diagnosis Date     Breast mass     benign right     Diabetes (H)      Hypertension      Past Surgical History:   Procedure Laterality Date     APPENDECTOMY       BIOPSY BREAST Right     benign     BIOPSY BREAST Right 2020    US Bx benign     BREAST BIOPSY, RT/LT Right 2002    right bx      SECTION  1992      COLONOSCOPY  2013      ENDOSCOPY UPPER, COLONOSCOPY, COMBINED N/A 2022    Procedure: upper endoscopy and colonoscopy;  Surgeon: Jaguar Bui MD;  Location: [facility]     GASTRIC BYPASS       gatric bypass       LAPAROSCOPIC HYSTERECTOMY SUPRACERVICAL N/A 2018    Procedure: LAPAROSCOPIC HYSTERECTOMY SUPRACERVICAL;  LAPAROSCOPIC SUPRACERVICAL HYSTERECTOMY, BILATEARL SALPINGECTOMY, LYSIS OF ADHESIONS;  Surgeon: Jean Marie Garvin MD;  Location: [facility] OR     ORTHOPEDIC SURGERY      right hip labreal tear     ORTHOPEDIC SURGERY      left wrist      SALPINGECTOMY Bilateral 2018    Procedure: SALPINGECTOMY;;  Surgeon: Jean Marie Garvin MD;  Location: [facility]     Current Outpatient Medications   Medication Sig Dispense Refill     butalbital-acetaminophen-caffeine (ESGIC) -40 MG tablet Take 1 tablet by mouth every 4 hours as needed for headaches 20 tablet 0     calcium carbonate (OS-MANUEL 500 MG Unga. CA) 500 MG tablet        Cyanocobalamin (VITAMIN B 12 PO)        multivitamin w/minerals (THERA-VIT-M) tablet Take 1 tablet by mouth daily       propranolol ER (INDERAL LA) 60 MG 24 hr capsule Take 1 capsule (60 mg) by mouth daily 30 capsule 1     SUMAtriptan (IMITREX) 50 MG tablet Take 1 tablet (50 mg) by mouth at onset of headache for migraine May repeat in 2 hours. Max 4 tablets/24 hours. 12 tablet 3     VITAMIN D, CHOLECALCIFEROL, PO Take 5,000 Units by mouth daily 2 tabs daily         Allergies   Allergen Reactions     Reglan [Metoclopramide] Anxiety        Social History     Tobacco Use     Smoking status: Former Smoker     Types: Cigarettes     Start date: 1986     Quit date:      Years since quittin.4     Smokeless tobacco: Never Used   Substance Use Topics     Alcohol use: Yes     Comment: occ       History   Drug Use No         Objective     /77 (BP Location: Right arm, Patient Position: Sitting, Cuff Size: Adult Regular)   Pulse 76   Temp 96.8  F (36  C) (Tympanic)   Resp 18   Wt 87.4 kg (192  lb 11.2 oz)   LMP 04/10/2018 (LMP Unknown)   SpO2 98%   BMI 32.07 kg/m      Physical Exam    GENERAL APPEARANCE: healthy, alert and no distress     EYES: EOMI, PERRL     HENT: ear canals and TM's normal and nose and mouth without ulcers or lesions     NECK: no adenopathy, no asymmetry, masses, or scars and thyroid normal to palpation     RESP: lungs clear to auscultation - no rales, rhonchi or wheezes     CV: regular rates and rhythm, normal S1 S2, no S3 or S4 and no murmur, click or rub     ABDOMEN:  soft, nontender, no HSM or masses and bowel sounds normal     MS: extremities normal- no gross deformities noted, no evidence of inflammation in joints, FROM in all extremities.     SKIN: no suspicious lesions or rashes     NEURO: Normal strength and tone, sensory exam grossly normal, mentation intact and speech normal     PSYCH: mentation appears normal. and affect normal/bright     LYMPHATICS: No cervical adenopathy    Recent Labs   Lab Test 03/07/22  1013 01/10/22  0808 10/04/21  0747 10/04/21  0746 04/09/21  0937 01/14/21  0924 12/11/20  1446   HGB 12.7 13.0   < >  --    < > 11.7  --     303   < >  --    < > 326  --    NA  --  140  --  140   < > 139  --    POTASSIUM  --  4.0  --  4.5   < > 4.3  --    CR  --  0.59  --  0.49*   < > 0.58  --    A1C  --   --   --   --   --  5.8* 5.7*    < > = values in this interval not displayed.        Diagnostics:  Results for orders placed or performed in visit on 05/19/22   Comprehensive metabolic panel     Status: Abnormal   Result Value Ref Range    Sodium 138 133 - 144 mmol/L    Potassium 4.5 3.4 - 5.3 mmol/L    Chloride 105 94 - 109 mmol/L    Carbon Dioxide (CO2) 27 20 - 32 mmol/L    Anion Gap 6 3 - 14 mmol/L    Urea Nitrogen 15 7 - 30 mg/dL    Creatinine 0.57 0.52 - 1.04 mg/dL    Calcium 9.3 8.5 - 10.1 mg/dL    Glucose 112 (H) 70 - 99 mg/dL    Alkaline Phosphatase 97 40 - 150 U/L    AST 19 0 - 45 U/L    ALT 26 0 - 50 U/L    Protein Total 7.3 6.8 - 8.8 g/dL     Albumin 3.7 3.4 - 5.0 g/dL    Bilirubin Total 0.8 0.2 - 1.3 mg/dL    GFR Estimate >90 >60 mL/min/1.73m2   CBC with platelets and differential     Status: None   Result Value Ref Range    WBC Count 6.4 4.0 - 11.0 10e3/uL    RBC Count 4.55 3.80 - 5.20 10e6/uL    Hemoglobin 13.6 11.7 - 15.7 g/dL    Hematocrit 39.8 35.0 - 47.0 %    MCV 88 78 - 100 fL    MCH 29.9 26.5 - 33.0 pg    MCHC 34.2 31.5 - 36.5 g/dL    RDW 13.1 10.0 - 15.0 %    Platelet Count 311 150 - 450 10e3/uL    % Neutrophils 64 %    % Lymphocytes 29 %    % Monocytes 7 %    % Eosinophils 1 %    % Basophils 0 %    Absolute Neutrophils 4.1 1.6 - 8.3 10e3/uL    Absolute Lymphocytes 1.8 0.8 - 5.3 10e3/uL    Absolute Monocytes 0.4 0.0 - 1.3 10e3/uL    Absolute Eosinophils 0.0 0.0 - 0.7 10e3/uL    Absolute Basophils 0.0 0.0 - 0.2 10e3/uL   CBC with platelets and differential     Status: None    Narrative    The following orders were created for panel order CBC with platelets and differential.  Procedure                               Abnormality         Status                     ---------                               -----------         ------                     CBC with platelets and d...[[id number]]                      Final result                 Please view results for these tests on the individual orders.        EKG: appears normal, NSR, normal axis, normal intervals, no acute ST/T changes c/w ischemia, no LVH by voltage criteria, unchanged from previous tracings    Revised Cardiac Risk Index (RCRI):  The patient has the following serious cardiovascular risks for perioperative complications:   - No serious cardiac risks = 0 points     RCRI Interpretation: 0 points: Class I (very low risk - 0.4% complication rate)           Signed Electronically by: Mary Ellen Armstrong NP  Copy of this evaluation report is provided to requesting physician.

## 2022-05-19 ENCOUNTER — OFFICE VISIT (OUTPATIENT)
Dept: FAMILY MEDICINE | Facility: OTHER | Age: 52
End: 2022-05-19
Attending: NURSE PRACTITIONER
Payer: COMMERCIAL

## 2022-05-19 VITALS
HEART RATE: 76 BPM | SYSTOLIC BLOOD PRESSURE: 124 MMHG | WEIGHT: 192.7 LBS | BODY MASS INDEX: 32.07 KG/M2 | TEMPERATURE: 96.8 F | RESPIRATION RATE: 18 BRPM | DIASTOLIC BLOOD PRESSURE: 77 MMHG | OXYGEN SATURATION: 98 %

## 2022-05-19 DIAGNOSIS — E66.811 CLASS 1 OBESITY WITHOUT SERIOUS COMORBIDITY WITH BODY MASS INDEX (BMI) OF 32.0 TO 32.9 IN ADULT, UNSPECIFIED OBESITY TYPE: ICD-10-CM

## 2022-05-19 DIAGNOSIS — Z98.84 H/O GASTRIC BYPASS: ICD-10-CM

## 2022-05-19 DIAGNOSIS — M25.551 HIP PAIN, RIGHT: ICD-10-CM

## 2022-05-19 DIAGNOSIS — D50.8 OTHER IRON DEFICIENCY ANEMIA: ICD-10-CM

## 2022-05-19 DIAGNOSIS — Z01.818 PRE-OP EXAM: Primary | ICD-10-CM

## 2022-05-19 LAB
ALBUMIN SERPL-MCNC: 3.7 G/DL (ref 3.4–5)
ALP SERPL-CCNC: 97 U/L (ref 40–150)
ALT SERPL W P-5'-P-CCNC: 26 U/L (ref 0–50)
ANION GAP SERPL CALCULATED.3IONS-SCNC: 6 MMOL/L (ref 3–14)
AST SERPL W P-5'-P-CCNC: 19 U/L (ref 0–45)
BASOPHILS # BLD AUTO: 0 10E3/UL (ref 0–0.2)
BASOPHILS NFR BLD AUTO: 0 %
BILIRUB SERPL-MCNC: 0.8 MG/DL (ref 0.2–1.3)
BUN SERPL-MCNC: 15 MG/DL (ref 7–30)
CALCIUM SERPL-MCNC: 9.3 MG/DL (ref 8.5–10.1)
CHLORIDE BLD-SCNC: 105 MMOL/L (ref 94–109)
CO2 SERPL-SCNC: 27 MMOL/L (ref 20–32)
CREAT SERPL-MCNC: 0.57 MG/DL (ref 0.52–1.04)
EOSINOPHIL # BLD AUTO: 0 10E3/UL (ref 0–0.7)
EOSINOPHIL NFR BLD AUTO: 1 %
ERYTHROCYTE [DISTWIDTH] IN BLOOD BY AUTOMATED COUNT: 13.1 % (ref 10–15)
GFR SERPL CREATININE-BSD FRML MDRD: >90 ML/MIN/1.73M2
GLUCOSE BLD-MCNC: 112 MG/DL (ref 70–99)
HCT VFR BLD AUTO: 39.8 % (ref 35–47)
HGB BLD-MCNC: 13.6 G/DL (ref 11.7–15.7)
LYMPHOCYTES # BLD AUTO: 1.8 10E3/UL (ref 0.8–5.3)
LYMPHOCYTES NFR BLD AUTO: 29 %
MCH RBC QN AUTO: 29.9 PG (ref 26.5–33)
MCHC RBC AUTO-ENTMCNC: 34.2 G/DL (ref 31.5–36.5)
MCV RBC AUTO: 88 FL (ref 78–100)
MONOCYTES # BLD AUTO: 0.4 10E3/UL (ref 0–1.3)
MONOCYTES NFR BLD AUTO: 7 %
NEUTROPHILS # BLD AUTO: 4.1 10E3/UL (ref 1.6–8.3)
NEUTROPHILS NFR BLD AUTO: 64 %
PLATELET # BLD AUTO: 311 10E3/UL (ref 150–450)
POTASSIUM BLD-SCNC: 4.5 MMOL/L (ref 3.4–5.3)
PROT SERPL-MCNC: 7.3 G/DL (ref 6.8–8.8)
RBC # BLD AUTO: 4.55 10E6/UL (ref 3.8–5.2)
SODIUM SERPL-SCNC: 138 MMOL/L (ref 133–144)
WBC # BLD AUTO: 6.4 10E3/UL (ref 4–11)

## 2022-05-19 PROCEDURE — 99214 OFFICE O/P EST MOD 30 MIN: CPT | Performed by: NURSE PRACTITIONER

## 2022-05-19 PROCEDURE — 85025 COMPLETE CBC W/AUTO DIFF WBC: CPT | Performed by: NURSE PRACTITIONER

## 2022-05-19 PROCEDURE — 80053 COMPREHEN METABOLIC PANEL: CPT | Performed by: NURSE PRACTITIONER

## 2022-05-19 PROCEDURE — 36415 COLL VENOUS BLD VENIPUNCTURE: CPT | Performed by: NURSE PRACTITIONER

## 2022-05-19 PROCEDURE — 93000 ELECTROCARDIOGRAM COMPLETE: CPT | Performed by: INTERNAL MEDICINE

## 2022-05-19 ASSESSMENT — PAIN SCALES - GENERAL: PAINLEVEL: NO PAIN (0)

## 2022-05-19 NOTE — PATIENT INSTRUCTIONS
Assessment & Plan     The proposed surgical procedure is considered INTERMEDIATE risk.    1. Pre-op exam  - EKG 12-lead complete w/read - (Clinic Performed)  - Comprehensive metabolic panel  - CBC with platelets and differential    2. Hip pain, right    3. Other iron deficiency anemia  HGB normal    4. H/O gastric bypass      Follow-up as needed    Mary Ellen Armstrong,   Certified Adult Nurse Practitioner  [phone]

## 2022-05-19 NOTE — NURSING NOTE
"Chief Complaint   Patient presents with     Pre-Op Exam       Initial /77 (BP Location: Right arm, Patient Position: Sitting, Cuff Size: Adult Regular)   Pulse 76   Temp 96.8  F (36  C) (Tympanic)   Resp 18   Wt 87.4 kg (192 lb 11.2 oz)   LMP 04/10/2018 (LMP Unknown)   SpO2 98%   BMI 32.07 kg/m   Estimated body mass index is 32.07 kg/m  as calculated from the following:    Height as of 2/2/22: 1.651 m (5' 5\").    Weight as of this encounter: 87.4 kg (192 lb 11.2 oz).  Medication Reconciliation: complete  Damaris Rosa LPN  "

## 2022-06-02 ENCOUNTER — MEDICAL CORRESPONDENCE (OUTPATIENT)
Dept: HEALTH INFORMATION MANAGEMENT | Facility: HOSPITAL | Age: 52
End: 2022-06-02

## 2022-06-08 ENCOUNTER — HOSPITAL ENCOUNTER (OUTPATIENT)
Dept: PHYSICAL THERAPY | Facility: OTHER | Age: 52
Discharge: HOME | End: 2022-06-08
Attending: ORTHOPAEDIC SURGERY
Payer: COMMERCIAL

## 2022-06-08 PROCEDURE — 97110 THERAPEUTIC EXERCISES: CPT | Mod: GP

## 2022-06-08 PROCEDURE — 97161 PT EVAL LOW COMPLEX 20 MIN: CPT | Mod: GP

## 2022-06-08 NOTE — PROGRESS NOTES
06/08/22 0859   General Information   Type of Visit Initial OP Ortho PT Evaluation   Start of Care Date 06/08/22   Referring Physician Dr. Thomson   Patient/Family Goals Statement return to normal activity level   Orders Evaluate and Treat   Orders Comment Work on hip strength/muscle balance around the hip, swelling control and gait   Date of Order 06/02/22   Certification Required? No   Medical Diagnosis Right endoscopic psoas release after prior total hip   Surgical/Medical history reviewed Yes   Weight-Bearing Status - RLE weight-bearing as tolerated   General Information Comments PMH - see chart   Body Part(s)   Body Part(s) Hip   Presentation and Etiology   Pertinent history of current problem (include personal factors and/or comorbidities that impact the POC) Patient reports she underwent right hip endoscopic psoas release on 6/02/22 by Dr. Tani Thomson at the [facility].  Patient reports the surgery was successful.  She will follow-up in orthopedics in September 2022.  Patient reports she had a right total hip arthroplasty in 2019 however continued to have pain and weakness.  She did try physical therapy at that time and also cortisone injections but she continued to have problems.  Patient is now referred to physical therapy.   Impairments A. Pain;F. Decreased strength and endurance;H. Impaired gait   Functional Limitations perform activities of daily living;perform required work activities;perform desired leisure / sports activities   Symptom Location Right anterior hip/groin   Chronicity New   Pain rating (0-10 point scale) Best (/10);Worst (/10)   Best (/10) 4   Worst (/10) 7   Pain quality A. Sharp;B. Dull;C. Aching   Frequency of pain/symptoms B. Intermittent   Pain/symptoms are: The same all the time   Pain/symptoms exacerbated by B. Walking;C. Lifting;D. Carrying;I. Bending;J. ADL;K. Home tasks;L. Work tasks;M. Other   Pain exacerbation comment Getting in and out of vehicle or bed  getting in and out of, donning doffing shoes and socks, stairs, rolling in bed   Pain/symptoms eased by C. Rest;H. Cold;I. OTC medication(s)   Progression of symptoms since onset: Improved   Current Level of Function   Patient role/employment history A. Employed   Employment Comments She works for [employer] but is on a leave until July   Fall Risk Screen   Fall screen completed by PT   Have you fallen 2 or more times in the past year? No   Have you fallen and had an injury in the past year? No   Is patient a fall risk? No   Abuse Screen (yes response referral indicated)   Feels Unsafe at Home or Work/School no   Feels Threatened by Someone no   Does Anyone Try to Keep You From Having Contact with Others or Doing Things Outside Your Home? no   Physical Signs of Abuse Present no   Patient needs abuse support services and resources No   Hip Objective Findings   Gait/Locomotion Patient ambulates into department today without an assistive device with an antalgic gait pattern.  Gait was much improved when using a crutch.   Side (if bilateral, select both right and left) Right   Hip/Knee Strength Comments She is able to perform a good quad set.  Did not perform manual muscle test as this is postsurgical   Observation No acute distress   Integumentary  Stitches present with no noted drainage.  She will be having stitches removed next week   Posture Left iliac crest was elevated as compared to the right.  Bilateral pes planus   Right Hamstring Flexibility Hypomobile   Right Prone Quad Flexibility Hypomobile   Right Hip Flexion PROM 90 degrees   Right Hip ER PROM Hypomobile   Right Hip IR PROM Hypomobile   Right Hip Ext PROM -5 degrees in supine   Planned Therapy Interventions   Planned Therapy Interventions balance training;gait training;manual therapy;ROM;strengthening;stretching   Planned Modality Interventions   Planned Modality Interventions Comments Modalities as indicated   Clinical Impression   Criteria for  Skilled Therapeutic Interventions Met yes, treatment indicated   PT Diagnosis Status post right  psoas release   Influenced by the following impairments Pain, decreased mobility, decreased strength, gait difficulties   Functional limitations due to impairments Walking, lifting, carrying, bending, ADLs, household tasks, work tasks, stairs, getting in and out of vehicle bathtub, donning doffing shoes and socks stairs, rolling in bed   Clinical Presentation Stable/Uncomplicated   Clinical Presentation Rationale Clinical judgment-no comorbidities that may negatively influence anticipated PT outcome   Clinical Decision Making (Complexity) Low complexity   Therapy Frequency 2 times/Week   Predicted Duration of Therapy Intervention (days/wks) 4 weeks   Risk & Benefits of therapy have been explained Yes   Patient, Family & other staff in agreement with plan of care Yes   Clinical Impression Comments Patient is status post right hip endoscopic psoas release with decreased hip/lower extremity mobility and strength   Education Assessment   Barriers to Learning No barriers   ORTHO GOALS   PT Ortho Eval Goals 1;2;3   Ortho Goal 1   Goal Identifier STG 1   Goal Description Decreased pain when at its worst to 6/10   Target Date 06/22/22   Ortho Goal 2   Goal Identifier LTG 1   Goal Description Patient will demonstrate independence with home exercise program   Target Date 07/06/22   Ortho Goal 3   Goal Identifier LTG #2   Goal Description Patient was able to ambulate community distances on even and uneven surfaces   Target Date 07/06/22   Total Evaluation Time   PT Eval, Low Complexity Minutes (60989) 25

## 2022-06-10 ENCOUNTER — HOSPITAL ENCOUNTER (OUTPATIENT)
Dept: PHYSICAL THERAPY | Facility: OTHER | Age: 52
Discharge: HOME | End: 2022-06-10
Attending: NURSE PRACTITIONER
Payer: COMMERCIAL

## 2022-06-10 PROCEDURE — 97110 THERAPEUTIC EXERCISES: CPT | Mod: GP

## 2022-06-15 ENCOUNTER — HOSPITAL ENCOUNTER (OUTPATIENT)
Dept: PHYSICAL THERAPY | Facility: OTHER | Age: 52
Discharge: HOME | End: 2022-06-15
Attending: NURSE PRACTITIONER
Payer: COMMERCIAL

## 2022-06-15 ENCOUNTER — OFFICE VISIT (OUTPATIENT)
Dept: FAMILY MEDICINE | Facility: OTHER | Age: 52
End: 2022-06-15
Attending: NURSE PRACTITIONER
Payer: COMMERCIAL

## 2022-06-15 DIAGNOSIS — Z48.02 ENCOUNTER FOR REMOVAL OF SUTURES: Primary | ICD-10-CM

## 2022-06-15 PROCEDURE — 97110 THERAPEUTIC EXERCISES: CPT | Mod: GP

## 2022-06-15 NOTE — PROGRESS NOTES
Shazia Verma presents to the clinic for removal of sutures. The patient has had sutures in place for 13 days. There has been no patient reported signs or symptoms of infection or drainage. 4  sutures are seen and located on the upper right thigh. Tetanus status is up to date. All sutures were easily removed today. Routine wound care discussed by the RN or provider. The patient will follow up as needed.

## 2022-06-21 ENCOUNTER — HOSPITAL ENCOUNTER (OUTPATIENT)
Dept: PHYSICAL THERAPY | Facility: OTHER | Age: 52
Discharge: HOME | End: 2022-06-21
Attending: NURSE PRACTITIONER
Payer: COMMERCIAL

## 2022-06-21 PROCEDURE — 97110 THERAPEUTIC EXERCISES: CPT | Mod: GP

## 2022-06-23 ENCOUNTER — HOSPITAL ENCOUNTER (OUTPATIENT)
Dept: PHYSICAL THERAPY | Facility: OTHER | Age: 52
Discharge: HOME | End: 2022-06-23
Attending: NURSE PRACTITIONER
Payer: COMMERCIAL

## 2022-06-23 PROCEDURE — 97110 THERAPEUTIC EXERCISES: CPT | Mod: GP

## 2022-06-27 ENCOUNTER — LAB (OUTPATIENT)
Dept: LAB | Facility: OTHER | Age: 52
End: 2022-06-27
Payer: COMMERCIAL

## 2022-06-27 DIAGNOSIS — E61.1 IRON DEFICIENCY: ICD-10-CM

## 2022-06-27 DIAGNOSIS — E55.9 VITAMIN D DEFICIENCY: ICD-10-CM

## 2022-06-27 LAB
BASOPHILS # BLD AUTO: 0 10E3/UL (ref 0–0.2)
BASOPHILS NFR BLD AUTO: 0 %
EOSINOPHIL # BLD AUTO: 0.1 10E3/UL (ref 0–0.7)
EOSINOPHIL NFR BLD AUTO: 1 %
ERYTHROCYTE [DISTWIDTH] IN BLOOD BY AUTOMATED COUNT: 13.2 % (ref 10–15)
FERRITIN SERPL-MCNC: 22 NG/ML (ref 8–252)
HCT VFR BLD AUTO: 39.2 % (ref 35–47)
HGB BLD-MCNC: 13.5 G/DL (ref 11.7–15.7)
IRON SATN MFR SERPL: 30 % (ref 15–46)
IRON SERPL-MCNC: 118 UG/DL (ref 35–180)
LYMPHOCYTES # BLD AUTO: 1.9 10E3/UL (ref 0.8–5.3)
LYMPHOCYTES NFR BLD AUTO: 33 %
MCH RBC QN AUTO: 29.9 PG (ref 26.5–33)
MCHC RBC AUTO-ENTMCNC: 34.4 G/DL (ref 31.5–36.5)
MCV RBC AUTO: 87 FL (ref 78–100)
MONOCYTES # BLD AUTO: 0.5 10E3/UL (ref 0–1.3)
MONOCYTES NFR BLD AUTO: 9 %
NEUTROPHILS # BLD AUTO: 3.3 10E3/UL (ref 1.6–8.3)
NEUTROPHILS NFR BLD AUTO: 57 %
PLATELET # BLD AUTO: 264 10E3/UL (ref 150–450)
RBC # BLD AUTO: 4.52 10E6/UL (ref 3.8–5.2)
TIBC SERPL-MCNC: 393 UG/DL (ref 240–430)
WBC # BLD AUTO: 5.9 10E3/UL (ref 4–11)

## 2022-06-27 PROCEDURE — 82306 VITAMIN D 25 HYDROXY: CPT

## 2022-06-27 PROCEDURE — 82728 ASSAY OF FERRITIN: CPT

## 2022-06-27 PROCEDURE — 36415 COLL VENOUS BLD VENIPUNCTURE: CPT

## 2022-06-27 PROCEDURE — 83550 IRON BINDING TEST: CPT

## 2022-06-27 PROCEDURE — 85025 COMPLETE CBC W/AUTO DIFF WBC: CPT

## 2022-06-28 ENCOUNTER — HOSPITAL ENCOUNTER (OUTPATIENT)
Dept: PHYSICAL THERAPY | Facility: OTHER | Age: 52
Discharge: HOME | End: 2022-06-28
Attending: NURSE PRACTITIONER
Payer: COMMERCIAL

## 2022-06-28 ENCOUNTER — ONCOLOGY VISIT (OUTPATIENT)
Dept: ONCOLOGY | Facility: OTHER | Age: 52
End: 2022-06-28
Attending: NURSE PRACTITIONER
Payer: COMMERCIAL

## 2022-06-28 ENCOUNTER — TELEPHONE (OUTPATIENT)
Dept: ONCOLOGY | Facility: OTHER | Age: 52
End: 2022-06-28

## 2022-06-28 VITALS
BODY MASS INDEX: 32.8 KG/M2 | HEIGHT: 65 IN | RESPIRATION RATE: 20 BRPM | WEIGHT: 196.87 LBS | OXYGEN SATURATION: 98 % | HEART RATE: 87 BPM | TEMPERATURE: 97.8 F | SYSTOLIC BLOOD PRESSURE: 110 MMHG | DIASTOLIC BLOOD PRESSURE: 62 MMHG

## 2022-06-28 DIAGNOSIS — R79.0 LOW FERRITIN: ICD-10-CM

## 2022-06-28 DIAGNOSIS — E61.1 IRON DEFICIENCY: Primary | ICD-10-CM

## 2022-06-28 DIAGNOSIS — Z98.84 H/O GASTRIC BYPASS: ICD-10-CM

## 2022-06-28 DIAGNOSIS — E55.9 VITAMIN D DEFICIENCY: ICD-10-CM

## 2022-06-28 DIAGNOSIS — R53.83 OTHER FATIGUE: ICD-10-CM

## 2022-06-28 LAB
FOLATE SERPL-MCNC: 11.5 NG/ML (ref 4.6–34.8)
VIT B12 SERPL-MCNC: 411 PG/ML (ref 232–1245)

## 2022-06-28 PROCEDURE — 82607 VITAMIN B-12: CPT | Performed by: NURSE PRACTITIONER

## 2022-06-28 PROCEDURE — 99213 OFFICE O/P EST LOW 20 MIN: CPT | Performed by: NURSE PRACTITIONER

## 2022-06-28 PROCEDURE — 97110 THERAPEUTIC EXERCISES: CPT | Mod: GP

## 2022-06-28 PROCEDURE — 82746 ASSAY OF FOLIC ACID SERUM: CPT | Performed by: NURSE PRACTITIONER

## 2022-06-28 PROCEDURE — 36415 COLL VENOUS BLD VENIPUNCTURE: CPT | Performed by: NURSE PRACTITIONER

## 2022-06-28 RX ORDER — ALBUTEROL SULFATE 0.83 MG/ML
2.5 SOLUTION RESPIRATORY (INHALATION)
Status: CANCELLED | OUTPATIENT
Start: 2022-06-29

## 2022-06-28 RX ORDER — NALOXONE HYDROCHLORIDE 0.4 MG/ML
0.2 INJECTION, SOLUTION INTRAMUSCULAR; INTRAVENOUS; SUBCUTANEOUS
Status: CANCELLED | OUTPATIENT
Start: 2022-06-29

## 2022-06-28 RX ORDER — EPINEPHRINE 1 MG/ML
0.3 INJECTION, SOLUTION, CONCENTRATE INTRAVENOUS EVERY 5 MIN PRN
Status: CANCELLED | OUTPATIENT
Start: 2022-06-29

## 2022-06-28 RX ORDER — MEPERIDINE HYDROCHLORIDE 25 MG/ML
25 INJECTION INTRAMUSCULAR; INTRAVENOUS; SUBCUTANEOUS EVERY 30 MIN PRN
Status: CANCELLED | OUTPATIENT
Start: 2022-06-29

## 2022-06-28 RX ORDER — DIPHENHYDRAMINE HYDROCHLORIDE 50 MG/ML
50 INJECTION INTRAMUSCULAR; INTRAVENOUS
Status: CANCELLED
Start: 2022-06-29

## 2022-06-28 RX ORDER — ALBUTEROL SULFATE 90 UG/1
1-2 AEROSOL, METERED RESPIRATORY (INHALATION)
Status: CANCELLED
Start: 2022-06-29

## 2022-06-28 ASSESSMENT — PAIN SCALES - GENERAL: PAINLEVEL: MILD PAIN (3)

## 2022-06-28 NOTE — PROGRESS NOTES
Hematology Follow-up Visit:  June 28, 2022    Reason for Visit:  Patient presents with:  Hematology: Follow up iron deficiency, vitamin D deficiency and history of gastric bypass      Nursing Notes and Documentation reviewed:  yes    HPI:  This is a 52-year-old female patient who presents to the clinic today in follow-up of iron deficiency.  Patient is post gastric bypass surgery 2012.  She was evaluated initially by Dr. Ceballos on 8/9/2021.  At that time ferritin had been drawn by her PCP showing a level of 6.  Hemoglobin was within normal limits.  It was felt this was likely related to malabsorption secondary to her gastric bypass procedure and she was treated with IV Venofer x5 infusions. Repeat iron studies showed improvement with the normalization of her ferritin level.  Additional studies including SPEP, TSH, sed rate and rheumatoid factor along with PHUC were completed that were negative.  It was recommended she pursue a colonoscopy.     She underwent colonoscopy and EGD on 2/11/2022 with hemorrhoids noted on colonoscopy.    She presents feeling extremely fatigued.  She states she can fall asleep very easily.  She feels her thought process is not good and her memory is not good and she is also dealing with hair loss.  She did have 1 day about 2 days ago where she had increased joint pain and aches and feeling even more tired.  She took a COVID test and this was negative.    She is currently not taking any vitamin B12 or vitamin D.    Treatment: Venofer    Past Medical History:   Diagnosis Date     Arthritis      Breast mass 2002    benign right     Diabetes (H)      Hypertension        Social History     Socioeconomic History     Marital status:      Spouse name: Not on file     Number of children: Not on file     Years of education: Not on file     Highest education level: Not on file   Occupational History     Not on file   Tobacco Use     Smoking status: Former Smoker     Types: Cigarettes     Start  date: 1986     Quit date:      Years since quittin.5     Smokeless tobacco: Never Used   Substance and Sexual Activity     Alcohol use: Yes     Comment: occ     Drug use: No     Sexual activity: Not on file   Other Topics Concern     Parent/sibling w/ CABG, MI or angioplasty before 65F 55M? Yes     Comment: mother   Social History Narrative     Not on file     Social Determinants of Health     Financial Resource Strain: Not on file   Food Insecurity: Not on file   Transportation Needs: Not on file   Physical Activity: Not on file   Stress: Not on file   Social Connections: Not on file   Intimate Partner Violence: Not on file   Housing Stability: Not on file       Past Surgical History:   Procedure Laterality Date     APPENDECTOMY  1991     BIOPSY BREAST Right 2002    benign     BIOPSY BREAST Right 2020    US Bx benign     BREAST BIOPSY, RT/LT Right 2002    right bx      SECTION  1992     COLONOSCOPY  2013     ENDOSCOPY UPPER, COLONOSCOPY, COMBINED N/A 2022    Procedure: upper endoscopy and colonoscopy;  Surgeon: Jaguar Bui MD;  Location: [facility]     GASTRIC BYPASS       gatric bypass       LAPAROSCOPIC HYSTERECTOMY SUPRACERVICAL N/A 2018    Procedure: LAPAROSCOPIC HYSTERECTOMY SUPRACERVICAL;  LAPAROSCOPIC SUPRACERVICAL HYSTERECTOMY, BILATEARL SALPINGECTOMY, LYSIS OF ADHESIONS;  Surgeon: Jean Marie Garvin MD;  Location: [facility]     ORTHOPEDIC SURGERY      right hip labreal tear     ORTHOPEDIC SURGERY      left wrist      SALPINGECTOMY Bilateral 2018    Procedure: SALPINGECTOMY;;  Surgeon: Jean Marie Garvin MD;  Location: [facility]       Family History   Problem Relation Age of Onset     Cerebrovascular Disease Mother      Hyperlipidemia Mother      Hypertension Mother      Coronary Artery Disease Mother      Migraines Mother      Obesity Mother      Osteoarthritis Mother      Osteoporosis Mother      Diabetes Mother      Myocardial Infarction Mother       Hyperlipidemia Father      Hypertension Father      Thyroid Disease Father      Hypertension Brother      Hyperlipidemia Brother      Obesity Brother      Hypertension Sister      Thyroid Disease Sister      Stomach Cancer Maternal Grandmother      Cancer Maternal Grandfather         colon or liver: unsure     Leukemia Paternal Grandfather      Breast Cancer No family hx of      Colon Cancer No family hx of      Prostate Cancer No family hx of      Anesthesia Reaction No family hx of      Asthma No family hx of      Genetic Disorder No family hx of        Allergies:  Allergies as of 06/28/2022 - Reviewed 06/28/2022   Allergen Reaction Noted     Reglan [metoclopramide] Anxiety 11/14/2016       Current Medications:  Current Outpatient Medications   Medication Sig Dispense Refill     calcium carbonate (OS-MANUEL 500 MG [place]. [place]) 500 MG tablet        multivitamin w/minerals (THERA-VIT-M) tablet Take 1 tablet by mouth daily       butalbital-acetaminophen-caffeine (ESGIC) -40 MG tablet Take 1 tablet by mouth every 4 hours as needed for headaches (Patient not taking: Reported on 6/28/2022) 20 tablet 0     Cyanocobalamin (VITAMIN B 12 PO)  (Patient not taking: Reported on 6/28/2022)       propranolol ER (INDERAL LA) 60 MG 24 hr capsule Take 1 capsule (60 mg) by mouth daily (Patient not taking: Reported on 6/28/2022) 30 capsule 1     SUMAtriptan (IMITREX) 50 MG tablet Take 1 tablet (50 mg) by mouth at onset of headache for migraine May repeat in 2 hours. Max 4 tablets/24 hours. (Patient not taking: Reported on 6/28/2022) 12 tablet 3     VITAMIN D, CHOLECALCIFEROL, PO Take 5,000 Units by mouth daily 2 tabs daily (Patient not taking: Reported on 6/28/2022)            Review Of Systems:  Constitutional: denies fever, weight changes  Eyes: denies blurred or double vision  Ears/Nose/Throat: denies ear pain, nose problems, difficulty swallowing  Respiratory: denies shortness of breath, cough  Skin: denies rash,  "lesions  Cardiovascular: denies chest pain, palpitations, edema  Gastrointestinal: denies abdominal pain, bloating, nausea, early satiety; denies blood in her stool  Genitourinary: denies difficulty with urination, blood in urine  Musculoskeletal: see hPI  Neurologic: occasional lightheadedness,  Denies numbness or Tingling or headaches  Hematologic/Lymphatic/Immunologic: denies easy bleeding, lumps or bumps noted; has easy bruising-not new  Endocrine: Denies increased thirst, night sweats      Physical Exam:  /62   Pulse 87   Temp 97.8  F (36.6  C) (Tympanic)   Resp 20   Ht 1.651 m (5' 5\")   Wt 89.3 kg (196 lb 13.9 oz)   LMP 04/10/2018 (LMP Unknown)   SpO2 98%   BMI 32.76 kg/m    GENERAL APPEARANCE: Healthy, alert and in no acute distress.  HEENT: Normocephalic, Sclerae anicteric. Oropharynx without ulcers, lesions, or thrush.  NECK:  No asymmetry or masses, no thyromegaly.  LYMPHATICS: No palpable cervical, supraclavicular nodes   RESP: Lungs clear to auscultation bilaterally, respirations regular and easy  CARDIOVASCULAR: Regular rate and rhythm. Normal S1, S2; no murmur, gallop, or rub.  NEURO: Alert and oriented x 3.  Gait steady.  PSYCHIATRIC: Mentation and affect appear normal.  Mood appropriate.    Laboratory/Imaging Studies:  Component      Latest Ref Rng & Units 6/27/2022   WBC      4.0 - 11.0 10e3/uL 5.9   RBC Count      3.80 - 5.20 10e6/uL 4.52   Hemoglobin      11.7 - 15.7 g/dL 13.5   Hematocrit      35.0 - 47.0 % 39.2   MCV      78 - 100 fL 87   MCH      26.5 - 33.0 pg 29.9   MCHC      31.5 - 36.5 g/dL 34.4   RDW      10.0 - 15.0 % 13.2   Platelet Count      150 - 450 10e3/uL 264   % Neutrophils      % 57   % Lymphocytes      % 33   % Monocytes      % 9   % Eosinophils      % 1   % Basophils      % 0   Absolute Neutrophils      1.6 - 8.3 10e3/uL 3.3   Absolute Lymphocytes      0.8 - 5.3 10e3/uL 1.9   Absolute Monocytes      0.0 - 1.3 10e3/uL 0.5   Absolute Eosinophils      0.0 - 0.7 " 10e3/uL 0.1   Absolute Basophils      0.0 - 0.2 10e3/uL 0.0   Iron      35 - 180 ug/dL 118   Iron Binding Cap      240 - 430 ug/dL 393   Iron Saturation Index      15 - 46 % 30   Ferritin      8 - 252 ng/mL 22         ASSESSMENT/PLAN:    #1 Iron deficiency: Ferritin level low at 22.  I'd like to see this above 50 due to her gastric bypass.  We will set her up for IV Venofer 200 mg x 5 infusions  by at least 48 hours.  We will recheck her iron studies about 4 weeks after and call her with that result.    #2 gastric bypass: We will obtain a vitamin B12 and folate level today.  She is currently not on any oral supplementation.  We will call her with the results.    #3  Vitamin D deficiency: Previously noted vitamin D deficiency and currently not on any supplementation.  We will check a level today.    I encouraged patient to call with any questions or concerns.    Megan Zepeda, NP APRN, FNP-BC, AOCNP

## 2022-06-28 NOTE — PATIENT INSTRUCTIONS
We will draw some extra labs today and call you with the results.    We will set you up for IV Venofer x 5 doses.  Each  by at least 48 hours.  (If prior auth'd by Insurance-she would like to start Thursday but then will be gone from 7/1 to 7/10 so will complete after that)    We will recheck her lab work 4 to 5 weeks after your infusions are complete.  We will call you with that result.    If you have any questions please call [phone]    Other instructions:  none

## 2022-06-28 NOTE — NURSING NOTE
"Oncology Rooming Note    June 28, 2022 9:26 AM   Shazia Verma is a 52 year old female who presents for:    Chief Complaint   Patient presents with     Hematology     Follow up iron deficiency, vitamin D deficiency and history of gastric bypass     Initial Vitals: /62   Pulse 87   Temp 97.8  F (36.6  C) (Tympanic)   Resp 20   Ht 1.651 m (5' 5\")   Wt 89.3 kg (196 lb 13.9 oz)   LMP 04/10/2018 (LMP Unknown)   SpO2 98%   BMI 32.76 kg/m   Estimated body mass index is 32.76 kg/m  as calculated from the following:    Height as of this encounter: 1.651 m (5' 5\").    Weight as of this encounter: 89.3 kg (196 lb 13.9 oz). Body surface area is 2.02 meters squared.  Mild Pain (3) Comment: Data Unavailable   Patient's last menstrual period was 04/10/2018 (lmp unknown).  Allergies reviewed: Yes  Medications reviewed: Yes    Medications: Medication refills not needed today.  Pharmacy name entered into [facility]:    [id number] - [place], [place] - [address] AT [facility] [address]  [address] [address] [place] [address] [id number] - [place], [place] - [address]          Morenita Hwang LPN            "

## 2022-06-29 DIAGNOSIS — Z98.84 H/O GASTRIC BYPASS: ICD-10-CM

## 2022-06-29 DIAGNOSIS — R53.83 OTHER FATIGUE: Primary | ICD-10-CM

## 2022-06-29 LAB — DEPRECATED CALCIDIOL+CALCIFEROL SERPL-MC: 19 UG/L (ref 20–75)

## 2022-07-23 DIAGNOSIS — G44.89 OTHER HEADACHE SYNDROME: ICD-10-CM

## 2022-07-25 RX ORDER — BUTALBITAL, ACETAMINOPHEN AND CAFFEINE 50; 325; 40 MG/1; MG/1; MG/1
1 TABLET ORAL EVERY 4 HOURS PRN
Qty: 20 TABLET | Refills: 0 | Status: SHIPPED | OUTPATIENT
Start: 2022-07-25 | End: 2022-11-08

## 2022-07-25 NOTE — TELEPHONE ENCOUNTER
[facility]       Last Written Prescription Date:  2/24/22  Last Fill Quantity: 20,   # refills: 0  Last Office Visit: 5/19/22  Future Office visit:

## 2022-09-05 ENCOUNTER — MYC MEDICAL ADVICE (OUTPATIENT)
Dept: FAMILY MEDICINE | Facility: OTHER | Age: 52
End: 2022-09-05

## 2022-09-06 ENCOUNTER — TELEPHONE (OUTPATIENT)
Dept: FAMILY MEDICINE | Facility: OTHER | Age: 52
End: 2022-09-06

## 2022-09-06 NOTE — TELEPHONE ENCOUNTER
Form received [facility]   After form is completed patient would like form to be UNKNOWN

## 2022-09-16 NOTE — PROGRESS NOTES
[facility]    Outpatient Physical Therapy Discharge Note  Patient: Shazia Verma  : 1970    Beginning/End Dates of Reporting Period:  22 to 22    Referring Provider: Dr. Thomson    Therapy Diagnosis: Right endoscopic psoas release after prior total hip     Client Self Report: Pt reports no real pain now in the hip. She feels she is doing most activities now. She does notes some mild gurading when getting into her vehicle and with stairs.    Objective Measurements:  Objective Measure: HEP  Details: Pt is able to perform the HEP correctly and independently                                    Outcome Measures (most recent score):      Goals:  Goal Identifier STG 1   Goal Description Decreased pain when at its worst to 6/10   Target Date 22   Date Met  22   Progress (detail required for progress note):       Goal Identifier LTG 1   Goal Description Patient will demonstrate independence with home exercise program   Target Date 22   Date Met  22   Progress (detail required for progress note):       Goal Identifier LTG #2   Goal Description Patient was able to ambulate community distances on even and uneven surfaces   Target Date 22   Date Met  22   Progress (detail required for progress note):       Goal Identifier     Goal Description     Target Date     Date Met      Progress (detail required for progress note):       Goal Identifier     Goal Description     Target Date     Date Met      Progress (detail required for progress note):       Goal Identifier     Goal Description     Target Date     Date Met      Progress (detail required for progress note):       Goal Identifier     Goal Description     Target Date     Date Met      Progress (detail required for progress note):       Goal Identifier     Goal Description     Target Date     Date Met      Progress  (detail required for progress note):             Plan:  Discharge from therapy.    Discharge:    Reason for Discharge: Patient has met all goals.    Equipment Issued: none    Discharge Plan: Patient to continue home program.

## 2022-09-28 ENCOUNTER — LAB (OUTPATIENT)
Dept: LAB | Facility: OTHER | Age: 52
End: 2022-09-28
Payer: COMMERCIAL

## 2022-09-28 DIAGNOSIS — Z13.220 LIPID SCREENING: ICD-10-CM

## 2022-09-28 DIAGNOSIS — Z98.84 H/O GASTRIC BYPASS: ICD-10-CM

## 2022-09-28 DIAGNOSIS — E61.1 IRON DEFICIENCY: ICD-10-CM

## 2022-09-28 DIAGNOSIS — R53.83 OTHER FATIGUE: ICD-10-CM

## 2022-09-28 LAB
BASOPHILS # BLD AUTO: 0 10E3/UL (ref 0–0.2)
BASOPHILS NFR BLD AUTO: 0 %
CHOLEST SERPL-MCNC: 247 MG/DL
EOSINOPHIL # BLD AUTO: 0.1 10E3/UL (ref 0–0.7)
EOSINOPHIL NFR BLD AUTO: 1 %
ERYTHROCYTE [DISTWIDTH] IN BLOOD BY AUTOMATED COUNT: 13.8 % (ref 10–15)
FASTING STATUS PATIENT QL REPORTED: YES
FERRITIN SERPL-MCNC: 17 NG/ML (ref 8–252)
FOLATE SERPL-MCNC: 13 NG/ML (ref 4.6–34.8)
HCT VFR BLD AUTO: 38.7 % (ref 35–47)
HDLC SERPL-MCNC: 51 MG/DL
HGB BLD-MCNC: 13.1 G/DL (ref 11.7–15.7)
IRON SATN MFR SERPL: 14 % (ref 15–46)
IRON SERPL-MCNC: 55 UG/DL (ref 35–180)
LDLC SERPL CALC-MCNC: 158 MG/DL
LYMPHOCYTES # BLD AUTO: 1.8 10E3/UL (ref 0.8–5.3)
LYMPHOCYTES NFR BLD AUTO: 39 %
MCH RBC QN AUTO: 29.5 PG (ref 26.5–33)
MCHC RBC AUTO-ENTMCNC: 33.9 G/DL (ref 31.5–36.5)
MCV RBC AUTO: 87 FL (ref 78–100)
MONOCYTES # BLD AUTO: 0.5 10E3/UL (ref 0–1.3)
MONOCYTES NFR BLD AUTO: 10 %
NEUTROPHILS # BLD AUTO: 2.2 10E3/UL (ref 1.6–8.3)
NEUTROPHILS NFR BLD AUTO: 49 %
NONHDLC SERPL-MCNC: 196 MG/DL
PLATELET # BLD AUTO: 330 10E3/UL (ref 150–450)
RBC # BLD AUTO: 4.44 10E6/UL (ref 3.8–5.2)
TIBC SERPL-MCNC: 395 UG/DL (ref 240–430)
TRIGL SERPL-MCNC: 190 MG/DL
VIT B12 SERPL-MCNC: 1662 PG/ML (ref 232–1245)
WBC # BLD AUTO: 4.5 10E3/UL (ref 4–11)

## 2022-09-28 PROCEDURE — 82746 ASSAY OF FOLIC ACID SERUM: CPT

## 2022-09-28 PROCEDURE — 82728 ASSAY OF FERRITIN: CPT

## 2022-09-28 PROCEDURE — 80061 LIPID PANEL: CPT

## 2022-09-28 PROCEDURE — 82607 VITAMIN B-12: CPT

## 2022-09-28 PROCEDURE — 85025 COMPLETE CBC W/AUTO DIFF WBC: CPT

## 2022-09-28 PROCEDURE — 36415 COLL VENOUS BLD VENIPUNCTURE: CPT

## 2022-09-28 PROCEDURE — 83550 IRON BINDING TEST: CPT

## 2022-10-04 DIAGNOSIS — Z98.84 H/O GASTRIC BYPASS: ICD-10-CM

## 2022-10-04 DIAGNOSIS — R79.0 LOW FERRITIN: Primary | ICD-10-CM

## 2022-10-06 ENCOUNTER — ANCILLARY PROCEDURE (OUTPATIENT)
Dept: GENERAL RADIOLOGY | Facility: OTHER | Age: 52
End: 2022-10-06
Attending: NURSE PRACTITIONER
Payer: COMMERCIAL

## 2022-10-06 ENCOUNTER — TELEPHONE (OUTPATIENT)
Dept: ONCOLOGY | Facility: OTHER | Age: 52
End: 2022-10-06

## 2022-10-06 ENCOUNTER — OFFICE VISIT (OUTPATIENT)
Dept: FAMILY MEDICINE | Facility: OTHER | Age: 52
End: 2022-10-06
Attending: NURSE PRACTITIONER
Payer: COMMERCIAL

## 2022-10-06 VITALS
WEIGHT: 201.1 LBS | RESPIRATION RATE: 18 BRPM | OXYGEN SATURATION: 98 % | BODY MASS INDEX: 33.46 KG/M2 | SYSTOLIC BLOOD PRESSURE: 128 MMHG | HEART RATE: 84 BPM | DIASTOLIC BLOOD PRESSURE: 82 MMHG | TEMPERATURE: 96.8 F

## 2022-10-06 DIAGNOSIS — Z23 NEED FOR PROPHYLACTIC VACCINATION AND INOCULATION AGAINST INFLUENZA: ICD-10-CM

## 2022-10-06 DIAGNOSIS — R07.81 RIB PAIN ON RIGHT SIDE: ICD-10-CM

## 2022-10-06 DIAGNOSIS — M62.830 BACK MUSCLE SPASM: Primary | ICD-10-CM

## 2022-10-06 DIAGNOSIS — Z12.31 ENCOUNTER FOR SCREENING MAMMOGRAM FOR BREAST CANCER: ICD-10-CM

## 2022-10-06 PROCEDURE — 90471 IMMUNIZATION ADMIN: CPT | Performed by: NURSE PRACTITIONER

## 2022-10-06 PROCEDURE — 90682 RIV4 VACC RECOMBINANT DNA IM: CPT | Performed by: NURSE PRACTITIONER

## 2022-10-06 PROCEDURE — 71101 X-RAY EXAM UNILAT RIBS/CHEST: CPT | Mod: TC | Performed by: RADIOLOGY

## 2022-10-06 PROCEDURE — 99214 OFFICE O/P EST MOD 30 MIN: CPT | Mod: 25 | Performed by: NURSE PRACTITIONER

## 2022-10-06 RX ORDER — PREDNISONE 20 MG/1
20 TABLET ORAL 2 TIMES DAILY
Qty: 10 TABLET | Refills: 0 | Status: SHIPPED | OUTPATIENT
Start: 2022-10-06 | End: 2022-10-11

## 2022-10-06 RX ORDER — BACLOFEN 10 MG/1
10 TABLET ORAL 3 TIMES DAILY PRN
Qty: 60 TABLET | Refills: 0 | Status: SHIPPED | OUTPATIENT
Start: 2022-10-06 | End: 2023-03-01

## 2022-10-06 ASSESSMENT — PAIN SCALES - GENERAL: PAINLEVEL: EXTREME PAIN (8)

## 2022-10-06 NOTE — NURSING NOTE
"Chief Complaint   Patient presents with     Pain       Initial /82 (BP Location: Left arm, Patient Position: Sitting, Cuff Size: Adult Regular)   Pulse 84   Temp 96.8  F (36  C) (Tympanic)   Resp 18   Wt 91.2 kg (201 lb 1.6 oz)   LMP 04/10/2018 (LMP Unknown)   SpO2 98%   BMI 33.46 kg/m   Estimated body mass index is 33.46 kg/m  as calculated from the following:    Height as of 6/28/22: 1.651 m (5' 5\").    Weight as of this encounter: 91.2 kg (201 lb 1.6 oz).  Medication Reconciliation: complete  Damaris Rosa LPN  "

## 2022-10-06 NOTE — PATIENT INSTRUCTIONS
"  Assessment & Plan     1. Back muscle spasm  Ice  Stretching   Consider PT  - predniSONE (DELTASONE) 20 MG tablet; Take 1 tablet (20 mg) by mouth 2 times daily for 5 days  Dispense: 10 tablet; Refill: 0  - baclofen (LIORESAL) 10 MG tablet; Take 1 tablet (10 mg) by mouth 3 times daily as needed for muscle spasms  Dispense: 60 tablet; Refill: 0    2. Rib pain on right side  No fracture noted   - XR Ribs & Chest Rt 3vw    3. Need for prophylactic vaccination and inoculation against influenza  - INFLUENZA QUAD, RECOMBINANT, P-FREE (RIV4) (FLUBLOK)    4. Encounter for screening mammogram for breast cancer  - MA Screen Bilateral w/Paul; Future         BMI:   Estimated body mass index is 33.46 kg/m  as calculated from the following:    Height as of 6/28/22: 1.651 m (5' 5\").    Weight as of this encounter: 91.2 kg (201 lb 1.6 oz).   Weight management plan: Discussed healthy diet and exercise guidelines    Follow-up if no improvement or any worsening.     Mary Ellen Armstrong, NP  [facility]  "

## 2022-10-06 NOTE — PROGRESS NOTES
"  Assessment & Plan     1. Back muscle spasm  Ice  Stretching   Consider PT  - predniSONE (DELTASONE) 20 MG tablet; Take 1 tablet (20 mg) by mouth 2 times daily for 5 days  Dispense: 10 tablet; Refill: 0  - baclofen (LIORESAL) 10 MG tablet; Take 1 tablet (10 mg) by mouth 3 times daily as needed for muscle spasms  Dispense: 60 tablet; Refill: 0    2. Rib pain on right side  No fracture noted   - XR Ribs & Chest Rt 3vw    3. Need for prophylactic vaccination and inoculation against influenza  - INFLUENZA QUAD, RECOMBINANT, P-FREE (RIV4) (FLUBLOK)    4. Encounter for screening mammogram for breast cancer  - MA Screen Bilateral w/Paul; Future         BMI:   Estimated body mass index is 33.46 kg/m  as calculated from the following:    Height as of 6/28/22: 1.651 m (5' 5\").    Weight as of this encounter: 91.2 kg (201 lb 1.6 oz).   Weight management plan: Discussed healthy diet and exercise guidelines    Follow-up if no improvement or any worsening.     Mary Ellen Armstrong, NP  [facility]    Subjective   Shazia is a 52 year old, presenting for the following health issues:  Pain      HPI     Pain History:  When did you first notice your pain? - Acute Pain   Have you seen anyone else for your pain? No  Where in your body do you have pain? Musculoskeletal problem/pain  Onset/Duration: 4 days  Description  Location: mid back and rib area - right  Redness: No  Pain: YES  Warmth: No  Intensity:  8/10  Progression of Symptoms:  worsening  Accompanying signs and symptoms:   Fevers: No  Numbness/tingling/weakness: No  History  Trauma to the area: No  Recent illness:  No  Previous similar problem: No  Previous evaluation:  No  Precipitating or alleviating factors:  Aggravating factors include: sitting, walking and twisting certain ways  Therapies tried and outcome: aleve          Review of Systems   Constitutional, HEENT, cardiovascular, pulmonary, gi and gu systems are negative, except as otherwise " noted.      Objective    /82 (BP Location: Left arm, Patient Position: Sitting, Cuff Size: Adult Regular)   Pulse 84   Temp 96.8  F (36  C) (Tympanic)   Resp 18   Wt 91.2 kg (201 lb 1.6 oz)   LMP 04/10/2018 (LMP Unknown)   SpO2 98%   BMI 33.46 kg/m    Body mass index is 33.46 kg/m .  Physical Exam   GENERAL: healthy, alert and no distress  MS: tenderness of right posterior intercostal muscles up into thoracic paraspinal and trapezius muscles  PSYCH: mentation appears normal, affect normal/bright        Results for orders placed or performed in visit on 10/06/22   XR Ribs & Chest Rt 3vw     Status: None    Narrative    XR RIBS & CHEST RT 3VW    HISTORY: 52 years Female Rib pain on right side    COMPARISON: None    TECHNIQUE: Chest and right RIBS, 3 views    FINDINGS: Lungs are clear. There is no evidence of pneumothorax or  hemothorax. There is no evidence of right rib fracture.      Impression    IMPRESSION: Clear chest. No evidence of right rib fracture.    LUCINDA GONZALEZ MD         SYSTEM ID:  [id number]

## 2022-10-06 NOTE — TELEPHONE ENCOUNTER
"Please give patient a call and ask her if she would be okay with me sending a \"letter of medical necessity\" to her insurance company to try and get iron infusions paid for her.  "

## 2022-10-07 NOTE — TELEPHONE ENCOUNTER
I called patient she was agreeable for Gisel to do a letter of Necessity to [facility] for iron infusions

## 2022-10-16 NOTE — PROGRESS NOTES
Assessment & Plan     1. Migraine without aura and without status migrainosus, not intractable  Continue butalbital  - propranolol ER (INDERAL LA) 60 MG 24 hr capsule; Take 1 capsule (60 mg) by mouth daily  Dispense: 30 capsule; Refill: 1    2. H/O gastric bypass  Continue vitamin as reviewed   - Magnesium    3. Low ferritin  Continue iron supplement  Could be a cause of hair loss  - Ferritin    4. Hair loss  Start biotin  - TSH with free T4 reflex  - CBC with platelets and differential    5. Vitamin D deficiency  Continue D supplement  Repeat D level today.       Review of the result(s) of each unique test - previous labs and previous head CT.       Return in about 1 month (around 6/6/2021) for migraine.    Mary Ellen Armstrong NP  [facility] [place] [facility]    Subjective   subhash is a 50 year old who presents for the following health issues     HPI     Migraine     Since your last clinic visit, how have your headaches changed?  Worsened    How often are you getting headaches or migraines? Once or twice a week      Are you able to do normal daily activities when you have a migraine? No    Are you taking rescue/relief medications? (Select all that apply) sumatriptan (Imitrex) and Other: butalbital    How helpful is your rescue/relief medication?  The relief is inconsistent    Are you taking any medications to prevent migraines? (Select all that apply)  No    In the past 4 weeks, how often have you gone to urgent care or the emergency room because of your headaches?  0      How many servings of fruits and vegetables do you eat daily?  2-3    On average, how many sweetened beverages do you drink each day (Examples: soda, juice, sweet tea, etc.  Do NOT count diet or artificially sweetened beverages)?   0    How many days per week do you exercise enough to make your heart beat faster? 3 or less    How many minutes a day do you exercise enough to make your heart beat faster? 9 or less    How many days per    Problem: Adult Inpatient Plan of Care  Goal: Plan of Care Review  Outcome: Ongoing, Progressing  Goal: Patient-Specific Goal (Individualized)  Outcome: Ongoing, Progressing  Goal: Absence of Hospital-Acquired Illness or Injury  Outcome: Ongoing, Progressing  Goal: Optimal Comfort and Wellbeing  Outcome: Ongoing, Progressing  Goal: Readiness for Transition of Care  Outcome: Ongoing, Progressing     Problem: Behavior Regulation Impairment (Psychotic Signs/Symptoms)  Goal: Improved Behavioral Control (Psychotic Signs/Symptoms)  Outcome: Ongoing, Progressing     Problem: Cognitive Impairment (Psychotic Signs/Symptoms)  Goal: Optimal Cognitive Function (Psychotic Signs/Symptoms)  Outcome: Ongoing, Progressing     Problem: Decreased Participation and Engagement (Psychotic Signs/Symptoms)  Goal: Increased Participation and Engagement (Psychotic Signs/Symptoms)  Outcome: Ongoing, Progressing     Problem: Mood Impairment (Psychotic Signs/Symptoms)  Goal: Improved Mood Symptoms (Psychotic Signs/Symptoms)  Outcome: Ongoing, Progressing     Problem: Psychomotor Impairment (Psychotic Signs/Symptoms)  Goal: Improved Psychomotor Symptoms (Psychotic Signs/Symptoms)  Outcome: Ongoing, Progressing     Problem: Sensory Perception Impairment (Psychotic Signs/Symptoms)  Goal: Decreased Sensory Symptoms (Psychotic Signs/Symptoms)  Outcome: Ongoing, Progressing     Problem: Sleep Disturbance (Psychotic Signs/Symptoms)  Goal: Improved Sleep (Psychotic Signs/Symptoms)  Outcome: Ongoing, Progressing     Problem: Social, Occupational or Functional Impairment (Psychotic Signs/Symptoms)  Goal: Enhanced Social, Occupational or Functional Skills (Psychotic Signs/Symptoms)  Outcome: Ongoing, Progressing      "week do you miss taking your medication? 0    She has been on topamax in the past, did not like it as \"made taste buds not work right\" especially with her soda that she enjoys.      Her other concern is continued hair loss.  She s/p gastric bypass.  She is taking supplements.  Last check ferritin was 5; she is taking iron.      D deficiency: is taking vit d, due for repeat.     Patient Active Problem List   Diagnosis     ACP (advance care planning)     H/O gastric bypass     Hypoglycemia     S/P hysterectomy     Other headache syndrome     History of right hip replacement     Migraine without aura and without status migrainosus, not intractable     Vitamin D deficiency     Past Surgical History:   Procedure Laterality Date     APPENDECTOMY       BREAST BIOPSY, RT/LT Right     right bx      SECTION  1992     COLONOSCOPY  2013     GASTRIC BYPASS       LAPAROSCOPIC HYSTERECTOMY SUPRACERVICAL N/A 2018    Procedure: LAPAROSCOPIC HYSTERECTOMY SUPRACERVICAL;  LAPAROSCOPIC SUPRACERVICAL HYSTERECTOMY, BILATEARL SALPINGECTOMY, LYSIS OF ADHESIONS;  Surgeon: Jean Marie Garvin MD;  Location: [facility]     ORTHOPEDIC SURGERY      right hip labreal tear     ORTHOPEDIC SURGERY      left wrist      SALPINGECTOMY Bilateral 2018    Procedure: SALPINGECTOMY;;  Surgeon: Jean Marie Garvin MD;  Location: [facility]       Social History     Tobacco Use     Smoking status: Former Smoker     Smokeless tobacco: Never Used   Substance Use Topics     Alcohol use: Yes     Comment: occ     Family History   Problem Relation Age of Onset     Cerebrovascular Disease Mother      Hyperlipidemia Mother      Hypertension Mother      Coronary Artery Disease Mother      Migraines Mother      Obesity Mother      Osteoarthritis Mother      Osteoporosis Mother      Diabetes Mother      Hyperlipidemia Father      Hypertension Father      Thyroid Disease Father      Hypertension Brother      Hyperlipidemia Brother      " Obesity Brother      Hypertension Sister      Thyroid Disease Sister      Stomach Cancer Maternal Grandmother      Cancer Maternal Grandfather         colon or liver: unsure     Leukemia Paternal Grandfather          Current Outpatient Medications   Medication Sig Dispense Refill     butalbital-acetaminophen-caffeine (ESGIC) -40 MG tablet Take 1 tablet by mouth every 4 hours as needed for headaches 20 tablet 1     calcium carbonate (OS-MANUEL 500 MG [place]. [place]) 500 MG tablet        celecoxib (CELEBREX) 200 MG capsule TAKE 1 CAPSULE BY MOUTH ONCE DAILY       Cyanocobalamin (VITAMIN B 12 PO)        Ferrous Sulfate (IRON SUPPLEMENT PO) Take 325 mg by mouth daily (with breakfast) 2 tabs daily       multivitamin, therapeutic with minerals (MULTI-VITAMIN) TABS Take 1 tablet by mouth daily       propranolol ER (INDERAL LA) 60 MG 24 hr capsule Take 1 capsule (60 mg) by mouth daily 30 capsule 1     SUMAtriptan (IMITREX) 50 MG tablet Take 1 tablet (50 mg) by mouth at onset of headache for migraine May repeat in 2 hours. Max 4 tablets/24 hours. 12 tablet 3     VITAMIN D, CHOLECALCIFEROL, PO Take 5,000 Units by mouth daily 2 tabs daily       Allergies   Allergen Reactions     Reglan [Metoclopramide] Anxiety     Recent Labs   Lab Test 04/09/21  0937 01/14/21  0924 12/11/20  1446 12/30/19  1528 06/19/19  1424 10/22/18  1414 10/22/18  1414 09/01/17  1154 09/01/17  1154   A1C  --  5.8* 5.7*  --   --   --   --   --  5.7   LDL  --  175*  --  155*  --   --  122*  --   --    HDL  --  66  --  51  --   --  58  --   --    TRIG  --  210*  --  259*  --   --  227*  --   --    ALT 28 22  --   --  26   < >  --    < >  --    CR 0.55 0.58  --  0.58 0.60   < > 0.68   < > 0.57   GFRESTIMATED >90 >90  --  >90 >90   < > >90   < > >90   GFRESTBLACK >90 >90  --  >90 >90   < > >90   < > >90   POTASSIUM 4.5 4.3  --  4.1 3.7   < > 3.6   < > 4.1   TSH  --  2.25  --  1.93 1.78  --  1.06  --  2.16    < > = values in this interval not displayed.      BP  "Readings from Last 3 Encounters:   05/06/21 126/76   04/09/21 134/70   01/14/21 124/84    Wt Readings from Last 3 Encounters:   05/06/21 87.2 kg (192 lb 4.8 oz)   04/09/21 87.5 kg (193 lb)   01/14/21 86.7 kg (191 lb 3.2 oz)                 Review of Systems   Constitutional, HEENT, cardiovascular, pulmonary, gi and gu systems are negative, except as otherwise noted.      Objective    /76 (BP Location: Left arm, Patient Position: Chair, Cuff Size: Adult Large)   Pulse 76   Temp 97.3  F (36.3  C) (Tympanic)   Resp 16   Ht 1.651 m (5' 5\")   Wt 87.2 kg (192 lb 4.8 oz)   LMP 04/10/2018 (LMP Unknown)   SpO2 98%   BMI 32.00 kg/m    Body mass index is 32 kg/m .  Physical Exam   GENERAL: healthy, alert and no distress  RESP: lungs clear to auscultation - no rales, rhonchi or wheezes  CV: regular rate and rhythm, normal S1 S2, no S3 or S4, no murmur, click or rub, no peripheral edema and peripheral pulses strong  MS: no gross musculoskeletal defects noted, no edema  NEURO: Normal strength and tone, mentation intact and speech normal  PSYCH: mentation appears normal, affect normal/bright                "

## 2022-10-25 ENCOUNTER — ANCILLARY PROCEDURE (OUTPATIENT)
Dept: MAMMOGRAPHY | Facility: OTHER | Age: 52
End: 2022-10-25
Attending: NURSE PRACTITIONER
Payer: COMMERCIAL

## 2022-10-25 ENCOUNTER — TELEPHONE (OUTPATIENT)
Dept: MAMMOGRAPHY | Facility: OTHER | Age: 52
End: 2022-10-25

## 2022-10-25 DIAGNOSIS — Z12.31 ENCOUNTER FOR SCREENING MAMMOGRAM FOR BREAST CANCER: ICD-10-CM

## 2022-10-25 PROCEDURE — 77067 SCR MAMMO BI INCL CAD: CPT | Mod: TC | Performed by: RADIOLOGY

## 2022-10-25 PROCEDURE — 77063 BREAST TOMOSYNTHESIS BI: CPT | Mod: TC | Performed by: RADIOLOGY

## 2022-10-25 NOTE — LETTER
October 28, 2022      Shazia Mosleydick  [address] [place] [address] [address] [place] [id number]        Dear ,    We are writing to inform you of your test results.    Normal      Resulted Orders   MA Screen Bilateral w/Paul    Narrative    EXAM: MA SCREENING BILATERAL W/ PAUL, 10/25/2022 8:57 AM    COMPARISONS: 9/9/2021    HISTORY: Encounter for screening mammogram for breast cancer    FINDINGS: No new dominant masses or malignant calcifications are noted    BREAST DENSITY: Heterogeneously dense.      Impression    IMPRESSION: BI-RADS CATEGORY: 2 - Benign.    RECOMMENDED FOLLOW-UP: Annual Mammography.      The images were reviewed by R2 computer aided detection.    JIHAN THOMSON MD         SYSTEM ID:  [id number]       If you have any questions or concerns, please call the clinic at the number listed above.       Sincerely,      Mary Ellen Armstrong NP

## 2022-11-07 DIAGNOSIS — G44.89 OTHER HEADACHE SYNDROME: ICD-10-CM

## 2022-11-08 RX ORDER — BUTALBITAL, ACETAMINOPHEN AND CAFFEINE 50; 325; 40 MG/1; MG/1; MG/1
1 TABLET ORAL EVERY 4 HOURS PRN
Qty: 20 TABLET | Refills: 0 | Status: SHIPPED | OUTPATIENT
Start: 2022-11-08 | End: 2023-03-01

## 2022-11-08 NOTE — TELEPHONE ENCOUNTER
[facility]      Last Written Prescription Date:  7.25.22  Last Fill Quantity: #20,   # refills: 0  Last Office Visit: 10.6.22  Future Office visit:       Routing refill request to provider for review/approval because:  Drug not on the [facility], P or [facility] refill protocol or controlled substance

## 2022-12-07 ENCOUNTER — TELEPHONE (OUTPATIENT)
Dept: ONCOLOGY | Facility: OTHER | Age: 52
End: 2022-12-07

## 2022-12-07 DIAGNOSIS — R79.0 LOW FERRITIN: Primary | ICD-10-CM

## 2022-12-07 NOTE — TELEPHONE ENCOUNTER
Not sure what happened here but back in October I sent a letter for medical necessity for iron infusions.  Please check what ever came of this.

## 2022-12-08 NOTE — TELEPHONE ENCOUNTER
VORB from [facility] would like patient to repeat labs, hgb, hematocrit, iron, TIBC prior to scheduling iron infusions. LVM with patient to return call regarding scheduling labs.

## 2022-12-09 NOTE — TELEPHONE ENCOUNTER
Spoke with patient. Scheduled for labs in [facility] on Monday at 8am

## 2022-12-12 ENCOUNTER — LAB (OUTPATIENT)
Dept: LAB | Facility: OTHER | Age: 52
End: 2022-12-12
Payer: COMMERCIAL

## 2022-12-12 DIAGNOSIS — R79.0 LOW FERRITIN: ICD-10-CM

## 2022-12-12 LAB
FERRITIN SERPL-MCNC: 25 NG/ML (ref 11–328)
HCT VFR BLD AUTO: 40.3 % (ref 35–47)
HGB BLD-MCNC: 13.3 G/DL (ref 11.7–15.7)
IRON BINDING CAPACITY (ROCHE): 373 UG/DL (ref 240–430)
IRON SATN MFR SERPL: 31 % (ref 15–46)
IRON SERPL-MCNC: 115 UG/DL (ref 37–145)

## 2022-12-12 PROCEDURE — 36415 COLL VENOUS BLD VENIPUNCTURE: CPT

## 2022-12-12 PROCEDURE — 85018 HEMOGLOBIN: CPT

## 2022-12-12 PROCEDURE — 83540 ASSAY OF IRON: CPT

## 2022-12-12 PROCEDURE — 85014 HEMATOCRIT: CPT

## 2022-12-12 PROCEDURE — 82728 ASSAY OF FERRITIN: CPT

## 2022-12-12 PROCEDURE — 83550 IRON BINDING TEST: CPT

## 2023-01-17 ENCOUNTER — LAB (OUTPATIENT)
Dept: LAB | Facility: OTHER | Age: 53
End: 2023-01-17
Payer: COMMERCIAL

## 2023-01-17 DIAGNOSIS — R79.0 LOW FERRITIN: Primary | ICD-10-CM

## 2023-01-17 DIAGNOSIS — Z98.84 H/O GASTRIC BYPASS: ICD-10-CM

## 2023-01-17 LAB
BASOPHILS # BLD AUTO: 0 10E3/UL (ref 0–0.2)
BASOPHILS NFR BLD AUTO: 0 %
EOSINOPHIL # BLD AUTO: 0.1 10E3/UL (ref 0–0.7)
EOSINOPHIL NFR BLD AUTO: 1 %
ERYTHROCYTE [DISTWIDTH] IN BLOOD BY AUTOMATED COUNT: 13.4 % (ref 10–15)
FERRITIN SERPL-MCNC: 27 NG/ML (ref 11–328)
HCT VFR BLD AUTO: 38.6 % (ref 35–47)
HGB BLD-MCNC: 12.8 G/DL (ref 11.7–15.7)
IRON BINDING CAPACITY (ROCHE): 350 UG/DL (ref 240–430)
IRON SATN MFR SERPL: 28 % (ref 15–46)
IRON SERPL-MCNC: 97 UG/DL (ref 37–145)
LYMPHOCYTES # BLD AUTO: 1.8 10E3/UL (ref 0.8–5.3)
LYMPHOCYTES NFR BLD AUTO: 29 %
MCH RBC QN AUTO: 29.4 PG (ref 26.5–33)
MCHC RBC AUTO-ENTMCNC: 33.2 G/DL (ref 31.5–36.5)
MCV RBC AUTO: 89 FL (ref 78–100)
MONOCYTES # BLD AUTO: 0.4 10E3/UL (ref 0–1.3)
MONOCYTES NFR BLD AUTO: 7 %
NEUTROPHILS # BLD AUTO: 3.9 10E3/UL (ref 1.6–8.3)
NEUTROPHILS NFR BLD AUTO: 63 %
PLATELET # BLD AUTO: 251 10E3/UL (ref 150–450)
RBC # BLD AUTO: 4.36 10E6/UL (ref 3.8–5.2)
WBC # BLD AUTO: 6.2 10E3/UL (ref 4–11)

## 2023-01-17 PROCEDURE — 83550 IRON BINDING TEST: CPT

## 2023-01-17 PROCEDURE — 82728 ASSAY OF FERRITIN: CPT

## 2023-01-17 PROCEDURE — 83540 ASSAY OF IRON: CPT

## 2023-01-17 PROCEDURE — 36415 COLL VENOUS BLD VENIPUNCTURE: CPT

## 2023-01-17 PROCEDURE — 85025 COMPLETE CBC W/AUTO DIFF WBC: CPT

## 2023-01-19 ENCOUNTER — TELEPHONE (OUTPATIENT)
Dept: ONCOLOGY | Facility: OTHER | Age: 53
End: 2023-01-19

## 2023-01-19 NOTE — TELEPHONE ENCOUNTER
Called to schedule schedule labs at 8:00 am labs day prior and [facility] in 6 months in [facility]. LVM for pt to call back

## 2023-02-03 ENCOUNTER — OFFICE VISIT (OUTPATIENT)
Dept: FAMILY MEDICINE | Facility: OTHER | Age: 53
End: 2023-02-03
Attending: NURSE PRACTITIONER
Payer: COMMERCIAL

## 2023-02-03 VITALS
HEART RATE: 70 BPM | BODY MASS INDEX: 33.8 KG/M2 | SYSTOLIC BLOOD PRESSURE: 120 MMHG | RESPIRATION RATE: 19 BRPM | OXYGEN SATURATION: 97 % | WEIGHT: 203.1 LBS | DIASTOLIC BLOOD PRESSURE: 80 MMHG | TEMPERATURE: 96.7 F

## 2023-02-03 DIAGNOSIS — N32.89 BLADDER SPASM: ICD-10-CM

## 2023-02-03 DIAGNOSIS — R10.2 PELVIC PAIN: Primary | ICD-10-CM

## 2023-02-03 LAB
ALBUMIN UR-MCNC: NEGATIVE MG/DL
APPEARANCE UR: CLEAR
BILIRUB UR QL STRIP: NEGATIVE
CLUE CELLS: NORMAL
COLOR UR AUTO: YELLOW
GLUCOSE UR STRIP-MCNC: NEGATIVE MG/DL
HGB UR QL STRIP: ABNORMAL
KETONES UR STRIP-MCNC: NEGATIVE MG/DL
LEUKOCYTE ESTERASE UR QL STRIP: NEGATIVE
NITRATE UR QL: NEGATIVE
PH UR STRIP: 6 [PH] (ref 5–7)
RBC #/AREA URNS AUTO: ABNORMAL /HPF
SP GR UR STRIP: 1.02 (ref 1–1.03)
SQUAMOUS #/AREA URNS AUTO: ABNORMAL /LPF
TRICHOMONAS, WET PREP: NORMAL
UROBILINOGEN UR STRIP-ACNC: 0.2 E.U./DL
WBC #/AREA URNS AUTO: ABNORMAL /HPF
WBC'S/HIGH POWER FIELD, WET PREP: NORMAL
YEAST, WET PREP: NORMAL

## 2023-02-03 PROCEDURE — 87210 SMEAR WET MOUNT SALINE/INK: CPT | Performed by: NURSE PRACTITIONER

## 2023-02-03 PROCEDURE — 99214 OFFICE O/P EST MOD 30 MIN: CPT | Performed by: NURSE PRACTITIONER

## 2023-02-03 PROCEDURE — 81001 URINALYSIS AUTO W/SCOPE: CPT | Performed by: NURSE PRACTITIONER

## 2023-02-03 RX ORDER — PHENAZOPYRIDINE HYDROCHLORIDE 200 MG/1
200 TABLET, FILM COATED ORAL 3 TIMES DAILY PRN
Qty: 30 TABLET | Refills: 1 | Status: SHIPPED | OUTPATIENT
Start: 2023-02-03

## 2023-02-03 ASSESSMENT — PAIN SCALES - GENERAL: PAINLEVEL: MILD PAIN (3)

## 2023-02-03 NOTE — PATIENT INSTRUCTIONS
Assessment & Plan     1. Pelvic pain  UA and wet prep normal  - Wet prep  - *UA reflex to Microscopic and Culture - [facility]  - Urine Microscopic  - US Pelvic Complete with Transvaginal; Future    2. Bladder spasm  Try baclofen  - phenazopyridine (PYRIDIUM) 200 MG tablet; Take 1 tablet (200 mg) by mouth 3 times daily as needed for pain or irritation  Dispense: 30 tablet; Refill: 1          Will notify of US results.       Mary Ellen Armstrong NP  [facility] [place] [facility]

## 2023-02-03 NOTE — PROGRESS NOTES
Assessment & Plan     1. Pelvic pain  UA and wet prep normal  - Wet prep  - *UA reflex to Microscopic and Culture - [facility]  - Urine Microscopic  - US Pelvic Complete with Transvaginal; Future    2. Bladder spasm  Try baclofen  - phenazopyridine (PYRIDIUM) 200 MG tablet; Take 1 tablet (200 mg) by mouth 3 times daily as needed for pain or irritation  Dispense: 30 tablet; Refill: 1      Hysterectomy lisa and path report reviewed.    Will notify of US results.       Mary Ellen Armstrong NP  [facility] - [facility]    Shazia is a 52 year old, presenting for the following health issues:  Urinary Problem      Genitourinary - Female  Onset/Duration: 1 week  Description:   Painful urination (Dysuria): No           Frequency: YES  Blood in urine (Hematuria): No  Delay in urine (Hesitency): YES  Intensity: mild  Progression of Symptoms:  same  Accompanying Signs & Symptoms:  Fever/chills: No  Flank pain: No  Nausea and vomiting: No  Vaginal symptoms: none  Abdominal/Pelvic Pain: YES  History:   History of frequent UTI s: No  History of kidney stones: YES  Sexually Active: YES  Possibility of pregnancy: No  Precipitating or alleviating factors: None  Therapies tried and outcome:  azo and antibiotic - bactrim for three days.         Review of Systems   Constitutional, HEENT, cardiovascular, pulmonary, gi and gu systems are negative, except as otherwise noted.      Objective    /80 (BP Location: Left arm, Patient Position: Sitting, Cuff Size: Adult Large)   Pulse 70   Temp (!) 96.7  F (35.9  C) (Tympanic)   Resp 19   Wt 92.1 kg (203 lb 1.6 oz)   LMP 04/10/2018 (LMP Unknown)   SpO2 97%   BMI 33.80 kg/m    Body mass index is 33.8 kg/m .  Physical Exam   GENERAL: healthy, alert and no distress  MS: no gross musculoskeletal defects noted, no edema  PSYCH: mentation appears normal, affect normal/bright        Results for orders placed or performed in visit on 02/03/23   *UA reflex to Microscopic  and Culture - [place] [facility]     Status: Abnormal    Specimen: Urine, Clean Catch   Result Value Ref Range    Color Urine Yellow Colorless, Straw, Light Yellow, Yellow    Appearance Urine Clear Clear    Glucose Urine Negative Negative mg/dL    Bilirubin Urine Negative Negative    Ketones Urine Negative Negative mg/dL    Specific Gravity Urine 1.025 1.003 - 1.035    Blood Urine Moderate (A) Negative    pH Urine 6.0 5.0 - 7.0    Protein Albumin Urine Negative Negative mg/dL    Urobilinogen Urine 0.2 0.2, 1.0 E.U./dL    Nitrite Urine Negative Negative    Leukocyte Esterase Urine Negative Negative   Urine Microscopic     Status: Abnormal   Result Value Ref Range    RBC Urine 0-2 0-2 /HPF /HPF    WBC Urine 0-5 0-5 /HPF /HPF    Squamous Epithelials Urine Few (A) None Seen /LPF    Narrative    Urine Culture not indicated   Wet prep     Status: Normal    Specimen: Vagina; Swab   Result Value Ref Range    Trichomonas Absent Absent    Yeast Absent Absent    Clue Cells Absent Absent    WBCs/high power field None None

## 2023-02-09 ENCOUNTER — HOSPITAL ENCOUNTER (OUTPATIENT)
Dept: ULTRASOUND IMAGING | Facility: HOSPITAL | Age: 53
Discharge: HOME | End: 2023-02-09
Attending: NURSE PRACTITIONER | Admitting: NURSE PRACTITIONER
Payer: COMMERCIAL

## 2023-02-09 DIAGNOSIS — R10.2 PELVIC PAIN: ICD-10-CM

## 2023-02-09 PROCEDURE — 76830 TRANSVAGINAL US NON-OB: CPT

## 2023-02-13 ENCOUNTER — MYC MEDICAL ADVICE (OUTPATIENT)
Dept: FAMILY MEDICINE | Facility: OTHER | Age: 53
End: 2023-02-13

## 2023-02-13 DIAGNOSIS — R10.2 PELVIC PAIN: Primary | ICD-10-CM

## 2023-02-20 ENCOUNTER — VIRTUAL VISIT (OUTPATIENT)
Dept: FAMILY MEDICINE | Facility: OTHER | Age: 53
End: 2023-02-20
Attending: NURSE PRACTITIONER
Payer: COMMERCIAL

## 2023-02-20 ENCOUNTER — TELEPHONE (OUTPATIENT)
Dept: FAMILY MEDICINE | Facility: OTHER | Age: 53
End: 2023-02-20

## 2023-02-20 ENCOUNTER — TELEPHONE (OUTPATIENT)
Dept: PHARMACY | Facility: PHYSICIAN GROUP | Age: 53
End: 2023-02-20

## 2023-02-20 DIAGNOSIS — E66.811 CLASS 1 OBESITY DUE TO EXCESS CALORIES WITHOUT SERIOUS COMORBIDITY WITH BODY MASS INDEX (BMI) OF 32.0 TO 32.9 IN ADULT: ICD-10-CM

## 2023-02-20 DIAGNOSIS — U07.1 INFECTION DUE TO 2019 NOVEL CORONAVIRUS: Primary | ICD-10-CM

## 2023-02-20 DIAGNOSIS — E66.09 CLASS 1 OBESITY DUE TO EXCESS CALORIES WITHOUT SERIOUS COMORBIDITY WITH BODY MASS INDEX (BMI) OF 32.0 TO 32.9 IN ADULT: ICD-10-CM

## 2023-02-20 PROBLEM — R73.03 PREDIABETES: Status: ACTIVE | Noted: 2023-02-20

## 2023-02-20 PROCEDURE — 99213 OFFICE O/P EST LOW 20 MIN: CPT | Mod: TEL | Performed by: NURSE PRACTITIONER

## 2023-02-20 NOTE — TELEPHONE ENCOUNTER
"Call placed to patient to discuss positive covid 19 results.   Underlying Medical Conditions: obesity  Patient testing positive on 2/20   Test by:  At home covid test  Start of Symptoms: 2/19  Covid 19 Vaccination Status:  Pfizer initial and one booster  Are you pregnant, breastfeeding or trying to conceive? no    COVID-19 Symptom Review      New or worsening difficulty breathing? no    Cough? yes     Dry or Productive?  Productive     Fever?  no     Highest temp past 24 hours?  NA    Chills?  yes    Headache:  yes    Sore throat: yes    Chest pain: no    Diarrhea: no    Body aches?  yes     Nasal congestion or drainage?  both     What treatments has patient tried?  Ibuprofen and excedrin  Does patient live in a nursing home, group home, or shelter? no  Does patient have a way to get food/medications during quarantine? yes    Appointment Scheduled:  Yes    Pharmacy: [facility] [place]    Confirmed with Pharmacy: Paxlovid & Molnupiravir in stock      Instructions below provided to patient. Patient verbalized understanding.     Instructions for Patients  Your COVID-19 test was positive. This means you have the virus. Please follow the \"How can I take care of myself\" and \"How do I self-isolate?\" guidelines in these instructions.    What treatments are available?  Over-the-counter medicines may help with your symptoms such as runny or stuffy nose, cough, chills, and fever. Talk to your care team about your options.     Some people are at high risk for severe illness (for example if you have a weak immune system, you're 65 or older, or you have certain medical problems). If your risk it high and your symptoms started in the last 5 to 7 days, we strongly recommend for you to get COVID treatment as soon as possible before you get really sick. Paxlovid, Molnupiravir and the monoclonal antibody treatments are proven safe and effective, make you feel better faster, and prevent hospitalization and death.       What are the " symptoms of COVID-19?  Symptoms can include fever, cough, shortness of breath, chills, headache, muscle pain sore throat, fatigue, runny or stuffy nose, and loss of taste and smell. Other less common symptoms include nausea, vomiting, or diarrhea (watery stools).    Know when to call 911. Emergency warning signs include:    Trouble breathing or shortness of breath    Pain or pressure in the chest that doesn't go away    Feeling confused like you haven't felt before, or not being able to wake up    Bluish-colored lips or face    How can I take care of myself?  1. Get lots of rest. Drink extra fluids (unless a doctor has told you not to).  2. Take Tylenol (acetaminophen) for fever or pain. If you have liver or kidney problems, ask your family doctor if it's okay to take Tylenol   Adults:   650 mg (two 325 mg pills or tablets) every 4 to 6 hours, or...   1,000 mg (two 500 mg pills or tablets) every 8 hours as needed.  Note: Don't take more than 3,000 mg in one day. Acetaminophen is found in many medicines (both prescribed and over the counter). Read all labels to be sure you don't take too much.  For children, check the Tylenol bottle for the right dose. The dose is based on the child's age or weight.  3. Take over the counter medicines for your symptoms as needed. Talk to your pharmacist.  4. If you have other health problems (like cancer, heart failure, an organ transplant, or severe kidney disease): Call your specialty clinic if you don't feel better in the next 2 days.    These guidelines are for isolating and quarantining before returning to work, school or .     For employers, schools and day cares: This is an official notice for this person's medical guidelines for returning in-person.     For health care sites: The CDC gives different isolation and quarantine guidelines for healthcare sites, please check with these sites before arriving.     How do I self-isolate?  You isolate when you have symptoms of  COVID or a test shows you have COVID, even if you don't have symptoms.     If you DO have symptoms:  o Stay home and away from others  - For at least 5 days after your symptoms started, AND   - You are fever free for 24 hours (with no medicine that reduces fever), AND  - Your other symptoms are better.  o Wear a mask for 10 full days any time you are around others.    If you DON'T have symptoms:  o Stay at home and away from others for at least 5 days after your positive test.  o Wear a mask for 10 full days any time you are around others.    How and when do I quarantine?  You quarantine when you may have been exposed to the virus and DON'T have symptoms.     Stay home and away from others.     You must quarantine for 5 days after your last contact with a person who has COVID.  o Note: If you are fully vaccinated, you don't need to quarantine. You should still follow the steps below.     Wear a mask for 10 full days any time you're around others.    Get tested at least 5 days after you were exposed, even if you don't have symptoms.     If you start to have symptoms, isolate right away and get tested.    Where can I get more information?    [facility] COVID-19 Resource Hub: [email or web address]     Formerly named [facility] Quarantine & Isolation: https://www.cdc.gov/coronavirus/2019-ncov/your-health/quarantine-isolation.html     Formerly named [facility] - What to Do If You're Sick: https://www.cdc.gov/coronavirus/2019-ncov/if-you-are-sick/index.html    [facility] clinical trials (COVID-19 research studies): clinicalaffairs.[facility]/umn-clinical-trials    [place] Department of Health COVID-19 Public Hotline: 1-240.806.6642

## 2023-02-20 NOTE — TELEPHONE ENCOUNTER
Paxlovid drug-drug interaction check:     1. Caffeine (in butalbital-acetaminophen-caffeine) and Paxlovid. Paxlovid may decrease the serum concentration of caffeine. This interaction is not expected to be clinically relevant, and no dose adjustment of butalbital-acetaminophen-caffeine is required.     Don Call, PharmD  Medication Therapy Management Pharmacist  [facility]  Office phone: [phone]

## 2023-02-20 NOTE — PATIENT INSTRUCTIONS
COVID-19 Outpatient Treatments  Your care team can help you find the best treatments for COVID-19. Talk to a health care provider or refer to the FDA medicine fact sheets below.    Important: You can't have Paxlovid or molnupiravir if you're starting the medicine more than 5 days after your symptoms have started.  Paxlovid: https://www.fda.gov/media/284475/download  Molnupiravir (Lagevrio): https://www.fda.gov/media/224705/download  Paxlovid (nimatrelvir and ritonavir)  How it works  Two medicines (nirmatrelvir and ritonavir) are taken together. They stop the virus from growing. Less amount of virus is easier for your body to fight.  Benefits  Lowers risk of a hospital stay or death from COVID-19.  How to take    Medicine comes in a daily container with both medicine tablets. Take by mouth twice daily (once in the morning, once at night) for 5 days.    The number of tablets to take varies by patient.    Don't chew or break capsules. Swallow whole.  When to take  Take as soon as possible after positive COVID-19 test result, and within 5 days of your first symptoms.  Who can take it  Patients must be 12 years or older, weigh at least 88 pounds, and have tested positive for COVID-19. Paxlovid is the preferred treatment for pregnant patients.  Possible side effects  Can cause altered sense of taste, diarrhea (loose, watery stools), high blood pressure, muscle aches.  Medicine conflicts    Some medicines may conflict with Paxlovid and may cause serious side effects.    Tell your care team about all the medicines you take, including prescription and over-the-counter medicines, vitamins, and herbal supplements.    Your care team will review your medicines to make sure that you can safely take Paxlovid.  Cautions    Paxlovid is not advised for patients with severe kidney or liver disease. If you have kidney or liver problems, the dose may need to be changed.    If you're pregnant or breastfeeding, talk to your care team  about your options.    If you take hormonal birth control (such as the Pill), then you or your partner should also use a non-hormonal form of birth control (such as a condom). Keep doing this for 1 menstrual cycle after your last dose of Paxlovid.  Molnupiravir (Lagevrio)  How it works  Stops the virus from growing. Less amount of virus is easier for your body to fight.  Benefits  Lowers risk of a hospital stay or death from COVID-19.  How to take  Take 4 capsules by mouth every 12 hours (4 in the morning and 4 at night) for 5 days. Don't chew or break capsules. Swallow whole.  When to take  Take as soon as possible after positive COVID-19 test result, and within 5 days of your first symptoms.  Who can take it  Patients must be 18 years or older and have tested positive for COVID-19.  Possible side effects  Diarrhea (loose, watery stools), nausea (feeling sick to your stomach), dizziness, headaches.  Medicine conflicts  Right now, there are no known conflicts with other drugs. But tell your care team about all medicines you take.  Cautions    This medicine is not advised for patients who are pregnant.    If you are someone who could become pregnant, use trusted birth control until 4 days after your last dose of molnupiravir.    If your partner could become pregnant, you should use trusted birth control until 3 months after your last dose of molnupiravir.  For informational purposes only. Not to replace the advice of your health care provider. Copyright   2022 [facility]. All rights reserved. Clinically reviewed by Linette Rosales, PharmD, BCACP. [employer] [id number] - REV 12/22.    Instructions for Patients      What are the symptoms of COVID-19?  Symptoms can include fever, cough, shortness of breath, chills, headache, muscle pain sore throat, fatigue, runny or stuffy nose, and loss of taste and smell. Other less common symptoms include nausea, vomiting, or diarrhea (watery stools).    Know when to call  911. Emergency warning signs include:    Trouble breathing or shortness of breath    Pain or pressure in the chest that doesn't go away    Feeling confused like you haven't felt before, or not being able to wake up    Bluish-colored lips or face    How can I take care of myself?  4. Get lots of rest. Drink extra fluids (unless a doctor has told you not to).  5. Take Tylenol (acetaminophen) for fever or pain. If you have liver or kidney problems, ask your family doctor if it's okay to take Tylenol   Adults can take either:   650 mg (two 325 mg pills or tablets) every 4 to 6 hours, or...   1,000 mg (two 500 mg pills) every 8 hours as needed.  Note: Don't take more than 3,000 mg in one day. Acetaminophen is found in many medicines (both prescribed and over the counter). Read all labels to be sure you don't take too much.  For children, check the Tylenol bottle for the right dose. The dose is based on the child's age or weight.  6. Take over the counter medicines for your symptoms as needed. Talk to your pharmacist.  7. If you have other health problems (like cancer, heart failure, an organ transplant, or severe kidney disease): Call your specialty clinic if you don't feel better in the next 2 days.    Where can I get more information?    [facility] COVID-19 Resource Hub: [email or web address]     CDC Quarantine & Isolation: https://www.cdc.gov/coronavirus/2019-ncov/your-health/quarantine-isolation.html     CDC - What to Do If You're Sick: https://www.cdc.gov/coronavirus/2019-ncov/if-you-are-sick/index.html    Learn about the ACTIV-6 Clinical Trial: activ6.[facility].edu or call (190)-260-1531  Coping with Life After COVID-19  Being in the hospital because of COVID-19 is scary. Going home can be scary, too. You may face changes to your life, the way you work or what you can eat. It s hard to adjust to change, and it s normal to feel afraid, frustrated or even angry. These feelings usually go away over time.  If your feelings don t start to get better, it s called  adjustment disorder.      What signs should I look out for?  Adjustment disorder can happen to anyone in a time of stress. It makes it hard to cope with daily life. You may feel lonely or fight with loved ones, even if you re glad to be home. Watch for these signs:  Fear or worry  Hard time focusing  Sadness or anger  Trouble talking to family or friends  Feeling like you don t fit in or isolating yourself  Problems with sleep   Drinking alcohol or taking drugs to cope    What can I do?  You can help yourself get better. Feeling you have control helps you move forward. You may wonder if you ll be able to do things you did before. Be patient. Do your best to make the most of every day. Try to build relationships, be as active as you can, eat right and keep a sense of humor. Avoid smoking and drinking too much alcohol. Call your family doctor or clinic if you re not sure what to do. They can guide you to care or other services.    When should I get help?  Think about getting counseling if your sadness or frustration gets worse. Together with a trained counselor, you can talk about your problems adjusting to life after your hospital stay. You can come up with new ways to handle changes that give you more control. Your family doctor or care team can help you find a counselor.     Get help if you re thinking about hurting yourself. If you need help right away, call 911 or go to the nearest emergency room. You can also try the Crisis Text Line.    Crisis Text Line: 996-834 (http://www.crisistextline.org)  The Crisis Text Line serves anyone, in any crisis. It gives free, 24/7 support. Here's how it works:  Text HOME to 199183 from anywhere in the [place], anytime, about any type of crisis.  A live, trained Crisis Counselor will text you back quickly.  The volunteer Crisis Counselor can help you move from a  hot moment  to a  cool moment.  They can also help you work out  a safety plan.

## 2023-02-20 NOTE — PROGRESS NOTES
Shazia is a 52 year old who is being evaluated via a billable telephone visit.      What phone number would you like to be contacted at? [phone]  How would you like to obtain your AVS? Rjhart    Distant Location (provider location):  On-site    Assessment & Plan     1. Infection due to 2019 novel coronavirus  Please see the CDC and/or the [place] Dept of Health websites for additional information about COVID and/or ANTIVIRAL therapy.   New medication education completed with potential risks, benefits, administration, and potential side effects.   No medication changes are needed at this time. Medications reviewed.   - nirmatrelvir and ritonavir (PAXLOVID) therapy pack; Take 3 tablets by mouth 2 times daily for 5 days (Take 2 Nirmatrelvir tablets and 1 Ritonavir tablet twice daily for 5 days)  Dispense: 30 tablet; Refill: 0    2. Class 1 obesity due to excess calories without serious comorbidity with body mass index (BMI) of 32.0 to 32.9 in adult  Noted. Qualifying condition for treatment.     Prescription drug management       No follow-ups on file.    Wilma Pro, CNP  [facility]   Shazia is a 52 year old, presenting for the following health issues:  Covid Concern      HPI   Underlying Medical Conditions: obesity  Patient testing positive on 2/20   Test by:  At home covid test  Start of Symptoms: 2/19  Covid 19 Vaccination Status:  Pfizer initial and one booster  Are you pregnant, breastfeeding or trying to conceive? no    COVID-19 Symptom Review      New or worsening difficulty breathing? no    Cough? yes     Dry or Productive?  Productive     Fever?  no     Highest temp past 24 hours?  NA    Chills?  yes    Headache:  yes    Sore throat: yes    Chest pain: no    Diarrhea: no    Body aches?  yes     Nasal congestion or drainage?  both     What treatments has patient tried?  Ibuprofen and excedrin  Does patient live in a nursing home, group home, or shelter? no  Does patient  have a way to get food/medications during quarantine? yes    Appointment Scheduled:  Yes    Pharmacy: [facility] in [place]    Confirmed with Pharmacy: Paxlovid & Molnupiravir in stock  Last Comprehensive Metabolic Panel:  Sodium   Date Value Ref Range Status   05/19/2022 138 133 - 144 mmol/L Final   06/26/2021 139 133 - 144 mmol/L Final     Potassium   Date Value Ref Range Status   05/19/2022 4.5 3.4 - 5.3 mmol/L Final   06/26/2021 3.8 3.4 - 5.3 mmol/L Final     Chloride   Date Value Ref Range Status   05/19/2022 105 94 - 109 mmol/L Final   06/26/2021 109 94 - 109 mmol/L Final     Carbon Dioxide   Date Value Ref Range Status   06/26/2021 22 20 - 32 mmol/L Final     Carbon Dioxide (CO2)   Date Value Ref Range Status   05/19/2022 27 20 - 32 mmol/L Final     Anion Gap   Date Value Ref Range Status   05/19/2022 6 3 - 14 mmol/L Final   06/26/2021 8 3 - 14 mmol/L Final     Glucose   Date Value Ref Range Status   05/19/2022 112 (H) 70 - 99 mg/dL Final   06/26/2021 96 70 - 99 mg/dL Final     Urea Nitrogen   Date Value Ref Range Status   05/19/2022 15 7 - 30 mg/dL Final   06/26/2021 16 7 - 30 mg/dL Final     Creatinine   Date Value Ref Range Status   05/19/2022 0.57 0.52 - 1.04 mg/dL Final   06/26/2021 0.58 0.52 - 1.04 mg/dL Final     GFR Estimate   Date Value Ref Range Status   05/19/2022 >90 >60 mL/min/1.73m2 Final     Comment:     Effective December 21, 2021 eGFRcr in adults is calculated using the 2021 CKD-EPI creatinine equation which includes age and gender (Jason et al., NEJM, DOI: 10.1056/AOIXfw0728662)   06/26/2021 >90 >60 mL/min/[1.73_m2] Final     Comment:     Non  GFR Calc  Starting 12/18/2018, serum creatinine based estimated GFR (eGFR) will be   calculated using the Chronic Kidney Disease Epidemiology Collaboration   (CKD-EPI) equation.       Calcium   Date Value Ref Range Status   05/19/2022 9.3 8.5 - 10.1 mg/dL Final   06/26/2021 8.7 8.5 - 10.1 mg/dL Final     Liver Function Studies - Recent  Labs   Lab Test 05/19/22  1010   PROTTOTAL 7.3   ALBUMIN 3.7   BILITOTAL 0.8   ALKPHOS 97   AST 19   ALT 26     Current Outpatient Medications   Medication     baclofen (LIORESAL) 10 MG tablet     butalbital-acetaminophen-caffeine (ESGIC) -40 MG tablet     calcium carbonate (OS-MANUEL 500 MG Ewiiaapaayp. CA) 500 MG tablet     Cyanocobalamin (VITAMIN B 12 PO)     multivitamin w/minerals (THERA-VIT-M) tablet     nirmatrelvir and ritonavir (PAXLOVID) therapy pack     phenazopyridine (PYRIDIUM) 200 MG tablet     SUMAtriptan (IMITREX) 50 MG tablet     VITAMIN D, CHOLECALCIFEROL, PO     No current facility-administered medications for this visit.     Paxlovid drug-drug interaction check:      1. Caffeine (in butalbital-acetaminophen-caffeine) and Paxlovid. Paxlovid may decrease the serum concentration of caffeine. This interaction is not expected to be clinically relevant, and no dose adjustment of butalbital-acetaminophen-caffeine is required.      Don Call, PharmD  Medication Therapy Management Pharmacist  [facility]      Review of Systems   Constitutional, HEENT, cardiovascular, pulmonary, gi and gu systems are negative, except as otherwise noted.      Objective           Vitals:  No vitals were obtained today due to virtual visit.    Physical Exam   healthy, alert and no distress  PSYCH: Alert and oriented times 3; coherent speech, normal   rate and volume, able to articulate logical thoughts, able   to abstract reason, no tangential thoughts, no hallucinations   or delusions  Her affect is normal  RESP: No cough, no audible wheezing, able to talk in full sentences  Remainder of exam unable to be completed due to telephone visits            Phone call duration: 8 minutes

## 2023-02-28 ENCOUNTER — HOSPITAL ENCOUNTER (OUTPATIENT)
Dept: CT IMAGING | Facility: HOSPITAL | Age: 53
Discharge: HOME | End: 2023-02-28
Attending: NURSE PRACTITIONER | Admitting: NURSE PRACTITIONER
Payer: COMMERCIAL

## 2023-02-28 DIAGNOSIS — R10.2 PELVIC PAIN: ICD-10-CM

## 2023-02-28 PROCEDURE — 250N000011 HC RX IP 250 OP 636: Performed by: RADIOLOGY

## 2023-02-28 PROCEDURE — 74177 CT ABD & PELVIS W/CONTRAST: CPT

## 2023-02-28 RX ORDER — IOPAMIDOL 755 MG/ML
17 INJECTION, SOLUTION INTRAVASCULAR ONCE
Status: COMPLETED | OUTPATIENT
Start: 2023-02-28 | End: 2023-02-28

## 2023-02-28 RX ORDER — IOPAMIDOL 755 MG/ML
90 INJECTION, SOLUTION INTRAVASCULAR ONCE
Status: COMPLETED | OUTPATIENT
Start: 2023-02-28 | End: 2023-02-28

## 2023-02-28 RX ADMIN — IOPAMIDOL 17 ML: 755 INJECTION, SOLUTION INTRAVENOUS at 10:23

## 2023-02-28 RX ADMIN — IOPAMIDOL 90 ML: 755 INJECTION, SOLUTION INTRAVENOUS at 10:24

## 2023-03-01 DIAGNOSIS — G44.89 OTHER HEADACHE SYNDROME: ICD-10-CM

## 2023-03-01 DIAGNOSIS — M62.830 BACK MUSCLE SPASM: ICD-10-CM

## 2023-03-01 RX ORDER — BACLOFEN 10 MG/1
TABLET ORAL
Qty: 60 TABLET | Refills: 0 | Status: SHIPPED | OUTPATIENT
Start: 2023-03-01 | End: 2023-06-14

## 2023-03-01 RX ORDER — BUTALBITAL, ACETAMINOPHEN AND CAFFEINE 50; 325; 40 MG/1; MG/1; MG/1
1 TABLET ORAL EVERY 4 HOURS PRN
Qty: 20 TABLET | Refills: 1 | Status: SHIPPED | OUTPATIENT
Start: 2023-03-01 | End: 2023-09-13

## 2023-03-01 NOTE — TELEPHONE ENCOUNTER
Baclofen 10mg      Last Written Prescription Date:  10/6/22  Last Fill Quantity: 60,   # refills: 0  Last Office Visit: 2/3/23  Future Office visit:       Routing refill request to provider for review/approval because:    Butalbital-acetaminophen-caffeine -40mg      Last Written Prescription Date:  11/8/22  Last Fill Quantity: 20,   # refills: 0  Last Office Visit: 2/3/23  Future Office visit:       Routing refill request to provider for review/approval because:

## 2023-03-14 ENCOUNTER — TELEPHONE (OUTPATIENT)
Dept: FAMILY MEDICINE | Facility: OTHER | Age: 53
End: 2023-03-14

## 2023-03-14 ENCOUNTER — OFFICE VISIT (OUTPATIENT)
Dept: FAMILY MEDICINE | Facility: OTHER | Age: 53
End: 2023-03-14
Attending: NURSE PRACTITIONER
Payer: COMMERCIAL

## 2023-03-14 VITALS
SYSTOLIC BLOOD PRESSURE: 128 MMHG | WEIGHT: 200 LBS | HEIGHT: 65 IN | DIASTOLIC BLOOD PRESSURE: 82 MMHG | OXYGEN SATURATION: 97 % | TEMPERATURE: 97.4 F | RESPIRATION RATE: 16 BRPM | BODY MASS INDEX: 33.32 KG/M2 | HEART RATE: 83 BPM

## 2023-03-14 DIAGNOSIS — N30.01 ACUTE CYSTITIS WITH HEMATURIA: Primary | ICD-10-CM

## 2023-03-14 DIAGNOSIS — R31.0 GROSS HEMATURIA: ICD-10-CM

## 2023-03-14 LAB
ALBUMIN UR-MCNC: ABNORMAL MG/DL
APPEARANCE UR: CLEAR
BACTERIA #/AREA URNS HPF: ABNORMAL /HPF
BILIRUB UR QL STRIP: NEGATIVE
COLOR UR AUTO: YELLOW
GLUCOSE UR STRIP-MCNC: NEGATIVE MG/DL
HGB UR QL STRIP: ABNORMAL
KETONES UR STRIP-MCNC: NEGATIVE MG/DL
LEUKOCYTE ESTERASE UR QL STRIP: ABNORMAL
NITRATE UR QL: NEGATIVE
PH UR STRIP: 6 [PH] (ref 5–7)
RBC #/AREA URNS AUTO: ABNORMAL /HPF
SP GR UR STRIP: 1.01 (ref 1–1.03)
SQUAMOUS #/AREA URNS AUTO: ABNORMAL /LPF
UROBILINOGEN UR STRIP-ACNC: 0.2 E.U./DL
WBC #/AREA URNS AUTO: ABNORMAL /HPF

## 2023-03-14 PROCEDURE — 81001 URINALYSIS AUTO W/SCOPE: CPT | Performed by: NURSE PRACTITIONER

## 2023-03-14 PROCEDURE — 87086 URINE CULTURE/COLONY COUNT: CPT | Performed by: NURSE PRACTITIONER

## 2023-03-14 PROCEDURE — 99214 OFFICE O/P EST MOD 30 MIN: CPT | Performed by: NURSE PRACTITIONER

## 2023-03-14 RX ORDER — SULFAMETHOXAZOLE/TRIMETHOPRIM 800-160 MG
1 TABLET ORAL 2 TIMES DAILY
Qty: 14 TABLET | Refills: 0 | Status: SHIPPED | OUTPATIENT
Start: 2023-03-14 | End: 2023-03-21

## 2023-03-14 ASSESSMENT — PAIN SCALES - GENERAL: PAINLEVEL: SEVERE PAIN (6)

## 2023-03-14 NOTE — PROGRESS NOTES
Assessment & Plan     1. Acute cystitis with hematuria  Push fluids  Pyridium which she has at home.    - sulfamethoxazole-trimethoprim (BACTRIM DS) 800-160 MG tablet; Take 1 tablet by mouth 2 times daily for 7 days  Dispense: 14 tablet; Refill: 0    2. Hematuria  Culture pending  - UA with Microscopic reflex to Culture - [facility]  - UA Microscopic with Reflex to Culture  - Urine Culture      follow-up if no improvement or any worsening.     Mary Ellen Armstrong NP  [facility]    Bandar Mccray is a 52 year old, presenting for the following health issues:  Hematuria      HPI     Genitourinary - Female  Onset/Duration: couple days   Description:   Painful urination (Dysuria): pressure            Frequency: YES  Blood in urine (Hematuria): YES when wiping   Delay in urine (Hesitency) yes   Intensity: moderate  Progression of Symptoms:  worsening  Accompanying Signs & Symptoms:  Fever/chills: No  Flank pain: No  Nausea and vomiting: No  Vaginal symptoms: none  Abdominal/Pelvic Pain: YES  History:   History of frequent UTI s: No  History of kidney stones: YES- once about 10-15 years ago   Sexually Active: YES  Possibility of pregnancy: No  Precipitating or alleviating factors: None  Therapies tried and outcome: none           Recent Labs   Lab Test 09/28/22  0819 05/19/22  1010 01/10/22  0808 10/04/21  0746 08/09/21  1247 08/09/21  1247 06/26/21  1426 05/06/21  1017 04/09/21  0937 01/14/21  0924 12/11/20  1446 12/30/19  1528 01/15/18  1542 09/01/17  1154   A1C  --   --   --   --   --   --   --   --   --  5.8* 5.7*  --   --  5.7   *  --   --   --   --   --   --   --   --  175*  --  155*   < >  --    HDL 51  --   --   --   --   --   --   --   --  66  --  51   < >  --    TRIG 190*  --   --   --   --   --   --   --   --  210*  --  259*   < >  --    ALT  --  26 27 26   < > 28 30  --  28 22  --   --    < >  --    CR  --  0.57 0.59 0.49*   < > 0.54 0.58  --  0.55 0.58  --  0.58    "< > 0.57   GFRESTIMATED  --  >90 >90 >90   < > >90 >90  --  >90 >90  --  >90   < > >90   GFRESTBLACK  --   --   --   --   --   --  >90  --  >90 >90  --  >90   < > >90   POTASSIUM  --  4.5 4.0 4.5   < > 4.2 3.8  --  4.5 4.3  --  4.1   < > 4.1   TSH  --   --   --   --   --  1.24  --  1.78  --  2.25  --  1.93   < > 2.16    < > = values in this interval not displayed.      BP Readings from Last 3 Encounters:   03/14/23 128/82   02/03/23 120/80   10/06/22 128/82    Wt Readings from Last 3 Encounters:   03/14/23 90.7 kg (200 lb)   02/03/23 92.1 kg (203 lb 1.6 oz)   10/06/22 91.2 kg (201 lb 1.6 oz)                      Review of Systems   Constitutional, HEENT, cardiovascular, pulmonary, gi and gu systems are negative, except as otherwise noted.      Objective    /82 (BP Location: Left arm, Patient Position: Chair, Cuff Size: Adult Large)   Pulse 83   Temp 97.4  F (36.3  C) (Tympanic)   Resp 16   Ht 1.651 m (5' 5\")   Wt 90.7 kg (200 lb)   LMP 04/10/2018 (LMP Unknown)   SpO2 97%   BMI 33.28 kg/m    Body mass index is 33.28 kg/m .  Physical Exam   GENERAL: healthy, alert and no distress  MS: no gross musculoskeletal defects noted, no edema  PSYCH: mentation appears normal, affect normal/bright        Results for orders placed or performed in visit on 03/14/23   UA with Microscopic reflex to Culture - [facility]     Status: Abnormal    Specimen: Urine, Midstream   Result Value Ref Range    Color Urine Yellow Colorless, Straw, Light Yellow, Yellow    Appearance Urine Clear Clear    Glucose Urine Negative Negative mg/dL    Bilirubin Urine Negative Negative    Ketones Urine Negative Negative mg/dL    Specific Gravity Urine 1.010 1.003 - 1.035    Blood Urine Large (A) Negative    pH Urine 6.0 5.0 - 7.0    Protein Albumin Urine Trace (A) Negative mg/dL    Urobilinogen Urine 0.2 0.2, 1.0 E.U./dL    Nitrite Urine Negative Negative    Leukocyte Esterase Urine Large (A) Negative   UA Microscopic with Reflex to " Culture     Status: Abnormal   Result Value Ref Range    Bacteria Urine Few (A) None Seen /HPF    RBC Urine  (A) 0-2 /HPF /HPF    WBC Urine  (A) 0-5 /HPF /HPF    Squamous Epithelials Urine Few (A) None Seen /LPF

## 2023-03-14 NOTE — TELEPHONE ENCOUNTER
10:14 AM    Reason for Call: OVERBOOK    Patient is having the following symptoms: VAGINAL BLEEDING AFTER HYSTERECTOMY  for 1 days.    The patient is requesting an appointment for ASAP (PATIENT WORKS UNTIL 3:30) with MICHAEL DELA CRUZ.    Was an appointment offered for this call? No  If yes : Appointment type              Date    Preferred method for responding to this message: Telephone Call  What is your phone number ? [phone]    If we cannot reach you directly, may we leave a detailed response at the number you provided? Yes    Can this message wait until your PCP/provider returns, if unavailable today? Not applicable, PROVIDER IN CLINIC    Anna Rincon

## 2023-03-15 LAB — BACTERIA UR CULT: NORMAL

## 2023-03-26 ENCOUNTER — HEALTH MAINTENANCE LETTER (OUTPATIENT)
Age: 53
End: 2023-03-26

## 2023-04-20 ENCOUNTER — OFFICE VISIT (OUTPATIENT)
Dept: FAMILY MEDICINE | Facility: OTHER | Age: 53
End: 2023-04-20
Attending: NURSE PRACTITIONER
Payer: COMMERCIAL

## 2023-04-20 VITALS
HEART RATE: 82 BPM | BODY MASS INDEX: 34.09 KG/M2 | SYSTOLIC BLOOD PRESSURE: 130 MMHG | HEIGHT: 65 IN | RESPIRATION RATE: 16 BRPM | TEMPERATURE: 97.6 F | WEIGHT: 204.6 LBS | OXYGEN SATURATION: 98 % | DIASTOLIC BLOOD PRESSURE: 84 MMHG

## 2023-04-20 DIAGNOSIS — R23.8 VESICULAR RASH: Primary | ICD-10-CM

## 2023-04-20 PROCEDURE — 99213 OFFICE O/P EST LOW 20 MIN: CPT | Performed by: NURSE PRACTITIONER

## 2023-04-20 PROCEDURE — 87798 DETECT AGENT NOS DNA AMP: CPT | Performed by: NURSE PRACTITIONER

## 2023-04-20 ASSESSMENT — PAIN SCALES - GENERAL: PAINLEVEL: NO PAIN (0)

## 2023-04-20 NOTE — PROGRESS NOTES
"  Assessment & Plan     1. Vesicular rash  Symptomatic cares  - Varicella zoster virus by PCR       BMI:   Estimated body mass index is 34.05 kg/m  as calculated from the following:    Height as of this encounter: 1.651 m (5' 5\").    Weight as of this encounter: 92.8 kg (204 lb 9.6 oz).     Will notify of results as they are returned.  May consider immunity testing in the future.        Mary Ellen Armstrong NP  [facility]    Subjective   Shazia is a 52 year old, presenting for the following health issues:  Derm Problem         View : No data to display.              HPI     Rash  Onset/Duration: Today   Description  Location: all over body   Character: raised, red pimple looking   Itching: no  Intensity:  na  Progression of Symptoms:  same  Accompanying signs and symptoms:   Fever: No  Body aches or joint pain: No  Sore throat symptoms: No  Recent cold symptoms: No  History:           Previous episodes of similar rash: None  New exposures:  None  Recent travel: No  Exposure to similar rash: No  Precipitating or alleviating factors: na  Therapies tried and outcome: none    Wonders if it is chicken pox.  She has not had it in the past.        Review of Systems   Constitutional, HEENT, cardiovascular, pulmonary, gi and gu systems are negative, except as otherwise noted.      Objective    /84 (BP Location: Left arm, Patient Position: Chair, Cuff Size: Adult Large)   Pulse 82   Temp 97.6  F (36.4  C) (Tympanic)   Resp 16   Ht 1.651 m (5' 5\")   Wt 92.8 kg (204 lb 9.6 oz)   LMP 04/10/2018 (LMP Unknown)   SpO2 98%   BMI 34.05 kg/m    Body mass index is 34.05 kg/m .  Physical Exam   GENERAL: healthy, alert and no distress  MS: no gross musculoskeletal defects noted, no edema  SKIN: vesicular rash on trunk.  Culture obtained.    PSYCH: mentation appears normal, affect normal/bright                    "

## 2023-04-22 LAB — VZV DNA SPEC QL NAA+PROBE: NOT DETECTED

## 2023-07-10 ENCOUNTER — LAB (OUTPATIENT)
Dept: LAB | Facility: OTHER | Age: 53
End: 2023-07-10
Payer: COMMERCIAL

## 2023-07-10 DIAGNOSIS — R79.0 LOW FERRITIN: ICD-10-CM

## 2023-07-10 DIAGNOSIS — Z98.84 H/O GASTRIC BYPASS: ICD-10-CM

## 2023-07-10 LAB
BASOPHILS # BLD AUTO: 0 10E3/UL (ref 0–0.2)
BASOPHILS NFR BLD AUTO: 1 %
EOSINOPHIL # BLD AUTO: 0.1 10E3/UL (ref 0–0.7)
EOSINOPHIL NFR BLD AUTO: 2 %
ERYTHROCYTE [DISTWIDTH] IN BLOOD BY AUTOMATED COUNT: 13.6 % (ref 10–15)
FERRITIN SERPL-MCNC: 15 NG/ML (ref 11–328)
HCT VFR BLD AUTO: 39.1 % (ref 35–47)
HGB BLD-MCNC: 13 G/DL (ref 11.7–15.7)
IMM GRANULOCYTES # BLD: 0 10E3/UL
IMM GRANULOCYTES NFR BLD: 0 %
IRON BINDING CAPACITY (ROCHE): 401 UG/DL (ref 240–430)
IRON SATN MFR SERPL: 18 % (ref 15–46)
IRON SERPL-MCNC: 74 UG/DL (ref 37–145)
LYMPHOCYTES # BLD AUTO: 2.1 10E3/UL (ref 0.8–5.3)
LYMPHOCYTES NFR BLD AUTO: 42 %
MCH RBC QN AUTO: 28.3 PG (ref 26.5–33)
MCHC RBC AUTO-ENTMCNC: 33.2 G/DL (ref 31.5–36.5)
MCV RBC AUTO: 85 FL (ref 78–100)
MONOCYTES # BLD AUTO: 0.3 10E3/UL (ref 0–1.3)
MONOCYTES NFR BLD AUTO: 7 %
NEUTROPHILS # BLD AUTO: 2.4 10E3/UL (ref 1.6–8.3)
NEUTROPHILS NFR BLD AUTO: 49 %
PLATELET # BLD AUTO: 264 10E3/UL (ref 150–450)
RBC # BLD AUTO: 4.59 10E6/UL (ref 3.8–5.2)
WBC # BLD AUTO: 5 10E3/UL (ref 4–11)

## 2023-07-10 PROCEDURE — 36415 COLL VENOUS BLD VENIPUNCTURE: CPT

## 2023-07-10 PROCEDURE — 83550 IRON BINDING TEST: CPT

## 2023-07-10 PROCEDURE — 85025 COMPLETE CBC W/AUTO DIFF WBC: CPT

## 2023-07-10 PROCEDURE — 83540 ASSAY OF IRON: CPT

## 2023-07-10 PROCEDURE — 82728 ASSAY OF FERRITIN: CPT

## 2023-07-11 ENCOUNTER — ONCOLOGY VISIT (OUTPATIENT)
Dept: ONCOLOGY | Facility: OTHER | Age: 53
End: 2023-07-11
Attending: NURSE PRACTITIONER
Payer: COMMERCIAL

## 2023-07-11 VITALS
TEMPERATURE: 97.1 F | OXYGEN SATURATION: 98 % | HEIGHT: 65 IN | RESPIRATION RATE: 20 BRPM | WEIGHT: 204.81 LBS | DIASTOLIC BLOOD PRESSURE: 80 MMHG | HEART RATE: 78 BPM | SYSTOLIC BLOOD PRESSURE: 130 MMHG | BODY MASS INDEX: 34.12 KG/M2

## 2023-07-11 DIAGNOSIS — R79.0 LOW FERRITIN: ICD-10-CM

## 2023-07-11 DIAGNOSIS — Z98.84 H/O GASTRIC BYPASS: ICD-10-CM

## 2023-07-11 DIAGNOSIS — E61.1 IRON DEFICIENCY: Primary | ICD-10-CM

## 2023-07-11 DIAGNOSIS — D50.9 ANEMIA, IRON DEFICIENCY: ICD-10-CM

## 2023-07-11 PROCEDURE — 99213 OFFICE O/P EST LOW 20 MIN: CPT | Performed by: NURSE PRACTITIONER

## 2023-07-11 RX ORDER — DIPHENHYDRAMINE HYDROCHLORIDE 50 MG/ML
50 INJECTION INTRAMUSCULAR; INTRAVENOUS
Status: CANCELLED
Start: 2023-07-14

## 2023-07-11 RX ORDER — METHYLPREDNISOLONE SODIUM SUCCINATE 125 MG/2ML
125 INJECTION, POWDER, LYOPHILIZED, FOR SOLUTION INTRAMUSCULAR; INTRAVENOUS
Status: CANCELLED
Start: 2023-07-14

## 2023-07-11 RX ORDER — ALBUTEROL SULFATE 0.83 MG/ML
2.5 SOLUTION RESPIRATORY (INHALATION)
Status: CANCELLED | OUTPATIENT
Start: 2023-07-14

## 2023-07-11 RX ORDER — ALBUTEROL SULFATE 90 UG/1
1-2 AEROSOL, METERED RESPIRATORY (INHALATION)
Status: CANCELLED
Start: 2023-07-14

## 2023-07-11 RX ORDER — EPINEPHRINE 1 MG/ML
0.3 INJECTION, SOLUTION, CONCENTRATE INTRAVENOUS EVERY 5 MIN PRN
Status: CANCELLED | OUTPATIENT
Start: 2023-07-14

## 2023-07-11 RX ORDER — MEPERIDINE HYDROCHLORIDE 25 MG/ML
25 INJECTION INTRAMUSCULAR; INTRAVENOUS; SUBCUTANEOUS EVERY 30 MIN PRN
Status: CANCELLED | OUTPATIENT
Start: 2023-07-14

## 2023-07-11 ASSESSMENT — PAIN SCALES - GENERAL: PAINLEVEL: MODERATE PAIN (4)

## 2023-07-11 NOTE — PATIENT INSTRUCTIONS
We will set you up for Venofer infusions x 5; each  by at least 48 hours.    We will recheck your labs 4 weeks after you finish the Infusions.  We will call you with the results and plan for follow up.    If you have any questions please call [phone]    Other instructions:  none

## 2023-07-11 NOTE — NURSING NOTE
"Oncology Rooming Note    July 11, 2023 9:56 AM   Shazia Verma is a 53 year old female who presents for:    Chief Complaint   Patient presents with     Hematology     Follow up Ferritin and hx of gastric bypass     Initial Vitals: /80   Pulse 78   Temp 97.1  F (36.2  C) (Tympanic)   Resp 20   Ht 1.651 m (5' 5\")   Wt 92.9 kg (204 lb 12.9 oz)   LMP 04/10/2018 (LMP Unknown)   SpO2 98%   BMI 34.08 kg/m   Estimated body mass index is 34.08 kg/m  as calculated from the following:    Height as of this encounter: 1.651 m (5' 5\").    Weight as of this encounter: 92.9 kg (204 lb 12.9 oz). Body surface area is 2.06 meters squared.  Moderate Pain (4) Comment: Data Unavailable   Patient's last menstrual period was 04/10/2018 (lmp unknown).  Allergies reviewed: Yes  Medications reviewed: Yes    Medications: Medication refills not needed today.  Pharmacy name entered into [facility]:    [id number] - [place], [place] - [address] AT [facility] [address]  [id number] [place], [place] [address] [place] [address] [id number] - [place], [place] - [address] [facility]  [id number] - [place], [place] - [address] AT [facility] [address]    Clinical concerns: Increased bruising, heaches, difficulty with thought process, aching joints, and fatigue, and restless legs.      Morenita Hwang LPN            "

## 2023-07-11 NOTE — PROGRESS NOTES
Hematology Follow-up Visit:  July 11, 2023    Reason for Visit:  Patient presents with:  Hematology: Follow up Ferritin and hx of gastric bypass      Nursing Notes and Documentation reviewed:  yes    HPI:   This is a 53-year-old female patient who presents to the clinic today in follow-up of iron deficiency.  Patient is post gastric bypass surgery 2012.  She was evaluated initially by Dr. Ceballos on 8/9/2021.  At that time ferritin had been drawn by her PCP showing a level of 6.  Hemoglobin was within normal limits.  It was felt this was likely related to malabsorption secondary to her gastric bypass procedure and she was treated with IV Venofer x5 infusions. Repeat iron studies showed improvement with the normalization of her ferritin level.  Additional studies including SPEP, TSH, sed rate and rheumatoid factor along with PHUC were completed that were negative.  It was recommended she pursue a colonoscopy.     She underwent colonoscopy and EGD on 2/11/2022 with hemorrhoids noted on colonoscopy.    She presents to the clinic today stating she is not feeling good.  She has ongoing issues with fatigue, headaches, difficulty with thought process along with aching joints.  She also has restless legs.  She feels she has increased bruising which she feels usually happens when her iron starts to get low.  She denies any shortness of breath or coughing and denies any issues with chest pain or abdominal pain.  She has no signs of bleeding.  She has no issues with blurred or double vision.    Treatment: Venofer PRN    Past Medical History:   Diagnosis Date     Arthritis      Breast mass 2002    benign right     Diabetes (H)      Hypertension        Social History     Socioeconomic History     Marital status:      Spouse name: Not on file     Number of children: Not on file     Years of education: Not on file     Highest education level: Not on file   Occupational History     Not on file   Tobacco Use     Smoking  status: Former     Types: Cigarettes     Start date: 1986     Quit date:      Years since quittin.5     Smokeless tobacco: Never   Vaping Use     Vaping Use: Never used   Substance and Sexual Activity     Alcohol use: Yes     Comment: occ     Drug use: No     Sexual activity: Not on file   Other Topics Concern     Parent/sibling w/ CABG, MI or angioplasty before 65F 55M? Yes     Comment: mother   Social History Narrative     Not on file     Social Determinants of Health     Financial Resource Strain: Not on file   Food Insecurity: Not on file   Transportation Needs: Not on file   Physical Activity: Not on file   Stress: Not on file   Social Connections: Not on file   Intimate Partner Violence: Not on file   Housing Stability: Not on file       Past Surgical History:   Procedure Laterality Date     APPENDECTOMY  1991     BIOPSY BREAST Right     benign     BIOPSY BREAST Right 2020    US Bx benign     BREAST BIOPSY, RT/LT Right 2002    right bx      SECTION  1992     COLONOSCOPY  2013     ENDOSCOPY UPPER, COLONOSCOPY, COMBINED N/A 2022    Procedure: upper endoscopy and colonoscopy;  Surgeon: Jaguar Bui MD;  Location: [facility]     GASTRIC BYPASS       gatric bypass       LAPAROSCOPIC HYSTERECTOMY SUPRACERVICAL N/A 2018    Procedure: LAPAROSCOPIC HYSTERECTOMY SUPRACERVICAL;  LAPAROSCOPIC SUPRACERVICAL HYSTERECTOMY, BILATEARL SALPINGECTOMY, LYSIS OF ADHESIONS;  Surgeon: Jean Marie Garvin MD;  Location: [facility]     ORTHOPEDIC SURGERY      right hip labreal tear     ORTHOPEDIC SURGERY      left wrist      SALPINGECTOMY Bilateral 2018    Procedure: SALPINGECTOMY;;  Surgeon: Jean Marie Garvin MD;  Location: [facility]       Family History   Problem Relation Age of Onset     Cerebrovascular Disease Mother      Hyperlipidemia Mother      Hypertension Mother      Coronary Artery Disease Mother      Migraines Mother      Obesity Mother      Osteoarthritis Mother       Osteoporosis Mother      Diabetes Mother      Myocardial Infarction Mother      Hyperlipidemia Father      Hypertension Father      Thyroid Disease Father      Hypertension Brother      Hyperlipidemia Brother      Obesity Brother      Hypertension Sister      Thyroid Disease Sister      Stomach Cancer Maternal Grandmother      Cancer Maternal Grandfather         colon or liver: unsure     Leukemia Paternal Grandfather      Breast Cancer No family hx of      Colon Cancer No family hx of      Prostate Cancer No family hx of      Anesthesia Reaction No family hx of      Asthma No family hx of      Genetic Disorder No family hx of        Allergies:  Allergies as of 07/11/2023 - Reviewed 07/11/2023   Allergen Reaction Noted     Reglan [metoclopramide] Anxiety 11/14/2016       Current Medications:  Current Outpatient Medications   Medication Sig Dispense Refill     baclofen (LIORESAL) 10 MG tablet TAKE 1 TABLET BY MOUTH THREE TIMES A DAY AS NEEDED FOR MUSCLE SPASMS 60 tablet 1     butalbital-acetaminophen-caffeine (ESGIC) -40 MG tablet TAKE 1 TABLET BY MOUTH EVERY 4 HOURS AS NEEDED FOR HEADACHES 20 tablet 1     Cyanocobalamin (VITAMIN B 12 PO)        VITAMIN D, CHOLECALCIFEROL, PO Take 5,000 Units by mouth daily 2 tabs daily       calcium carbonate (OS-MANUEL 500 MG Koi. CA) 500 MG tablet  (Patient not taking: Reported on 7/11/2023)       multivitamin w/minerals (THERA-VIT-M) tablet Take 1 tablet by mouth daily (Patient not taking: Reported on 7/11/2023)       phenazopyridine (PYRIDIUM) 200 MG tablet Take 1 tablet (200 mg) by mouth 3 times daily as needed for pain or irritation (Patient not taking: Reported on 7/11/2023) 30 tablet 1     SUMAtriptan (IMITREX) 50 MG tablet Take 1 tablet (50 mg) by mouth at onset of headache for migraine May repeat in 2 hours. Max 4 tablets/24 hours. (Patient not taking: Reported on 7/11/2023) 12 tablet 3          Review Of Systems: See HPI      Physical Exam:  /80   Pulse 78   " Temp 97.1  F (36.2  C) (Tympanic)   Resp 20   Ht 1.651 m (5' 5\")   Wt 92.9 kg (204 lb 12.9 oz)   LMP 04/10/2018 (LMP Unknown)   SpO2 98%   BMI 34.08 kg/m    GENERAL APPEARANCE: Healthy, alert and in no acute distress.  HEENT: Normocephalic, Sclerae anicteric. Oropharynx without ulcers, lesions, or thrush.  NECK:  No asymmetry or masses, no thyromegaly.  LYMPHATICS: No palpable cervical, supraclavicular nodes   RESP: Lungs clear to auscultation bilaterally, respirations regular and easy  CARDIOVASCULAR: Regular rate and rhythm. Normal S1, S2; no murmur, gallop, or rub.  MUSCULOSKELETAL: Extremities without gross deformities noted.  SKIN: No suspicious lesions or rashes.  NEURO: Alert and oriented x 3.  Gait steady.  PSYCHIATRIC: Mentation and affect appear normal.  Mood appropriate.    Laboratory/Imaging Studies:    Component      Latest Ref [facility] 7/10/2023  7:49 AM   WBC      4.0 - 11.0 10e3/uL 5.0    RBC Count      3.80 - 5.20 10e6/uL 4.59    Hemoglobin      11.7 - 15.7 g/dL 13.0    Hematocrit      35.0 - 47.0 % 39.1    MCV      78 - 100 fL 85    MCH      26.5 - 33.0 pg 28.3    MCHC      31.5 - 36.5 g/dL 33.2    RDW      10.0 - 15.0 % 13.6    Platelet Count      150 - 450 10e3/uL 264    % Neutrophils      % 49    % Lymphocytes      % 42    % Monocytes      % 7    % Eosinophils      % 2    % Basophils      % 1    % Immature Granulocytes      % 0    Absolute Neutrophils      1.6 - 8.3 10e3/uL 2.4    Absolute Lymphocytes      0.8 - 5.3 10e3/uL 2.1    Absolute Monocytes      0.0 - 1.3 10e3/uL 0.3    Absolute Eosinophils      0.0 - 0.7 10e3/uL 0.1    Absolute Basophils      0.0 - 0.2 10e3/uL 0.0    Absolute Immature Granulocytes      <=0.4 10e3/uL 0.0    Iron      37 - 145 ug/dL 74    Iron Binding Capacity      240 - 430 ug/dL 401    Iron Sat Index      15 - 46 % 18    Ferritin      11 - 328 ng/mL 15        ASSESSMENT/PLAN:    #1 Iron deficiency: Ferritin level low at 15 with saturation low at 18. I'd like to see " her ferritin above 50 and saturation above 20 due to her gastric bypass.  We will set her up for IV Venofer 200 mg x 5 infusions  by at least 48 hours.  We will recheck her iron studies about 4 weeks after and call her with that result and plan.     I encouraged patient to call with any questions or concerns.      Megan Zepeda, NP APRN, FNP-BC, AOCNP

## 2023-07-14 ENCOUNTER — INFUSION THERAPY VISIT (OUTPATIENT)
Dept: INFUSION THERAPY | Facility: OTHER | Age: 53
End: 2023-07-14
Attending: NURSE PRACTITIONER
Payer: COMMERCIAL

## 2023-07-14 VITALS
OXYGEN SATURATION: 96 % | RESPIRATION RATE: 16 BRPM | BODY MASS INDEX: 34.27 KG/M2 | DIASTOLIC BLOOD PRESSURE: 80 MMHG | SYSTOLIC BLOOD PRESSURE: 121 MMHG | TEMPERATURE: 97.9 F | WEIGHT: 205.91 LBS | HEART RATE: 69 BPM

## 2023-07-14 DIAGNOSIS — D50.9 ANEMIA, IRON DEFICIENCY: ICD-10-CM

## 2023-07-14 DIAGNOSIS — E61.1 IRON DEFICIENCY: Primary | ICD-10-CM

## 2023-07-14 DIAGNOSIS — R79.0 LOW FERRITIN: ICD-10-CM

## 2023-07-14 DIAGNOSIS — Z98.84 H/O GASTRIC BYPASS: ICD-10-CM

## 2023-07-14 PROCEDURE — 96365 THER/PROPH/DIAG IV INF INIT: CPT | Performed by: INTERNAL MEDICINE

## 2023-07-14 RX ORDER — ALBUTEROL SULFATE 0.83 MG/ML
2.5 SOLUTION RESPIRATORY (INHALATION)
Status: CANCELLED | OUTPATIENT
Start: 2023-07-16

## 2023-07-14 RX ORDER — METHYLPREDNISOLONE SODIUM SUCCINATE 125 MG/2ML
125 INJECTION, POWDER, LYOPHILIZED, FOR SOLUTION INTRAMUSCULAR; INTRAVENOUS
Status: CANCELLED
Start: 2023-07-16

## 2023-07-14 RX ORDER — DIPHENHYDRAMINE HYDROCHLORIDE 50 MG/ML
50 INJECTION INTRAMUSCULAR; INTRAVENOUS
Status: CANCELLED
Start: 2023-07-16

## 2023-07-14 RX ORDER — EPINEPHRINE 1 MG/ML
0.3 INJECTION, SOLUTION, CONCENTRATE INTRAVENOUS EVERY 5 MIN PRN
Status: CANCELLED | OUTPATIENT
Start: 2023-07-16

## 2023-07-14 RX ORDER — MEPERIDINE HYDROCHLORIDE 25 MG/ML
25 INJECTION INTRAMUSCULAR; INTRAVENOUS; SUBCUTANEOUS EVERY 30 MIN PRN
Status: CANCELLED | OUTPATIENT
Start: 2023-07-16

## 2023-07-14 RX ORDER — ALBUTEROL SULFATE 90 UG/1
1-2 AEROSOL, METERED RESPIRATORY (INHALATION)
Status: CANCELLED
Start: 2023-07-16

## 2023-07-14 RX ADMIN — Medication 250 ML: at 09:39

## 2023-07-14 NOTE — PROGRESS NOTES
Infusion Nursing Note:  Shazia J [place] presents today for infusion of venofer.    Patient seen by provider today: No   present during visit today: Not Applicable.    Note: patient denies questions or concerns regarding today's infusion of venofer.    Intravenous Access:Peripheral IV placed.    Treatment Conditions:  Not Applicable.  Confirmed patient received the Fact Sheet for Patients, Parents and Caregivers prior to receiving medication.     Post Infusion Assessment:  Patient tolerated infusion without incident.  Site patent and intact, free from redness, edema or discomfort.  No evidence of extravasations.  Access discontinued per protocol.       Discharge Plan:   Discharge instructions reviewed with: Patient.  Patient and/or family verbalized understanding of discharge instructions and all questions answered.  Patient discharged in stable condition accompanied by: self.  Departure Mode: Ambulatory.

## 2023-07-14 NOTE — PATIENT INSTRUCTIONS

## 2023-07-17 ENCOUNTER — INFUSION THERAPY VISIT (OUTPATIENT)
Dept: INFUSION THERAPY | Facility: OTHER | Age: 53
End: 2023-07-17
Attending: NURSE PRACTITIONER
Payer: COMMERCIAL

## 2023-07-17 VITALS
WEIGHT: 207.45 LBS | BODY MASS INDEX: 34.52 KG/M2 | HEART RATE: 77 BPM | DIASTOLIC BLOOD PRESSURE: 96 MMHG | OXYGEN SATURATION: 97 % | SYSTOLIC BLOOD PRESSURE: 132 MMHG | RESPIRATION RATE: 18 BRPM | TEMPERATURE: 97.1 F

## 2023-07-17 DIAGNOSIS — R79.0 LOW FERRITIN: ICD-10-CM

## 2023-07-17 DIAGNOSIS — D50.9 ANEMIA, IRON DEFICIENCY: ICD-10-CM

## 2023-07-17 DIAGNOSIS — Z98.84 H/O GASTRIC BYPASS: ICD-10-CM

## 2023-07-17 DIAGNOSIS — E61.1 IRON DEFICIENCY: Primary | ICD-10-CM

## 2023-07-17 PROCEDURE — 96365 THER/PROPH/DIAG IV INF INIT: CPT | Performed by: INTERNAL MEDICINE

## 2023-07-17 RX ORDER — EPINEPHRINE 1 MG/ML
0.3 INJECTION, SOLUTION, CONCENTRATE INTRAVENOUS EVERY 5 MIN PRN
Status: CANCELLED | OUTPATIENT
Start: 2023-07-18

## 2023-07-17 RX ORDER — MEPERIDINE HYDROCHLORIDE 25 MG/ML
25 INJECTION INTRAMUSCULAR; INTRAVENOUS; SUBCUTANEOUS EVERY 30 MIN PRN
Status: CANCELLED | OUTPATIENT
Start: 2023-07-18

## 2023-07-17 RX ORDER — ALBUTEROL SULFATE 0.83 MG/ML
2.5 SOLUTION RESPIRATORY (INHALATION)
Status: CANCELLED | OUTPATIENT
Start: 2023-07-18

## 2023-07-17 RX ORDER — DIPHENHYDRAMINE HYDROCHLORIDE 50 MG/ML
50 INJECTION INTRAMUSCULAR; INTRAVENOUS
Status: CANCELLED
Start: 2023-07-18

## 2023-07-17 RX ORDER — METHYLPREDNISOLONE SODIUM SUCCINATE 125 MG/2ML
125 INJECTION, POWDER, LYOPHILIZED, FOR SOLUTION INTRAMUSCULAR; INTRAVENOUS
Status: CANCELLED
Start: 2023-07-18

## 2023-07-17 RX ORDER — ALBUTEROL SULFATE 90 UG/1
1-2 AEROSOL, METERED RESPIRATORY (INHALATION)
Status: CANCELLED
Start: 2023-07-18

## 2023-07-17 RX ADMIN — Medication 250 ML: at 08:32

## 2023-07-17 ASSESSMENT — PAIN SCALES - GENERAL: PAINLEVEL: NO PAIN (0)

## 2023-07-17 NOTE — PROGRESS NOTES
Infusion Nursing Note:  Shazia Verma presents today for Venofer.    Patient seen by provider today: No   present during visit today: Not Applicable.    Intravenous Access:  Peripheral IV placed.      Post Infusion Assessment:  Patient tolerated infusion without incident.  Site patent and intact, free from redness, edema or discomfort.  No evidence of extravasations.  Access discontinued per protocol.       Discharge Plan:   Patient discharged in stable condition accompanied by: self.  Departure Mode: Ambulatory.

## 2023-07-19 ENCOUNTER — INFUSION THERAPY VISIT (OUTPATIENT)
Dept: INFUSION THERAPY | Facility: OTHER | Age: 53
End: 2023-07-19
Attending: NURSE PRACTITIONER
Payer: COMMERCIAL

## 2023-07-19 VITALS
BODY MASS INDEX: 34.6 KG/M2 | DIASTOLIC BLOOD PRESSURE: 87 MMHG | RESPIRATION RATE: 16 BRPM | HEART RATE: 87 BPM | SYSTOLIC BLOOD PRESSURE: 125 MMHG | TEMPERATURE: 97 F | OXYGEN SATURATION: 96 % | WEIGHT: 207.89 LBS

## 2023-07-19 DIAGNOSIS — Z98.84 H/O GASTRIC BYPASS: ICD-10-CM

## 2023-07-19 DIAGNOSIS — E61.1 IRON DEFICIENCY: Primary | ICD-10-CM

## 2023-07-19 DIAGNOSIS — D50.9 ANEMIA, IRON DEFICIENCY: ICD-10-CM

## 2023-07-19 DIAGNOSIS — R79.0 LOW FERRITIN: ICD-10-CM

## 2023-07-19 PROCEDURE — 96365 THER/PROPH/DIAG IV INF INIT: CPT | Performed by: INTERNAL MEDICINE

## 2023-07-19 RX ORDER — METHYLPREDNISOLONE SODIUM SUCCINATE 125 MG/2ML
125 INJECTION, POWDER, LYOPHILIZED, FOR SOLUTION INTRAMUSCULAR; INTRAVENOUS
Status: CANCELLED
Start: 2023-07-21

## 2023-07-19 RX ORDER — ALBUTEROL SULFATE 90 UG/1
1-2 AEROSOL, METERED RESPIRATORY (INHALATION)
Status: CANCELLED
Start: 2023-07-21

## 2023-07-19 RX ORDER — ALBUTEROL SULFATE 0.83 MG/ML
2.5 SOLUTION RESPIRATORY (INHALATION)
Status: CANCELLED | OUTPATIENT
Start: 2023-07-21

## 2023-07-19 RX ORDER — EPINEPHRINE 1 MG/ML
0.3 INJECTION, SOLUTION, CONCENTRATE INTRAVENOUS EVERY 5 MIN PRN
Status: CANCELLED | OUTPATIENT
Start: 2023-07-21

## 2023-07-19 RX ORDER — DIPHENHYDRAMINE HYDROCHLORIDE 50 MG/ML
50 INJECTION INTRAMUSCULAR; INTRAVENOUS
Status: CANCELLED
Start: 2023-07-21

## 2023-07-19 RX ORDER — MEPERIDINE HYDROCHLORIDE 25 MG/ML
25 INJECTION INTRAMUSCULAR; INTRAVENOUS; SUBCUTANEOUS EVERY 30 MIN PRN
Status: CANCELLED | OUTPATIENT
Start: 2023-07-21

## 2023-07-19 RX ADMIN — Medication 250 ML: at 08:50

## 2023-07-19 NOTE — PROGRESS NOTES
Post Infusion Assessment:  Site patent and intact, free from redness, edema or discomfort.  No evidence of extravasations.  Access discontinued per protocol.       Discharge Plan:   Patient discharged in stable condition accompanied by: self.  Departure Mode: Ambulatory.      Floridalma Willson LPN

## 2023-07-19 NOTE — PROGRESS NOTES
Infusion Nursing Note:  Shazia Verma presents today for Venofer.    Patient seen by provider today: No   present during visit today: Not Applicable.      Intravenous Access:  Peripheral IV placed.      Post Infusion Assessment:  Patient tolerated infusion without incident.  Advised KAYLAN Hedrick that patient is ok to discharge.

## 2023-07-21 ENCOUNTER — INFUSION THERAPY VISIT (OUTPATIENT)
Dept: INFUSION THERAPY | Facility: OTHER | Age: 53
End: 2023-07-21
Attending: NURSE PRACTITIONER
Payer: COMMERCIAL

## 2023-07-21 VITALS
RESPIRATION RATE: 16 BRPM | SYSTOLIC BLOOD PRESSURE: 135 MMHG | DIASTOLIC BLOOD PRESSURE: 80 MMHG | TEMPERATURE: 98 F | HEART RATE: 77 BPM | OXYGEN SATURATION: 97 %

## 2023-07-21 DIAGNOSIS — R79.0 LOW FERRITIN: ICD-10-CM

## 2023-07-21 DIAGNOSIS — D50.9 ANEMIA, IRON DEFICIENCY: ICD-10-CM

## 2023-07-21 DIAGNOSIS — Z98.84 H/O GASTRIC BYPASS: ICD-10-CM

## 2023-07-21 DIAGNOSIS — E61.1 IRON DEFICIENCY: Primary | ICD-10-CM

## 2023-07-21 PROCEDURE — 96365 THER/PROPH/DIAG IV INF INIT: CPT | Performed by: INTERNAL MEDICINE

## 2023-07-21 RX ORDER — METHYLPREDNISOLONE SODIUM SUCCINATE 125 MG/2ML
125 INJECTION, POWDER, LYOPHILIZED, FOR SOLUTION INTRAMUSCULAR; INTRAVENOUS
Status: CANCELLED
Start: 2023-07-23

## 2023-07-21 RX ORDER — MEPERIDINE HYDROCHLORIDE 25 MG/ML
25 INJECTION INTRAMUSCULAR; INTRAVENOUS; SUBCUTANEOUS EVERY 30 MIN PRN
Status: CANCELLED | OUTPATIENT
Start: 2023-07-23

## 2023-07-21 RX ORDER — ALBUTEROL SULFATE 90 UG/1
1-2 AEROSOL, METERED RESPIRATORY (INHALATION)
Status: CANCELLED
Start: 2023-07-23

## 2023-07-21 RX ORDER — EPINEPHRINE 1 MG/ML
0.3 INJECTION, SOLUTION, CONCENTRATE INTRAVENOUS EVERY 5 MIN PRN
Status: CANCELLED | OUTPATIENT
Start: 2023-07-23

## 2023-07-21 RX ORDER — DIPHENHYDRAMINE HYDROCHLORIDE 50 MG/ML
50 INJECTION INTRAMUSCULAR; INTRAVENOUS
Status: CANCELLED
Start: 2023-07-23

## 2023-07-21 RX ORDER — ALBUTEROL SULFATE 0.83 MG/ML
2.5 SOLUTION RESPIRATORY (INHALATION)
Status: CANCELLED | OUTPATIENT
Start: 2023-07-23

## 2023-07-21 RX ADMIN — Medication 250 ML: at 08:34

## 2023-07-21 NOTE — PROGRESS NOTES
Infusion Nursing Note:  Shazia Verma presents today for venefor.    Patient seen by provider today: No   present during visit today: Not Applicable.    Note: N/A.      Intravenous Access:  Peripheral IV placed.    Treatment Conditions:  Results reviewed, labs MET treatment parameters, ok to proceed with treatment.      Post Infusion Assessment:  Patient tolerated infusion without incident.       Discharge Plan:   Discharge instructions reviewed with: Patient.      MICAH JIM

## 2023-07-24 ENCOUNTER — INFUSION THERAPY VISIT (OUTPATIENT)
Dept: INFUSION THERAPY | Facility: OTHER | Age: 53
End: 2023-07-24
Attending: NURSE PRACTITIONER
Payer: COMMERCIAL

## 2023-07-24 VITALS
TEMPERATURE: 97.2 F | SYSTOLIC BLOOD PRESSURE: 136 MMHG | HEART RATE: 85 BPM | OXYGEN SATURATION: 97 % | DIASTOLIC BLOOD PRESSURE: 93 MMHG

## 2023-07-24 DIAGNOSIS — R79.0 LOW FERRITIN: ICD-10-CM

## 2023-07-24 DIAGNOSIS — E61.1 IRON DEFICIENCY: ICD-10-CM

## 2023-07-24 DIAGNOSIS — Z98.84 H/O GASTRIC BYPASS: Primary | ICD-10-CM

## 2023-07-24 DIAGNOSIS — D50.9 ANEMIA, IRON DEFICIENCY: ICD-10-CM

## 2023-07-24 PROCEDURE — 96365 THER/PROPH/DIAG IV INF INIT: CPT | Performed by: INTERNAL MEDICINE

## 2023-07-24 RX ORDER — METHYLPREDNISOLONE SODIUM SUCCINATE 125 MG/2ML
125 INJECTION, POWDER, LYOPHILIZED, FOR SOLUTION INTRAMUSCULAR; INTRAVENOUS
Start: 2023-07-25

## 2023-07-24 RX ORDER — EPINEPHRINE 1 MG/ML
0.3 INJECTION, SOLUTION, CONCENTRATE INTRAVENOUS EVERY 5 MIN PRN
OUTPATIENT
Start: 2023-07-25

## 2023-07-24 RX ORDER — MEPERIDINE HYDROCHLORIDE 25 MG/ML
25 INJECTION INTRAMUSCULAR; INTRAVENOUS; SUBCUTANEOUS EVERY 30 MIN PRN
OUTPATIENT
Start: 2023-07-25

## 2023-07-24 RX ORDER — ALBUTEROL SULFATE 90 UG/1
1-2 AEROSOL, METERED RESPIRATORY (INHALATION)
Start: 2023-07-25

## 2023-07-24 RX ORDER — DIPHENHYDRAMINE HYDROCHLORIDE 50 MG/ML
50 INJECTION INTRAMUSCULAR; INTRAVENOUS
Start: 2023-07-25

## 2023-07-24 RX ORDER — ALBUTEROL SULFATE 0.83 MG/ML
2.5 SOLUTION RESPIRATORY (INHALATION)
OUTPATIENT
Start: 2023-07-25

## 2023-07-24 RX ADMIN — Medication 250 ML: at 09:09

## 2023-07-24 NOTE — PROGRESS NOTES
Infusion Nursing Note:  Shazia Verma presents today for venofer.    Patient seen by provider today: No   present during visit today: Not Applicable.    Note: N/A.      Intravenous Access:  Peripheral IV placed.    Treatment Conditions:  N/A.      Post Infusion Assessment:  Patient tolerated infusion without incident.       Discharge Plan:   Patient and/or family verbalized understanding of discharge instructions and all questions answered.      Pina Cosby RN

## 2023-08-08 ENCOUNTER — LAB (OUTPATIENT)
Dept: LAB | Facility: OTHER | Age: 53
End: 2023-08-08
Payer: COMMERCIAL

## 2023-08-08 DIAGNOSIS — R79.0 LOW FERRITIN: ICD-10-CM

## 2023-08-08 DIAGNOSIS — E61.1 IRON DEFICIENCY: Primary | ICD-10-CM

## 2023-08-08 DIAGNOSIS — E61.1 IRON DEFICIENCY: ICD-10-CM

## 2023-08-08 DIAGNOSIS — Z98.84 H/O GASTRIC BYPASS: ICD-10-CM

## 2023-08-08 LAB
BASOPHILS # BLD AUTO: 0 10E3/UL (ref 0–0.2)
BASOPHILS NFR BLD AUTO: 0 %
EOSINOPHIL # BLD AUTO: 0.1 10E3/UL (ref 0–0.7)
EOSINOPHIL NFR BLD AUTO: 1 %
ERYTHROCYTE [DISTWIDTH] IN BLOOD BY AUTOMATED COUNT: 15.3 % (ref 10–15)
FERRITIN SERPL-MCNC: 244 NG/ML (ref 11–328)
HCT VFR BLD AUTO: 40.1 % (ref 35–47)
HGB BLD-MCNC: 13.4 G/DL (ref 11.7–15.7)
IMM GRANULOCYTES # BLD: 0 10E3/UL
IMM GRANULOCYTES NFR BLD: 0 %
IRON BINDING CAPACITY (ROCHE): 318 UG/DL (ref 240–430)
IRON SATN MFR SERPL: 28 % (ref 15–46)
IRON SERPL-MCNC: 90 UG/DL (ref 37–145)
LYMPHOCYTES # BLD AUTO: 1.6 10E3/UL (ref 0.8–5.3)
LYMPHOCYTES NFR BLD AUTO: 35 %
MCH RBC QN AUTO: 28.6 PG (ref 26.5–33)
MCHC RBC AUTO-ENTMCNC: 33.4 G/DL (ref 31.5–36.5)
MCV RBC AUTO: 86 FL (ref 78–100)
MONOCYTES # BLD AUTO: 0.3 10E3/UL (ref 0–1.3)
MONOCYTES NFR BLD AUTO: 7 %
NEUTROPHILS # BLD AUTO: 2.6 10E3/UL (ref 1.6–8.3)
NEUTROPHILS NFR BLD AUTO: 56 %
PLATELET # BLD AUTO: 315 10E3/UL (ref 150–450)
RBC # BLD AUTO: 4.68 10E6/UL (ref 3.8–5.2)
WBC # BLD AUTO: 4.6 10E3/UL (ref 4–11)

## 2023-08-08 PROCEDURE — 83540 ASSAY OF IRON: CPT

## 2023-08-08 PROCEDURE — 82728 ASSAY OF FERRITIN: CPT

## 2023-08-08 PROCEDURE — 85025 COMPLETE CBC W/AUTO DIFF WBC: CPT

## 2023-08-08 PROCEDURE — 83550 IRON BINDING TEST: CPT

## 2023-08-08 PROCEDURE — 36415 COLL VENOUS BLD VENIPUNCTURE: CPT

## 2023-08-23 ENCOUNTER — MYC MEDICAL ADVICE (OUTPATIENT)
Dept: FAMILY MEDICINE | Facility: OTHER | Age: 53
End: 2023-08-23

## 2023-09-07 ENCOUNTER — MYC MEDICAL ADVICE (OUTPATIENT)
Dept: FAMILY MEDICINE | Facility: OTHER | Age: 53
End: 2023-09-07

## 2023-09-09 DIAGNOSIS — G44.89 OTHER HEADACHE SYNDROME: ICD-10-CM

## 2023-09-11 NOTE — TELEPHONE ENCOUNTER
[facility]      Last Written Prescription Date:  3/1/23  Last Fill Quantity: 20,   # refills: 1  Last Office Visit: 4/20/23  Future Office visit:       Routing refill request to provider for review/approval because:

## 2023-09-13 RX ORDER — BUTALBITAL, ACETAMINOPHEN AND CAFFEINE 50; 325; 40 MG/1; MG/1; MG/1
1 TABLET ORAL EVERY 4 HOURS PRN
Qty: 20 TABLET | Refills: 1 | Status: SHIPPED | OUTPATIENT
Start: 2023-09-13 | End: 2024-04-05

## 2023-10-17 NOTE — PROGRESS NOTES
"  Assessment & Plan     1. Hip pain, left  Negative XR, MRI ordered to to previous history with right hip.    - XR Hip Left 2-3 Views (Clinic Performed); Future    2. Chronic left hip pain  - MR Hip Left w/o Contrast; Future    3. Encounter for screening mammogram for breast cancer  - MA Screen Bilateral w/Paul; Future    Tylenol or ibuprofen as needed per package directions.      BMI:   Estimated body mass index is 34.65 kg/m  as calculated from the following:    Height as of this encounter: 1.651 m (5' 5\").    Weight as of this encounter: 94.4 kg (208 lb 3.2 oz).   Weight management plan: Discussed healthy diet and exercise guidelines      Will notify of results as they are returned.      Mary Ellen Armstrong NP  [facility] [place] ISMAEL Mccray is a 53 year old, presenting for the following health issues:  Musculoskeletal Problem      HPI     Pain History:  When did you first notice your pain?  Over 1 year - worsening   Have you seen anyone else for your pain? No  How has your pain affected your ability to work? Deals with pain   Where in your body do you have pain? Musculoskeletal problem/pain  Onset/Duration: for awhile   Description  Location: hip  - left  Joint Swelling: No  Redness: No  Pain: YES  Warmth: No  Intensity:  moderate, severe  Progression of Symptoms:  worsening  Accompanying signs and symptoms:   Fevers: No  Numbness/tingling/weakness: No  History  Trauma to the area: No  Recent illness:  No  Previous similar problem: YES  [place]  [place]   Precipitating or alleviating factors:  Aggravating factors include: sitting, standing, walking, climbing stairs, lifting, exercise, and overuse  Therapies tried and outcome: acetaminophen and topical cream over the counter, physical therapy in 2020.             Recent Labs   Lab Test 09/28/22  0819 05/19/22  1010 01/10/22  0808 10/04/21  0746 08/09/21  1247 08/09/21  1247 06/26/21  1426 " "05/06/21  1017 04/09/21  0937 01/14/21  0924 12/11/20  1446 12/30/19  1528 01/15/18  1542 09/01/17  1154   A1C  --   --   --   --   --   --   --   --   --  5.8* 5.7*  --   --  5.7   *  --   --   --   --   --   --   --   --  175*  --  155*   < >  --    HDL 51  --   --   --   --   --   --   --   --  66  --  51   < >  --    TRIG 190*  --   --   --   --   --   --   --   --  210*  --  259*   < >  --    ALT  --  26 27 26   < > 28 30  --  28 22  --   --    < >  --    CR  --  0.57 0.59 0.49*   < > 0.54 0.58  --  0.55 0.58  --  0.58   < > 0.57   GFRESTIMATED  --  >90 >90 >90   < > >90 >90  --  >90 >90  --  >90   < > >90   GFRESTBLACK  --   --   --   --   --   --  >90  --  >90 >90  --  >90   < > >90   POTASSIUM  --  4.5 4.0 4.5   < > 4.2 3.8  --  4.5 4.3  --  4.1   < > 4.1   TSH  --   --   --   --   --  1.24  --  1.78  --  2.25  --  1.93   < > 2.16    < > = values in this interval not displayed.      BP Readings from Last 3 Encounters:   10/18/23 136/88   07/24/23 (!) 136/93   07/21/23 135/80    Wt Readings from Last 3 Encounters:   10/18/23 94.4 kg (208 lb 3.2 oz)   07/19/23 94.3 kg (207 lb 14.3 oz)   07/17/23 94.1 kg (207 lb 7.3 oz)                        Review of Systems   Constitutional, HEENT, cardiovascular, pulmonary, gi and gu systems are negative, except as otherwise noted.      Objective    /88 (BP Location: Left arm, Patient Position: Chair, Cuff Size: Adult Large)   Pulse 91   Temp 97.5  F (36.4  C) (Tympanic)   Resp 16   Ht 1.651 m (5' 5\")   Wt 94.4 kg (208 lb 3.2 oz)   LMP 04/10/2018 (LMP Unknown)   SpO2 97%   BMI 34.65 kg/m    Body mass index is 34.65 kg/m .  Physical Exam   GENERAL: healthy, alert and no distress  RESP: lungs clear to auscultation - no rales, rhonchi or wheezes  CV: regular rate and rhythm, normal S1 S2, no S3 or S4, no murmur, click or rub, no peripheral edema and peripheral pulses strong  MS: decreased ROM of left hip - straight leg raise, adduction, abduction   PSYCH: " mentation appears normal, affect normal/bright          Results for orders placed or performed in visit on 10/18/23   XR Hip Left 2-3 Views (Clinic Performed)     Status: None    Narrative    XR HIP LEFT 2-3 VIEWS    HISTORY: 53 years Female Hip pain, left    COMPARISON: None    TECHNIQUE: 2 views left hip    FINDINGS: Joint spaces are congruent, and articular surfaces are  smooth. No evidence of fracture.      Impression    IMPRESSION: Negative study.    LUCINDA GONZALEZ MD         SYSTEM ID:  [id number]

## 2023-10-18 ENCOUNTER — OFFICE VISIT (OUTPATIENT)
Dept: FAMILY MEDICINE | Facility: OTHER | Age: 53
End: 2023-10-18
Attending: NURSE PRACTITIONER
Payer: COMMERCIAL

## 2023-10-18 ENCOUNTER — ANCILLARY PROCEDURE (OUTPATIENT)
Dept: GENERAL RADIOLOGY | Facility: OTHER | Age: 53
End: 2023-10-18
Attending: NURSE PRACTITIONER
Payer: COMMERCIAL

## 2023-10-18 VITALS
DIASTOLIC BLOOD PRESSURE: 88 MMHG | WEIGHT: 208.2 LBS | RESPIRATION RATE: 16 BRPM | SYSTOLIC BLOOD PRESSURE: 136 MMHG | HEART RATE: 91 BPM | HEIGHT: 65 IN | TEMPERATURE: 97.5 F | BODY MASS INDEX: 34.69 KG/M2 | OXYGEN SATURATION: 97 %

## 2023-10-18 DIAGNOSIS — M25.552 HIP PAIN, LEFT: Primary | ICD-10-CM

## 2023-10-18 DIAGNOSIS — G89.29 CHRONIC LEFT HIP PAIN: ICD-10-CM

## 2023-10-18 DIAGNOSIS — M25.552 HIP PAIN, LEFT: ICD-10-CM

## 2023-10-18 DIAGNOSIS — M25.552 CHRONIC LEFT HIP PAIN: ICD-10-CM

## 2023-10-18 DIAGNOSIS — Z12.31 ENCOUNTER FOR SCREENING MAMMOGRAM FOR BREAST CANCER: ICD-10-CM

## 2023-10-18 PROCEDURE — 73502 X-RAY EXAM HIP UNI 2-3 VIEWS: CPT | Mod: TC | Performed by: RADIOLOGY

## 2023-10-18 PROCEDURE — 99213 OFFICE O/P EST LOW 20 MIN: CPT | Mod: 25 | Performed by: NURSE PRACTITIONER

## 2023-10-18 PROCEDURE — 90686 IIV4 VACC NO PRSV 0.5 ML IM: CPT | Performed by: NURSE PRACTITIONER

## 2023-10-18 PROCEDURE — 90471 IMMUNIZATION ADMIN: CPT | Performed by: NURSE PRACTITIONER

## 2023-10-18 ASSESSMENT — PAIN SCALES - GENERAL: PAINLEVEL: MODERATE PAIN (4)

## 2023-10-25 ENCOUNTER — HOSPITAL ENCOUNTER (OUTPATIENT)
Dept: MRI IMAGING | Facility: HOSPITAL | Age: 53
Discharge: HOME | End: 2023-10-25
Attending: NURSE PRACTITIONER | Admitting: NURSE PRACTITIONER
Payer: COMMERCIAL

## 2023-10-25 DIAGNOSIS — M25.552 CHRONIC LEFT HIP PAIN: ICD-10-CM

## 2023-10-25 DIAGNOSIS — G89.29 CHRONIC LEFT HIP PAIN: ICD-10-CM

## 2023-10-25 PROCEDURE — 73721 MRI JNT OF LWR EXTRE W/O DYE: CPT | Mod: LT

## 2023-10-26 ENCOUNTER — MYC MEDICAL ADVICE (OUTPATIENT)
Dept: FAMILY MEDICINE | Facility: OTHER | Age: 53
End: 2023-10-26

## 2023-10-26 DIAGNOSIS — G89.29 CHRONIC LEFT HIP PAIN: ICD-10-CM

## 2023-10-26 DIAGNOSIS — M25.552 CHRONIC LEFT HIP PAIN: ICD-10-CM

## 2023-10-26 DIAGNOSIS — M16.12 PRIMARY OSTEOARTHRITIS OF LEFT HIP: ICD-10-CM

## 2023-10-26 DIAGNOSIS — S43.432D SUPERIOR GLENOID LABRUM LESION OF LEFT SHOULDER, SUBSEQUENT ENCOUNTER: ICD-10-CM

## 2023-10-26 DIAGNOSIS — M25.552 HIP PAIN, LEFT: Primary | ICD-10-CM

## 2023-11-08 NOTE — PROGRESS NOTES
SUBJECTIVE:   CC: Shazia is an 53 year old who presents for preventive health visit.       11/10/2023    10:11 AM   Additional Questions   Roomed by edis   Accompanied by none       Healthy Habits:     Getting at least 3 servings of Calcium per day:  Yes    Bi-annual eye exam:  Yes    Dental care twice a year:  Yes    Sleep apnea or symptoms of sleep apnea:  None    Diet:  Regular (no restrictions)    Frequency of exercise:  None    Taking medications regularly:  Yes    Medication side effects:  None    Additional concerns today:  No      Obesity:  she has had gastric bypass in the past but for the past several years - since menopause - she has been gaining weight.  Wonders options.  Options reviewed, has been on metformin in the past and it did help her.      Social History     Tobacco Use    Smoking status: Former     Types: Cigarettes     Start date: 1986     Quit date:      Years since quittin.8    Smokeless tobacco: Never   Substance Use Topics    Alcohol use: Yes     Comment: occ             11/10/2023    10:10 AM   Alcohol Use   Prescreen: >3 drinks/day or >7 drinks/week? No          No data to display              Reviewed orders with patient.  Reviewed health maintenance and updated orders accordingly - Yes      Breast Cancer Screening:    [facility]HS-7:       2021    10:40 AM   Breast CA Risk Assessment ([facility]HS-7)   Did any of your first-degree relatives have breast or ovarian cancer? No   Did any of your relatives have bilateral breast cancer? No   Did any man in your family have breast cancer? No   Did any woman in your family have breast and ovarian cancer? No   Did any woman in your family have breast cancer before age 50 y? No   Do you have 2 or more relatives with breast and/or ovarian cancer? No   Do you have 2 or more relatives with breast and/or bowel cancer? No       Annual mammogram recommended.   Pertinent mammograms are reviewed under the imaging tab.    History of abnormal Pap smear: NO  - age 30-65 PAP every 5 years with negative HPV co-testing recommended      Latest Ref Rng & Units 4/10/2018     8:59 AM   PAP / HPV   PAP (Historical)  NIL    HPV 16 DNA NEG^Negative Negative    HPV 18 DNA NEG^Negative Negative    Other HR HPV NEG^Negative Negative      Reviewed and updated as needed this visit by clinical staff   Tobacco  Allergies               Reviewed and updated as needed this visit by Provider                     Review of Systems   Constitutional:  Negative for chills and fever.   HENT:  Negative for congestion, ear pain, hearing loss and sore throat.    Eyes:  Negative for pain and visual disturbance.   Respiratory:  Negative for cough and shortness of breath.    Cardiovascular:  Negative for chest pain, palpitations and peripheral edema.   Gastrointestinal:  Negative for abdominal pain, constipation, diarrhea, heartburn, hematochezia and nausea.   Breasts:  Negative for tenderness, breast mass and discharge.   Genitourinary:  Negative for dysuria, frequency, genital sores, hematuria, pelvic pain, urgency, vaginal bleeding and vaginal discharge.   Musculoskeletal:  Positive for arthralgias. Negative for joint swelling and myalgias.   Skin:  Negative for rash.   Neurological:  Positive for dizziness and headaches. Negative for weakness and paresthesias.   Psychiatric/Behavioral:  Negative for mood changes. The patient is not nervous/anxious.        Recent Labs   Lab Test 09/28/22  0819 05/19/22  1010 01/10/22  0808 10/04/21  0746 08/09/21  1247 08/09/21  1247 06/26/21  1426 05/06/21  1017 04/09/21  0937 01/14/21  0924 12/11/20  1446 12/30/19  1528 01/15/18  1542 09/01/17  1154   A1C  --   --   --   --   --   --   --   --   --  5.8* 5.7*  --   --  5.7   *  --   --   --   --   --   --   --   --  175*  --  155*   < >  --    HDL 51  --   --   --   --   --   --   --   --  66  --  51   < >  --    TRIG 190*  --   --   --   --   --   --   --   --  210*  --  259*   < >  --    ALT  --  26 27  "26   < > 28 30  --  28 22  --   --    < >  --    CR  --  0.57 0.59 0.49*   < > 0.54 0.58  --  0.55 0.58  --  0.58   < > 0.57   GFRESTIMATED  --  >90 >90 >90   < > >90 >90  --  >90 >90  --  >90   < > >90   GFRESTBLACK  --   --   --   --   --   --  >90  --  >90 >90  --  >90   < > >90   POTASSIUM  --  4.5 4.0 4.5   < > 4.2 3.8  --  4.5 4.3  --  4.1   < > 4.1   TSH  --   --   --   --   --  1.24  --  1.78  --  2.25  --  1.93   < > 2.16    < > = values in this interval not displayed.      BP Readings from Last 3 Encounters:   11/10/23 118/80   10/18/23 136/88   07/24/23 (!) 136/93    Wt Readings from Last 3 Encounters:   11/10/23 94.7 kg (208 lb 11.2 oz)   10/18/23 94.4 kg (208 lb 3.2 oz)   07/19/23 94.3 kg (207 lb 14.3 oz)                         OBJECTIVE:   /80 (BP Location: Left arm, Patient Position: Chair, Cuff Size: Adult Large)   Pulse 74   Temp 97.2  F (36.2  C) (Tympanic)   Resp 16   Ht 1.651 m (5' 5\")   Wt 94.7 kg (208 lb 11.2 oz)   LMP 04/10/2018 (LMP Unknown)   SpO2 98%   BMI 34.73 kg/m    Physical Exam  GENERAL: healthy, alert and no distress  EYES: Eyes grossly normal to inspection, PERRL and conjunctivae and sclerae normal  HENT: ear canals and TM's normal, nose and mouth without ulcers or lesions  NECK: no adenopathy, no asymmetry, masses, or scars and thyroid normal to palpation  RESP: lungs clear to auscultation - no rales, rhonchi or wheezes  CV: regular rate and rhythm, normal S1 S2, no S3 or S4, no murmur, click or rub, no peripheral edema and peripheral pulses strong  ABDOMEN: soft, nontender, no hepatosplenomegaly, no masses and bowel sounds normal   (female): normal female external genitalia, normal urethral meatus, vaginal mucosa, normal cervix/adnexa/uterus without masses or discharge.  Pap obtained.   MS: no gross musculoskeletal defects noted, no edema  NEURO: Normal strength and tone, mentation intact and speech normal  PSYCH: mentation appears normal, affect " normal/bright        ASSESSMENT/PLAN:     1. Annual physical exam  Exam completed   - Lipid Profile; Future  - Comprehensive metabolic panel; Future  - TSH with free T4 reflex; Future    2. Cervical cancer screening  - A pap thin layer screen with  HPV - recommended age 30 - 65 years    3. Elevated glucose  - Hemoglobin A1c; Future  - metFORMIN (GLUCOPHAGE) 500 MG tablet; Take 1 tablet (500 mg) by mouth 2 times daily (with meals)  Dispense: 120 tablet; Refill: 1    4. Class 1 obesity due to excess calories without serious comorbidity with body mass index (BMI) of 34.0 to 34.9 in adult  Continue exercise, consider referral to dietitian   - Hemoglobin A1c; Future  - metFORMIN (GLUCOPHAGE) 500 MG tablet; Take 1 tablet (500 mg) by mouth 2 times daily (with meals)  Dispense: 120 tablet; Refill: 1'    Metformin start:   Week 1:  One tablet (500mg) with breakfast   Week 2:  One tablet (500mg) with breakfast and supper   Week 3:  Two tablets (1000mg) with breakfast and one tablet (500mg) with supper   Week 4:  Two tablets (1000mg) with breakfast and supper - please stay on this dose.        Patient has been advised of split billing requirements and indicates understanding: Yes    Follow-up in 1 month or as needed    COUNSELING:  Reviewed preventive health counseling, as reflected in patient instructions       Regular exercise       Healthy diet/nutrition       Vision screening       Hearing screening       Immunizations            She reports that she quit smoking about 35 years ago. Her smoking use included cigarettes. She started smoking about 37 years ago. She has never used smokeless tobacco.          Mary Ellen Armstrong, NP  [facility]

## 2023-11-10 ENCOUNTER — OFFICE VISIT (OUTPATIENT)
Dept: FAMILY MEDICINE | Facility: OTHER | Age: 53
End: 2023-11-10
Attending: NURSE PRACTITIONER
Payer: COMMERCIAL

## 2023-11-10 VITALS
HEART RATE: 74 BPM | OXYGEN SATURATION: 98 % | DIASTOLIC BLOOD PRESSURE: 80 MMHG | WEIGHT: 208.7 LBS | RESPIRATION RATE: 16 BRPM | TEMPERATURE: 97.2 F | HEIGHT: 65 IN | BODY MASS INDEX: 34.77 KG/M2 | SYSTOLIC BLOOD PRESSURE: 118 MMHG

## 2023-11-10 DIAGNOSIS — E66.811 CLASS 1 OBESITY DUE TO EXCESS CALORIES WITHOUT SERIOUS COMORBIDITY WITH BODY MASS INDEX (BMI) OF 34.0 TO 34.9 IN ADULT: ICD-10-CM

## 2023-11-10 DIAGNOSIS — E66.09 CLASS 1 OBESITY DUE TO EXCESS CALORIES WITHOUT SERIOUS COMORBIDITY WITH BODY MASS INDEX (BMI) OF 34.0 TO 34.9 IN ADULT: ICD-10-CM

## 2023-11-10 DIAGNOSIS — Z00.00 ANNUAL PHYSICAL EXAM: Primary | ICD-10-CM

## 2023-11-10 DIAGNOSIS — Z12.4 CERVICAL CANCER SCREENING: ICD-10-CM

## 2023-11-10 DIAGNOSIS — R73.09 ELEVATED GLUCOSE: ICD-10-CM

## 2023-11-10 LAB
ALBUMIN SERPL BCG-MCNC: 4.7 G/DL (ref 3.5–5.2)
ALP SERPL-CCNC: 116 U/L (ref 35–104)
ALT SERPL W P-5'-P-CCNC: 22 U/L (ref 0–50)
ANION GAP SERPL CALCULATED.3IONS-SCNC: 13 MMOL/L (ref 7–15)
AST SERPL W P-5'-P-CCNC: 20 U/L (ref 0–45)
BILIRUB SERPL-MCNC: 0.7 MG/DL
BUN SERPL-MCNC: 16.9 MG/DL (ref 6–20)
CALCIUM SERPL-MCNC: 9.3 MG/DL (ref 8.6–10)
CHLORIDE SERPL-SCNC: 103 MMOL/L (ref 98–107)
CHOLEST SERPL-MCNC: 271 MG/DL
CREAT SERPL-MCNC: 0.59 MG/DL (ref 0.51–0.95)
DEPRECATED HCO3 PLAS-SCNC: 24 MMOL/L (ref 22–29)
EGFRCR SERPLBLD CKD-EPI 2021: >90 ML/MIN/1.73M2
EST. AVERAGE GLUCOSE BLD GHB EST-MCNC: 128 MG/DL
GLUCOSE SERPL-MCNC: 100 MG/DL (ref 70–99)
HBA1C MFR BLD: 6.1 %
HDLC SERPL-MCNC: 48 MG/DL
LDLC SERPL CALC-MCNC: 169 MG/DL
NONHDLC SERPL-MCNC: 223 MG/DL
POTASSIUM SERPL-SCNC: 4.5 MMOL/L (ref 3.4–5.3)
PROT SERPL-MCNC: 7.3 G/DL (ref 6.4–8.3)
SODIUM SERPL-SCNC: 140 MMOL/L (ref 135–145)
TRIGL SERPL-MCNC: 272 MG/DL
TSH SERPL DL<=0.005 MIU/L-ACNC: 3.75 UIU/ML (ref 0.3–4.2)

## 2023-11-10 PROCEDURE — G0123 SCREEN CERV/VAG THIN LAYER: HCPCS | Performed by: NURSE PRACTITIONER

## 2023-11-10 PROCEDURE — 87624 HPV HI-RISK TYP POOLED RSLT: CPT | Performed by: NURSE PRACTITIONER

## 2023-11-10 PROCEDURE — 84443 ASSAY THYROID STIM HORMONE: CPT | Performed by: NURSE PRACTITIONER

## 2023-11-10 PROCEDURE — 83036 HEMOGLOBIN GLYCOSYLATED A1C: CPT | Performed by: NURSE PRACTITIONER

## 2023-11-10 PROCEDURE — 80053 COMPREHEN METABOLIC PANEL: CPT | Performed by: NURSE PRACTITIONER

## 2023-11-10 PROCEDURE — 80061 LIPID PANEL: CPT | Performed by: NURSE PRACTITIONER

## 2023-11-10 PROCEDURE — 99213 OFFICE O/P EST LOW 20 MIN: CPT | Mod: 25 | Performed by: NURSE PRACTITIONER

## 2023-11-10 PROCEDURE — 36415 COLL VENOUS BLD VENIPUNCTURE: CPT | Performed by: NURSE PRACTITIONER

## 2023-11-10 PROCEDURE — 99396 PREV VISIT EST AGE 40-64: CPT | Performed by: NURSE PRACTITIONER

## 2023-11-10 ASSESSMENT — ENCOUNTER SYMPTOMS
FEVER: 0
ABDOMINAL PAIN: 0
EYE PAIN: 0
DIZZINESS: 1
COUGH: 0
NAUSEA: 0
DIARRHEA: 0
PARESTHESIAS: 0
HEMATURIA: 0
JOINT SWELLING: 0
BREAST MASS: 0
NERVOUS/ANXIOUS: 0
MYALGIAS: 0
HEARTBURN: 0
ARTHRALGIAS: 1
FREQUENCY: 0
DYSURIA: 0
CONSTIPATION: 0
HEMATOCHEZIA: 0
CHILLS: 0
HEADACHES: 1
PALPITATIONS: 0
SORE THROAT: 0
WEAKNESS: 0
SHORTNESS OF BREATH: 0

## 2023-11-10 ASSESSMENT — PAIN SCALES - GENERAL: PAINLEVEL: MODERATE PAIN (5)

## 2023-11-10 NOTE — PATIENT INSTRUCTIONS
ASSESSMENT/PLAN:     1. Annual physical exam  Exam completed   - Lipid Profile; Future  - Comprehensive metabolic panel; Future  - TSH with free T4 reflex; Future    2. Cervical cancer screening  - A pap thin layer screen with  HPV - recommended age 30 - 65 years    3. Elevated glucose  - Hemoglobin A1c; Future  - metFORMIN (GLUCOPHAGE) 500 MG tablet; Take 1 tablet (500 mg) by mouth 2 times daily (with meals)  Dispense: 120 tablet; Refill: 1    4. Class 1 obesity due to excess calories without serious comorbidity with body mass index (BMI) of 34.0 to 34.9 in adult  Continue exercise, consider referral to dietitian   - Hemoglobin A1c; Future  - metFORMIN (GLUCOPHAGE) 500 MG tablet; Take 1 tablet (500 mg) by mouth 2 times daily (with meals)  Dispense: 120 tablet; Refill: 1'    Metformin start:   Week 1:  One tablet (500mg) with breakfast   Week 2:  One tablet (500mg) with breakfast and supper   Week 3:  Two tablets (1000mg) with breakfast and one tablet (500mg) with supper   Week 4:  Two tablets (1000mg) with breakfast and supper - please stay on this dose.      Follow-up in 1 month    Mary Ellen Armstrong,   Certified Adult Nurse Practitioner  [phone]

## 2023-11-17 LAB
BKR LAB AP GYN ADEQUACY: NORMAL
BKR LAB AP GYN INTERPRETATION: NORMAL
BKR LAB AP HPV REFLEX: NORMAL
BKR LAB AP PREVIOUS ABNORMAL: NORMAL
PATH REPORT.COMMENTS IMP SPEC: NORMAL
PATH REPORT.COMMENTS IMP SPEC: NORMAL
PATH REPORT.RELEVANT HX SPEC: NORMAL

## 2023-11-21 LAB
HUMAN PAPILLOMA VIRUS 16 DNA: NEGATIVE
HUMAN PAPILLOMA VIRUS 18 DNA: NEGATIVE
HUMAN PAPILLOMA VIRUS FINAL DIAGNOSIS: NORMAL
HUMAN PAPILLOMA VIRUS OTHER HR: NEGATIVE

## 2023-11-22 ENCOUNTER — THERAPY VISIT (OUTPATIENT)
Dept: PHYSICAL THERAPY | Facility: OTHER | Age: 53
End: 2023-11-22
Attending: NURSE PRACTITIONER
Payer: COMMERCIAL

## 2023-11-22 DIAGNOSIS — M70.62 TROCHANTERIC BURSITIS OF LEFT HIP: Primary | ICD-10-CM

## 2023-11-22 PROCEDURE — 97162 PT EVAL MOD COMPLEX 30 MIN: CPT | Mod: GP

## 2023-11-22 NOTE — PROGRESS NOTES
PHYSICAL THERAPY EVALUATION  Type of Visit: Evaluation    See electronic medical record for Abuse and Falls Screening details.    Subjective       Presenting condition or subjective complaint: left hip bursitis  Date of onset: 11/13/23    Relevant medical history: Diabetes; High blood pressure; Migraines or headaches; Overweight   Dates & types of surgery: appendectomy 1991  c section 1992 1994rt hip repair replacement tendon release hysterectomy    Prior diagnostic imaging/testing results: MRI; X-ray     Prior therapy history for the same diagnosis, illness or injury: No      Prior Level of Function  Transfers: Independent  Ambulation: Independent  ADL: Independent  IADL:     Living Environment  Social support: With a significant other or spouse   Type of home: House   Stairs to enter the home: Yes       Ramp: No   Stairs inside the home: Yes 5     Help at home: None  Equipment owned:       Employment: Yes reservation specialist  Hobbies/Interests: atv snowmobileing    Patient goals for therapy: pain free    Pain assessment:  Patient reports somewhat of a longstanding history of left hip pain.  She states she did go to physical therapy in 2020 approximately 6 sessions with some relief initially.  She states dates over time her hip pain has been worsening and has been affecting her functionally.  She was recently seen by her primary care provider MRI and x-rays were taken of the left hip with MRI impression noting: Tendinitis of the gluteus medius and minimus tendons, findings suggestive of trochanteric bursitis.  Mild osteoarthritic change of the left hip with degenerative change or nondisplaced tearing of the superior labrum.  Patient was seen by Dr. LAURA Kapadia for an orthopedic consult on 11/13/2023.  Patient states she did receive a cortisone injection which she states has helped significantly with her pain and especially being able to walk more upright.  She is now referred to physical therapy.  She is to contact  her physician with her progress following completion of PT     Objective   HIP EVALUATION  PAIN: Pain Level at Rest: 0/10  Pain Level with Use: 5/10  Pain Location: Left lateral thigh/hip with pain also into the left gluteal region  Pain Quality: Aching and Dull  Pain Frequency: intermittent  Pain is Exacerbated By: Getting in and out of car, walking, standing, stairs, sitting  Pain is Relieved By: rest  Pain Progression: Improved  INTEGUMENTARY (edema, incisions):   POSTURE: WFL  GAIT:   Weightbearing Status:   Assistive Device(s): None  Gait Deviations: WFL  BALANCE/PROPRIOCEPTION:   WEIGHTBEARING ALIGNMENT:   NON-WEIGHTBEARING ALIGNMENT:    ROM:  Trunk range of motion: Forward flexion was pain-free.  Back bending was 0 to 18 degrees with slight discomfort along the left lumbosacral region.  Right sidebending = 0 to 25 degrees with contralateral pull in the gluteal region, left sidebending = 0 to 30 degrees.    PELVIC/SI SCREEN: WNL  STRENGTH:  Manual muscle test: Left hip abduction 4+/5, knee flexion 4/5, hip extension 4 -/5, hip flexion 4/5, knee extension 5/5  LE FLEXIBILITY:  Hypomobility noted in the left quadriceps, hamstring, gastroc, ITB and piriformis muscles  SPECIAL TESTS:  Left hip special tests were negative  FUNCTIONAL TESTS:   PALPATION:  Minimal tenderness over the left greater trochanter of the hip.  Mild soft tissue tension tenderness to palpation along the left gluteus medius piriformis muscles  JOINT MOBILITY: Decreased left hip external rotation    Assessment & Plan   CLINICAL IMPRESSIONS  Medical Diagnosis: Left hip greater trochanteric bursitis    Treatment Diagnosis: Left hip greater trochanteric bursitis   Impression/Assessment: Patient is a 53 year old female with left hip complaints.  The following significant findings have been identified: Pain, Decreased ROM/flexibility, Decreased joint mobility, Decreased strength, Impaired muscle performance, and Decreased activity tolerance. These  impairments interfere with their ability to perform self care tasks, recreational activities, household mobility, and community mobility as compared to previous level of function.     Clinical Decision Making (Complexity):  Clinical Presentation: Stable/Uncomplicated  Clinical Presentation Rationale: based on medical and personal factors listed in PT evaluation  Clinical Decision Making (Complexity): Low complexity    PLAN OF CARE  Treatment Interventions:  Modalities: Cryotherapy, Ultrasound  Interventions: Manual Therapy, Neuromuscular Re-education, Therapeutic Activity, Therapeutic Exercise, Self-Care/Home Management    Long Term Goals     PT Goal 1  Goal Identifier: STG 1  Goal Description: Decreased pain when at its worst to a 3/10  Target Date: 12/06/23  PT Goal 2  Goal Identifier: LTG 1  Goal Description: Patient will demonstrate independence with home exercise program  Target Date: 12/20/23  PT Goal 3  Goal Identifier: LTG 2  Goal Description: Patient be able to walk 1 mile with little to no difficulty  Target Date: 12/20/23      Frequency of Treatment: 2 times/week  Duration of Treatment: 3 weeks    Recommended Referrals to Other Professionals: Physical Therapy  Education Assessment:   Learner/Method: Patient;Listening;No Barriers to Learning  Education Comments: Session included patient education/discussion in today's objective findings along with recommended treatment plan, recommendation for use of home icing and use of pillow between knees for sleeping    Risks and benefits of evaluation/treatment have been explained.   Patient/Family/caregiver agrees with Plan of Care.     Evaluation Time:     PT Eval, Moderate Complexity Minutes (24736): 45       Signing Clinician: Tiffany Donnelly PT

## 2023-11-29 ENCOUNTER — THERAPY VISIT (OUTPATIENT)
Dept: PHYSICAL THERAPY | Facility: OTHER | Age: 53
End: 2023-11-29
Attending: NURSE PRACTITIONER
Payer: COMMERCIAL

## 2023-11-29 DIAGNOSIS — M70.62 TROCHANTERIC BURSITIS OF LEFT HIP: Primary | ICD-10-CM

## 2023-11-29 PROCEDURE — 97140 MANUAL THERAPY 1/> REGIONS: CPT | Mod: GP

## 2023-11-29 PROCEDURE — 97035 APP MDLTY 1+ULTRASOUND EA 15: CPT | Mod: GP

## 2023-12-06 ENCOUNTER — THERAPY VISIT (OUTPATIENT)
Dept: PHYSICAL THERAPY | Facility: OTHER | Age: 53
End: 2023-12-06
Attending: NURSE PRACTITIONER
Payer: COMMERCIAL

## 2023-12-06 DIAGNOSIS — M70.62 TROCHANTERIC BURSITIS OF LEFT HIP: Primary | ICD-10-CM

## 2023-12-06 PROCEDURE — 97035 APP MDLTY 1+ULTRASOUND EA 15: CPT | Mod: GP

## 2023-12-06 PROCEDURE — 97140 MANUAL THERAPY 1/> REGIONS: CPT | Mod: GP

## 2023-12-08 ENCOUNTER — THERAPY VISIT (OUTPATIENT)
Dept: PHYSICAL THERAPY | Facility: OTHER | Age: 53
End: 2023-12-08
Attending: NURSE PRACTITIONER
Payer: COMMERCIAL

## 2023-12-08 DIAGNOSIS — M70.62 TROCHANTERIC BURSITIS OF LEFT HIP: Primary | ICD-10-CM

## 2023-12-08 PROCEDURE — 97140 MANUAL THERAPY 1/> REGIONS: CPT | Mod: GP

## 2023-12-08 PROCEDURE — 97035 APP MDLTY 1+ULTRASOUND EA 15: CPT | Mod: GP

## 2023-12-14 NOTE — PROGRESS NOTES
Assessment & Plan     1. Class 1 obesity due to excess calories without serious comorbidity with body mass index (BMI) of 34.0 to 34.9 in adult  Stop metformin due to side effects, will try mounjaro.     - tirzepatide (MOUNJARO) 2.5 MG/0.5ML pen; Inject 2.5 mg Subcutaneous every 7 days  Dispense: 2 mL; Refill: 0  - continue weight loss efforts, diet changes and exercise goals    In 3 months or as needed     Mary Ellen Armstrong NP  [facility] - GAVIN Mccray is a 53 year old, presenting for the following health issues:  Weight Problem        12/15/2023     1:39 PM   Additional Questions   Roomed by Aimee STEVENS RN   Accompanied by none       HPI     Concern - Weight Management   Onset: ongoing   Description: gaining weight since went into menopause   Intensity: moderate  Progression of Symptoms:  Last office visit weight was 208# on 11/10/23 today's weight is 206.2#  Accompanying Signs & Symptoms: None  Previous history of similar problem: Ongoing   Precipitating factors:        Worsened by: Previous gastric bypass and 50lb weight gain after hysterectomy  Alleviating factors:        Improved by: Currently using metformin  Therapies tried and outcome: [id number]/70 - has been taking 1000mg am and 500mg pm due to diarrhea which is interfering with daily activities.  Would like to try something different.      Has tried topamax in the past, could not tolerate it.          Recent Labs   Lab Test 11/10/23  1134 09/28/22  0819 05/19/22  1010 01/10/22  0808 10/04/21  0746 08/09/21  1247 06/26/21  1426 05/06/21  1017 04/09/21  0937 01/14/21  0924 12/11/20  1446   A1C 6.1*  --   --   --   --   --   --   --   --  5.8* 5.7*   * 158*  --   --   --   --   --   --   --  175*  --    HDL 48* 51  --   --   --   --   --   --   --  66  --    TRIG 272* 190*  --   --   --   --   --   --   --  210*  --    ALT 22  --  26 27   < > 28 30  --  28 22  --    CR 0.59  --  0.57 0.59   < > 0.54 0.58  --  0.55  0.58  --    GFRESTIMATED >90  --  >90 >90   < > >90 >90  --  >90 >90  --    GFRESTBLACK  --   --   --   --   --   --  >90  --  >90 >90  --    POTASSIUM 4.5  --  4.5 4.0   < > 4.2 3.8  --  4.5 4.3  --    TSH 3.75  --   --   --   --  1.24  --    < >  --  2.25  --     < > = values in this interval not displayed.      BP Readings from Last 3 Encounters:   12/15/23 110/70   11/10/23 118/80   10/18/23 136/88    Wt Readings from Last 3 Encounters:   12/15/23 93.7 kg (206 lb 9.6 oz)   11/10/23 94.7 kg (208 lb 11.2 oz)   10/18/23 94.4 kg (208 lb 3.2 oz)                        Review of Systems   Constitutional, HEENT, cardiovascular, pulmonary, gi and gu systems are negative, except as otherwise noted.      Objective    /70 (BP Location: Left arm, Patient Position: Sitting, Cuff Size: Adult Regular)   Pulse 74   Temp 97.1  F (36.2  C) (Tympanic)   Resp 18   Wt 93.7 kg (206 lb 9.6 oz)   LMP 04/10/2018 (LMP Unknown)   SpO2 98%   BMI 34.38 kg/m    Body mass index is 34.38 kg/m .  Physical Exam   GENERAL: healthy, alert and no distress  RESP: lungs clear to auscultation - no rales, rhonchi or wheezes  CV: regular rate and rhythm, normal S1 S2, no S3 or S4, no murmur  MS: no gross musculoskeletal defects noted, no edema  PSYCH: mentation appears normal, affect normal/bright

## 2023-12-15 ENCOUNTER — THERAPY VISIT (OUTPATIENT)
Dept: PHYSICAL THERAPY | Facility: OTHER | Age: 53
End: 2023-12-15
Attending: NURSE PRACTITIONER
Payer: COMMERCIAL

## 2023-12-15 ENCOUNTER — OFFICE VISIT (OUTPATIENT)
Dept: FAMILY MEDICINE | Facility: OTHER | Age: 53
End: 2023-12-15
Attending: NURSE PRACTITIONER
Payer: COMMERCIAL

## 2023-12-15 VITALS
BODY MASS INDEX: 34.38 KG/M2 | RESPIRATION RATE: 18 BRPM | DIASTOLIC BLOOD PRESSURE: 70 MMHG | WEIGHT: 206.6 LBS | OXYGEN SATURATION: 98 % | SYSTOLIC BLOOD PRESSURE: 110 MMHG | HEART RATE: 74 BPM | TEMPERATURE: 97.1 F

## 2023-12-15 DIAGNOSIS — E66.811 CLASS 1 OBESITY DUE TO EXCESS CALORIES WITHOUT SERIOUS COMORBIDITY WITH BODY MASS INDEX (BMI) OF 34.0 TO 34.9 IN ADULT: Primary | ICD-10-CM

## 2023-12-15 DIAGNOSIS — E66.09 CLASS 1 OBESITY DUE TO EXCESS CALORIES WITHOUT SERIOUS COMORBIDITY WITH BODY MASS INDEX (BMI) OF 34.0 TO 34.9 IN ADULT: Primary | ICD-10-CM

## 2023-12-15 DIAGNOSIS — M70.62 TROCHANTERIC BURSITIS OF LEFT HIP: Primary | ICD-10-CM

## 2023-12-15 PROCEDURE — 99214 OFFICE O/P EST MOD 30 MIN: CPT | Performed by: NURSE PRACTITIONER

## 2023-12-15 PROCEDURE — 97140 MANUAL THERAPY 1/> REGIONS: CPT | Mod: GP

## 2023-12-15 PROCEDURE — 97035 APP MDLTY 1+ULTRASOUND EA 15: CPT | Mod: GP

## 2023-12-15 RX ORDER — TIRZEPATIDE 2.5 MG/.5ML
2.5 INJECTION, SOLUTION SUBCUTANEOUS
Qty: 2 ML | Refills: 0 | Status: SHIPPED | OUTPATIENT
Start: 2023-12-15 | End: 2024-01-08

## 2023-12-15 ASSESSMENT — PAIN SCALES - GENERAL: PAINLEVEL: NO PAIN (0)

## 2023-12-15 NOTE — PROGRESS NOTES
PLAN  Continue therapy per current plan of care.    Beginning/End Dates of Progress Note Reporting Period:    11/22/23 to 12/15/2023    Referring Provider:Dr. Roberto Yen       Please sign to continue PT 3-5 more sessions          ---------------------------------------------------------------------------------------------------------   12/15/23 1258   Appointment Info   Signing clinician's name / credentials Tiffany Donnelly, PT   Visits Used 5/6 per MD referral   Medical Diagnosis Left hip greater trochanteric bursitis   PT Tx Diagnosis Left hip greater trochanteric bursitis   Precautions/Limitations None   Progress Note/Certification   Onset of illness/injury or Date of Surgery 11/13/23   Therapy Frequency 2 times/week   Predicted Duration 3 weeks   GOALS   PT Goals 2;3   PT Goal 1   Goal Identifier STG 1   Goal Description Decreased pain when at its worst to a 3/10   Target Date 12/06/23   Date Met 12/06/23   PT Goal 2   Goal Identifier LTG 1   Goal Description Patient will demonstrate independence with home exercise program   Target Date 12/20/23   PT Goal 3   Goal Identifier LTG 2   Goal Description Patient be able to walk 1 mile with little to no difficulty   Target Date 12/20/23   Subjective Report   Subjective Report pt reports her hip asgetting  better and the pain is occuring  more intermittently.   Objective Measures   Objective Measures Objective Measure 3   Objective Measure 1   Objective Measure SIJ assessment   Details negative   Objective Measure 2   Objective Measure L hip special tests   Details negative. she did have some anterior thigh/ hip discomfort with ER. + posterior hip capsule tightness   Objective Measure 3   Objective Measure palpation   Details + tenderrness L hip greater trochanter, L gluteus medius and piriformis [facility]   Ultrasound   Ultrasound Minutes (09873) 8   Ultrasound -Type (does not include 3-5 min prep/cleanup time) Continuous   Intensity 1.4w/cm2   Duration (does not include  the 3-5 min set up/clean up time) 8 min   Frequency 1 MHz   Positioning sidelying   Patient Response/Progress tolerated well   Location L hip /lateral glute region   Therapeutic Procedure/Exercise   Therapeutic Procedures: strength, endurance, ROM, flexibillity minutes (41934) 5   Ther Proc 1 HEP   Ther Proc 1 - Details review of HEP  and addede :Access Code: [email or web address]  URL: [email or web address]  Date: 12/15/2023  Prepared by: Tiffany Donnelly    Exercises  - Gastroc Stretch on Wall  - 1-2 x daily - 7 x weekly - 1 sets - 2 reps - 30 secs hold  - Prone Quadriceps Stretch with Strap  - 1-2 x daily - 7 x weekly - 1 sets - 2 reps - 30 secs hold   Skilled Intervention pt ed, verbal and manual cues   Patient Response/Progress able to perform the ex correctly   Manual Therapy   Manual Therapy: Mobilization, MFR, MLD, friction massage minutes (69477) 27   Manual Therapy 1 STM   Manual Therapy 1 - Details STM L hip/ gluteal mms   Manual Therapy 2 jt mobs   Manual Therapy 2 - Details L hip A/P glide. posterior capsule stretch   Skilled Intervention decrease soft tissue and or joint restrictions   Patient Response/Progress good- less tightness   Plan   Home program see above   Updates to plan of care MD update with request to continue PT 3-4 more session sent   Plan for next session pt will schedule more PT sessions   Comments   Comments ?  ITB,  progress to strengthening as able   Total Session Time   Timed Code Treatment Minutes 40   Total Treatment Time (sum of timed and untimed services) 40             Please sign and date :

## 2024-01-02 ENCOUNTER — LAB (OUTPATIENT)
Dept: LAB | Facility: OTHER | Age: 54
End: 2024-01-02
Payer: COMMERCIAL

## 2024-01-02 DIAGNOSIS — E61.1 IRON DEFICIENCY: ICD-10-CM

## 2024-01-02 DIAGNOSIS — Z98.84 H/O GASTRIC BYPASS: ICD-10-CM

## 2024-01-02 LAB
BASOPHILS # BLD AUTO: 0 10E3/UL (ref 0–0.2)
BASOPHILS NFR BLD AUTO: 1 %
EOSINOPHIL # BLD AUTO: 0.1 10E3/UL (ref 0–0.7)
EOSINOPHIL NFR BLD AUTO: 1 %
ERYTHROCYTE [DISTWIDTH] IN BLOOD BY AUTOMATED COUNT: 13.2 % (ref 10–15)
FERRITIN SERPL-MCNC: 141 NG/ML (ref 11–328)
FOLATE SERPL-MCNC: 25.6 NG/ML (ref 4.6–34.8)
HCT VFR BLD AUTO: 41.9 % (ref 35–47)
HGB BLD-MCNC: 14 G/DL (ref 11.7–15.7)
IMM GRANULOCYTES # BLD: 0 10E3/UL
IMM GRANULOCYTES NFR BLD: 0 %
IRON BINDING CAPACITY (ROCHE): 312 UG/DL (ref 240–430)
IRON SATN MFR SERPL: 25 % (ref 15–46)
IRON SERPL-MCNC: 78 UG/DL (ref 37–145)
LYMPHOCYTES # BLD AUTO: 1.6 10E3/UL (ref 0.8–5.3)
LYMPHOCYTES NFR BLD AUTO: 26 %
MCH RBC QN AUTO: 29.9 PG (ref 26.5–33)
MCHC RBC AUTO-ENTMCNC: 33.4 G/DL (ref 31.5–36.5)
MCV RBC AUTO: 89 FL (ref 78–100)
MONOCYTES # BLD AUTO: 0.4 10E3/UL (ref 0–1.3)
MONOCYTES NFR BLD AUTO: 6 %
NEUTROPHILS # BLD AUTO: 4.1 10E3/UL (ref 1.6–8.3)
NEUTROPHILS NFR BLD AUTO: 66 %
PLATELET # BLD AUTO: 317 10E3/UL (ref 150–450)
RBC # BLD AUTO: 4.69 10E6/UL (ref 3.8–5.2)
VIT B12 SERPL-MCNC: 1398 PG/ML (ref 232–1245)
WBC # BLD AUTO: 6.1 10E3/UL (ref 4–11)

## 2024-01-02 PROCEDURE — 82728 ASSAY OF FERRITIN: CPT

## 2024-01-02 PROCEDURE — 36415 COLL VENOUS BLD VENIPUNCTURE: CPT

## 2024-01-02 PROCEDURE — 83540 ASSAY OF IRON: CPT

## 2024-01-02 PROCEDURE — 83550 IRON BINDING TEST: CPT

## 2024-01-02 PROCEDURE — 82746 ASSAY OF FOLIC ACID SERUM: CPT

## 2024-01-02 PROCEDURE — 85025 COMPLETE CBC W/AUTO DIFF WBC: CPT

## 2024-01-02 PROCEDURE — 82607 VITAMIN B-12: CPT

## 2024-01-04 ENCOUNTER — THERAPY VISIT (OUTPATIENT)
Dept: PHYSICAL THERAPY | Facility: OTHER | Age: 54
End: 2024-01-04
Attending: NURSE PRACTITIONER
Payer: COMMERCIAL

## 2024-01-04 DIAGNOSIS — M70.62 TROCHANTERIC BURSITIS OF LEFT HIP: Primary | ICD-10-CM

## 2024-01-04 PROCEDURE — 97140 MANUAL THERAPY 1/> REGIONS: CPT | Mod: GP

## 2024-01-04 PROCEDURE — 97035 APP MDLTY 1+ULTRASOUND EA 15: CPT | Mod: GP

## 2024-01-07 DIAGNOSIS — E66.811 CLASS 1 OBESITY DUE TO EXCESS CALORIES WITHOUT SERIOUS COMORBIDITY WITH BODY MASS INDEX (BMI) OF 34.0 TO 34.9 IN ADULT: ICD-10-CM

## 2024-01-07 DIAGNOSIS — E66.09 CLASS 1 OBESITY DUE TO EXCESS CALORIES WITHOUT SERIOUS COMORBIDITY WITH BODY MASS INDEX (BMI) OF 34.0 TO 34.9 IN ADULT: ICD-10-CM

## 2024-01-08 RX ORDER — TIRZEPATIDE 2.5 MG/.5ML
2.5 INJECTION, SOLUTION SUBCUTANEOUS
Qty: 2 ML | Refills: 0 | Status: SHIPPED | OUTPATIENT
Start: 2024-01-08 | End: 2024-01-11

## 2024-01-08 NOTE — TELEPHONE ENCOUNTER
tirzepatide (MOUNJARO) 2.5 MG/0.5ML pen 2 mL 0 12/15/2023     Last Office Visit: 12/15/2023  Future Office visit:    Next 5 appointments (look out 90 days)      Jan 11, 2024 10:00 AM  (Arrive by 9:45 AM)  SHORT with Mary Ellen Armstrong NP  [facility] ([facility] ) [address]  [facility] [address]  [phone]             Routing refill request to provider for review/approval because:

## 2024-01-09 NOTE — PROGRESS NOTES
Assessment & Plan     1. Class 1 obesity due to excess calories without serious comorbidity with body mass index (BMI) of 34.0 to 34.9 in adult  Continue weight loss efforts - meal planning and increase exercise.    Will continue mounjaor at current 2.5mg weekly dose for now  - tirzepatide (MOUNJARO) 2.5 MG/0.5ML pen; Inject 2.5 mg Subcutaneous every 7 days  Dispense: 2 mL; Refill: 0    2. Motion sickness, initial encounter  Upcoming cruise.    - scopolamine (TRANSDERM) 1 MG/3DAYS 72 hr patch; Place 1 patch onto the skin every 72 hours  Dispense: 3 patch; Refill: 0      Follow-up in 1 month or as needed     Mary Ellen Armstrong NP  [facility] - GAVIN Mccray is a 53 year old, presenting for the following health issues:  weight management         1/11/2024     9:38 AM   Additional Questions   Roomed by edis   Accompanied by none       HPI     Concern - Weight Management   Onset: ongoing   Description: hard time losing weight   Intensity: moderate  Progression of Symptoms:  Last office visit weight was 206# on 12/15/23 today's weight is 196.7#  Accompanying Signs & Symptoms: none   Previous history of similar problem: yes   Precipitating factors:        Worsened by: unknown   Alleviating factors:        Improved by: Mounjaro   Therapies tried and outcome: Gastric bypass Mounjaro     She is not drinking soda, drinking more water, more protein and less sugar.  Has not been exercising yet.  Does have increased energy.      Mike melts - multivitamin for gastric bypass patients.     She is going on a 5 day cruise, would like scopolamine patch as this is her first cruise and is not sure how she will handle it.      BP Readings from Last 3 Encounters:   01/11/24 108/70   12/15/23 110/70   11/10/23 118/80    Wt Readings from Last 3 Encounters:   01/11/24 89.2 kg (196 lb 11.2 oz)   12/15/23 93.7 kg (206 lb 9.6 oz)   11/10/23 94.7 kg (208 lb 11.2 oz)                        Review of Systems  "  Constitutional, HEENT, cardiovascular, pulmonary, gi and gu systems are negative, except as otherwise noted.      Objective    /70 (BP Location: Left arm, Patient Position: Chair, Cuff Size: Adult Large)   Pulse 72   Temp 96.9  F (36.1  C) (Tympanic)   Resp 16   Ht 1.651 m (5' 5\")   Wt 89.2 kg (196 lb 11.2 oz)   LMP 04/10/2018 (LMP Unknown)   SpO2 98%   BMI 32.73 kg/m    Body mass index is 32.73 kg/m .  Physical Exam   GENERAL: healthy, alert and no distress, obese  MS: no gross musculoskeletal defects noted, no edema  PSYCH: mentation appears normal, affect normal/bright    Lab on 01/02/2024   Component Date Value Ref Range Status    Ferritin 01/02/2024 141  11 - 328 ng/mL Final    Iron 01/02/2024 78  37 - 145 ug/dL Final    Iron Binding Capacity 01/02/2024 312  240 - 430 ug/dL Final    Iron Sat Index 01/02/2024 25  15 - 46 % Final    Vitamin B12 01/02/2024 1,398 (H)  232 - 1,245 pg/mL Final    Folic Acid 01/02/2024 25.6  4.6 - 34.8 ng/mL Final    WBC Count 01/02/2024 6.1  4.0 - 11.0 10e3/uL Final    RBC Count 01/02/2024 4.69  3.80 - 5.20 10e6/uL Final    Hemoglobin 01/02/2024 14.0  11.7 - 15.7 g/dL Final    Hematocrit 01/02/2024 41.9  35.0 - 47.0 % Final    MCV 01/02/2024 89  78 - 100 fL Final    MCH 01/02/2024 29.9  26.5 - 33.0 pg Final    MCHC 01/02/2024 33.4  31.5 - 36.5 g/dL Final    RDW 01/02/2024 13.2  10.0 - 15.0 % Final    Platelet Count 01/02/2024 317  150 - 450 10e3/uL Final    % Neutrophils 01/02/2024 66  % Final    % Lymphocytes 01/02/2024 26  % Final    % Monocytes 01/02/2024 6  % Final    % Eosinophils 01/02/2024 1  % Final    % Basophils 01/02/2024 1  % Final    % Immature Granulocytes 01/02/2024 0  % Final    Absolute Neutrophils 01/02/2024 4.1  1.6 - 8.3 10e3/uL Final    Absolute Lymphocytes 01/02/2024 1.6  0.8 - 5.3 10e3/uL Final    Absolute Monocytes 01/02/2024 0.4  0.0 - 1.3 10e3/uL Final    Absolute Eosinophils 01/02/2024 0.1  0.0 - 0.7 10e3/uL Final    Absolute Basophils " 01/02/2024 0.0  0.0 - 0.2 10e3/uL Final    Absolute Immature Granulocytes 01/02/2024 0.0  <=0.4 10e3/uL Final

## 2024-01-11 ENCOUNTER — OFFICE VISIT (OUTPATIENT)
Dept: FAMILY MEDICINE | Facility: OTHER | Age: 54
End: 2024-01-11
Attending: NURSE PRACTITIONER
Payer: COMMERCIAL

## 2024-01-11 VITALS
WEIGHT: 196.7 LBS | RESPIRATION RATE: 16 BRPM | TEMPERATURE: 96.9 F | HEART RATE: 72 BPM | SYSTOLIC BLOOD PRESSURE: 108 MMHG | DIASTOLIC BLOOD PRESSURE: 70 MMHG | OXYGEN SATURATION: 98 % | HEIGHT: 65 IN | BODY MASS INDEX: 32.77 KG/M2

## 2024-01-11 DIAGNOSIS — E66.09 CLASS 1 OBESITY DUE TO EXCESS CALORIES WITHOUT SERIOUS COMORBIDITY WITH BODY MASS INDEX (BMI) OF 34.0 TO 34.9 IN ADULT: ICD-10-CM

## 2024-01-11 DIAGNOSIS — T75.3XXA MOTION SICKNESS, INITIAL ENCOUNTER: Primary | ICD-10-CM

## 2024-01-11 DIAGNOSIS — E66.811 CLASS 1 OBESITY DUE TO EXCESS CALORIES WITHOUT SERIOUS COMORBIDITY WITH BODY MASS INDEX (BMI) OF 34.0 TO 34.9 IN ADULT: ICD-10-CM

## 2024-01-11 PROCEDURE — 99213 OFFICE O/P EST LOW 20 MIN: CPT | Performed by: NURSE PRACTITIONER

## 2024-01-11 RX ORDER — TIRZEPATIDE 2.5 MG/.5ML
2.5 INJECTION, SOLUTION SUBCUTANEOUS
Qty: 2 ML | Refills: 0 | Status: SHIPPED | OUTPATIENT
Start: 2024-01-11 | End: 2024-02-15

## 2024-01-11 RX ORDER — SCOLOPAMINE TRANSDERMAL SYSTEM 1 MG/1
1 PATCH, EXTENDED RELEASE TRANSDERMAL
Qty: 3 PATCH | Refills: 0 | Status: SHIPPED | OUTPATIENT
Start: 2024-01-11 | End: 2024-02-15

## 2024-01-18 NOTE — PROGRESS NOTES
DISCHARGE  Reason for Discharge: Patient chooses to discontinue therapy.    Equipment Issued: none    Discharge Plan: Patient to continue home program.    Referring Provider:  Dr. Roberto Yen   01/04/24 [id number]   Appointment Info   Signing clinician's name / credentials Tiffany Donnelly PT   Visits Used 6/6 per MD referral   Medical Diagnosis Left hip greater trochanteric bursitis   PT Tx Diagnosis Left hip greater trochanteric bursitis   Precautions/Limitations None   Progress Note/Certification   Onset of illness/injury or Date of Surgery 11/13/23   Therapy Frequency 2 times/week   Predicted Duration 3 weeks   PT Goal 1   Goal Identifier STG 1   Goal Description Decreased pain when at its worst to a 3/10   Target Date 12/06/23   Date Met 12/06/23   PT Goal 2   Goal Identifier LTG 1   Goal Description Patient will demonstrate independence with home exercise program   Target Date 12/20/23   Date Met 01/04/24   PT Goal 3   Goal Identifier LTG 2   Goal Description Patient be able to walk 1 mile with little to no difficulty   Target Date 12/20/23   Subjective Report   Subjective Report pt reports she is doing better and notes less tenderness to palpation of the R hip region now.She is now able to sleep without waking due to hip pain.   Objective Measure 1   Objective Measure SIJ assessment   Details negative   Objective Measure 2   Objective Measure L hip special tests   Details negative. she did have some anterior thigh/ hip discomfort with ER. + posterior hip capsule tightness   Objective Measure 3   Objective Measure palpation   Details + mild tenderrness L hip greater trochanter, L gluteus medius and piriformis [facility]   Ultrasound   Ultrasound Minutes (31637) 8   Ultrasound -Type (does not include 3-5 min prep/cleanup time) Continuous   Intensity 1.4w/cm2   Duration (does not include the 3-5 min set up/clean up time) 8 min   Frequency 1 MHz   Location L hip /lateral glute region   Positioning sidelying   Patient  Response/Progress tolerated well   Therapeutic Procedure/Exercise   Therapeutic Procedures: strength, endurance, ROM, flexibillity minutes (21764) 4   Ther Proc 1 HEP   Ther Proc 1 - Details Access Code: [email or web address]  URL: [email or web address]  Date: 01/04/2024  Prepared by: Tiffany Donnelly    Exercises  - Clamshell  - 1 x daily - 3-4 x weekly - 1 sets - 10-30 reps   Skilled Intervention pt ed, verbal and manual cues   Patient Response/Progress able to perform the ex correctly   Manual Therapy   Manual Therapy: Mobilization, MFR, MLD, friction massage minutes (35139) 26   Manual Therapy 1 STM   Manual Therapy 1 - Details STM L hip/ gluteal mms   Skilled Intervention decrease soft tissue and or joint restrictions   Patient Response/Progress good- less tightness   Plan   Home program see above   Plan for next session we havent received new orders yet. Pt will contact her MD   Comments   Comments pt cancelled remaining PT due to insurance /financial changes.   Total Session Time   Timed Code Treatment Minutes 38   Total Treatment Time (sum of timed and untimed services) 38

## 2024-01-21 ENCOUNTER — MYC MEDICAL ADVICE (OUTPATIENT)
Dept: FAMILY MEDICINE | Facility: OTHER | Age: 54
End: 2024-01-21

## 2024-01-21 DIAGNOSIS — G25.81 RESTLESS LEG SYNDROME: Primary | ICD-10-CM

## 2024-01-24 RX ORDER — PRAMIPEXOLE DIHYDROCHLORIDE 0.12 MG/1
TABLET ORAL
Qty: 60 TABLET | Refills: 0 | Status: SHIPPED | OUTPATIENT
Start: 2024-01-24 | End: 2024-01-29

## 2024-01-24 NOTE — TELEPHONE ENCOUNTER
Pt called and is wondering if she can get something for her RLS.    Pt was seen in 12/30/2019 and note for RLS as follows:   2. Restless legs syndrome  - pramipexole (MIRAPEX) 0.125 MG tablet; Take 1-2 tablets (0.125-0.25 mg) by mouth nightly as needed (restless legs)  Dispense: 60 tablet; Refill: 1     Pt stated that she would wait until her appointment in February to discuss if needed.      Pended up pramipexole per old order from 12/30/2019 per pt request.  Diagnosis: RLS  Please review and sign is appropriate.     Pt informed that PCP out of clinic until tomorrow.

## 2024-01-26 ENCOUNTER — VIRTUAL VISIT (OUTPATIENT)
Dept: ONCOLOGY | Facility: OTHER | Age: 54
End: 2024-01-26
Attending: NURSE PRACTITIONER
Payer: COMMERCIAL

## 2024-01-26 DIAGNOSIS — E61.1 IRON DEFICIENCY: Primary | ICD-10-CM

## 2024-01-26 DIAGNOSIS — G25.81 RESTLESS LEG SYNDROME: ICD-10-CM

## 2024-01-26 DIAGNOSIS — Z98.84 H/O GASTRIC BYPASS: ICD-10-CM

## 2024-01-26 PROCEDURE — 99441 PR PHYSICIAN TELEPHONE EVALUATION 5-10 MIN: CPT | Mod: 93 | Performed by: NURSE PRACTITIONER

## 2024-01-26 ASSESSMENT — PAIN SCALES - GENERAL: PAINLEVEL: NO PAIN (0)

## 2024-01-26 NOTE — NURSING NOTE
"Oncology Rooming Note    January 26, 2024 2:25 PM   Shazia Verma is a 53 year old female who presents for:    Chief Complaint   Patient presents with    Hematology     Follow up Iron Deficiency     Initial Vitals: LMP 04/10/2018 (LMP Unknown)  Estimated body mass index is 32.73 kg/m  as calculated from the following:    Height as of 1/11/24: 1.651 m (5' 5\").    Weight as of 1/11/24: 89.2 kg (196 lb 11.2 oz). There is no height or weight on file to calculate BSA.  No Pain (0) Comment: Data Unavailable   Patient's last menstrual period was 04/10/2018 (lmp unknown).  Allergies reviewed: Yes  Medications reviewed: Yes    Medications: Medication refills not needed today.  Pharmacy name entered into [employer]: [facility] [id number] [place] [address]    Frailty Screening:   Is the patient here for a new oncology consult visit in cancer care? 2. No            Morenita Hwang LPN             "

## 2024-01-26 NOTE — PROGRESS NOTES
Hematology Follow-up Visit:  January 26, 2024    Reason for Visit:  Patient presents with:  Hematology: Follow up Iron Deficiency      Nursing Notes and Documentation reviewed:  yes    HPI: This is a 53-year-old female patient who is undergoing a billable telephone visit today in follow-up of iron deficiency.  Patient is post gastric bypass surgery 2012.  She was evaluated initially by Dr. Ceballos on 8/9/2021.  At that time ferritin had been drawn by her PCP showing a level of 6.  Hemoglobin was within normal limits.  It was felt this was likely related to malabsorption secondary to her gastric bypass procedure and she was treated with IV Venofer x5 infusions. Repeat iron studies showed improvement with the normalization of her ferritin level.  Additional studies including SPEP, TSH, sed rate and rheumatoid factor along with PHUC were completed that were negative.  It was recommended she pursue a colonoscopy.     She underwent colonoscopy and EGD on 2/11/2022 with hemorrhoids noted on colonoscopy.    She states she is feeling great.  She offers no new complaints.  She states she did discontinue her oral vitamin B12 and iron as she instituted a supplement called Barimelt.  This supplement had been recommended by her bariatric surgeon previously but she did not start it.  She has been taking it for just over a month now.  Note her metformin was discontinued and she was placed on Mounjaro and she is feeling very good.    Treatment: Venofer PRN     Past Medical History:   Diagnosis Date    Arthritis     Breast mass 2002    benign right    Diabetes (H)     History of right hip replacement     Hypertension        Social History     Socioeconomic History    Marital status:      Spouse name: Not on file    Number of children: Not on file    Years of education: Not on file    Highest education level: Not on file   Occupational History    Not on file   Tobacco Use    Smoking status: Former     Types: Cigarettes      Start date: 1986     Quit date:      Years since quittin.0    Smokeless tobacco: Never   Vaping Use    Vaping Use: Never used   Substance and Sexual Activity    Alcohol use: Yes     Comment: occ    Drug use: No    Sexual activity: Not on file   Other Topics Concern    Parent/sibling w/ CABG, MI or angioplasty before 65F 55M? Yes     Comment: mother   Social History Narrative    Not on file     Social Determinants of Health     Financial Resource Strain: Low Risk  (2024)    Financial Resource Strain     Within the past 12 months, have you or your family members you live with been unable to get utilities (heat, electricity) when it was really needed?: No   Food Insecurity: Low Risk  (2024)    Food Insecurity     Within the past 12 months, did you worry that your food would run out before you got money to buy more?: No     Within the past 12 months, did the food you bought just not last and you didn t have money to get more?: No   Transportation Needs: Low Risk  (2024)    Transportation Needs     Within the past 12 months, has lack of transportation kept you from medical appointments, getting your medicines, non-medical meetings or appointments, work, or from getting things that you need?: No   Physical Activity: Not on file   Stress: Not on file   Social Connections: Not on file   Interpersonal Safety: Low Risk  (11/10/2023)    Interpersonal Safety     Do you feel physically and emotionally safe where you currently live?: Yes     Within the past 12 months, have you been hit, slapped, kicked or otherwise physically hurt by someone?: No     Within the past 12 months, have you been humiliated or emotionally abused in other ways by your partner or ex-partner?: No   Housing Stability: Low Risk  (2024)    Housing Stability     Do you have housing? : Yes     Are you worried about losing your housing?: No       Past Surgical History:   Procedure Laterality Date    APPENDECTOMY      AS  TOTAL HIP ARTHROPLASTY Right 2019    BIOPSY BREAST Right 2002    benign    BIOPSY BREAST Right 2020    US Bx benign    BREAST BIOPSY, RT/LT Right 2002    right bx     SECTION  1992 1994    COLONOSCOPY  2013    ENDOSCOPY UPPER, COLONOSCOPY, COMBINED N/A 2022    Procedure: upper endoscopy and colonoscopy;  Surgeon: Jaguar Bui MD;  Location: [facility]    GASTRIC BYPASS      gatric bypass      LAPAROSCOPIC HYSTERECTOMY SUPRACERVICAL N/A 2018    Procedure: LAPAROSCOPIC HYSTERECTOMY SUPRACERVICAL;  LAPAROSCOPIC SUPRACERVICAL HYSTERECTOMY, BILATEARL SALPINGECTOMY, LYSIS OF ADHESIONS;  Surgeon: Jean Marie Garvin MD;  Location: [facility]    ORTHOPEDIC SURGERY      right hip labreal tear    ORTHOPEDIC SURGERY      left wrist     SALPINGECTOMY Bilateral 2018    Procedure: SALPINGECTOMY;;  Surgeon: Jean Marie Garvin MD;  Location: [facility]       Family History   Problem Relation Age of Onset    Cerebrovascular Disease Mother     Hyperlipidemia Mother     Hypertension Mother     Coronary Artery Disease Mother     Migraines Mother     Obesity Mother     Osteoarthritis Mother     Osteoporosis Mother     Diabetes Mother     Myocardial Infarction Mother     Hyperlipidemia Father     Hypertension Father     Thyroid Disease Father     Hypertension Brother     Hyperlipidemia Brother     Obesity Brother     Hypertension Sister     Thyroid Disease Sister     Stomach Cancer Maternal Grandmother     Cancer Maternal Grandfather         colon or liver: unsure    Leukemia Paternal Grandfather     Breast Cancer No family hx of     Colon Cancer No family hx of     Prostate Cancer No family hx of     Anesthesia Reaction No family hx of     Asthma No family hx of     Genetic Disorder No family hx of        Allergies:  Allergies as of 2024 - Reviewed 2024   Allergen Reaction Noted    Metformin Diarrhea 12/15/2023    Reglan [metoclopramide] Anxiety 2016       Current Medications:  Current  Outpatient Medications   Medication Sig Dispense Refill    baclofen (LIORESAL) 10 MG tablet TAKE 1 TABLET BY MOUTH THREE TIMES A DAY AS NEEDED FOR MUSCLE SPASMS 60 tablet 1    multivitamin w/minerals (THERA-VIT-M) tablet Take 1 tablet by mouth daily      NONFORMULARY Barimelts with Iron 1 dissolvable tablet daily      phenazopyridine (PYRIDIUM) 200 MG tablet Take 1 tablet (200 mg) by mouth 3 times daily as needed for pain or irritation 30 tablet 1    pramipexole (MIRAPEX) 0.125 MG tablet Take 1-2 tablets (0.125-0.25 mg) by mouth nightly as needed (restless legs). 60 tablet 0    tirzepatide (MOUNJARO) 2.5 MG/0.5ML pen Inject 2.5 mg Subcutaneous every 7 days 2 mL 0    VITAMIN D, CHOLECALCIFEROL, PO Take 5,000 Units by mouth daily 2 tabs daily      butalbital-acetaminophen-caffeine (ESGIC) -40 MG tablet TAKE 1 TABLET BY MOUTH EVERY 4 HOURS AS NEEDED FOR HEADACHES (Patient not taking: Reported on 1/26/2024) 20 tablet 1    calcium carbonate (OS-MANUEL 500 MG [place]. [place]) 500 MG tablet  (Patient not taking: Reported on 7/11/2023)      scopolamine (TRANSDERM) 1 MG/3DAYS 72 hr patch Place 1 patch onto the skin every 72 hours (Patient not taking: Reported on 1/26/2024) 3 patch 0    SUMAtriptan (IMITREX) 50 MG tablet Take 1 tablet (50 mg) by mouth at onset of headache for migraine May repeat in 2 hours. Max 4 tablets/24 hours. (Patient not taking: Reported on 1/26/2024) 12 tablet 3          Review Of Systems:  Constitutional: denies fever, weight changes  Eyes: denies blurred or double vision  Ears/Nose/Throat: denies ear pain, nose problems, difficulty swallowing  Respiratory: denies shortness of breath, cough  Skin: denies rash, lesions  Cardiovascular: denies chest pain, palpitations, edema  Gastrointestinal: denies abdominal pain, bloating, nausea; no change in bowel habits-occasional constipation and uses laxative as needed  Genitourinary: denies difficulty with urination, blood in urine  Musculoskeletal: denies new  muscle pain, bone pain  Neurologic: denies lightheadedness,  numbness orTingling, has occasional headaches-not new or changed  Hematologic/Lymphatic/Immunologic: denies easy bleeding, edema, lumps or bumps noted; feels bruising is better  Endocrine: Denies increased thirst, night sweats      Physical Exam: No Physical exam/this was a telephone visit    PSYCHIATRIC:   Mood appropriate.    Laboratory/Imaging Studies:    Component      Latest Ref Rng 1/2/2024  8:31 AM   WBC      4.0 - 11.0 10e3/uL 6.1    RBC Count      3.80 - 5.20 10e6/uL 4.69    Hemoglobin      11.7 - 15.7 g/dL 14.0    Hematocrit      35.0 - 47.0 % 41.9    MCV      78 - 100 fL 89    MCH      26.5 - 33.0 pg 29.9    MCHC      31.5 - 36.5 g/dL 33.4    RDW      10.0 - 15.0 % 13.2    Platelet Count      150 - 450 10e3/uL 317    % Neutrophils      % 66    % Lymphocytes      % 26    % Monocytes      % 6    % Eosinophils      % 1    % Basophils      % 1    % Immature Granulocytes      % 0    Absolute Neutrophils      1.6 - 8.3 10e3/uL 4.1    Absolute Lymphocytes      0.8 - 5.3 10e3/uL 1.6    Absolute Monocytes      0.0 - 1.3 10e3/uL 0.4    Absolute Eosinophils      0.0 - 0.7 10e3/uL 0.1    Absolute Basophils      0.0 - 0.2 10e3/uL 0.0    Absolute Immature Granulocytes      <=0.4 10e3/uL 0.0    Iron      37 - 145 ug/dL 78    Iron Binding Capacity      240 - 430 ug/dL 312    Iron Sat Index      15 - 46 % 25    Ferritin      11 - 328 ng/mL 141    Vitamin B12      232 - 1,245 pg/mL 1,398 (H)    Folate      4.6 - 34.8 ng/mL 25.6       Legend:  (H) High    ASSESSMENT/PLAN:    #1 Iron deficiency: Hemoglobin/hematocrit along with iron/TIBC and ferritin are within normal limits.  Patient is feeling good.  She will continue with her Barimelt supplement which does contain vitamin B12 and iron.  Will repeat her labs in 6 months and call her with those results.    I encouraged patient to call with any questions or concerns.     20 minutes spent in the patient's encounter  today with time spent in chart review along with chart preparation.  Time was also spent in obtaining a review of systems along with reviewing all of patient's lab work in detail with the patient.  Time was also spent placing future orders and in chart documentation.      Actual telephone call duration 10 minutes    Megan Zepeda NP APRN, FNP-BC, AOCNP

## 2024-01-26 NOTE — PATIENT INSTRUCTIONS
We will have you get your labs rechecked in 6 months and we will call you with those results.    If you have any questions please call [phone]    Other instructions:  none

## 2024-01-26 NOTE — TELEPHONE ENCOUNTER
Mirapex       Last Written Prescription Date:  1/24/2023  Last Fill Quantity: 60,   # refills: 0  Last Office Visit: 1/11/2024  Future Office visit:    Next 5 appointments (look out 90 days)      Feb 15, 2024  9:00 AM  (Arrive by 8:45 AM)  SHORT with Mary Ellen Armstrong NP  [facility] ([facility] ) [address]  [facility] [address]  [phone]

## 2024-01-29 RX ORDER — PRAMIPEXOLE DIHYDROCHLORIDE 0.12 MG/1
TABLET ORAL
Qty: 60 TABLET | Refills: 0 | Status: SHIPPED | OUTPATIENT
Start: 2024-01-29 | End: 2024-03-22

## 2024-02-13 NOTE — PROGRESS NOTES
Assessment & Plan     1. Class 1 obesity due to excess calories without serious comorbidity with body mass index (BMI) of 34.0 to 34.9 in adult  Continue meal planning  Continue regular exercise  Continue mounjaro at current dose  - tirzepatide (MOUNJARO) 2.5 MG/0.5ML pen; Inject 2.5 mg Subcutaneous every 7 days  Dispense: 2 mL; Refill: 3    2. Restless leg syndrome  Continue mirapex  Increase fluids  Calcium and magnesium supplement.     3. Migraine without aura and without status migrainosus, not intractable  Butalbital or imitrex as needed      Review of the result(s) of each unique test - ferritin, B 12 and folate readings reviewed.     Follow-up in 3 month or as needed    Mary Ellen Armstrong,   Certified Adult Nurse Practitioner  [phone]      Bandar Mccray is a 53 year old, presenting for the following health issues:  Weight Check        2/15/2024     8:40 AM   Additional Questions   Roomed by Rach ADKINS   Accompanied by None     HPI     Concern - Weight management   Onset: ongoing   Description: hard tome losing weight   Intensity: moderate  Progression of Symptoms:  Last office visit 1/11/24 was 196# today's weight is 185#  Accompanying Signs & Symptoms: None  Previous history of similar problem: No  Precipitating factors:        Worsened by: No  Alleviating factors:        Improved by: None  Therapies tried and outcome: Mounjaro- good        Restless legs - needs a refill of mirapex, working well.    Migraine - stable, uses imitrex with good relief.      Recent Labs   Lab Test 11/10/23  1134 09/28/22  0819 05/19/22  1010 01/10/22  0808 10/04/21  0746 08/09/21  1247 06/26/21  1426 05/06/21  1017 04/09/21  0937 01/14/21  0924 12/11/20  1446   A1C 6.1*  --   --   --   --   --   --   --   --  5.8* 5.7*   * 158*  --   --   --   --   --   --   --  175*  --    HDL 48* 51  --   --   --   --   --   --   --  66  --    TRIG 272* 190*  --   --   --   --   --   --   --  210*  --    ALT 22  --  26 27   < >  28 30  --  28 22  --    CR 0.59  --  0.57 0.59   < > 0.54 0.58  --  0.55 0.58  --    GFRESTIMATED >90  --  >90 >90   < > >90 >90  --  >90 >90  --    GFRESTBLACK  --   --   --   --   --   --  >90  --  >90 >90  --    POTASSIUM 4.5  --  4.5 4.0   < > 4.2 3.8  --  4.5 4.3  --    TSH 3.75  --   --   --   --  1.24  --    < >  --  2.25  --     < > = values in this interval not displayed.      BP Readings from Last 3 Encounters:   02/15/24 123/85   01/11/24 108/70   12/15/23 110/70    Wt Readings from Last 3 Encounters:   02/15/24 83.9 kg (185 lb)   01/11/24 89.2 kg (196 lb 11.2 oz)   12/15/23 93.7 kg (206 lb 9.6 oz)                   Review of Systems  Constitutional, neuro, ENT, endocrine, pulmonary, cardiac, gastrointestinal, genitourinary, musculoskeletal, integument and psychiatric systems are negative, except as otherwise noted.      Objective    /85 (BP Location: Left arm, Patient Position: Sitting, Cuff Size: Adult Large)   Pulse 78   Temp 97.2  F (36.2  C) (Tympanic)   Resp 16   Wt 83.9 kg (185 lb)   LMP 04/10/2018 (LMP Unknown)   SpO2 98%   Breastfeeding No   BMI 30.79 kg/m    Body mass index is 30.79 kg/m .  Physical Exam   GENERAL: alert and no distress  RESP: lungs clear to auscultation - no rales, rhonchi or wheezes  CV: regular rate and rhythm, normal S1 S2, no S3 or S4, no murmur  MS: no gross musculoskeletal defects noted, no edema  PSYCH: mentation appears normal, affect normal/bright            Signed Electronically by: Mary Ellen Armstrong NP

## 2024-02-15 ENCOUNTER — OFFICE VISIT (OUTPATIENT)
Dept: FAMILY MEDICINE | Facility: OTHER | Age: 54
End: 2024-02-15
Attending: NURSE PRACTITIONER
Payer: COMMERCIAL

## 2024-02-15 VITALS
SYSTOLIC BLOOD PRESSURE: 123 MMHG | HEART RATE: 78 BPM | RESPIRATION RATE: 16 BRPM | OXYGEN SATURATION: 98 % | BODY MASS INDEX: 30.79 KG/M2 | TEMPERATURE: 97.2 F | DIASTOLIC BLOOD PRESSURE: 85 MMHG | WEIGHT: 185 LBS

## 2024-02-15 DIAGNOSIS — G25.81 RESTLESS LEG SYNDROME: ICD-10-CM

## 2024-02-15 DIAGNOSIS — E66.09 CLASS 1 OBESITY DUE TO EXCESS CALORIES WITHOUT SERIOUS COMORBIDITY WITH BODY MASS INDEX (BMI) OF 34.0 TO 34.9 IN ADULT: Primary | ICD-10-CM

## 2024-02-15 DIAGNOSIS — E66.811 CLASS 1 OBESITY DUE TO EXCESS CALORIES WITHOUT SERIOUS COMORBIDITY WITH BODY MASS INDEX (BMI) OF 34.0 TO 34.9 IN ADULT: Primary | ICD-10-CM

## 2024-02-15 DIAGNOSIS — G43.009 MIGRAINE WITHOUT AURA AND WITHOUT STATUS MIGRAINOSUS, NOT INTRACTABLE: ICD-10-CM

## 2024-02-15 PROCEDURE — 99214 OFFICE O/P EST MOD 30 MIN: CPT | Performed by: NURSE PRACTITIONER

## 2024-02-15 RX ORDER — TIRZEPATIDE 2.5 MG/.5ML
2.5 INJECTION, SOLUTION SUBCUTANEOUS
Qty: 2 ML | Refills: 3 | Status: SHIPPED | OUTPATIENT
Start: 2024-02-15 | End: 2024-03-04 | Stop reason: DRUGHIGH

## 2024-02-15 ASSESSMENT — PAIN SCALES - GENERAL: PAINLEVEL: NO PAIN (0)

## 2024-02-15 NOTE — PATIENT INSTRUCTIONS
Assessment & Plan     1. Class 1 obesity due to excess calories without serious comorbidity with body mass index (BMI) of 34.0 to 34.9 in adult  Continue meal planning  Continue regular exercise  Continue mounjaro at current dose  - tirzepatide (MOUNJARO) 2.5 MG/0.5ML pen; Inject 2.5 mg Subcutaneous every 7 days  Dispense: 2 mL; Refill: 3    2. Restless leg syndrome  Continue mirapex  Increase fluids  Calcium and magnesium supplement.     3. Migraine without aura and without status migrainosus, not intractable  Butalbital or imitrex as needed      Review of the result(s) of each unique test - ferritin, B 12 and folate readings reviewed.     Follow-up in 3 month or as needed    Mary Ellen Armstrong,   Certified Adult Nurse Practitioner  [phone]

## 2024-03-07 ENCOUNTER — ANCILLARY PROCEDURE (OUTPATIENT)
Dept: MAMMOGRAPHY | Facility: OTHER | Age: 54
End: 2024-03-07
Attending: NURSE PRACTITIONER
Payer: COMMERCIAL

## 2024-03-07 DIAGNOSIS — Z12.31 ENCOUNTER FOR SCREENING MAMMOGRAM FOR BREAST CANCER: ICD-10-CM

## 2024-03-07 PROCEDURE — 77063 BREAST TOMOSYNTHESIS BI: CPT | Mod: TC | Performed by: RADIOLOGY

## 2024-03-07 PROCEDURE — 77067 SCR MAMMO BI INCL CAD: CPT | Mod: TC | Performed by: RADIOLOGY

## 2024-03-08 ENCOUNTER — TELEPHONE (OUTPATIENT)
Dept: MAMMOGRAPHY | Facility: OTHER | Age: 54
End: 2024-03-08

## 2024-03-22 DIAGNOSIS — G25.81 RESTLESS LEG SYNDROME: ICD-10-CM

## 2024-03-25 DIAGNOSIS — G25.81 RESTLESS LEG SYNDROME: ICD-10-CM

## 2024-03-25 RX ORDER — PRAMIPEXOLE DIHYDROCHLORIDE 0.12 MG/1
TABLET ORAL
Qty: 180 TABLET | Refills: 1 | Status: SHIPPED | OUTPATIENT
Start: 2024-03-25 | End: 2024-09-24

## 2024-03-25 RX ORDER — PRAMIPEXOLE DIHYDROCHLORIDE 0.12 MG/1
TABLET ORAL
Qty: 60 TABLET | Refills: 0 | OUTPATIENT
Start: 2024-03-25

## 2024-04-03 DIAGNOSIS — G44.89 OTHER HEADACHE SYNDROME: ICD-10-CM

## 2024-04-04 NOTE — TELEPHONE ENCOUNTER
Esgic  Last Written Prescription Date: 9/13/23  Last Fill Quantity: 20 # of Refills: 1  Last Office Visit: 2/15/24

## 2024-04-05 RX ORDER — BUTALBITAL, ACETAMINOPHEN AND CAFFEINE 50; 325; 40 MG/1; MG/1; MG/1
1 TABLET ORAL EVERY 4 HOURS PRN
Qty: 20 TABLET | Refills: 1 | Status: SHIPPED | OUTPATIENT
Start: 2024-04-05 | End: 2024-08-07

## 2024-04-05 NOTE — TELEPHONE ENCOUNTER
Routing refill request to provider for review/approval because:    Drug not on the [facility], [facility] or [facility] refill protocol or controlled substance

## 2024-05-02 NOTE — PROGRESS NOTES
"  Assessment & Plan     1. Class 1 obesity due to excess calories without serious comorbidity with body mass index (BMI) of 34.0 to 34.9 in adult  Increase mounjaro  Continue watching diet  Increase exercise.   - tirzepatide (MOUNJARO) 7.5 MG/0.5ML pen; Inject 7.5 mg Subcutaneous every 7 days  Dispense: 6 mL; Refill: 1  - Lipid Profile; Future  - Comprehensive metabolic panel; Future  - TSH with free T4 reflex; Future    2. Prediabetes  - tirzepatide (MOUNJARO) 7.5 MG/0.5ML pen; Inject 7.5 mg Subcutaneous every 7 days  Dispense: 6 mL; Refill: 1  - Hemoglobin A1c; Future        BMI  Estimated body mass index is 30.22 kg/m  as calculated from the following:    Height as of this encounter: 1.651 m (5' 5\").    Weight as of this encounter: 82.4 kg (181 lb 9.6 oz).   Weight management plan: Discussed healthy diet and exercise guidelines        No follow-ups on file.    The longitudinal plan of care for the diagnosis(es)/condition(s) as documented were addressed during this visit. Due to the added complexity in care, I will continue to support Shazia in the subsequent management and with ongoing continuity of care.    Mary Ellen Armstrong,   Certified Adult Nurse Practitioner  [phone]      Subjective   Shazia is a 53 year old, presenting for the following health issues:  weight management        5/3/2024    10:57 AM   Additional Questions   Roomed by edis   Accompanied by none     HPI     Medication Followup of Mounjaro  Taking Medication as prescribed: yes  Side Effects:  None  Medication Helping Symptoms:  yes  Weight last visit 185 # and Today is 181.6#      Recent Labs   Lab Test 11/10/23  1134 09/28/22  0819 05/19/22  1010 01/10/22  0808 10/04/21  0746 08/09/21  1247 06/26/21  1426 05/06/21  1017 04/09/21  0937 01/14/21  0924 12/11/20  1446   A1C 6.1*  --   --   --   --   --   --   --   --  5.8* 5.7*   * 158*  --   --   --   --   --   --   --  175*  --    HDL 48* 51  --   --   --   --   --   --   --  66  --  " "  TRIG 272* 190*  --   --   --   --   --   --   --  210*  --    ALT 22  --  26 27   < > 28 30  --  28 22  --    CR 0.59  --  0.57 0.59   < > 0.54 0.58  --  0.55 0.58  --    GFRESTIMATED >90  --  >90 >90   < > >90 >90  --  >90 >90  --    GFRESTBLACK  --   --   --   --   --   --  >90  --  >90 >90  --    POTASSIUM 4.5  --  4.5 4.0   < > 4.2 3.8  --  4.5 4.3  --    TSH 3.75  --   --   --   --  1.24  --    < >  --  2.25  --     < > = values in this interval not displayed.      BP Readings from Last 3 Encounters:   05/03/24 118/76   02/15/24 123/85   01/11/24 108/70    Wt Readings from Last 3 Encounters:   05/03/24 82.4 kg (181 lb 9.6 oz)   02/15/24 83.9 kg (185 lb)   01/11/24 89.2 kg (196 lb 11.2 oz)                   Review of Systems  Constitutional, neuro, ENT, endocrine, pulmonary, cardiac, gastrointestinal, genitourinary, musculoskeletal, integument and psychiatric systems are negative, except as otherwise noted.      Objective    /76 (BP Location: Left arm, Patient Position: Chair, Cuff Size: Adult Regular)   Pulse 86   Temp 96.9  F (36.1  C) (Tympanic)   Resp 16   Ht 1.651 m (5' 5\")   Wt 82.4 kg (181 lb 9.6 oz)   LMP 04/10/2018 (LMP Unknown)   SpO2 98%   BMI 30.22 kg/m    Body mass index is 30.22 kg/m .  Physical Exam   GENERAL: alert, no distress, and obese  MS: no gross musculoskeletal defects noted, no edema  PSYCH: mentation appears normal, affect normal/bright            Signed Electronically by: Mary Ellen Armstrong NP    "

## 2024-05-03 ENCOUNTER — OFFICE VISIT (OUTPATIENT)
Dept: FAMILY MEDICINE | Facility: OTHER | Age: 54
End: 2024-05-03
Attending: NURSE PRACTITIONER
Payer: COMMERCIAL

## 2024-05-03 VITALS
HEART RATE: 86 BPM | OXYGEN SATURATION: 98 % | SYSTOLIC BLOOD PRESSURE: 118 MMHG | RESPIRATION RATE: 16 BRPM | WEIGHT: 181.6 LBS | DIASTOLIC BLOOD PRESSURE: 76 MMHG | BODY MASS INDEX: 30.26 KG/M2 | TEMPERATURE: 96.9 F | HEIGHT: 65 IN

## 2024-05-03 DIAGNOSIS — E66.09 CLASS 1 OBESITY DUE TO EXCESS CALORIES WITHOUT SERIOUS COMORBIDITY WITH BODY MASS INDEX (BMI) OF 34.0 TO 34.9 IN ADULT: Primary | ICD-10-CM

## 2024-05-03 DIAGNOSIS — R73.03 PREDIABETES: ICD-10-CM

## 2024-05-03 DIAGNOSIS — E66.811 CLASS 1 OBESITY DUE TO EXCESS CALORIES WITHOUT SERIOUS COMORBIDITY WITH BODY MASS INDEX (BMI) OF 34.0 TO 34.9 IN ADULT: Primary | ICD-10-CM

## 2024-05-03 LAB
ALBUMIN SERPL BCG-MCNC: 4.4 G/DL (ref 3.5–5.2)
ALP SERPL-CCNC: 85 U/L (ref 40–150)
ALT SERPL W P-5'-P-CCNC: 23 U/L (ref 0–50)
ANION GAP SERPL CALCULATED.3IONS-SCNC: 13 MMOL/L (ref 7–15)
AST SERPL W P-5'-P-CCNC: 21 U/L (ref 0–45)
BILIRUB SERPL-MCNC: 0.4 MG/DL
BUN SERPL-MCNC: 12 MG/DL (ref 6–20)
CALCIUM SERPL-MCNC: 9.4 MG/DL (ref 8.6–10)
CHLORIDE SERPL-SCNC: 102 MMOL/L (ref 98–107)
CHOLEST SERPL-MCNC: 243 MG/DL
CREAT SERPL-MCNC: 0.62 MG/DL (ref 0.51–0.95)
DEPRECATED HCO3 PLAS-SCNC: 25 MMOL/L (ref 22–29)
EGFRCR SERPLBLD CKD-EPI 2021: >90 ML/MIN/1.73M2
EST. AVERAGE GLUCOSE BLD GHB EST-MCNC: 108 MG/DL
FASTING STATUS PATIENT QL REPORTED: NO
GLUCOSE SERPL-MCNC: 83 MG/DL (ref 70–99)
HBA1C MFR BLD: 5.4 %
HDLC SERPL-MCNC: 55 MG/DL
LDLC SERPL CALC-MCNC: 148 MG/DL
NONHDLC SERPL-MCNC: 188 MG/DL
POTASSIUM SERPL-SCNC: 3.9 MMOL/L (ref 3.4–5.3)
PROT SERPL-MCNC: 7.2 G/DL (ref 6.4–8.3)
SODIUM SERPL-SCNC: 140 MMOL/L (ref 135–145)
TRIGL SERPL-MCNC: 199 MG/DL
TSH SERPL DL<=0.005 MIU/L-ACNC: 2.51 UIU/ML (ref 0.3–4.2)

## 2024-05-03 PROCEDURE — 80061 LIPID PANEL: CPT | Performed by: NURSE PRACTITIONER

## 2024-05-03 PROCEDURE — G2211 COMPLEX E/M VISIT ADD ON: HCPCS | Performed by: NURSE PRACTITIONER

## 2024-05-03 PROCEDURE — 84443 ASSAY THYROID STIM HORMONE: CPT | Performed by: NURSE PRACTITIONER

## 2024-05-03 PROCEDURE — 99214 OFFICE O/P EST MOD 30 MIN: CPT | Performed by: NURSE PRACTITIONER

## 2024-05-03 PROCEDURE — 36415 COLL VENOUS BLD VENIPUNCTURE: CPT | Performed by: NURSE PRACTITIONER

## 2024-05-03 PROCEDURE — 83036 HEMOGLOBIN GLYCOSYLATED A1C: CPT | Performed by: NURSE PRACTITIONER

## 2024-05-03 PROCEDURE — 80053 COMPREHEN METABOLIC PANEL: CPT | Performed by: NURSE PRACTITIONER

## 2024-05-03 ASSESSMENT — PAIN SCALES - GENERAL: PAINLEVEL: NO PAIN (0)

## 2024-06-11 ENCOUNTER — MYC MEDICAL ADVICE (OUTPATIENT)
Dept: FAMILY MEDICINE | Facility: OTHER | Age: 54
End: 2024-06-11

## 2024-06-11 DIAGNOSIS — E66.811 CLASS 1 OBESITY DUE TO EXCESS CALORIES WITHOUT SERIOUS COMORBIDITY WITH BODY MASS INDEX (BMI) OF 34.0 TO 34.9 IN ADULT: Primary | ICD-10-CM

## 2024-06-11 DIAGNOSIS — E66.09 CLASS 1 OBESITY DUE TO EXCESS CALORIES WITHOUT SERIOUS COMORBIDITY WITH BODY MASS INDEX (BMI) OF 34.0 TO 34.9 IN ADULT: Primary | ICD-10-CM

## 2024-07-02 NOTE — PROGRESS NOTES
Assessment & Plan     1. Migraine without aura and without status migrainosus, not intractable  Restart propranolol  - SUMAtriptan (IMITREX) 50 MG tablet; Take 1 tablet (50 mg) by mouth at onset of headache for migraine May repeat in 2 hours. Max 4 tablets/24 hours.  Dispense: 12 tablet; Refill: 3  - propranolol ER (INDERAL LA) 60 MG 24 hr capsule; Take 1 capsule (60 mg) by mouth daily  Dispense: 90 capsule; Refill: 1    2. Other headache syndrome      Follow-up in 3 months or as needed     The longitudinal plan of care for the diagnosis(es)/condition(s) as documented were addressed during this visit. Due to the added complexity in care, I will continue to support Shazia in the subsequent management and with ongoing continuity of care.    Mary Ellen Armstrong,   Certified Adult Nurse Practitioner  [phone]      Subjective   Shazia is a 54 year old, presenting for the following health issues:  Headache    HPI     Migraine   Since your last clinic visit, how have your headaches changed?  Worsened  How often are you getting headaches or migraines? Never seem to go away   Are you able to do normal daily activities when you have a migraine? No  Are you taking rescue/relief medications? (Select all that apply) sumatriptan (Imitrex)  How helpful is your rescue/relief medication?  I get some relief  Are you taking any medications to prevent migraines? (Select all that apply)  Other: ESGIC  In the past 4 weeks, how often have you gone to urgent care or the emergency room because of your headaches?  0  Had been on propranolol in the past and had much fewer headaches.  Treatment options reviewed, would like to restart propranolol.          Recent Labs   Lab Test 05/03/24  1213 11/10/23  1134 09/28/22  0819 05/19/22  1010 08/09/21  1247 06/26/21  1426 05/06/21  1017 04/09/21  0937 01/14/21  0924   A1C 5.4 6.1*  --   --   --   --   --   --  5.8*   * 169* 158*  --   --   --   --   --  175*   HDL 55 48* 51  --   --   --    "--   --  66   TRIG 199* 272* 190*  --   --   --   --   --  210*   ALT 23 22  --  26   < > 30  --  28 22   CR 0.62 0.59  --  0.57   < > 0.58  --  0.55 0.58   GFRESTIMATED >90 >90  --  >90   < > >90  --  >90 >90   GFRESTBLACK  --   --   --   --   --  >90  --  >90 >90   POTASSIUM 3.9 4.5  --  4.5   < > 3.8  --  4.5 4.3   TSH 2.51 3.75  --   --    < >  --    < >  --  2.25    < > = values in this interval not displayed.      BP Readings from Last 3 Encounters:   07/08/24 122/78   05/03/24 118/76   02/15/24 123/85    Wt Readings from Last 3 Encounters:   07/08/24 76.6 kg (168 lb 14.4 oz)   05/03/24 82.4 kg (181 lb 9.6 oz)   02/15/24 83.9 kg (185 lb)              Review of Systems  Constitutional, neuro, ENT, endocrine, pulmonary, cardiac, gastrointestinal, genitourinary, musculoskeletal, integument and psychiatric systems are negative, except as otherwise noted.      Objective    /78 (BP Location: Left arm, Patient Position: Sitting, Cuff Size: Adult Regular)   Pulse 90   Temp 97.7  F (36.5  C) (Tympanic)   Resp 16   Ht 1.651 m (5' 5\")   Wt 76.6 kg (168 lb 14.4 oz)   LMP 04/10/2018 (LMP Unknown)   SpO2 98%   BMI 28.11 kg/m    Body mass index is 28.11 kg/m .  Physical Exam   GENERAL: alert and no distress  MS: no gross musculoskeletal defects noted, no edema  PSYCH: mentation appears normal, affect normal/bright            Signed Electronically by: Mary Ellen Armstrong NP    "

## 2024-07-08 ENCOUNTER — OFFICE VISIT (OUTPATIENT)
Dept: FAMILY MEDICINE | Facility: OTHER | Age: 54
End: 2024-07-08
Attending: NURSE PRACTITIONER
Payer: COMMERCIAL

## 2024-07-08 VITALS
DIASTOLIC BLOOD PRESSURE: 78 MMHG | TEMPERATURE: 97.7 F | BODY MASS INDEX: 28.14 KG/M2 | RESPIRATION RATE: 16 BRPM | HEART RATE: 90 BPM | OXYGEN SATURATION: 98 % | SYSTOLIC BLOOD PRESSURE: 122 MMHG | WEIGHT: 168.9 LBS | HEIGHT: 65 IN

## 2024-07-08 DIAGNOSIS — G43.009 MIGRAINE WITHOUT AURA AND WITHOUT STATUS MIGRAINOSUS, NOT INTRACTABLE: Primary | ICD-10-CM

## 2024-07-08 DIAGNOSIS — G44.89 OTHER HEADACHE SYNDROME: ICD-10-CM

## 2024-07-08 PROCEDURE — G2211 COMPLEX E/M VISIT ADD ON: HCPCS | Performed by: NURSE PRACTITIONER

## 2024-07-08 PROCEDURE — 99214 OFFICE O/P EST MOD 30 MIN: CPT | Performed by: NURSE PRACTITIONER

## 2024-07-08 RX ORDER — PROPRANOLOL HCL 60 MG
60 CAPSULE, EXTENDED RELEASE 24HR ORAL DAILY
Qty: 90 CAPSULE | Refills: 1 | Status: SHIPPED | OUTPATIENT
Start: 2024-07-08

## 2024-07-08 RX ORDER — SUMATRIPTAN 50 MG/1
50 TABLET, FILM COATED ORAL
Qty: 12 TABLET | Refills: 3 | Status: SHIPPED | OUTPATIENT
Start: 2024-07-08

## 2024-07-08 ASSESSMENT — PAIN SCALES - GENERAL: PAINLEVEL: SEVERE PAIN (6)

## 2024-07-26 ENCOUNTER — LAB (OUTPATIENT)
Dept: LAB | Facility: OTHER | Age: 54
End: 2024-07-26
Payer: COMMERCIAL

## 2024-07-26 DIAGNOSIS — Z98.84 H/O GASTRIC BYPASS: ICD-10-CM

## 2024-07-26 DIAGNOSIS — E61.1 IRON DEFICIENCY: ICD-10-CM

## 2024-07-26 LAB
BASOPHILS # BLD AUTO: 0 10E3/UL (ref 0–0.2)
BASOPHILS NFR BLD AUTO: 0 %
EOSINOPHIL # BLD AUTO: 0.1 10E3/UL (ref 0–0.7)
EOSINOPHIL NFR BLD AUTO: 1 %
ERYTHROCYTE [DISTWIDTH] IN BLOOD BY AUTOMATED COUNT: 13.7 % (ref 10–15)
FERRITIN SERPL-MCNC: 77 NG/ML (ref 11–328)
FOLATE SERPL-MCNC: 29.1 NG/ML (ref 4.6–34.8)
HCT VFR BLD AUTO: 40.4 % (ref 35–47)
HGB BLD-MCNC: 13.5 G/DL (ref 11.7–15.7)
IMM GRANULOCYTES # BLD: 0 10E3/UL
IMM GRANULOCYTES NFR BLD: 0 %
IRON BINDING CAPACITY (ROCHE): 308 UG/DL (ref 240–430)
IRON SATN MFR SERPL: 25 % (ref 15–46)
IRON SERPL-MCNC: 78 UG/DL (ref 37–145)
LYMPHOCYTES # BLD AUTO: 2.1 10E3/UL (ref 0.8–5.3)
LYMPHOCYTES NFR BLD AUTO: 36 %
MCH RBC QN AUTO: 29.3 PG (ref 26.5–33)
MCHC RBC AUTO-ENTMCNC: 33.4 G/DL (ref 31.5–36.5)
MCV RBC AUTO: 88 FL (ref 78–100)
MONOCYTES # BLD AUTO: 0.4 10E3/UL (ref 0–1.3)
MONOCYTES NFR BLD AUTO: 7 %
NEUTROPHILS # BLD AUTO: 3.1 10E3/UL (ref 1.6–8.3)
NEUTROPHILS NFR BLD AUTO: 55 %
PLATELET # BLD AUTO: 287 10E3/UL (ref 150–450)
RBC # BLD AUTO: 4.61 10E6/UL (ref 3.8–5.2)
VIT B12 SERPL-MCNC: 753 PG/ML (ref 232–1245)
WBC # BLD AUTO: 5.7 10E3/UL (ref 4–11)

## 2024-07-26 PROCEDURE — 85025 COMPLETE CBC W/AUTO DIFF WBC: CPT

## 2024-07-26 PROCEDURE — 82728 ASSAY OF FERRITIN: CPT

## 2024-07-26 PROCEDURE — 82746 ASSAY OF FOLIC ACID SERUM: CPT

## 2024-07-26 PROCEDURE — 83540 ASSAY OF IRON: CPT

## 2024-07-26 PROCEDURE — 83550 IRON BINDING TEST: CPT

## 2024-07-26 PROCEDURE — 82607 VITAMIN B-12: CPT

## 2024-07-26 PROCEDURE — 36415 COLL VENOUS BLD VENIPUNCTURE: CPT

## 2024-08-04 DIAGNOSIS — G44.89 OTHER HEADACHE SYNDROME: ICD-10-CM

## 2024-08-04 DIAGNOSIS — E66.811 CLASS 1 OBESITY DUE TO EXCESS CALORIES WITHOUT SERIOUS COMORBIDITY WITH BODY MASS INDEX (BMI) OF 34.0 TO 34.9 IN ADULT: ICD-10-CM

## 2024-08-04 DIAGNOSIS — E66.09 CLASS 1 OBESITY DUE TO EXCESS CALORIES WITHOUT SERIOUS COMORBIDITY WITH BODY MASS INDEX (BMI) OF 34.0 TO 34.9 IN ADULT: ICD-10-CM

## 2024-08-07 RX ORDER — BUTALBITAL, ACETAMINOPHEN AND CAFFEINE 50; 325; 40 MG/1; MG/1; MG/1
1 TABLET ORAL EVERY 4 HOURS PRN
Qty: 20 TABLET | Refills: 1 | Status: SHIPPED | OUTPATIENT
Start: 2024-08-07

## 2024-08-07 RX ORDER — TIRZEPATIDE 5 MG/.5ML
INJECTION, SOLUTION SUBCUTANEOUS
Qty: 6 ML | Refills: 1 | Status: SHIPPED | OUTPATIENT
Start: 2024-08-07 | End: 2024-09-20

## 2024-09-17 NOTE — PROGRESS NOTES
Assessment & Plan     1. Class 1 obesity due to excess calories without serious comorbidity with body mass index (BMI) of 34.0 to 34.9 in adult  Continue current dose for now.    - tirzepatide (MOUNJARO) 5 MG/0.5ML pen; Inject 5 mg subcutaneously once a week.  Dispense: 3 mL; Refill: 1    2. Need for vaccination  Routine   - INFLUENZA VACCINE TRIVALENT(FLUBLOK)    3. Migraine without aura and without status migrainosus, not intractable  Stable     4. Other headache syndrome  As above    5. Restless leg syndrome  Continue mirapex.     Follow-up in 3 months or as needed    The longitudinal plan of care for the diagnosis(es)/condition(s) as documented were addressed during this visit. Due to the added complexity in care, I will continue to support Shazia in the subsequent management and with ongoing continuity of care.    Mary Ellen Armstrong,   Certified Adult Nurse Practitioner  [phone]      Subjective   Shazia is a 54 year old, presenting for the following health issues:  weight management         9/20/2024     8:47 AM   Additional Questions   Roomed by edis   Accompanied by none     HPI       Concern - Weight management   Onset: ongoing   Description: hard time losing weight   Intensity: moderate  Progression of Symptoms:  last office visit weight was 168# today's weight is 160.4#  Accompanying Signs & Symptoms: none  Previous history of similar problem: yes  Precipitating factors:        Worsened by: not taking weight loss medication   Alleviating factors:        Improved by: Mounjaro   Therapies tried and outcome: Mounjaro       Other chronic conditions are stable.     Recent Labs   Lab Test 05/03/24  1213 11/10/23  1134 09/28/22  0819 05/19/22  1010 08/09/21  1247 06/26/21  1426 05/06/21  1017 04/09/21  0937 01/14/21  0924   A1C 5.4 6.1*  --   --   --   --   --   --  5.8*   * 169* 158*  --   --   --   --   --  175*   HDL 55 48* 51  --   --   --   --   --  66   TRIG 199* 272* 190*  --   --   --   --   --  " 210*   ALT 23 22  --  26   < > 30  --  28 22   CR 0.62 0.59  --  0.57   < > 0.58  --  0.55 0.58   GFRESTIMATED >90 >90  --  >90   < > >90  --  >90 >90   GFRESTBLACK  --   --   --   --   --  >90  --  >90 >90   POTASSIUM 3.9 4.5  --  4.5   < > 3.8  --  4.5 4.3   TSH 2.51 3.75  --   --    < >  --    < >  --  2.25    < > = values in this interval not displayed.      BP Readings from Last 3 Encounters:   09/20/24 120/68   07/08/24 122/78   05/03/24 118/76    Wt Readings from Last 3 Encounters:   09/20/24 72.8 kg (160 lb 6.4 oz)   07/08/24 76.6 kg (168 lb 14.4 oz)   05/03/24 82.4 kg (181 lb 9.6 oz)                        Review of Systems  Constitutional, neuro, ENT, endocrine, pulmonary, cardiac, gastrointestinal, genitourinary, musculoskeletal, integument and psychiatric systems are negative, except as otherwise noted.      Objective    /68 (BP Location: Left arm, Patient Position: Chair, Cuff Size: Adult Regular)   Pulse 63   Temp 96.8  F (36  C) (Tympanic)   Resp 16   Ht 1.651 m (5' 5\")   Wt 72.8 kg (160 lb 6.4 oz)   LMP 04/10/2018 (LMP Unknown)   SpO2 99%   BMI 26.69 kg/m    Body mass index is 26.69 kg/m .  Physical Exam   GENERAL: alert and no distress  MS: no gross musculoskeletal defects noted, no edema  PSYCH: mentation appears normal, affect normal/bright            Signed Electronically by: Mary Ellen Armstrong NP    "

## 2024-09-20 ENCOUNTER — OFFICE VISIT (OUTPATIENT)
Dept: FAMILY MEDICINE | Facility: OTHER | Age: 54
End: 2024-09-20
Attending: NURSE PRACTITIONER
Payer: COMMERCIAL

## 2024-09-20 VITALS
WEIGHT: 160.4 LBS | BODY MASS INDEX: 26.73 KG/M2 | HEIGHT: 65 IN | SYSTOLIC BLOOD PRESSURE: 120 MMHG | OXYGEN SATURATION: 99 % | TEMPERATURE: 96.8 F | HEART RATE: 63 BPM | DIASTOLIC BLOOD PRESSURE: 68 MMHG | RESPIRATION RATE: 16 BRPM

## 2024-09-20 DIAGNOSIS — E66.811 CLASS 1 OBESITY DUE TO EXCESS CALORIES WITHOUT SERIOUS COMORBIDITY WITH BODY MASS INDEX (BMI) OF 34.0 TO 34.9 IN ADULT: Primary | ICD-10-CM

## 2024-09-20 DIAGNOSIS — G25.81 RESTLESS LEG SYNDROME: ICD-10-CM

## 2024-09-20 DIAGNOSIS — Z23 NEED FOR VACCINATION: ICD-10-CM

## 2024-09-20 DIAGNOSIS — E66.09 CLASS 1 OBESITY DUE TO EXCESS CALORIES WITHOUT SERIOUS COMORBIDITY WITH BODY MASS INDEX (BMI) OF 34.0 TO 34.9 IN ADULT: Primary | ICD-10-CM

## 2024-09-20 DIAGNOSIS — G43.009 MIGRAINE WITHOUT AURA AND WITHOUT STATUS MIGRAINOSUS, NOT INTRACTABLE: ICD-10-CM

## 2024-09-20 DIAGNOSIS — G44.89 OTHER HEADACHE SYNDROME: ICD-10-CM

## 2024-09-20 PROCEDURE — 90673 RIV3 VACCINE NO PRESERV IM: CPT | Performed by: NURSE PRACTITIONER

## 2024-09-20 PROCEDURE — 99214 OFFICE O/P EST MOD 30 MIN: CPT | Mod: 25 | Performed by: NURSE PRACTITIONER

## 2024-09-20 PROCEDURE — 90471 IMMUNIZATION ADMIN: CPT | Performed by: NURSE PRACTITIONER

## 2024-09-20 RX ORDER — TIRZEPATIDE 5 MG/.5ML
5 INJECTION, SOLUTION SUBCUTANEOUS WEEKLY
Qty: 3 ML | Refills: 1 | Status: SHIPPED | OUTPATIENT
Start: 2024-09-20

## 2024-09-20 ASSESSMENT — PAIN SCALES - GENERAL: PAINLEVEL: NO PAIN (0)

## 2024-09-24 DIAGNOSIS — G25.81 RESTLESS LEG SYNDROME: ICD-10-CM

## 2024-09-24 RX ORDER — PRAMIPEXOLE DIHYDROCHLORIDE 0.12 MG/1
TABLET ORAL
Qty: 180 TABLET | Refills: 1 | Status: SHIPPED | OUTPATIENT
Start: 2024-09-24

## 2024-10-02 ENCOUNTER — TELEPHONE (OUTPATIENT)
Dept: FAMILY MEDICINE | Facility: OTHER | Age: 54
End: 2024-10-02

## 2024-10-02 NOTE — TELEPHONE ENCOUNTER
1:20 PM    Reason for Call: Phone Call    Description: [employer] called regarding FMLA part F paperwork needs to include all dates of service need to be included on the FMLA form. Make corrections on form and then refax to Marce at [employer]. Fax # [phone]. Please call Marce at [employer] with questions.    Was an appointment offered for this call? No  If yes : Appointment type              Date    Preferred method for responding to this message: Telephone Call  What is your phone number ?  [phone] ext. [phone]    If we cannot reach you directly, may we leave a detailed response at the number you provided? Yes    Can this message wait until your PCP/provider returns, if available today? Not applicable    [facility] rBown

## 2024-10-10 NOTE — PROGRESS NOTES
Assessment & Plan     1. Multiple lipomas  Referral to Dr Bui for excision.    - Adult Gen Surg  Referral    2. Class 1 obesity due to excess calories without serious comorbidity with body mass index (BMI) of 34.0 to 34.9 in adult  Continue mounjaro and weight loss efforts.       Follow-up as scheduled    The longitudinal plan of care for the diagnosis(es)/condition(s) as documented were addressed during this visit. Due to the added complexity in care, I will continue to support Shazia in the subsequent management and with ongoing continuity of care.    Mary Ellen Armstrong,   Certified Adult Nurse Practitioner  [phone]      Subjective   Shazia is a 54 year old, presenting for the following health issues:  Mass         Concern - Lumps in arm  Onset: year  Description: three lumps under right arm by elbow - diagnosed with lipoma years ago but now that she has lost weight they are more noticeable and painful with palpation/pressure.  She continues using mounjaro - watching portion sizes, healthy food choices and regular activity.    Intensity: moderate  Progression of Symptoms:  worsening  Accompanying Signs & Symptoms: painful  Previous history of similar problem: no  Precipitating factors:        Worsened by: touching it and laying on it  Alleviating factors:        Improved by: nothing  Therapies tried and outcome: None      Recent Labs   Lab Test 05/03/24  1213 11/10/23  1134 09/28/22  0819 05/19/22  1010 08/09/21  1247 06/26/21  1426 05/06/21  1017 04/09/21  0937 01/14/21  0924   A1C 5.4 6.1*  --   --   --   --   --   --  5.8*   * 169* 158*  --   --   --   --   --  175*   HDL 55 48* 51  --   --   --   --   --  66   TRIG 199* 272* 190*  --   --   --   --   --  210*   ALT 23 22  --  26   < > 30  --  28 22   CR 0.62 0.59  --  0.57   < > 0.58  --  0.55 0.58   GFRESTIMATED >90 >90  --  >90   < > >90  --  >90 >90   GFRESTBLACK  --   --   --   --   --  >90  --  >90 >90   POTASSIUM 3.9 4.5  --  4.5    < > 3.8  --  4.5 4.3   TSH 2.51 3.75  --   --    < >  --    < >  --  2.25    < > = values in this interval not displayed.      BP Readings from Last 3 Encounters:   10/11/24 127/82   09/20/24 120/68   07/08/24 122/78    Wt Readings from Last 3 Encounters:   10/11/24 70.8 kg (156 lb)   09/20/24 72.8 kg (160 lb 6.4 oz)   07/08/24 76.6 kg (168 lb 14.4 oz)                        Review of Systems  Constitutional, neuro, ENT, endocrine, pulmonary, cardiac, gastrointestinal, genitourinary, musculoskeletal, integument and psychiatric systems are negative, except as otherwise noted.      Objective    /82 (BP Location: Left arm, Patient Position: Sitting, Cuff Size: Adult Regular)   Pulse 75   Temp 97.7  F (36.5  C) (Tympanic)   Resp 16   Wt 70.8 kg (156 lb)   LMP 04/10/2018 (LMP Unknown)   SpO2 98%   Breastfeeding No   BMI 25.96 kg/m    Body mass index is 25.96 kg/m .  Physical Exam   GENERAL: alert and no distress  MS: no gross musculoskeletal defects noted, right arm, three cystic structures consistent with lipoma that are tender with palpation by right elbow.    PSYCH: mentation appears normal, affect normal/bright            Signed Electronically by: Mary Ellen Armstrong NP

## 2024-10-11 ENCOUNTER — OFFICE VISIT (OUTPATIENT)
Dept: FAMILY MEDICINE | Facility: OTHER | Age: 54
End: 2024-10-11
Attending: NURSE PRACTITIONER
Payer: COMMERCIAL

## 2024-10-11 VITALS
BODY MASS INDEX: 25.96 KG/M2 | SYSTOLIC BLOOD PRESSURE: 127 MMHG | HEART RATE: 75 BPM | WEIGHT: 156 LBS | RESPIRATION RATE: 16 BRPM | DIASTOLIC BLOOD PRESSURE: 82 MMHG | TEMPERATURE: 97.7 F | OXYGEN SATURATION: 98 %

## 2024-10-11 DIAGNOSIS — D17.9 MULTIPLE LIPOMAS: Primary | ICD-10-CM

## 2024-10-11 DIAGNOSIS — E66.09 CLASS 1 OBESITY DUE TO EXCESS CALORIES WITHOUT SERIOUS COMORBIDITY WITH BODY MASS INDEX (BMI) OF 34.0 TO 34.9 IN ADULT: ICD-10-CM

## 2024-10-11 DIAGNOSIS — E66.811 CLASS 1 OBESITY DUE TO EXCESS CALORIES WITHOUT SERIOUS COMORBIDITY WITH BODY MASS INDEX (BMI) OF 34.0 TO 34.9 IN ADULT: ICD-10-CM

## 2024-10-11 PROCEDURE — 99213 OFFICE O/P EST LOW 20 MIN: CPT | Performed by: NURSE PRACTITIONER

## 2024-10-11 PROCEDURE — G2211 COMPLEX E/M VISIT ADD ON: HCPCS | Performed by: NURSE PRACTITIONER

## 2024-10-11 ASSESSMENT — PAIN SCALES - GENERAL: PAINLEVEL: NO PAIN (0)

## 2024-10-17 ENCOUNTER — OFFICE VISIT (OUTPATIENT)
Dept: SURGERY | Facility: OTHER | Age: 54
End: 2024-10-17
Attending: SURGERY
Payer: COMMERCIAL

## 2024-10-17 VITALS
HEIGHT: 65 IN | DIASTOLIC BLOOD PRESSURE: 78 MMHG | BODY MASS INDEX: 25.99 KG/M2 | OXYGEN SATURATION: 97 % | SYSTOLIC BLOOD PRESSURE: 114 MMHG | WEIGHT: 156 LBS | RESPIRATION RATE: 16 BRPM | HEART RATE: 89 BPM

## 2024-10-17 DIAGNOSIS — D17.9 MULTIPLE LIPOMAS: Primary | ICD-10-CM

## 2024-10-17 PROCEDURE — 99213 OFFICE O/P EST LOW 20 MIN: CPT | Performed by: SURGERY

## 2024-10-17 ASSESSMENT — PAIN SCALES - GENERAL: PAINLEVEL: NO PAIN (0)

## 2024-10-21 ENCOUNTER — HOSPITAL ENCOUNTER (OUTPATIENT)
Dept: ULTRASOUND IMAGING | Facility: HOSPITAL | Age: 54
Discharge: HOME | End: 2024-10-21
Attending: SURGERY | Admitting: SURGERY
Payer: COMMERCIAL

## 2024-10-21 DIAGNOSIS — D17.9 MULTIPLE LIPOMAS: ICD-10-CM

## 2024-10-21 PROCEDURE — 76882 US LMTD JT/FCL EVL NVASC XTR: CPT | Mod: RT

## 2024-10-21 NOTE — PROGRESS NOTES
"[facility] Progress Note    HPI: Comes to clinic to discuss subcutaneous nodules in right arm.       S: She has lost significant weight recently has noted these nodules under skin that will be painful at times to palpation. She does not believe they are growing.     O:   Vitals:  /78 (BP Location: Right arm, Cuff Size: Adult Regular)   Pulse 89   Resp 16   Ht 1.651 m (5' 5\")   Wt 70.8 kg (156 lb)   LMP 04/10/2018 (LMP Unknown)   SpO2 97%   BMI 25.96 kg/m        Physical Exam:  G: alert oriented, no acute distress   ENT: sclera non-icteric   Pulm: no respiratory distress   CVS: RRR  ABD: soft non-tender non-distended   Ext: Indistinct swelling in antecubital fossa possibly mobile. There is a sub centimeter nodule near the medial condyle of the distal humerus.     Assessment/Plan:  Discussed that there are significant sized veins that run in the vicinity of these likely lipomas/angiolipomas, Discussed would like to get an ultrasound prior to offering surgical removal as well as better determine the right venue for removal. She is in agreement and is planning a fishing trip this weekend anyway so works better for her to not have them removed today.     Jaguar Bui MD     "

## 2024-10-22 ENCOUNTER — PREP FOR PROCEDURE (OUTPATIENT)
Dept: SURGERY | Facility: OTHER | Age: 54
End: 2024-10-22

## 2024-10-22 DIAGNOSIS — D17.9 LIPOMA: Primary | ICD-10-CM

## 2024-11-11 ENCOUNTER — ANESTHESIA EVENT (OUTPATIENT)
Dept: SURGERY | Facility: HOSPITAL | Age: 54
End: 2024-11-11
Payer: COMMERCIAL

## 2024-11-11 NOTE — ANESTHESIA PREPROCEDURE EVALUATION
Anesthesia Pre-Procedure Evaluation    Patient: Shazia Verma   MRN: [id number] : 1970        Procedure : Procedure(s):  Excisional biopsy right arm times two          Past Medical History:   Diagnosis Date    Arthritis     Breast mass 2002    benign right    Diabetes (H)     History of right hip replacement     Hypertension       Past Surgical History:   Procedure Laterality Date    APPENDECTOMY  1991    AS TOTAL HIP ARTHROPLASTY Right 2019    BIOPSY BREAST Right 2002    benign    BIOPSY BREAST Right 2020    US Bx benign    BREAST BIOPSY, RT/LT Right 2002    right bx    BREAST SURGERY  2002     SECTION  1992     COLONOSCOPY  2013    ENDOSCOPY UPPER, COLONOSCOPY, COMBINED N/A 2022    Procedure: upper endoscopy and colonoscopy;  Surgeon: Jaguar Bui MD;  Location: [facility]    GASTRIC BYPASS      gatric bypass      LAPAROSCOPIC HYSTERECTOMY SUPRACERVICAL N/A 2018    Procedure: LAPAROSCOPIC HYSTERECTOMY SUPRACERVICAL;  LAPAROSCOPIC SUPRACERVICAL HYSTERECTOMY, BILATEARL SALPINGECTOMY, LYSIS OF ADHESIONS;  Surgeon: Jean Marie Garvin MD;  Location: [facility]    ORTHOPEDIC SURGERY      right hip labreal tear    ORTHOPEDIC SURGERY      left wrist     SALPINGECTOMY Bilateral 2018    Procedure: SALPINGECTOMY;;  Surgeon: Jean Marie Garvin MD;  Location: [facility]      Allergies   Allergen Reactions    Metformin Diarrhea    Reglan [Metoclopramide] Anxiety      Social History     Tobacco Use    Smoking status: Former     Current packs/day: 0.00     Types: Cigarettes     Start date: 1986     Quit date:      Years since quittin.8     Passive exposure: Past    Smokeless tobacco: Never   Substance Use Topics    Alcohol use: Yes     Comment: occ      Wt Readings from Last 1 Encounters:   10/17/24 70.8 kg (156 lb)              OUTSIDE LABS:  CBC:   Lab Results   Component Value Date    WBC 5.7 2024    WBC 6.1 2024    HGB 13.5 2024    HGB 14.0  "01/02/2024    HCT 40.4 07/26/2024    HCT 41.9 01/02/2024     07/26/2024     01/02/2024     BMP:   Lab Results   Component Value Date     05/03/2024     11/10/2023    POTASSIUM 3.9 05/03/2024    POTASSIUM 4.5 11/10/2023    CHLORIDE 102 05/03/2024    CHLORIDE 103 11/10/2023    CO2 25 05/03/2024    CO2 24 11/10/2023    BUN 12.0 05/03/2024    BUN 16.9 11/10/2023    CR 0.62 05/03/2024    CR 0.59 11/10/2023    GLC 83 05/03/2024     (H) 11/10/2023     COAGS: No results found for: \"PTT\", \"INR\", \"FIBR\"  POC:   Lab Results   Component Value Date    HCG Negative 05/02/2018     HEPATIC:   Lab Results   Component Value Date    ALBUMIN 4.4 05/03/2024    PROTTOTAL 7.2 05/03/2024    ALT 23 05/03/2024    AST 21 05/03/2024    ALKPHOS 85 05/03/2024    BILITOTAL 0.4 05/03/2024     OTHER:   Lab Results   Component Value Date    A1C 5.4 05/03/2024    MANUEL 9.4 05/03/2024    MAG 2.2 06/26/2021    TSH 2.51 05/03/2024    SED 4 08/09/2021              LIA Colby CRNA    I have reviewed the pertinent notes and labs in the chart from the past 30 days and (re)examined the patient.  Any updates or changes from those notes are reflected in this note.                         # Overweight: Estimated body mass index is 25.96 kg/m  as calculated from the following:    Height as of 10/17/24: 1.651 m (5' 5\").    Weight as of 10/17/24: 70.8 kg (156 lb).             "

## 2024-11-12 ENCOUNTER — HOSPITAL ENCOUNTER (OUTPATIENT)
Facility: HOSPITAL | Age: 54
Discharge: HOME | End: 2024-11-12
Attending: SURGERY | Admitting: SURGERY
Payer: COMMERCIAL

## 2024-11-12 ENCOUNTER — ANESTHESIA (OUTPATIENT)
Dept: SURGERY | Facility: HOSPITAL | Age: 54
End: 2024-11-12
Payer: COMMERCIAL

## 2024-11-12 VITALS
HEIGHT: 65 IN | HEART RATE: 77 BPM | OXYGEN SATURATION: 99 % | TEMPERATURE: 97.5 F | BODY MASS INDEX: 25.33 KG/M2 | SYSTOLIC BLOOD PRESSURE: 136 MMHG | RESPIRATION RATE: 16 BRPM | WEIGHT: 152 LBS | DIASTOLIC BLOOD PRESSURE: 83 MMHG

## 2024-11-12 DIAGNOSIS — G89.18 ACUTE POST-OPERATIVE PAIN: Primary | ICD-10-CM

## 2024-11-12 LAB — GLUCOSE BLDC GLUCOMTR-MCNC: 79 MG/DL (ref 70–99)

## 2024-11-12 PROCEDURE — 88305 TISSUE EXAM BY PATHOLOGIST: CPT | Mod: TC | Performed by: SURGERY

## 2024-11-12 PROCEDURE — 82962 GLUCOSE BLOOD TEST: CPT

## 2024-11-12 PROCEDURE — 250N000011 HC RX IP 250 OP 636: Mod: JZ | Performed by: SURGERY

## 2024-11-12 PROCEDURE — 88305 TISSUE EXAM BY PATHOLOGIST: CPT | Mod: 26 | Performed by: PATHOLOGY

## 2024-11-12 PROCEDURE — 999N000141 HC STATISTIC PRE-PROCEDURE NURSING ASSESSMENT: Performed by: SURGERY

## 2024-11-12 PROCEDURE — 360N000075 HC SURGERY LEVEL 2, PER MIN: Performed by: SURGERY

## 2024-11-12 PROCEDURE — 710N000012 HC RECOVERY PHASE 2, PER MINUTE: Performed by: SURGERY

## 2024-11-12 PROCEDURE — 24075 EXC ARM/ELBOW LES SC < 3 CM: CPT | Mod: RT | Performed by: SURGERY

## 2024-11-12 PROCEDURE — 250N000009 HC RX 250: Mod: JZ | Performed by: SURGERY

## 2024-11-12 PROCEDURE — 250N000013 HC RX MED GY IP 250 OP 250 PS 637: Performed by: SURGERY

## 2024-11-12 PROCEDURE — 272N000001 HC OR GENERAL SUPPLY STERILE: Performed by: SURGERY

## 2024-11-12 RX ORDER — BUPIVACAINE HYDROCHLORIDE 2.5 MG/ML
INJECTION, SOLUTION EPIDURAL; INFILTRATION; INTRACAUDAL
Status: DISCONTINUED
Start: 2024-11-12 | End: 2024-11-12 | Stop reason: HOSPADM

## 2024-11-12 RX ORDER — ACETAMINOPHEN 325 MG/1
650 TABLET ORAL EVERY 4 HOURS PRN
Qty: 50 TABLET | Refills: 0 | Status: SHIPPED | OUTPATIENT
Start: 2024-11-12

## 2024-11-12 RX ORDER — LIDOCAINE HYDROCHLORIDE 10 MG/ML
INJECTION, SOLUTION EPIDURAL; INFILTRATION; INTRACAUDAL; PERINEURAL
Status: DISCONTINUED
Start: 2024-11-12 | End: 2024-11-12 | Stop reason: HOSPADM

## 2024-11-12 RX ORDER — IBUPROFEN 200 MG
600 TABLET ORAL
Status: COMPLETED | OUTPATIENT
Start: 2024-11-12 | End: 2024-11-12

## 2024-11-12 RX ADMIN — IBUPROFEN 600 MG: 200 TABLET, FILM COATED ORAL at 13:58

## 2024-11-12 ASSESSMENT — ACTIVITIES OF DAILY LIVING (ADL)
ADLS_ACUITY_SCORE: 0

## 2024-11-12 NOTE — OR NURSING
Patient and responsible adult given discharge instructions with no questions regarding instructions. Mary score 20. Pain level 0/10. Discharged from unit via ambulation. Patient discharged to home with spouse.

## 2024-11-12 NOTE — BRIEF OP NOTE
[facility]    Brief Operative Note    Pre-operative diagnosis: Lipoma [D17.9]  Post-operative diagnosis Same as pre-operative diagnosis    Procedure: Excisional biopsy right arm times two, Right - Arm    Surgeon: Surgeons and Role:     * Jaguar Bui MD - Primary  Anesthesia: Local   Estimated Blood Loss: Less than 10 ml    Drains: None  Specimens:   ID Type Source Tests Collected by Time Destination   1 : right arm mass x2 Tissue Arm, Right SURGICAL PATHOLOGY EXAM Jaguar Bui MD 11/12/2024  1:30 PM      Findings:   Two fatty tissue masses removed .  Complications: None.  Implants: * No implants in log *

## 2024-11-12 NOTE — OP NOTE
Preoperative diagnosis:  Symptomatic arm masses     Postoperative diagnosis:  lipomas     Procedure:  Excisional biopsy of scalp x 2     Anesthesia:  Local    History: see clinic notes     EBL: 5 ml    Findings:  There were two hard < 3cm lipomas removed from the medial aspect of the right arm.     Tissue to pathology: Fatty tissue mass x 2, sent together     Details:   The requisite time out pause observed during which the patient confirmed their identity, date of birth, side and site of the requested excision.  The region was prepped and draped sterilely; local anesthesia was obtained with infiltration of 25 cc of 1% lidocaine, with 0.25 % marcaine plain.   A transverse incision was made over the presenting portion of the mass as patient had prior pointed out prior to surgery and carried through full thickness skin.  Using combination of blunt and sharp dissection the mass was delivered through the incision. It was fatty < 3 cm in size. This was repeated for the more proximal incision. There were deep and adherent to the muscle fascia making removal more difficult.   The wound was irrigated and closed in layers with interrupted 3-0 Vicryl in the subcutaneous tissue.  The skin was reapproximated with 4-0 monocryl in a subcuticular fashion.  Skin glue was applied.   The procedure was well tolerated and the patient was discharged with wound care instructions and followup appointment.       Jaguar Bui MD

## 2024-11-13 LAB
PATH REPORT.COMMENTS IMP SPEC: NORMAL
PATH REPORT.FINAL DX SPEC: NORMAL
PATH REPORT.GROSS SPEC: NORMAL
PATH REPORT.MICROSCOPIC SPEC OTHER STN: NORMAL
PATH REPORT.RELEVANT HX SPEC: NORMAL
PHOTO IMAGE: NORMAL

## 2024-11-20 ENCOUNTER — MYC MEDICAL ADVICE (OUTPATIENT)
Dept: SURGERY | Facility: OTHER | Age: 54
End: 2024-11-20

## 2024-11-20 ENCOUNTER — HOSPITAL ENCOUNTER (EMERGENCY)
Facility: HOSPITAL | Age: 54
Discharge: HOME | End: 2024-11-20
Attending: NURSE PRACTITIONER
Payer: COMMERCIAL

## 2024-11-20 VITALS
OXYGEN SATURATION: 98 % | SYSTOLIC BLOOD PRESSURE: 134 MMHG | TEMPERATURE: 97.4 F | HEIGHT: 65 IN | WEIGHT: 151 LBS | BODY MASS INDEX: 25.16 KG/M2 | RESPIRATION RATE: 16 BRPM | DIASTOLIC BLOOD PRESSURE: 85 MMHG | HEART RATE: 72 BPM

## 2024-11-20 DIAGNOSIS — Z48.89 ENCOUNTER FOR POST SURGICAL WOUND CHECK: Primary | ICD-10-CM

## 2024-11-20 DIAGNOSIS — L03.113 CELLULITIS OF RIGHT UPPER EXTREMITY: ICD-10-CM

## 2024-11-20 PROCEDURE — 99213 OFFICE O/P EST LOW 20 MIN: CPT | Performed by: NURSE PRACTITIONER

## 2024-11-20 PROCEDURE — G0463 HOSPITAL OUTPT CLINIC VISIT: HCPCS

## 2024-11-20 RX ORDER — CEPHALEXIN 500 MG/1
500 CAPSULE ORAL 4 TIMES DAILY
Qty: 28 CAPSULE | Refills: 0 | Status: SHIPPED | OUTPATIENT
Start: 2024-11-20 | End: 2024-11-27

## 2024-11-20 RX ORDER — CETIRIZINE HYDROCHLORIDE 10 MG/1
10 TABLET ORAL DAILY
Qty: 10 TABLET | Refills: 0 | Status: SHIPPED | OUTPATIENT
Start: 2024-11-20 | End: 2024-11-30

## 2024-11-20 ASSESSMENT — ENCOUNTER SYMPTOMS
COLOR CHANGE: 1
JOINT SWELLING: 1
FEVER: 0
MYALGIAS: 1
WOUND: 1
CHILLS: 0

## 2024-11-20 NOTE — Clinical Note
Shazia Verma was seen and treated in our emergency department on 11/20/2024.  She may return to work on 11/22/2024.       If you have any questions or concerns, please don't hesitate to call.      Mpofu, Prudence, CNP

## 2024-11-20 NOTE — DISCHARGE INSTRUCTIONS
As discussed it appears that you have cellulitis to this arm.  This will be treated with the antibiotic prescribed today.  I did also prescribe Zyrtec to help with the itching sensation.  Continue Tylenol or ibuprofen as needed for pain.  Apply cold compresses as you have been doing.    If the redness continues to spread out of the outlined area please return to urgent care/emergency department for evaluation.    Keep scheduled appointment with Dr. Bui next week.

## 2024-11-20 NOTE — ED PROVIDER NOTES
History     Chief Complaint   Patient presents with    Arm Pain     HPI  Shazia Verma is a 54 year old female who is s/p excisional biopsy that was completed to the right arm by Dr. Bui on 11/12/2024.  Patient had multiple lipomas to this arm.  She states that immediately after the procedure she did have some redness to the area.  Today the redness has significantly worsened as the day has progressed..  It is accompanied by warmth, pain and some itchiness.  She notes she has been occasionally scratching the area.  She has not noticed any discharge from the incisions.  She tells me that she does have internal sutures as well as tissue adhesive over the incisions.  No fevers or chills.  She still moving her arm with minimal difficulty.  Taking Tylenol and ibuprofen for the pain.  Attempted to contact general surgery clinic and notes that she just got a phone call upon arrival to this facility.    Allergies:  Allergies   Allergen Reactions    Metformin Diarrhea    Reglan [Metoclopramide] Anxiety       Problem List:    Patient Active Problem List    Diagnosis Date Noted    Trochanteric bursitis of left hip 11/22/2023     Priority: Medium    Anemia, iron deficiency 07/11/2023     Priority: Medium    Prediabetes 02/20/2023     Priority: Medium    Iron deficiency 08/10/2021     Priority: Medium    Low ferritin 08/09/2021     Priority: Medium    Migraine without aura and without status migrainosus, not intractable 05/06/2021     Priority: Medium    Vitamin D deficiency 05/06/2021     Priority: Medium    Other headache syndrome 09/05/2019     Priority: Medium    History of right hip replacement 01/30/2019     Priority: Medium    S/P hysterectomy 05/02/2018     Priority: Medium    Hypoglycemia 09/08/2017     Priority: Medium    ACP (advance care planning) 11/28/2016     Priority: Medium     Advance Care Planning 11/28/2016: ACP Review of Chart / Resources Provided:  Reviewed chart for advance care plan.  Shazia ANAYA Luci  has been provided information and resources to begin or update their advance care plan.  Added by WOODY LENNON            H/O gastric bypass 2016     Priority: Medium     Had type 2 diabetes, hyperlipidemia and hypertension prior to surgery.           Past Medical History:    Past Medical History:   Diagnosis Date    Arthritis     Breast mass     Diabetes (H)     History of right hip replacement     Hypertension        Past Surgical History:    Past Surgical History:   Procedure Laterality Date    APPENDECTOMY  1991    AS TOTAL HIP ARTHROPLASTY Right 2019    BIOPSY BREAST Right 2002    benign    BIOPSY BREAST Right 2020    US Bx benign    BREAST BIOPSY, RT/LT Right 2002    right bx    BREAST SURGERY  2002     SECTION  1992     COLONOSCOPY  2013    ENDOSCOPY UPPER, COLONOSCOPY, COMBINED N/A 2022    Procedure: upper endoscopy and colonoscopy;  Surgeon: Jaguar Bui MD;  Location: [facility]    EXCISE LESION UPPER EXTREMITY Right 2024    Procedure: Excisional biopsy right arm times two;  Surgeon: Jaguar Bui MD;  Location: [facility]    GASTRIC BYPASS      gatric bypass      LAPAROSCOPIC HYSTERECTOMY SUPRACERVICAL N/A 2018    Procedure: LAPAROSCOPIC HYSTERECTOMY SUPRACERVICAL;  LAPAROSCOPIC SUPRACERVICAL HYSTERECTOMY, BILATEARL SALPINGECTOMY, LYSIS OF ADHESIONS;  Surgeon: Jean Marie Garvin MD;  Location: [facility]    ORTHOPEDIC SURGERY      right hip labreal tear    ORTHOPEDIC SURGERY  2014    left wrist     SALPINGECTOMY Bilateral 2018    Procedure: SALPINGECTOMY;;  Surgeon: Jean Marie Garvin MD;  Location: [facility]       Family History:    Family History   Problem Relation Age of Onset    Cerebrovascular Disease Mother     Hyperlipidemia Mother     Hypertension Mother     Coronary Artery Disease Mother     Migraines Mother     Obesity Mother     Osteoarthritis Mother     Osteoporosis Mother     Diabetes Mother     Myocardial Infarction Mother      "Hyperlipidemia Father     Hypertension Father     Thyroid Disease Father     Hypertension Brother     Hyperlipidemia Brother     Obesity Brother     Hypertension Sister     Thyroid Disease Sister     Stomach Cancer Maternal Grandmother     Cancer Maternal Grandfather         colon or liver: unsure    Leukemia Paternal Grandfather     Breast Cancer No family hx of     Colon Cancer No family hx of     Prostate Cancer No family hx of     Anesthesia Reaction No family hx of     Asthma No family hx of     Genetic Disorder No family hx of        Social History:  Marital Status:   [2]  Social History     Tobacco Use    Smoking status: Former     Current packs/day: 0.00     Types: Cigarettes     Start date: 1986     Quit date:      Years since quittin.9     Passive exposure: Past    Smokeless tobacco: Never   Vaping Use    Vaping status: Never Used   Substance Use Topics    Alcohol use: Yes     Comment: occ    Drug use: No        Medications:    acetaminophen (TYLENOL) 325 MG tablet  baclofen (LIORESAL) 10 MG tablet  butalbital-acetaminophen-caffeine (ESGIC) -40 MG tablet  cephALEXin (KEFLEX) 500 MG capsule  cetirizine (ZYRTEC) 10 MG tablet  multivitamin w/minerals (THERA-VIT-M) tablet  NONFORMULARY  phenazopyridine (PYRIDIUM) 200 MG tablet  pramipexole (MIRAPEX) 0.125 MG tablet  propranolol ER (INDERAL LA) 60 MG 24 hr capsule  SUMAtriptan (IMITREX) 50 MG tablet  tirzepatide (MOUNJARO) 5 MG/0.5ML pen  VITAMIN D, CHOLECALCIFEROL, PO          Review of Systems   Constitutional:  Negative for chills and fever.   Musculoskeletal:  Positive for joint swelling and myalgias.   Skin:  Positive for color change and wound.   All other systems reviewed and are negative.      Physical Exam   BP: 134/85  Pulse: 72  Temp: 97.4  F (36.3  C)  Resp: 16  Height: 165.1 cm (5' 5\")  Weight: 68.5 kg (151 lb)  SpO2: 98 %      Physical Exam  Vitals and nursing note reviewed.   Constitutional:       General: She is not in " acute distress.     Appearance: Normal appearance. She is well-developed.   HENT:      Head: Normocephalic and atraumatic.   Eyes:      Conjunctiva/sclera: Conjunctivae normal.   Cardiovascular:      Rate and Rhythm: Normal rate.   Pulmonary:      Effort: Pulmonary effort is normal. No respiratory distress.   Abdominal:      General: Abdomen is flat.   Musculoskeletal:         General: Swelling and tenderness present. No deformity.      Cervical back: Normal range of motion and neck supple.   Skin:     General: Skin is warm and dry.      Findings: Erythema present.             Comments: Erythematous area to the right arm measuring 21 x 14 cm.  There does appear to have some raised bumps near the 2 incisions on her arm.  No drainage to the incisions.  No significant fluctuance appreciated.  Area is warm and tender to the touch.  Assessment   Neurological:      Mental Status: She is alert and oriented to person, place, and time.         ED Course        Procedures         No results found for this or any previous visit (from the past 24 hours).    Medications - No data to display    Assessments & Plan (with Medical Decision Making)  54-year-old female that presented for postop wound check.  Patient had multiple lipomas removed from her right arm and now has redness, pain and some itching to the arm.  Afebrile.  On evaluation findings are most consistent with cellulitis.  She does have some raised lesions suspicious for a rash to the area.  Patient told me she did try and apply Hydrocortisone cream to the area.  Low concern for an abscess at this time.  Will treat with cephalexin for cellulitis of the right arm.  Will also treat her with Zyrtec to help with the itching.  Recommended cold compresses and continue with Tylenol or ibuprofen as needed for pain.   She has a follow-up appointment with Dr. Hao blanchard in 1 week and she was encouraged to keep this for reevaluation.  Erythematous area was outlined today and patient  informed that if redness continues to spread out of this area she should return here for evaluation.      I have reviewed the nursing notes.    I have reviewed the findings, diagnosis, plan and need for follow up with the patient.  This document was prepared using a combination of typing and voice generated software.  While every attempt was made for accuracy, spelling and grammatical errors may exist.         New Prescriptions    CEPHALEXIN (KEFLEX) 500 MG CAPSULE    Take 1 capsule (500 mg) by mouth 4 times daily for 7 days.    CETIRIZINE (ZYRTEC) 10 MG TABLET    Take 1 tablet (10 mg) by mouth daily for 10 days.       Final diagnoses:   Encounter for post surgical wound check   Cellulitis of right upper extremity       11/20/2024   [facility]       Mpofu, Prudence, CNP  11/20/24 1500

## 2024-11-26 ENCOUNTER — OFFICE VISIT (OUTPATIENT)
Dept: SURGERY | Facility: OTHER | Age: 54
End: 2024-11-26
Attending: SURGERY
Payer: COMMERCIAL

## 2024-11-26 VITALS
HEART RATE: 87 BPM | DIASTOLIC BLOOD PRESSURE: 80 MMHG | RESPIRATION RATE: 14 BRPM | TEMPERATURE: 96.9 F | SYSTOLIC BLOOD PRESSURE: 138 MMHG | OXYGEN SATURATION: 99 %

## 2024-11-26 DIAGNOSIS — G89.18 ACUTE POST-OPERATIVE PAIN: Primary | ICD-10-CM

## 2024-11-26 RX ORDER — GABAPENTIN 100 MG/1
100 CAPSULE ORAL 3 TIMES DAILY
Qty: 42 CAPSULE | Refills: 0 | Status: SHIPPED | OUTPATIENT
Start: 2024-11-26 | End: 2024-12-10

## 2024-11-26 ASSESSMENT — PAIN SCALES - GENERAL: PAINLEVEL_OUTOF10: SEVERE PAIN (7)

## 2024-11-27 NOTE — PROGRESS NOTES
[facility] Progress Note    HPI: RTC for follow up of excisional biopsy of right arm.       S: She reports that she has good motion and strength of her right arm, but that she has significant pain when resting her elbow on surfaces and sometimes just touching the skin. It does not hurt when just wearing clothing. She was seen in the urgent care last week for concern over itching rash of her arm, she was prescribed antibiotics.     O:   Vitals:  /80 (BP Location: Left arm, Cuff Size: Adult Regular)   Pulse 87   Temp 96.9  F (36.1  C) (Tympanic)   Resp 14   LMP 04/10/2018 (LMP Unknown)   SpO2 99%       Physical Exam:  G: alert oriented, no acute distress   ENT: sclera non-icteric   Pulm: no respiratory distress   CVS: RRR  ABD: soft non-tender non-distended   Ext: No significant swelling, incisions still have glue no induration. Has full range of motion, 5/5 strength to brachial, median and ulnar nerve distributions.     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 There is no loss of motor ability, distribution seems most consistent with ulnar nerve. Lesions were removed under local making pressure injury unlikely. I see no evidence of infection and review of photos from urgent care most likely allergy possibly related to prep. Will trial some gabapentin will start at a low dose of 100 mg TID and see back next week to see if need to go up on dose. If not improving will discuss PT or possible neurology referral from [facility], but as she is only 2 weeks post op expect this to get better week to week.     Jaguar Bui MD

## 2024-12-04 ENCOUNTER — OFFICE VISIT (OUTPATIENT)
Dept: SURGERY | Facility: OTHER | Age: 54
End: 2024-12-04
Attending: SURGERY
Payer: COMMERCIAL

## 2024-12-04 VITALS
SYSTOLIC BLOOD PRESSURE: 122 MMHG | TEMPERATURE: 96.3 F | RESPIRATION RATE: 16 BRPM | OXYGEN SATURATION: 100 % | HEART RATE: 79 BPM | DIASTOLIC BLOOD PRESSURE: 66 MMHG

## 2024-12-04 DIAGNOSIS — G89.18 ACUTE POST-OPERATIVE PAIN: ICD-10-CM

## 2024-12-04 RX ORDER — GABAPENTIN 100 MG/1
100 CAPSULE ORAL 3 TIMES DAILY
Qty: 42 CAPSULE | Refills: 0 | Status: SHIPPED | OUTPATIENT
Start: 2024-12-04

## 2024-12-04 ASSESSMENT — PAIN SCALES - GENERAL: PAINLEVEL_OUTOF10: MODERATE PAIN (4)

## 2024-12-05 NOTE — PROGRESS NOTES
[facility] Progress Note    HPI: RTC for follow up of excisional biopsy of right arm. 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       S: Her pain is significantly improving, no concerns with motion or strength of hand or arm. She is using the low dose of gabapentin which is helping.     O:   Vitals:  /66 (BP Location: Left arm, Cuff Size: Adult Regular)   Pulse 79   Temp (!) 96.3  F (35.7  C) (Tympanic)   Resp 16   LMP 04/10/2018 (LMP Unknown)   SpO2 100%       Physical Exam:  G: alert oriented, no acute distress   ENT: sclera non-icteric   Pulm: no respiratory distress   CVS: RRR  ABD: soft non-tender non-distended   Ext: Well healed incisions with no swelling or redness     Assessment/Plan:  She has shown significant improvement over the last week. Did offer to increase her gabapentin dose which she declined. Expected continued gains will see back in two weeks, if still concerns at that point will likely see if PT might see her to see if there is any objective evidence of injury. Unlikely to refer for nerve testing until 3 months post surgery per discussions of orthopedics.     Jaguar Bui MD

## 2024-12-18 ENCOUNTER — OFFICE VISIT (OUTPATIENT)
Dept: SURGERY | Facility: OTHER | Age: 54
End: 2024-12-18
Attending: SURGERY
Payer: COMMERCIAL

## 2024-12-18 VITALS
OXYGEN SATURATION: 98 % | WEIGHT: 148 LBS | SYSTOLIC BLOOD PRESSURE: 120 MMHG | DIASTOLIC BLOOD PRESSURE: 78 MMHG | RESPIRATION RATE: 16 BRPM | HEIGHT: 65 IN | HEART RATE: 97 BPM | BODY MASS INDEX: 24.66 KG/M2

## 2024-12-18 DIAGNOSIS — Z98.890 POSTOPERATIVE STATE: Primary | ICD-10-CM

## 2024-12-18 ASSESSMENT — PAIN SCALES - GENERAL: PAINLEVEL_OUTOF10: NO PAIN (0)

## 2024-12-19 NOTE — PROGRESS NOTES
"[facility] Progress Note    HPI: RTC for follow up of excisional biopsy of right arm. 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       S: Doing much better today, no pain at rest, no concerns about strength only pain when presses right on incision.     O:   Vitals:  /78 (BP Location: Left arm)   Pulse 97   Resp 16   Ht 1.651 m (5' 5\")   Wt 67.1 kg (148 lb)   LMP 04/10/2018 (LMP Unknown)   SpO2 98%   BMI 24.63 kg/m        Physical Exam:  G: alert oriented, no acute distress   ENT: sclera non-icteric   Pulm: no respiratory distress   CVS: RRR  Ext: WWP, no swelling or erythema    Assessment/Plan:  Doing better she is self weaning off low dose gabapentin, will continue to do so, she is doing significantly better, expect to continue, follow up PRN at this point.     Jaguar Bui MD     "

## 2025-02-02 ENCOUNTER — HOSPITAL ENCOUNTER (EMERGENCY)
Facility: HOSPITAL | Age: 55
Discharge: HOME | End: 2025-02-02
Attending: NURSE PRACTITIONER
Payer: COMMERCIAL

## 2025-02-02 ENCOUNTER — APPOINTMENT (OUTPATIENT)
Dept: GENERAL RADIOLOGY | Facility: HOSPITAL | Age: 55
End: 2025-02-02
Attending: NURSE PRACTITIONER
Payer: COMMERCIAL

## 2025-02-02 VITALS
TEMPERATURE: 98 F | RESPIRATION RATE: 18 BRPM | OXYGEN SATURATION: 98 % | DIASTOLIC BLOOD PRESSURE: 84 MMHG | HEART RATE: 88 BPM | SYSTOLIC BLOOD PRESSURE: 123 MMHG

## 2025-02-02 DIAGNOSIS — M25.532 LEFT WRIST PAIN: ICD-10-CM
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
## 2025-02-02 DIAGNOSIS — M25.512 LEFT SHOULDER PAIN: ICD-10-CM

## 2025-02-02 DIAGNOSIS — M54.2 NECK PAIN ON LEFT SIDE: ICD-10-CM

## 2025-02-02 DIAGNOSIS — M79.643 TENDERNESS OF ANATOMICAL SNUFFBOX: ICD-10-CM

## 2025-02-02 PROCEDURE — G0463 HOSPITAL OUTPT CLINIC VISIT: HCPCS

## 2025-02-02 PROCEDURE — 73110 X-RAY EXAM OF WRIST: CPT | Mod: LT

## 2025-02-02 PROCEDURE — 73030 X-RAY EXAM OF SHOULDER: CPT | Mod: LT

## 2025-02-02 PROCEDURE — 73130 X-RAY EXAM OF HAND: CPT | Mod: LT

## 2025-02-02 PROCEDURE — 99213 OFFICE O/P EST LOW 20 MIN: CPT | Performed by: NURSE PRACTITIONER

## 2025-02-02 ASSESSMENT — ENCOUNTER SYMPTOMS
NECK PAIN: 1
MYALGIAS: 1

## 2025-02-02 ASSESSMENT — ACTIVITIES OF DAILY LIVING (ADL): ADLS_ACUITY_SCORE: 41

## 2025-02-02 NOTE — ED TRIAGE NOTES
PAL Tobar CNP assessed patient in triage and determined patient Urgent Care appropriate. Will be seen in Urgent Care.
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
## 2025-02-03 NOTE — DISCHARGE INSTRUCTIONS
All your x-rays look good today.  Take Tylenol or ibuprofen as needed for pain.  Apply ice packs to the areas that hurt.  Use the wrist brace.    Follow-up with your primary doctor as needed.  Return to urgent care or emergency department for any worsening or concerning symptoms.

## 2025-02-09 DIAGNOSIS — E66.811 CLASS 1 OBESITY DUE TO EXCESS CALORIES WITHOUT SERIOUS COMORBIDITY WITH BODY MASS INDEX (BMI) OF 34.0 TO 34.9 IN ADULT: ICD-10-CM

## 2025-02-09 DIAGNOSIS — E66.09 CLASS 1 OBESITY DUE TO EXCESS CALORIES WITHOUT SERIOUS COMORBIDITY WITH BODY MASS INDEX (BMI) OF 34.0 TO 34.9 IN ADULT: ICD-10-CM

## 2025-02-10 RX ORDER — TIRZEPATIDE 5 MG/.5ML
INJECTION, SOLUTION SUBCUTANEOUS
Qty: 6 ML | Refills: 1 | Status: SHIPPED | OUTPATIENT
Start: 2025-02-10

## 2025-02-10 NOTE — TELEPHONE ENCOUNTER
MOUNJARO 5 MG/0.5 ML PEN         Last Written Prescription Date:  1/13/25  Last Fill Quantity: 2 mL,   # refills: 3  Last Office Visit: 12/20/24  Future Office visit:    Next 5 appointments (look out 90 days)      Mar 24, 2025 2:30 PM  (Arrive by 2:15 PM)  Adult Preventative Visit with Mary Ellen Armstrong NP  [facility] ([facility] ) [address] [address]  [phone]             Routing refill request to provider for review/approval because:    GLP-1 Agonists Protocol [id number] 08:17 AM   Protocol Details Medication indicated for associated diagnosis      The original prescription was discontinued on 9/20/2024 by Mary Ellen Armstrong NP for the following reason: Reorder (No AVS). Renewing this prescription may not be appropriate.

## 2025-03-10 ENCOUNTER — ANCILLARY PROCEDURE (OUTPATIENT)
Dept: MAMMOGRAPHY | Facility: OTHER | Age: 55
End: 2025-03-10
Attending: NURSE PRACTITIONER
Payer: COMMERCIAL

## 2025-03-10 ENCOUNTER — TELEPHONE (OUTPATIENT)
Dept: MAMMOGRAPHY | Facility: OTHER | Age: 55
End: 2025-03-10

## 2025-03-10 DIAGNOSIS — Z12.31 BREAST CANCER SCREENING BY MAMMOGRAM: ICD-10-CM

## 2025-03-10 PROCEDURE — 77067 SCR MAMMO BI INCL CAD: CPT | Mod: TC | Performed by: RADIOLOGY

## 2025-03-10 PROCEDURE — 77063 BREAST TOMOSYNTHESIS BI: CPT | Mod: TC | Performed by: RADIOLOGY

## 2025-03-24 ENCOUNTER — OFFICE VISIT (OUTPATIENT)
Dept: FAMILY MEDICINE | Facility: OTHER | Age: 55
End: 2025-03-24
Attending: NURSE PRACTITIONER
Payer: COMMERCIAL

## 2025-03-24 VITALS
SYSTOLIC BLOOD PRESSURE: 128 MMHG | HEART RATE: 95 BPM | BODY MASS INDEX: 24.49 KG/M2 | RESPIRATION RATE: 18 BRPM | DIASTOLIC BLOOD PRESSURE: 80 MMHG | OXYGEN SATURATION: 97 % | TEMPERATURE: 97.9 F | WEIGHT: 147 LBS | HEIGHT: 65 IN

## 2025-03-24 DIAGNOSIS — G43.009 MIGRAINE WITHOUT AURA AND WITHOUT STATUS MIGRAINOSUS, NOT INTRACTABLE: ICD-10-CM

## 2025-03-24 DIAGNOSIS — G25.81 RESTLESS LEG SYNDROME: ICD-10-CM

## 2025-03-24 DIAGNOSIS — E66.09 CLASS 1 OBESITY DUE TO EXCESS CALORIES WITHOUT SERIOUS COMORBIDITY WITH BODY MASS INDEX (BMI) OF 34.0 TO 34.9 IN ADULT: ICD-10-CM

## 2025-03-24 DIAGNOSIS — D50.9 IRON DEFICIENCY ANEMIA, UNSPECIFIED IRON DEFICIENCY ANEMIA TYPE: ICD-10-CM

## 2025-03-24 DIAGNOSIS — E66.811 CLASS 1 OBESITY DUE TO EXCESS CALORIES WITHOUT SERIOUS COMORBIDITY WITH BODY MASS INDEX (BMI) OF 34.0 TO 34.9 IN ADULT: ICD-10-CM

## 2025-03-24 DIAGNOSIS — Z00.00 ANNUAL PHYSICAL EXAM: Primary | ICD-10-CM

## 2025-03-24 DIAGNOSIS — R73.03 PREDIABETES: ICD-10-CM

## 2025-03-24 DIAGNOSIS — M25.552 CHRONIC LEFT HIP PAIN: ICD-10-CM

## 2025-03-24 DIAGNOSIS — G89.29 CHRONIC LEFT HIP PAIN: ICD-10-CM

## 2025-03-24 LAB
ALBUMIN SERPL BCG-MCNC: 4.6 G/DL (ref 3.5–5.2)
ALP SERPL-CCNC: 89 U/L (ref 40–150)
ALT SERPL W P-5'-P-CCNC: 43 U/L (ref 0–50)
ANION GAP SERPL CALCULATED.3IONS-SCNC: 10 MMOL/L (ref 7–15)
AST SERPL W P-5'-P-CCNC: 37 U/L (ref 0–45)
BASOPHILS # BLD AUTO: 0 10E3/UL (ref 0–0.2)
BASOPHILS NFR BLD AUTO: 0 %
BILIRUB SERPL-MCNC: 0.4 MG/DL
BUN SERPL-MCNC: 16.9 MG/DL (ref 6–20)
CALCIUM SERPL-MCNC: 9.4 MG/DL (ref 8.8–10.4)
CHLORIDE SERPL-SCNC: 101 MMOL/L (ref 98–107)
CHOLEST SERPL-MCNC: 240 MG/DL
CREAT SERPL-MCNC: 0.59 MG/DL (ref 0.51–0.95)
EGFRCR SERPLBLD CKD-EPI 2021: >90 ML/MIN/1.73M2
EOSINOPHIL # BLD AUTO: 0.1 10E3/UL (ref 0–0.7)
EOSINOPHIL NFR BLD AUTO: 1 %
ERYTHROCYTE [DISTWIDTH] IN BLOOD BY AUTOMATED COUNT: 14.4 % (ref 10–15)
EST. AVERAGE GLUCOSE BLD GHB EST-MCNC: 108 MG/DL
FASTING STATUS PATIENT QL REPORTED: NO
FASTING STATUS PATIENT QL REPORTED: NO
GLUCOSE SERPL-MCNC: 91 MG/DL (ref 70–99)
HBA1C MFR BLD: 5.4 %
HCO3 SERPL-SCNC: 28 MMOL/L (ref 22–29)
HCT VFR BLD AUTO: 39.9 % (ref 35–47)
HDLC SERPL-MCNC: 66 MG/DL
HGB BLD-MCNC: 13.2 G/DL (ref 11.7–15.7)
IMM GRANULOCYTES # BLD: 0 10E3/UL
IMM GRANULOCYTES NFR BLD: 0 %
LDLC SERPL CALC-MCNC: 136 MG/DL
LYMPHOCYTES # BLD AUTO: 2.7 10E3/UL (ref 0.8–5.3)
LYMPHOCYTES NFR BLD AUTO: 39 %
MCH RBC QN AUTO: 27.8 PG (ref 26.5–33)
MCHC RBC AUTO-ENTMCNC: 33.1 G/DL (ref 31.5–36.5)
MCV RBC AUTO: 84 FL (ref 78–100)
MONOCYTES # BLD AUTO: 0.5 10E3/UL (ref 0–1.3)
MONOCYTES NFR BLD AUTO: 7 %
NEUTROPHILS # BLD AUTO: 3.7 10E3/UL (ref 1.6–8.3)
NEUTROPHILS NFR BLD AUTO: 54 %
NONHDLC SERPL-MCNC: 174 MG/DL
PLATELET # BLD AUTO: 309 10E3/UL (ref 150–450)
POTASSIUM SERPL-SCNC: 4.2 MMOL/L (ref 3.4–5.3)
PROT SERPL-MCNC: 7.7 G/DL (ref 6.4–8.3)
RBC # BLD AUTO: 4.75 10E6/UL (ref 3.8–5.2)
SODIUM SERPL-SCNC: 139 MMOL/L (ref 135–145)
TRIGL SERPL-MCNC: 191 MG/DL
TSH SERPL DL<=0.005 MIU/L-ACNC: 3.33 UIU/ML (ref 0.3–4.2)
WBC # BLD AUTO: 6.9 10E3/UL (ref 4–11)

## 2025-03-24 PROCEDURE — 80061 LIPID PANEL: CPT | Performed by: NURSE PRACTITIONER

## 2025-03-24 PROCEDURE — 99214 OFFICE O/P EST MOD 30 MIN: CPT | Mod: 25 | Performed by: NURSE PRACTITIONER

## 2025-03-24 PROCEDURE — 36415 COLL VENOUS BLD VENIPUNCTURE: CPT | Performed by: NURSE PRACTITIONER

## 2025-03-24 PROCEDURE — 99396 PREV VISIT EST AGE 40-64: CPT | Performed by: NURSE PRACTITIONER

## 2025-03-24 PROCEDURE — 80050 GENERAL HEALTH PANEL: CPT | Performed by: NURSE PRACTITIONER

## 2025-03-24 PROCEDURE — 83036 HEMOGLOBIN GLYCOSYLATED A1C: CPT | Performed by: NURSE PRACTITIONER

## 2025-03-24 PROCEDURE — G2211 COMPLEX E/M VISIT ADD ON: HCPCS | Performed by: NURSE PRACTITIONER

## 2025-03-24 SDOH — HEALTH STABILITY: PHYSICAL HEALTH: ON AVERAGE, HOW MANY MINUTES DO YOU ENGAGE IN EXERCISE AT THIS LEVEL?: 40 MIN

## 2025-03-24 SDOH — HEALTH STABILITY: PHYSICAL HEALTH: ON AVERAGE, HOW MANY DAYS PER WEEK DO YOU ENGAGE IN MODERATE TO STRENUOUS EXERCISE (LIKE A BRISK WALK)?: 3 DAYS

## 2025-03-24 ASSESSMENT — PAIN SCALES - GENERAL: PAINLEVEL_OUTOF10: NO PAIN (0)

## 2025-03-24 ASSESSMENT — SOCIAL DETERMINANTS OF HEALTH (SDOH): HOW OFTEN DO YOU GET TOGETHER WITH FRIENDS OR RELATIVES?: THREE TIMES A WEEK

## 2025-03-24 NOTE — PATIENT INSTRUCTIONS
Assessment & Plan     1. Annual physical exam (Primary)  Exam completed     2. Prediabetes  - Hemoglobin A1c; Future  - Lipid Profile; Future  - Comprehensive metabolic panel; Future  - TSH with free T4 reflex; Future    3. Class 1 obesity due to excess calories without serious comorbidity with body mass index (BMI) of 34.0 to 34.9 in adult  Continue mounjaro.     4. Migraine without aura and without status migrainosus, not intractable  Continue current plan     5. Iron deficiency anemia, unspecified iron deficiency anemia type  - CBC with Platelets & Differential; Future    6. Chronic left hip pain  - Orthopedic  Referral; Future    7. Restless leg syndrome  - try gabapentin 100-300mg at bedtime.        Follow-up in 6 months or as needed    Mary Ellen Armstrong,   Certified Adult Nurse Practitioner  [phone]

## 2025-03-24 NOTE — PROGRESS NOTES
Preventive Care Visit  [facility]  Mary Ellen Armstrong, SAL, Family Medicine  Mar 24, 2025      Assessment & Plan     1. Annual physical exam (Primary)  Exam completed     2. Prediabetes  - Hemoglobin A1c; Future  - Lipid Profile; Future  - Comprehensive metabolic panel; Future  - TSH with free T4 reflex; Future    3. Class 1 obesity due to excess calories without serious comorbidity with body mass index (BMI) of 34.0 to 34.9 in adult  Continue mounjaro.     4. Migraine without aura and without status migrainosus, not intractable  Continue current plan     5. Iron deficiency anemia, unspecified iron deficiency anemia type  - CBC with Platelets & Differential; Future    6. Chronic left hip pain  - Orthopedic  Referral; Future    7. Restless leg syndrome  - try gabapentin 100-300mg at bedtime.      Patient has been advised of split billing requirements and indicates understanding: Yes        Counseling  Appropriate preventive services were addressed with this patient via screening, questionnaire, or discussion as appropriate for fall prevention, nutrition, physical activity, Tobacco-use cessation, social engagement, weight loss and cognition.  Checklist reviewing preventive services available has been given to the patient.  Reviewed patient's diet, addressing concerns and/or questions.   She is at risk for lack of exercise and has been provided with information to increase physical activity for the benefit of her well-being.       Follow-up in 6 months or as needed       The longitudinal plan of care for the diagnosis(es)/condition(s) as documented were addressed during this visit. Due to the added complexity in care, I will continue to support Shazia in the subsequent management and with ongoing continuity of care.    Mary Ellen Armstrong,   Certified Adult Nurse Practitioner  [phone]    Subjective   Shazia is a 54 year old, presenting for the following:  Physical           HPI     Diabetes  Follow-up(PRE)    How often are you checking your blood sugar? Not at all  What concerns do you have today about your diabetes? None   Do you have any of these symptoms? (Select all that apply)  No numbness or tingling in feet.  No redness, sores or blisters on feet.  No complaints of excessive thirst.  No reports of blurry vision.  No significant changes to weight.      BP Readings from Last 3 Encounters:   03/24/25 128/80   02/02/25 123/84   12/20/24 122/82    Wt Readings from Last 3 Encounters:   03/24/25 66.7 kg (147 lb)   12/20/24 69.2 kg (152 lb 9.6 oz)   12/18/24 67.1 kg (148 lb)                 Hemoglobin A1C (%)   Date Value   05/03/2024 5.4   11/10/2023 6.1 (H)   01/14/2021 5.8 (H)   12/11/2020 5.7 (H)     LDL Cholesterol Calculated (mg/dL)   Date Value   05/03/2024 148 (H)   11/10/2023 169 (H)   01/14/2021 175 (H)   12/30/2019 155 (H)           Migraine   Since your last clinic visit, how have your headaches changed?  No change  How often are you getting headaches or migraines? 3-4 times a week   Are you able to do normal daily activities when you have a migraine? Yes  Are you taking rescue/relief medications? (Select all that apply) ibuprofen (Advil, Motrin) and sumatriptan (Imitrex)  How helpful is your rescue/relief medication?  I get total relief  Are you taking any medications to prevent migraines? (Select all that apply)  No  In the past 4 weeks, how often have you gone to urgent care or the emergency room because of your headaches?  0    Restless legs - mirapex is not helping all that much, she does have some gabapentin at home and will try that.     Advance Care Planning  Patient does not have a Health Care Directive: Discussed advance care planning with patient; however, patient declined at this time.      3/24/2025   General Health   How would you rate your overall physical health? Good   Feel stress (tense, anxious, or unable to sleep) Not at all         3/24/2025   Nutrition   Three or more  servings of calcium each day? Yes   Diet: I don't know   How many servings of fruit and vegetables per day? (!) 2-3   How many sweetened beverages each day? 0-1         3/24/2025   Exercise   Days per week of moderate/strenous exercise 3 days   Average minutes spent exercising at this level 40 min         3/24/2025   Social Factors   Frequency of gathering with friends or relatives Three times a week   Worry food won't last until get money to buy more No   Food not last or not have enough money for food? No   Do you have housing? (Housing is defined as stable permanent housing and does not include staying ouside in a car, in a tent, in an abandoned building, in an overnight shelter, or couch-surfing.) Yes   Are you worried about losing your housing? No   Lack of transportation? No   Unable to get utilities (heat,electricity)? No         3/24/2025   Fall Risk   Fallen 2 or more times in the past year? No   Trouble with walking or balance? No          3/24/2025   Dental   Dentist two times every year? Yes           Today's PHQ-2 Score:       3/24/2025     2:01 PM   PHQ-2 (  [employer])   Q1: Little interest or pleasure in doing things 0   Q2: Feeling down, depressed or hopeless 0   PHQ-2 Score 0    Q1: Little interest or pleasure in doing things Not at all   Q2: Feeling down, depressed or hopeless Not at all   PHQ-2 Score 0       Patient-reported           3/24/2025   Substance Use   Alcohol more than 3/day or more than 7/wk No   Do you use any other substances recreationally? No     Social History     Tobacco Use    Smoking status: Former     Current packs/day: 0.00     Types: Cigarettes     Start date: 1986     Quit date:      Years since quittin.2     Passive exposure: Past    Smokeless tobacco: Never   Vaping Use    Vaping status: Never Used   Substance Use Topics    Alcohol use: Yes     Comment: occ    Drug use: No           3/10/2025   LAST FHS-7 RESULTS   1st degree relative breast or ovarian  cancer No   Any relative bilateral breast cancer No   Any male have breast cancer No   Any ONE woman have BOTH breast AND ovarian cancer No   Any woman with breast cancer before 50yrs No   2 or more relatives with breast AND/OR ovarian cancer No   2 or more relatives with breast AND/OR bowel cancer No     Mammogram Screening - Mammogram every 1-2 years updated in Health Maintenance based on mutual decision making          3/24/2025   One time HIV Screening   Previous HIV test? I don't know         3/24/2025   STI Screening   New sexual partner(s) since last STI/HIV test? No     History of abnormal Pap smear: No - age 30- 64 PAP with HPV every 5 years recommended        Latest Ref Rng & Units 11/10/2023    10:46 AM 4/10/2018     8:59 AM   PAP / HPV   PAP  Negative for Intraepithelial Lesion or Malignancy (NILM)     PAP (Historical)   NIL    HPV 16 DNA Negative Negative  Negative    HPV 18 DNA Negative Negative  Negative    Other HR HPV Negative Negative  Negative      ASCVD Risk   The 10-year ASCVD risk score (Jayashree ABARCA, et al., 2019) is: 2.2%    Values used to calculate the score:      Age: 54 years      Sex: Female      Is Non- : No      Diabetic: No      Tobacco smoker: No      Systolic Blood Pressure: 128 mmHg      Is BP treated: No      HDL Cholesterol: 55 mg/dL      Total Cholesterol: 243 mg/dL    Reviewed and updated as needed this visit by Provider   Tobacco  Allergies  Meds  Problems  Med Hx  Surg Hx  Fam Hx              Review of Systems  CONSTITUTIONAL: NEGATIVE for fever, chills, change in weight  INTEGUMENTARY/SKIN: NEGATIVE for worrisome rashes, moles or lesions  EYES: NEGATIVE for vision changes or irritation  ENT/MOUTH: NEGATIVE for ear, mouth and throat problems  RESP: NEGATIVE for significant cough or SOB  BREAST: NEGATIVE for masses, tenderness or discharge  CV: NEGATIVE for chest pain, palpitations or peripheral edema  GI: NEGATIVE for nausea, abdominal  "pain, heartburn, or change in bowel habits  : NEGATIVE for frequency, dysuria, or hematuria  MUSCULOSKELETAL: NEGATIVE for significant arthralgias or myalgia  NEURO: NEGATIVE for weakness, dizziness or paresthesias  ENDOCRINE: NEGATIVE for temperature intolerance, skin/hair changes  HEME: NEGATIVE for bleeding problems  PSYCHIATRIC: NEGATIVE for changes in mood or affect     Objective    Exam  /80 (BP Location: Left arm, Patient Position: Chair, Cuff Size: Adult Regular)   Pulse 95   Temp 97.9  F (36.6  C) (Tympanic)   Resp 18   Ht 1.651 m (5' 5\")   Wt 66.7 kg (147 lb)   LMP 04/10/2018 (LMP Unknown)   SpO2 97%   Breastfeeding No   BMI 24.46 kg/m     Estimated body mass index is 24.46 kg/m  as calculated from the following:    Height as of this encounter: 1.651 m (5' 5\").    Weight as of this encounter: 66.7 kg (147 lb).    Physical Exam  GENERAL: alert and no distress  NECK: no adenopathy, no asymmetry, masses, or scars  RESP: lungs clear to auscultation - no rales, rhonchi or wheezes  CV: regular rate and rhythm, normal S1 S2, no S3 or S4, no murmur  MS: no gross musculoskeletal defects noted, no edema  PSYCH: mentation appears normal, affect normal/bright        Signed Electronically by: Mary Ellen Armstrong NP    "

## 2025-03-27 ENCOUNTER — TELEPHONE (OUTPATIENT)
Dept: FAMILY MEDICINE | Facility: OTHER | Age: 55
End: 2025-03-27

## 2025-03-27 NOTE — TELEPHONE ENCOUNTER
MRI results faxed to Dr. Rincon at [facility] [phone]. Images requested to be pushed also.

## 2025-04-16 DIAGNOSIS — G43.009 MIGRAINE WITHOUT AURA AND WITHOUT STATUS MIGRAINOSUS, NOT INTRACTABLE: ICD-10-CM

## 2025-04-16 RX ORDER — SUMATRIPTAN 50 MG/1
50 TABLET, FILM COATED ORAL
Qty: 12 TABLET | Refills: 3 | Status: SHIPPED | OUTPATIENT
Start: 2025-04-16

## 2025-04-16 NOTE — TELEPHONE ENCOUNTER
SUMATRIPTAN SUCC 50 MG TABLET         Last Written Prescription Date:  12/20/24  Last Fill Quantity: 12,   # refills: 3  Last Office Visit: 3/24/25  Future Office visit:       Routing refill request to provider for review/approval because:    Serotonin Agonists [id number]/16/2025 12:21 AM   Protocol Details Review patient's medical record. If the patient has used less than or equal to nine (9) tablets or injections for migraine a month the RN may authorize the refill request.

## 2025-05-25 ENCOUNTER — HOSPITAL ENCOUNTER (EMERGENCY)
Facility: HOSPITAL | Age: 55
Discharge: HOME | End: 2025-05-25
Attending: NURSE PRACTITIONER
Payer: COMMERCIAL

## 2025-05-25 VITALS
RESPIRATION RATE: 20 BRPM | SYSTOLIC BLOOD PRESSURE: 131 MMHG | TEMPERATURE: 96.8 F | OXYGEN SATURATION: 100 % | DIASTOLIC BLOOD PRESSURE: 94 MMHG | HEART RATE: 83 BPM

## 2025-05-25 DIAGNOSIS — G43.909 MIGRAINE: ICD-10-CM

## 2025-05-25 PROCEDURE — 99284 EMERGENCY DEPT VISIT MOD MDM: CPT | Mod: 25

## 2025-05-25 PROCEDURE — 258N000003 HC RX IP 258 OP 636: Performed by: NURSE PRACTITIONER

## 2025-05-25 PROCEDURE — 96374 THER/PROPH/DIAG INJ IV PUSH: CPT

## 2025-05-25 PROCEDURE — 250N000013 HC RX MED GY IP 250 OP 250 PS 637: Performed by: NURSE PRACTITIONER

## 2025-05-25 PROCEDURE — 96375 TX/PRO/DX INJ NEW DRUG ADDON: CPT

## 2025-05-25 PROCEDURE — 250N000011 HC RX IP 250 OP 636: Mod: JZ | Performed by: NURSE PRACTITIONER

## 2025-05-25 PROCEDURE — 99283 EMERGENCY DEPT VISIT LOW MDM: CPT | Performed by: NURSE PRACTITIONER

## 2025-05-25 RX ORDER — ACETAMINOPHEN 325 MG/1
975 TABLET ORAL ONCE
Status: COMPLETED | OUTPATIENT
Start: 2025-05-25 | End: 2025-05-25

## 2025-05-25 RX ORDER — DIPHENHYDRAMINE HYDROCHLORIDE 50 MG/ML
50 INJECTION, SOLUTION INTRAMUSCULAR; INTRAVENOUS ONCE
Status: COMPLETED | OUTPATIENT
Start: 2025-05-25 | End: 2025-05-25

## 2025-05-25 RX ORDER — KETOROLAC TROMETHAMINE 15 MG/ML
15 INJECTION, SOLUTION INTRAMUSCULAR; INTRAVENOUS ONCE
Status: COMPLETED | OUTPATIENT
Start: 2025-05-25 | End: 2025-05-25

## 2025-05-25 RX ADMIN — SODIUM CHLORIDE 1000 ML: 9 INJECTION, SOLUTION INTRAVENOUS at 19:17

## 2025-05-25 RX ADMIN — DIPHENHYDRAMINE HYDROCHLORIDE 50 MG: 50 INJECTION, SOLUTION INTRAMUSCULAR; INTRAVENOUS at 18:53

## 2025-05-25 RX ADMIN — KETOROLAC TROMETHAMINE 15 MG: 15 INJECTION, SOLUTION INTRAMUSCULAR; INTRAVENOUS at 18:56

## 2025-05-25 RX ADMIN — ACETAMINOPHEN 975 MG: 325 TABLET, FILM COATED ORAL at 18:59

## 2025-05-25 RX ADMIN — PROCHLORPERAZINE EDISYLATE 10 MG: 5 INJECTION INTRAMUSCULAR; INTRAVENOUS at 18:51

## 2025-05-25 ASSESSMENT — ACTIVITIES OF DAILY LIVING (ADL)
ADLS_ACUITY_SCORE: 41

## 2025-05-25 ASSESSMENT — COLUMBIA-SUICIDE SEVERITY RATING SCALE - C-SSRS
1. IN THE PAST MONTH, HAVE YOU WISHED YOU WERE DEAD OR WISHED YOU COULD GO TO SLEEP AND NOT WAKE UP?: NO
6. HAVE YOU EVER DONE ANYTHING, STARTED TO DO ANYTHING, OR PREPARED TO DO ANYTHING TO END YOUR LIFE?: NO
2. HAVE YOU ACTUALLY HAD ANY THOUGHTS OF KILLING YOURSELF IN THE PAST MONTH?: NO

## 2025-05-25 ASSESSMENT — ENCOUNTER SYMPTOMS
HEMATOLOGIC/LYMPHATIC NEGATIVE: 1
CARDIOVASCULAR NEGATIVE: 1
WEAKNESS: 0
ENDOCRINE NEGATIVE: 1
MUSCULOSKELETAL NEGATIVE: 1
GASTROINTESTINAL NEGATIVE: 1
RESPIRATORY NEGATIVE: 1
PSYCHIATRIC NEGATIVE: 1
FACIAL ASYMMETRY: 0
EYES NEGATIVE: 1
CONSTITUTIONAL NEGATIVE: 1
DIZZINESS: 0
HEADACHES: 1
ALLERGIC/IMMUNOLOGIC NEGATIVE: 1
LIGHT-HEADEDNESS: 0
NUMBNESS: 0

## 2025-05-25 NOTE — ED TRIAGE NOTES
"Patient presents w/ c/o on and off head aches for the last few weeks that \"don't feel like my normal migraines\".   Last took Excedrin @ 1200, Imitrex at 0700.  No changes in vision.   BEFAST negative.   A&Ox4.      Triage Assessment (Adult)       Row Name 05/25/25 2262          Triage Assessment    Airway WDL WDL        Respiratory WDL    Respiratory WDL WDL;expansion/retractions;rhythm/pattern     Rhythm/Pattern, Respiratory unlabored;pattern regular;depth regular;no shortness of breath reported     Expansion/Accessory Muscles/Retractions no use of accessory muscles;no retractions;expansion symmetric        Cognitive/Neuro/Behavioral WDL    Cognitive/Neuro/Behavioral WDL WDL                     "

## 2025-05-25 NOTE — ED PROVIDER NOTES
History     Chief Complaint   Patient presents with    Headache     HPI  Shazia Verma is a 54 year old individual with history of gastric bypass, comes in for headache.  Patient states has been having on and off headaches for the past few weeks.  States that she wakes up and they are not that bad but by the time she goes to bed they are very bad.  Has been taking Excedrin with no improvement.  Has taken Imitrex with no improvement.  Comes in for evaluation.  No paresthesias reported.  No weaknesses.  No visual disturbance.  States that bouncing around made the headache worse.    Allergies:  Allergies   Allergen Reactions    Metformin Diarrhea    Reglan [Metoclopramide] Anxiety       Problem List:    Patient Active Problem List    Diagnosis Date Noted    Trochanteric bursitis of left hip 11/22/2023     Priority: Medium    Anemia, iron deficiency 07/11/2023     Priority: Medium    Prediabetes 02/20/2023     Priority: Medium    Iron deficiency 08/10/2021     Priority: Medium    Low ferritin 08/09/2021     Priority: Medium    Migraine without aura and without status migrainosus, not intractable 05/06/2021     Priority: Medium    Vitamin D deficiency 05/06/2021     Priority: Medium    Other headache syndrome 09/05/2019     Priority: Medium    History of right hip replacement 01/30/2019     Priority: Medium    S/P hysterectomy 05/02/2018     Priority: Medium    Hypoglycemia 09/08/2017     Priority: Medium    ACP (advance care planning) 11/28/2016     Priority: Medium     Advance Care Planning 11/28/2016: ACP Review of Chart / Resources Provided:  Reviewed chart for advance care plan.  Shazia Verma has been provided information and resources to begin or update their advance care plan.  Added by WOODY LENNON            H/O gastric bypass 11/28/2016     Priority: Medium     Had type 2 diabetes, hyperlipidemia and hypertension prior to surgery.           Past Medical History:    Past Medical History:   Diagnosis Date     Arthritis     Breast mass     Diabetes (H)     History of right hip replacement     Hypertension        Past Surgical History:    Past Surgical History:   Procedure Laterality Date    APPENDECTOMY  1991    AS TOTAL HIP ARTHROPLASTY Right 2019    BIOPSY BREAST Right     benign    BIOPSY BREAST Right 2020    US Bx benign    BREAST BIOPSY, RT/LT Right 2002    right bx    BREAST SURGERY  2002     SECTION  1992     COLONOSCOPY  2013    ENDOSCOPY UPPER, COLONOSCOPY, COMBINED N/A 2022    Procedure: upper endoscopy and colonoscopy;  Surgeon: Jaguar Bui MD;  Location: [facility] OR    EXCISE LESION UPPER EXTREMITY Right 2024    Procedure: Excisional biopsy right arm times two;  Surgeon: Jaguar Bui MD;  Location: [facility] OR    GASTRIC BYPASS      gatric bypass      LAPAROSCOPIC HYSTERECTOMY SUPRACERVICAL N/A 2018    Procedure: LAPAROSCOPIC HYSTERECTOMY SUPRACERVICAL;  LAPAROSCOPIC SUPRACERVICAL HYSTERECTOMY, BILATEARL SALPINGECTOMY, LYSIS OF ADHESIONS;  Surgeon: Jean Marie Garvin MD;  Location: [facility] OR    ORTHOPEDIC SURGERY      right hip labreal tear    ORTHOPEDIC SURGERY  2014    left wrist     SALPINGECTOMY Bilateral 2018    Procedure: SALPINGECTOMY;;  Surgeon: Jean Marie Garvin MD;  Location: [facility] OR       Family History:    Family History   Problem Relation Age of Onset    Cerebrovascular Disease Mother     Hyperlipidemia Mother     Hypertension Mother     Coronary Artery Disease Mother     Migraines Mother     Obesity Mother     Osteoarthritis Mother     Osteoporosis Mother     Diabetes Mother     Myocardial Infarction Mother     Hyperlipidemia Father     Hypertension Father     Thyroid Disease Father     Hypertension Brother     Hyperlipidemia Brother     Obesity Brother     Hypertension Sister     Thyroid Disease Sister     Stomach Cancer Maternal Grandmother     Cancer Maternal Grandfather         colon or liver: unsure    Leukemia Paternal Grandfather      Breast Cancer No family hx of     Colon Cancer No family hx of     Prostate Cancer No family hx of     Anesthesia Reaction No family hx of     Asthma No family hx of     Genetic Disorder No family hx of        Social History:  Marital Status:   [2]  Social History     Tobacco Use    Smoking status: Former     Current packs/day: 0.00     Types: Cigarettes     Start date: 1986     Quit date:      Years since quittin.4     Passive exposure: Past    Smokeless tobacco: Never   Vaping Use    Vaping status: Never Used   Substance Use Topics    Alcohol use: Yes     Comment: occ    Drug use: No        Medications:    acetaminophen (TYLENOL) 325 MG tablet  baclofen (LIORESAL) 10 MG tablet  butalbital-acetaminophen-caffeine (ESGIC) -40 MG tablet  gabapentin (NEURONTIN) 100 MG capsule  MOUNJARO 5 MG/0.5ML SOAJ  multivitamin w/minerals (THERA-VIT-M) tablet  NONFORMULARY  phenazopyridine (PYRIDIUM) 200 MG tablet  pramipexole (MIRAPEX) 0.125 MG tablet  SUMAtriptan (IMITREX) 50 MG tablet  VITAMIN D, CHOLECALCIFEROL, PO          Review of Systems   Constitutional: Negative.    HENT: Negative.     Eyes: Negative.    Respiratory: Negative.     Cardiovascular: Negative.    Gastrointestinal: Negative.    Endocrine: Negative.    Genitourinary: Negative.    Musculoskeletal: Negative.    Skin: Negative.    Allergic/Immunologic: Negative.    Neurological:  Positive for headaches. Negative for dizziness, facial asymmetry, weakness, light-headedness and numbness.   Hematological: Negative.    Psychiatric/Behavioral: Negative.         Physical Exam   BP: (!) 131/94  Pulse: 83  Temp: 96.8  F (36  C)  Resp: 20  SpO2: 100 %      GENERAL APPEARANCE:  The patient is a 54 year old well-developed, well-nourished individual that appears as stated age.  LUNGS:  Breathing is easy.  Breath sounds are equal and clear bilaterally.  No wheezes, rhonchi, or rales.  HEART:  Regular rate and rhythm with normal S1 and S2.  No murmurs,  gallops, or rubs.  MENTAL STATUS:   The patient was alert and oriented to person, place, time and purpose. Registration and recall intact. No difficulty with concentration.   CRANIAL NERVES:  PERRL. EOMI; no nystagmus.  Full visual fields.  Trapezius and sternocleidomastoid are full strength. Tongue was midline and protrudes midline. Uvula was midline and raises midline. Facial sensation was intact to pain and light touch at all distributions. No speech disturbance. Hearing intact to conversation and whisper.  No facial asymmetry.  MOTOR: Strength was 5/5 at upper extremities, lower extremities and trunk.  No drift.  Speed and dexterity was unremarkable. Bulk and tone were unremarkable. There was no evidence of atrophy/atrophy of intrinsic hand muscles or foot muscles.  No abnormal movements or fasciculations were observed.  SENSORY:  Sensation intact to pain and light touch at all distributions.   No neglect.  REFLEXES: There was no clonus present.  Toes down-going.  CEREBELLAR FUNCTIONING: No difficulty with finger-to-nose, finger tapping and heel-to-shin tasks. No dysmetria or dysdiadochokinesia observed.  PSYCH:   Euthymic affect. Thought content unremarkable.    SKIN:  Warm, dry, and well perfused.  Good turgor.  No lesions, nodules, or rashes are noted.  No bruising noted.      ED Course     ED Course as of 05/25/25 2059   Sun May 25, 2025   1828 In to see patient and history/physical completed.    1834 Migraine cocktail ordered.   2054 Patient has resolution of headache.  Will discharge home to do sleep therapy and hydration.  Follow-up recommendations and return precautions given.                 No results found for this or any previous visit (from the past 24 hours).    Medications   ketorolac (TORADOL) injection 15 mg (15 mg Intravenous $Given 5/25/25 1856)   prochlorperazine (COMPAZINE) injection 10 mg (10 mg Intravenous $Given 5/25/25 1851)   diphenhydrAMINE (BENADRYL) injection 50 mg (50 mg Intravenous  $Given 5/25/25 1853)   acetaminophen (TYLENOL) tablet 975 mg (975 mg Oral $Given 5/25/25 1859)   sodium chloride 0.9% BOLUS 1,000 mL (0 mLs Intravenous Stopped 5/25/25 1942)       Assessments & Plan (with Medical Decision Making)     I have reviewed the nursing notes.    I have reviewed the findings, diagnosis, plan and need for follow up with the patient.    Summary:  Patient presents to the ER today headache.  Potential diagnosis which have been considered and evaluated include intracerebral bleed, migraine, tension headache, cerebral mass, as well as others. Many of these have been excluded using the various modalities and assessment as noted on the chart. At the present time, the diagnosis is migraine.  Upon arrival, vitals signs are normal.  The patient is alert and oriented.  Neurological examination normal.  Patient has been trying medications at home without improvement of her headache.  IV established and migraine cocktail ordered.  1 L LR, prochlorperazine 10 mg, ketorolac 15 mg, and diphenhydramine 50 mg IV given.  Acetaminophen 975 p.o. given also.  Patient had resolution of symptoms.  Will discharge home to continue sleep therapy and oral hydration.  Follow-up with PCP as needed and return to ER if new worsening symptoms.  Patient verbalized understanding agrees to plan of care.  Patient discharged home.        Critical Care Time: None     Impression and plan discussed with patient. Questions answered, concerns addressed, indications for urgent re-evaluation reviewed, and  given. Patient/Parent/Caregiver agree with treatment plan and have no further questions at this time.  AVS provided at discharge.    This document was prepared using a combination of typing and voice generated software.  While every attempt was made for accuracy, spelling and grammatical errors may exist.              New Prescriptions    No medications on file       Final diagnoses:   Migraine       5/25/2025   [facility]       Floyd Jorgensen, APRN CNP  05/25/25 0607

## 2025-05-26 NOTE — DISCHARGE INSTRUCTIONS
Headaches:   Best treatment for headaches is for you to go to sleep.  Turn off ALL electronic devices and go into a dark room to rest.  Use hydration, acetaminophen/ibuprofen, and diphenhydramine to help with your symptoms as shown below.    Hydrate:   During this time it is important to keep well hydrated with water, juices, or low calorie sports drinks.  Using 1/2 strength G2, Gatorade Zero, or PowerAde Zero is best choice (mix half and half with water).  DO NOT use caffeinated or alcoholic beverages for hydration, as these actually dehydrate you.        Pain control:   If your past medical conditions, allergies, current medications, or current status does not prevent you from using acetaminophen and/or ibuprofen, use the following:   Acetaminophen 650-1000 mg every 6 hours as needed for pain in addition to ibuprofen 400-600 mg every 6 hours as needed for pain.  Take these two medications together if wanted.    Remember that these are for AS NEEDED.  If not needed, do not take.       Nausea:  You may use diphenhydramine (Benadryl) 50 mg every 6 hours as needed to help nausea.  This will also make you sleep.                Follow-up with your primary care provider for reevaluation.  Contact your primary care provider if you have any questions or concerns.  Do not hesitate to return to the ER if any new or worsening symptoms.     Please read the attached instructions (if any).  They highlight more specific treatments and interventions for you at home.              Thank you for letting me participate in your care and wish you a fast and uneventful recovery,    Floyd FITZGERALD CNP    Do not hesitate to contact me with questions or concerns.  [email or web address]

## 2025-05-28 NOTE — PROGRESS NOTES
"  Assessment & Plan     1. Palpitations  worsening  - EKG 12-lead complete w/read - (Clinic Performed)  - Glucose whole blood; Future - 95 today, normal  - ZIO PATCH - RANGE - MAIL OUT; Future    2. New persistent daily headache (Primary)  - CTA Head with Contrast; Future    3. Confusion  - CTA Head with Contrast; Future    4. Lightheadedness  - CTA Head with Contrast; Future    5. Speech disturbance, unspecified type  - CTA Head Neck with Contrast; Future      May consider referral to neurology     Follow-up   Follow-up after scans/zio patch are completed.     The longitudinal plan of care for the diagnosis(es)/condition(s) as documented were addressed during this visit. Due to the added complexity in care, I will continue to support Shazia in the subsequent management and with ongoing continuity of care.    Mary Ellen Armstrong,   Certified Adult Nurse Practitioner  [phone]      Subjective   Shazia is a 54 year old, presenting for the following health issues:  Headache    HPI     Migraine   Since your last clinic visit, how have your headaches changed?  Worsened  How often are you getting headaches or migraines? Every day   Are you able to do normal daily activities when you have a migraine? Yes but difficult  Are you taking rescue/relief medications? (Select all that apply) Excedrin, sumatriptan (Imitrex), and Other: butalbital  How helpful is your rescue/relief medication?  The relief is inconsistent  Are you taking any medications to prevent migraines? (Select all that apply)  No  In the past 4 weeks, how often have you gone to urgent care or the emergency room because of your headaches?  1    She feels she has a new daily persistent headache that her migraine medications are not effective.  She went into the ED due to new symptoms but was given a migraine cocktail and discharged.  Notes reviewed.  No labs done.      She feels \"weird\" at times - light headed, confused, a new different headache and increased " "Ventura is a 42 year old who is being evaluated via a billable video visit.      How would you like to obtain your AVS? MyChart  If the video visit is dropped, the invitation should be resent by: Text to cell phone: [phone]   Will anyone else be joining your video visit? No           Outpatient Psychiatric Progress Note    Name: Timmy Fitzpatrick   : 1979                    Primary Care Provider: [facility]   Therapist: Liana    PHQ-9 scores:  PHQ-9 SCORE 2020   PHQ-9 Total Score - - -   PHQ-9 Total Score MyChart - - -   PHQ-9 Total Score 20 15 15   Some encounter information is confidential and restricted. Go to Review Flowsheets activity to see all data.       JUAREZ-7 scores:  JUAREZ-7 SCORE 2020   Total Score - - -   Total Score - - -   Total Score 14 13 13   Some encounter information is confidential and restricted. Go to Review Flowsheets activity to see all data.       Patient Identification:    Patient is a 42 year old year old,   Choose not to Answer Choose not to answer male  who presents for return visit with me.  Patient is currently unemployed. Patient attended the session alone. Patient prefers to be called: \" Ventura\".    Interim History:    I last saw Timmy Fitzpatrick for outpatient psychiatry Return Visit on 2021.     During that appointment, he reported ongoing sleep disruption and attributes this mostly to pain symptoms that have worsened recently.  Surgery is pending to relieve pressure from a pinched nerve in his back.  Because of his sleep disturbance I added hydroxyzine 50 mg at bedtime to help him sleep in addition to the Lunesta 2 mg at bedtime as needed.  He reports feeling more refreshed and energized when he is able to sleep through the night.  To process his life stressors and physical limitations he is continuing individual talk therapy as well as couples therapy with his wife to improve " "heart palpitations.  She does have a history of PVC's but again, symptoms are worsening.  She is experiencing these symptoms at this time.  With the confusion comes difficulty speaking words, she knows what she wants to say in her head but cannot speak it.      EKG NSR and glucose normal at 95 here today.          Recent Labs   Lab Test 03/24/25  1458 05/03/24  1213 11/10/23  1134 08/09/21  1247 06/26/21  1426 05/06/21  1017 04/09/21  0937   A1C 5.4 5.4 6.1*  --   --   --   --    * 148* 169*   < >  --   --   --    HDL 66 55 48*   < >  --   --   --    TRIG 191* 199* 272*   < >  --   --   --    ALT 43 23 22   < > 30  --  28   CR 0.59 0.62 0.59   < > 0.58  --  0.55   GFRESTIMATED >90 >90 >90   < > >90  --  >90   GFRESTBLACK  --   --   --   --  >90  --  >90   POTASSIUM 4.2 3.9 4.5   < > 3.8  --  4.5   TSH 3.33 2.51 3.75   < >  --    < >  --     < > = values in this interval not displayed.      BP Readings from Last 3 Encounters:   05/29/25 96/68   05/25/25 (!) 131/94   03/24/25 128/80    Wt Readings from Last 3 Encounters:   05/29/25 65.4 kg (144 lb 1.6 oz)   03/24/25 66.7 kg (147 lb)   12/20/24 69.2 kg (152 lb 9.6 oz)               Review of Systems  Constitutional, HEENT, cardiovascular, pulmonary, GI, , musculoskeletal, neuro, skin, endocrine and psych systems are negative, except as otherwise noted.      Objective    BP 96/68   Pulse 84   Temp 97.4  F (36.3  C) (Tympanic)   Resp 16   Ht 1.651 m (5' 5\")   Wt 65.4 kg (144 lb 1.6 oz)   LMP 04/10/2018 (LMP Unknown)   SpO2 97%   BMI 23.98 kg/m    Body mass index is 23.98 kg/m .  Physical Exam   GENERAL: alert, no distress, but for just a moment she had a glazed look in her eyes and slightly confused with work recall.    EYES: glossy for a few seconds, then normal.  Pupil reaction normal  NECK: no adenopathy, no asymmetry, masses, or scars, thyroid normal to palpation, and no carotid bruits  RESP: lungs clear to auscultation - no rales, rhonchi or " communications.  Gabapentin is continued at 300 mg 6 times daily for anxiety.  Wellbutrin and Lexapro will continue at their current doses to help assist lifting his depression and negative outlook on life .     Current medications include: buPROPion (WELLBUTRIN SR) 100 MG 12 hr tablet, Take 1 tablet (100 mg) by mouth 2 times daily  cyclobenzaprine (FLEXERIL) 10 MG tablet, Take 1 tablet (10 mg) by mouth 3 times daily as needed for muscle spasms  escitalopram (LEXAPRO) 10 MG tablet, Take 1 tablet (10 mg) by mouth daily  eszopiclone (LUNESTA) 2 MG tablet, Take 1 tablet (2 mg) by mouth nightly as needed for sleep  gabapentin (NEURONTIN) 300 MG capsule, Take 1 capsule (300 mg) by mouth 6 times daily Refill 28 days after last fill  hydrOXYzine (VISTARIL) 50 MG capsule, Take 1 capsule (50 mg) by mouth At Bedtime (Patient not taking: Reported on 7/27/2021)  medical cannabis (Patient's own supply), See Admin Instructions (The purpose of this order is to document that the patient reports taking medical cannabis.  This is not a prescription, and is not used to certify that the patient has a qualifying medical condition.)  Multiple Vitamins-Minerals (MENS MULTIPLE VITAMIN/LYCOPENE PO), Take 1 tablet by mouth daily.  naproxen (NAPROSYN) 500 MG tablet, Take 1 tablet (500 mg) by mouth 2 times daily (with meals) (Patient not taking: Reported on 7/27/2021)  nicotine polacrilex (NICORETTE) 4 MG gum, Place 4 mg inside cheek as needed for smoking cessation  oxyCODONE (ROXICODONE) 5 MG tablet, Take 1 tablet (5 mg) by mouth every 6 hours as needed for pain (Patient not taking: Reported on 7/13/2021)  predniSONE (DELTASONE) 20 MG tablet, Take two tablets (= 40mg) each day for 5 (five) days (Patient not taking: Reported on 7/13/2021)  sildenafil (REVATIO) 20 MG tablet, TAKE 5 TABLETS BY MOUTH DAILY AS NEEDED, 30 MINUTES TO 4 HOURS BEFORE SEX (DO NOT USE WITH NITROGLYCERIN, TERAZOSIN, OR DOXAZOSIN)    No current facility-administered  medications on file prior to visit.       The [place] Prescription Monitoring Program has been reviewed and there are no concerns about diversionary activity for controlled substances at this time.      I was able to review most recent Primary Care Provider, specialty provider, and therapy visit notes that I have access to.     Today, patient reports that he had back surgery August and now is in little pain.  His mood has improved.  He is able to sleep through the night.  Most of the time he feels rested when he wakes.  He is still dealing with anxiety.  This is situational and related to family issues and in social situations.  It does not affect his mood.  He is done with groups but meets with Liana weekly for individual therapy\.  Suicide thoughts are less than they used to be.  He is more aware of them.  His mood swings occur less often.       has a past medical history of Anxiety, Cervicalgia, Chemical dependency (H), Depression, and Migraine headache. He also has no past medical history of Complication of anesthesia, Diabetes (H), PONV (postoperative nausea and vomiting), Sleep apnea, or Uncomplicated asthma.    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      Substance use updates:    Some cannabis use, no alcohol use  Tobacco use: Yes nicotine gum  Ready to quit?  No  Nicotine Replacement Therapy tried: Nicotine gum     Vital Signs:   There were no vitals taken for this visit.    Labs:    Most recent laboratory results reviewed and no new labs.     Review of Systems:  10 systems (general, cardiovascular, respiratory, eyes, ENT, endocrine, GI, , M/S, neurological) were reviewed. Most pertinent finding(s) is/are: Mild back pain, no chest pain, no shortness of breath, no skin rashes observed. The remaining systems are all unremarkable.    Mental Status Examination:  Appearance:  awake, alert and casually dressed  Attitude:  cooperative   Eye  wheezes  CV: regular rate and rhythm, normal S1 S2, no S3 or S4, no murmur, NEURO:  normal exam.   MS: no gross musculoskeletal defects noted, no edema  PSYCH: mentation appears normal, affect normal/bright    Results for orders placed or performed in visit on 05/29/25   Glucose whole blood     Status: Normal   Result Value Ref Range    Glucose Whole Blood 95 60 - 99 mg/dL    Narrative    Ate 3 hrs ago   EKG 12-lead complete w/read - (Clinic Performed)     Status: None (Preliminary result)   Result Value Ref Range    Systolic Blood Pressure  mmHg    Diastolic Blood Pressure  mmHg    Ventricular Rate 75 BPM    Atrial Rate 75 BPM    UT Interval 134 ms    QRS Duration 70 ms     ms    QTc 410 ms    P Axis 35 degrees    R AXIS 52 degrees    T Axis 53 degrees    Interpretation ECG       Sinus rhythm with sinus arrhythmia  Normal ECG  When compared with ECG of 22-Dec-1997 10:09,  No significant change was found               Signed Electronically by: Mary Ellen Armstrong NP     Contact:  adequate  Gait and Station: No assistive Devices used and No dizziness or falls  Psychomotor Behavior:  intact station, gait and muscle tone  Oriented to:  time, person, and place  Attention Span and Concentration:  Normal  Speech:   clear, coherent and Speaks: English  Mood:  better  Affect:  appropriate and in normal range  Associations:  no loose associations  Thought Process:  goal oriented  Thought Content:  no evidence of suicidal ideation or homicidal ideation, no auditory hallucinations present and no visual hallucinations present  Recent and Remote Memory:  intact Not formally assessed. No amnesia.  Fund of Knowledge: appropriate  Insight:  good  Judgment:  intact  Impulse Control:  intact    Suicide Risk Assessment:  Today Timmy Fitzpatrick reports that he is not having any thoughts to harm himself or others. In addition, there are notable risk factors for self-harm, including anxiety and mood change. However, risk is mitigated by commitment to family, history of seeking help when needed, future oriented, denies suicidal intent or plan and denies homicidal ideation, intent, or plan. Therefore, based on all available evidence including the factors cited above, Timmy Fitzpatrick does not appear to be at imminent risk for self-harm, does not meet criteria for a 72-hr hold, and therefore remains appropriate for ongoing outpatient level of care.  A thorough assessment of risk factors related to suicide and self-harm have been reviewed and are noted above. The patient convincingly denies suicidality on several occasions. Local community safety resources printed and reviewed for patient to use if needed. There was no deceit detected, and the patient presented in a manner that was believable.     DSM5 Diagnosis:  296.31 (F33.0) Major Depressive Disorder, Recurrent Episode, Mild _ and With mixed features  300.23 (F40.10) Social Anxiety Disorder  780.52 (G47.00) Insomnia Disorder   With ohter medical  comorbidity  Episodic      Medical comorbidities include:   Patient Active Problem List    Diagnosis Date Noted     COPD (chronic obstructive pulmonary disease) (H) 03/12/2021     Priority: Medium     Umbilical hernia without obstruction and without gangrene 03/01/2021     Priority: Medium     Added automatically from request for surgery [id number]       Generalized anxiety disorder 03/26/2019     Priority: Medium     Bipolar II disorder (H) 11/28/2018     Priority: Medium     Anxiety 09/18/2017     Priority: Medium     Tear of medial meniscus of knee 09/08/2016     Priority: Medium     Chemical dependency (H) 11/25/2015     Priority: Medium     Alcoholic, sober since 2006 09/13/2012     Priority: Medium     Major depression in partial remission (H) 06/30/2011     Priority: Medium     Migraine headache 06/30/2011     Priority: Medium     (Problem list name updated by automated process. Provider to review and confirm.)       CARDIOVASCULAR SCREENING; LDL GOAL LESS THAN 160 10/31/2010     Priority: Medium     Cervicalgia 03/24/2010     Priority: Medium     Nonallopathic lesion of thoracic region 03/24/2010     Priority: Medium     Problem list name updated by automated process. Provider to review       Nonallopathic lesion of lumbar region 03/24/2010     Priority: Medium     Problem list name updated by automated process. Provider to review         Assessment:    Timmy Fitzpatrick reported that he is feeling less depressed and anxious since back surgery in August has reduced his pain level significantly.  He is able to sleep through the night and feels rested during the day.  He continues to heal from the surgery but has been able to be more supportive around the house and caring for the children and managing responsibilities there while his wife maintains her career.  At this point he will continue with all of his medications as prescribed and we will meet again next year to determine if he is ready to return to his  primary care provider for future refills..    Medication side effects and alternatives were reviewed. Health promotion activities recommended and reviewed today. All questions addressed. Education and counseling completed regarding risks and benefits of medications and psychotherapy options.    Treatment Plan:        1.  Continue gabapentin 300 mg 6 times daily    2.  Continue Lunesta 2 mg at bedtime-take sparingly    3.  Continue Wellbutrin  mg twice daily    4.  Continue Lexapro 10 mg daily    5.  Hydroxyzine discontinued-not taking    6.  Continue talk therapy with Liana        Continue all other medications as reviewed per electronic medical record today.     Safety plan reviewed. To the Emergency Department as needed or call after hours crisis line at [phone] or [phone]. [place] Crisis Text Line. Text [place] to 208568 or Suicide LifeLine Chat: Here On Biz.org/chat/    To schedule individual or family therapy, call [facility] at [phone].    Schedule an appointment with me in January or sooner as needed. Call [facility] at [phone] to schedule.    Follow up with primary care provider as planned or for acute medical concerns.    Call the psychiatric nurse line with medication questions or concerns at [phone].    Kula Causeshart may be used to communicate with your provider, but this is not intended to be used for emergencies.    Crisis Resources:    National Suicide Prevention Lifeline: 867.293.3511 (TTY: 219.803.7887). Call anytime for help.  (www.suicidepreventionlifeline.org)  [place] on Mental Illness (www.audra.org): 220.192.1493 or 790-630-4737.   Mental Health Association (www.mentalhealth.org): 622.264.2437 or 969-043-9388.  [place] Crisis Text [place]: Text [place] to 237114  Suicide LifeLine Chat: Here On Biz.org/chat    Administrative Billing:   Time spent with patient was 30 minutes and greater than 50% of time or 20  minutes was spent in counseling and coordination of care regarding above diagnoses and treatment plan.    Patient Status:  Patient will continue to be seen for ongoing consultation and stabilization.    Signed:   EMILIA Wagner-BC   Psychiatry

## 2025-05-29 ENCOUNTER — RESULTS FOLLOW-UP (OUTPATIENT)
Dept: FAMILY MEDICINE | Facility: OTHER | Age: 55
End: 2025-05-29

## 2025-05-29 ENCOUNTER — OFFICE VISIT (OUTPATIENT)
Dept: FAMILY MEDICINE | Facility: OTHER | Age: 55
End: 2025-05-29
Attending: NURSE PRACTITIONER
Payer: COMMERCIAL

## 2025-05-29 VITALS
RESPIRATION RATE: 16 BRPM | TEMPERATURE: 97.4 F | HEIGHT: 65 IN | OXYGEN SATURATION: 97 % | DIASTOLIC BLOOD PRESSURE: 68 MMHG | HEART RATE: 84 BPM | BODY MASS INDEX: 24.01 KG/M2 | SYSTOLIC BLOOD PRESSURE: 96 MMHG | WEIGHT: 144.1 LBS

## 2025-05-29 DIAGNOSIS — R42 LIGHTHEADEDNESS: ICD-10-CM

## 2025-05-29 DIAGNOSIS — I47.10 SVT (SUPRAVENTRICULAR TACHYCARDIA): ICD-10-CM

## 2025-05-29 DIAGNOSIS — G44.52 NEW PERSISTENT DAILY HEADACHE: Primary | ICD-10-CM

## 2025-05-29 DIAGNOSIS — R00.2 PALPITATIONS: ICD-10-CM

## 2025-05-29 DIAGNOSIS — R47.9 SPEECH DISTURBANCE, UNSPECIFIED TYPE: ICD-10-CM

## 2025-05-29 DIAGNOSIS — R00.2 PALPITATIONS: Primary | ICD-10-CM

## 2025-05-29 DIAGNOSIS — R41.0 CONFUSION: ICD-10-CM

## 2025-05-29 LAB
ATRIAL RATE - MUSE: 75 BPM
DIASTOLIC BLOOD PRESSURE - MUSE: NORMAL MMHG
GLUCOSE BLD-MCNC: 95 MG/DL (ref 60–99)
INTERPRETATION ECG - MUSE: NORMAL
P AXIS - MUSE: 35 DEGREES
PR INTERVAL - MUSE: 134 MS
QRS DURATION - MUSE: 70 MS
QT - MUSE: 368 MS
QTC - MUSE: 410 MS
R AXIS - MUSE: 52 DEGREES
SYSTOLIC BLOOD PRESSURE - MUSE: NORMAL MMHG
T AXIS - MUSE: 53 DEGREES
VENTRICULAR RATE- MUSE: 75 BPM

## 2025-05-29 ASSESSMENT — PAIN SCALES - GENERAL: PAINLEVEL_OUTOF10: MODERATE PAIN (5)

## 2025-05-29 NOTE — PATIENT INSTRUCTIONS
Assessment & Plan     1. Palpitations  worsening  - EKG 12-lead complete w/read - (Clinic Performed)  - Glucose whole blood; Future  - Glucose whole blood  - ZIO PATCH - RANGE - MAIL OUT; Future    2. New persistent daily headache (Primary)  - CTA Head with Contrast; Future    3. Confusion  - CTA Head with Contrast; Future    4. Lightheadedness  - CTA Head with Contrast; Future    May consider referral to neurology     Follow-up   Follow-up after scans/zio patch are completed.       To ED with acute concerns    Mary Ellen Armstrong,   Certified Adult Nurse Practitioner  [phone]

## 2025-05-31 LAB
ATRIAL RATE - MUSE: 75 BPM
DIASTOLIC BLOOD PRESSURE - MUSE: NORMAL MMHG
INTERPRETATION ECG - MUSE: NORMAL
P AXIS - MUSE: 35 DEGREES
PR INTERVAL - MUSE: 134 MS
QRS DURATION - MUSE: 70 MS
QT - MUSE: 368 MS
QTC - MUSE: 410 MS
R AXIS - MUSE: 52 DEGREES
SYSTOLIC BLOOD PRESSURE - MUSE: NORMAL MMHG
T AXIS - MUSE: 53 DEGREES
VENTRICULAR RATE- MUSE: 75 BPM

## 2025-06-13 NOTE — TELEPHONE ENCOUNTER
1:13 PM    Reason for Call: Phone Call    Description: Judith from [facility]'s Preadmission called and stated they need the preop from 01/15/18 ASAP. This can faxed to [phone]. If you have any questions you can call her back at [phone]    Was an appointment offered for this call? No  If yes : Appointment type              Date    Preferred method for responding to this message: Telephone Call  What is your phone number ?    If we cannot reach you directly, may we leave a detailed response at the number you provided? Yes    Can this message wait until your PCP/provider returns, if available today? No, PCP out and needs ASAP    Candi Wheeler    
Faxed PreOp 1/15/18, labs and ECG to:  Dr. Rincon, [facility]  Ph,. [phone]  
Pnote is not signed... As soon as it is signed I can sent... Thanks!  
Home

## 2025-06-14 DIAGNOSIS — G25.81 RESTLESS LEG SYNDROME: ICD-10-CM

## 2025-06-16 RX ORDER — GABAPENTIN 100 MG/1
100 CAPSULE ORAL AT BEDTIME
Qty: 90 CAPSULE | Refills: 1 | Status: SHIPPED | OUTPATIENT
Start: 2025-06-16

## 2025-06-16 NOTE — TELEPHONE ENCOUNTER
GABAPENTIN 100 MG CAPSULE       Routing refill request to provider for review/approval because:  Drug not on the [facility] or [facility] refill protocol or controlled substance

## 2025-06-16 NOTE — TELEPHONE ENCOUNTER
gabapentin (NEURONTIN) 100 MG capsule       Last Written Prescription Date:  4/21/2025  Last Fill Quantity: 30,   # refills: 1  Last Office Visit: 5/29/2025

## 2025-07-17 ENCOUNTER — MYC MEDICAL ADVICE (OUTPATIENT)
Dept: ONCOLOGY | Facility: OTHER | Age: 55
End: 2025-07-17

## 2025-07-17 ENCOUNTER — TELEPHONE (OUTPATIENT)
Dept: ONCOLOGY | Facility: OTHER | Age: 55
End: 2025-07-17

## 2025-07-17 NOTE — TELEPHONE ENCOUNTER
Please call.  I have a MyChart message from her requesting lab work.  I have no problem ordering it but I would need to see her in clinic since I have not seen her for over a year.  My understanding is she was going to  continue to have her PCP follow-up her iron labs.  That is the last documentation I have in the chart .I do believe that some of those symptoms could be caused from low iron.

## 2025-07-17 NOTE — TELEPHONE ENCOUNTER
I spoke with patient she will reach out to her Primary Manuel for lab orders. Patient was grateful for the call

## 2025-07-21 ENCOUNTER — LAB (OUTPATIENT)
Dept: LAB | Facility: OTHER | Age: 55
End: 2025-07-21
Payer: COMMERCIAL

## 2025-07-21 DIAGNOSIS — D50.9 IRON DEFICIENCY ANEMIA, UNSPECIFIED IRON DEFICIENCY ANEMIA TYPE: ICD-10-CM

## 2025-07-21 LAB
BASOPHILS # BLD AUTO: 0 10E3/UL (ref 0–0.2)
BASOPHILS NFR BLD AUTO: 0 %
EOSINOPHIL # BLD AUTO: 0.1 10E3/UL (ref 0–0.7)
EOSINOPHIL NFR BLD AUTO: 2 %
ERYTHROCYTE [DISTWIDTH] IN BLOOD BY AUTOMATED COUNT: 13.1 % (ref 10–15)
FERRITIN SERPL-MCNC: 25 NG/ML (ref 11–328)
HCT VFR BLD AUTO: 39 % (ref 35–47)
HGB BLD-MCNC: 12.7 G/DL (ref 11.7–15.7)
IMM GRANULOCYTES # BLD: 0 10E3/UL
IMM GRANULOCYTES NFR BLD: 0 %
IRON BINDING CAPACITY (ROCHE): 369 UG/DL (ref 240–430)
IRON SATN MFR SERPL: 32 % (ref 15–46)
IRON SERPL-MCNC: 119 UG/DL (ref 37–145)
LYMPHOCYTES # BLD AUTO: 2 10E3/UL (ref 0.8–5.3)
LYMPHOCYTES NFR BLD AUTO: 45 %
MCH RBC QN AUTO: 28.7 PG (ref 26.5–33)
MCHC RBC AUTO-ENTMCNC: 32.6 G/DL (ref 31.5–36.5)
MCV RBC AUTO: 88 FL (ref 78–100)
MONOCYTES # BLD AUTO: 0.3 10E3/UL (ref 0–1.3)
MONOCYTES NFR BLD AUTO: 7 %
NEUTROPHILS # BLD AUTO: 2.1 10E3/UL (ref 1.6–8.3)
NEUTROPHILS NFR BLD AUTO: 46 %
PLATELET # BLD AUTO: 277 10E3/UL (ref 150–450)
RBC # BLD AUTO: 4.42 10E6/UL (ref 3.8–5.2)
WBC # BLD AUTO: 4.5 10E3/UL (ref 4–11)

## 2025-07-21 PROCEDURE — 83550 IRON BINDING TEST: CPT

## 2025-07-21 PROCEDURE — 82728 ASSAY OF FERRITIN: CPT

## 2025-07-21 PROCEDURE — 85025 COMPLETE CBC W/AUTO DIFF WBC: CPT

## 2025-07-21 PROCEDURE — 83540 ASSAY OF IRON: CPT

## 2025-07-21 PROCEDURE — 36415 COLL VENOUS BLD VENIPUNCTURE: CPT

## 2025-07-28 DIAGNOSIS — G25.81 RESTLESS LEG SYNDROME: ICD-10-CM

## 2025-07-28 RX ORDER — PRAMIPEXOLE DIHYDROCHLORIDE 0.12 MG/1
TABLET ORAL
Qty: 180 TABLET | Refills: 1 | Status: SHIPPED | OUTPATIENT
Start: 2025-07-28

## 2025-08-04 ENCOUNTER — OFFICE VISIT (OUTPATIENT)
Dept: FAMILY MEDICINE | Facility: OTHER | Age: 55
End: 2025-08-04
Attending: NURSE PRACTITIONER
Payer: COMMERCIAL

## 2025-08-04 VITALS
DIASTOLIC BLOOD PRESSURE: 66 MMHG | TEMPERATURE: 98 F | OXYGEN SATURATION: 99 % | BODY MASS INDEX: 23.25 KG/M2 | HEART RATE: 65 BPM | WEIGHT: 139.7 LBS | SYSTOLIC BLOOD PRESSURE: 118 MMHG

## 2025-08-04 DIAGNOSIS — G44.84 PRIMARY EXERTIONAL HEADACHE: ICD-10-CM

## 2025-08-04 DIAGNOSIS — R68.89 DECREASED EXERCISE TOLERANCE: Primary | ICD-10-CM

## 2025-08-04 DIAGNOSIS — M21.372 FOOT DROP, LEFT: ICD-10-CM

## 2025-08-04 DIAGNOSIS — R26.81 UNSTEADY GAIT WHEN WALKING: ICD-10-CM

## 2025-08-04 RX ORDER — GALCANEZUMAB 120 MG/ML
120 INJECTION, SOLUTION SUBCUTANEOUS
COMMUNITY
Start: 2025-07-15

## 2025-08-04 RX ORDER — UBROGEPANT 50 MG/1
50 TABLET ORAL
COMMUNITY
Start: 2025-07-15

## 2025-08-04 ASSESSMENT — PAIN SCALES - GENERAL: PAINLEVEL_OUTOF10: NO PAIN (0)

## 2025-08-22 ENCOUNTER — HOSPITAL ENCOUNTER (OUTPATIENT)
Dept: MRI IMAGING | Facility: HOSPITAL | Age: 55
Discharge: HOME | End: 2025-08-22
Attending: NURSE PRACTITIONER | Admitting: RADIOLOGY
Payer: COMMERCIAL

## 2025-08-22 ENCOUNTER — RESULTS FOLLOW-UP (OUTPATIENT)
Dept: FAMILY MEDICINE | Facility: OTHER | Age: 55
End: 2025-08-22

## 2025-08-22 DIAGNOSIS — R26.81 UNSTEADY GAIT WHEN WALKING: ICD-10-CM

## 2025-08-22 DIAGNOSIS — M16.12 PRIMARY OSTEOARTHRITIS OF LEFT HIP: ICD-10-CM

## 2025-08-22 DIAGNOSIS — M21.372 FOOT DROP, LEFT: ICD-10-CM

## 2025-08-22 DIAGNOSIS — G44.84 PRIMARY EXERTIONAL HEADACHE: ICD-10-CM

## 2025-08-22 DIAGNOSIS — M70.62 TROCHANTERIC BURSITIS OF LEFT HIP: Primary | ICD-10-CM

## 2025-08-22 PROCEDURE — 72148 MRI LUMBAR SPINE W/O DYE: CPT | Mod: 26 | Performed by: RADIOLOGY

## 2025-08-22 PROCEDURE — 72148 MRI LUMBAR SPINE W/O DYE: CPT

## 2025-08-27 ENCOUNTER — TELEPHONE (OUTPATIENT)
Dept: INTERVENTIONAL RADIOLOGY/VASCULAR | Facility: HOSPITAL | Age: 55
End: 2025-08-27

## 2025-08-27 RX ORDER — RIZATRIPTAN BENZOATE 10 MG/1
TABLET, ORALLY DISINTEGRATING ORAL
COMMUNITY
Start: 2025-08-21

## 2025-08-27 RX ORDER — MELOXICAM 7.5 MG/1
7.5 TABLET ORAL DAILY
Qty: 30 TABLET | Refills: 1 | Status: SHIPPED | OUTPATIENT
Start: 2025-08-27

## 2025-08-29 ENCOUNTER — HOSPITAL ENCOUNTER (OUTPATIENT)
Dept: NUCLEAR MEDICINE | Facility: HOSPITAL | Age: 55
Setting detail: NUCLEAR MEDICINE
Discharge: HOME | End: 2025-08-29
Attending: NURSE PRACTITIONER
Payer: COMMERCIAL

## 2025-08-29 ENCOUNTER — HOSPITAL ENCOUNTER (OUTPATIENT)
Dept: CARDIOLOGY | Facility: HOSPITAL | Age: 55
Setting detail: NUCLEAR MEDICINE
Discharge: HOME | End: 2025-08-29
Attending: NURSE PRACTITIONER
Payer: COMMERCIAL

## 2025-08-29 VITALS — SYSTOLIC BLOOD PRESSURE: 135 MMHG | DIASTOLIC BLOOD PRESSURE: 89 MMHG | RESPIRATION RATE: 16 BRPM | HEART RATE: 72 BPM

## 2025-08-29 DIAGNOSIS — R68.89 DECREASED EXERCISE TOLERANCE: ICD-10-CM

## 2025-08-29 LAB
CV BLOOD PRESSURE: 76 MMHG
CV STRESS CURRENT BP HE: NORMAL
CV STRESS CURRENT HR HE: 104
CV STRESS CURRENT HR HE: 110
CV STRESS CURRENT HR HE: 111
CV STRESS CURRENT HR HE: 114
CV STRESS CURRENT HR HE: 116
CV STRESS CURRENT HR HE: 117
CV STRESS CURRENT HR HE: 120
CV STRESS CURRENT HR HE: 124
CV STRESS CURRENT HR HE: 124
CV STRESS CURRENT HR HE: 58
CV STRESS CURRENT HR HE: 67
CV STRESS DEVIATION TIME HE: NORMAL
CV STRESS ECHO PERCENT HR HE: NORMAL
CV STRESS EXERCISE STAGE HE: NORMAL
CV STRESS FINAL RESTING BP HE: NORMAL
CV STRESS FINAL RESTING HR HE: 104
CV STRESS MAX HR HE: 124
CV STRESS MAX TREADMILL GRADE HE: 0
CV STRESS MAX TREADMILL SPEED HE: 0
CV STRESS PEAK DIA BP HE: NORMAL
CV STRESS PEAK SYS BP HE: NORMAL
CV STRESS PHASE HE: NORMAL
CV STRESS PROTOCOL HE: NORMAL
CV STRESS RESTING PT POSITION HE: NORMAL
CV STRESS ST DEVIATION AMOUNT HE: NORMAL
CV STRESS ST DEVIATION ELEVATION HE: NORMAL
CV STRESS ST EVELATION AMOUNT HE: NORMAL
CV STRESS TEST TYPE HE: NORMAL
CV STRESS TOTAL STAGE TIME MIN 1 HE: NORMAL
NUC STRESS EJECTION FRACTION: 83 %
RATE PRESSURE PRODUCT: NORMAL
STRESS ECHO BASELINE DIASTOLIC HE: 78
STRESS ECHO BASELINE HR: 57
STRESS ECHO BASELINE SYSTOLIC BP: 110
STRESS ECHO CALCULATED PERCENT HR: 75 %
STRESS ECHO LAST STRESS DIASTOLIC BP: 70
STRESS ECHO LAST STRESS HR: 110
STRESS ECHO LAST STRESS SYSTOLIC BP: 124
STRESS ECHO TARGET HR: 165

## 2025-08-29 PROCEDURE — 343N000001 HC RX 343 MED OP 636: Performed by: RADIOLOGY

## 2025-08-29 PROCEDURE — 93016 CV STRESS TEST SUPVJ ONLY: CPT | Performed by: INTERNAL MEDICINE

## 2025-08-29 PROCEDURE — 93018 CV STRESS TEST I&R ONLY: CPT | Performed by: INTERNAL MEDICINE

## 2025-08-29 PROCEDURE — 93017 CV STRESS TEST TRACING ONLY: CPT

## 2025-08-29 PROCEDURE — 78452 HT MUSCLE IMAGE SPECT MULT: CPT

## 2025-08-29 PROCEDURE — A9500 TC99M SESTAMIBI: HCPCS | Performed by: STUDENT IN AN ORGANIZED HEALTH CARE EDUCATION/TRAINING PROGRAM

## 2025-08-29 PROCEDURE — 78452 HT MUSCLE IMAGE SPECT MULT: CPT | Mod: 26 | Performed by: RADIOLOGY

## 2025-08-29 PROCEDURE — A9500 TC99M SESTAMIBI: HCPCS | Performed by: RADIOLOGY

## 2025-08-29 PROCEDURE — 250N000011 HC RX IP 250 OP 636: Performed by: INTERNAL MEDICINE

## 2025-08-29 PROCEDURE — 343N000001 HC RX 343 MED OP 636: Performed by: STUDENT IN AN ORGANIZED HEALTH CARE EDUCATION/TRAINING PROGRAM

## 2025-08-29 RX ORDER — AMINOPHYLLINE 25 MG/ML
INJECTION, SOLUTION INTRAVENOUS
Status: DISCONTINUED
Start: 2025-08-29 | End: 2025-08-29 | Stop reason: WASHOUT

## 2025-08-29 RX ORDER — REGADENOSON 0.08 MG/ML
0.4 INJECTION, SOLUTION INTRAVENOUS ONCE
Status: COMPLETED | OUTPATIENT
Start: 2025-08-29 | End: 2025-08-29

## 2025-08-29 RX ADMIN — TECHNETIUM TC 99M SESTAMIBI 32.6 MILLICURIE: 1 INJECTION INTRAVENOUS at 08:26

## 2025-08-29 RX ADMIN — TECHNETIUM TC 99M SESTAMIBI 10.9 MILLICURIE: 1 INJECTION INTRAVENOUS at 07:01

## 2025-08-29 RX ADMIN — REGADENOSON 0.4 MG: 0.08 INJECTION, SOLUTION INTRAVENOUS at 08:26

## 2025-09-01 DIAGNOSIS — E66.09 CLASS 1 OBESITY DUE TO EXCESS CALORIES WITHOUT SERIOUS COMORBIDITY WITH BODY MASS INDEX (BMI) OF 34.0 TO 34.9 IN ADULT: ICD-10-CM

## 2025-09-01 DIAGNOSIS — E66.811 CLASS 1 OBESITY DUE TO EXCESS CALORIES WITHOUT SERIOUS COMORBIDITY WITH BODY MASS INDEX (BMI) OF 34.0 TO 34.9 IN ADULT: ICD-10-CM

## 2025-09-03 RX ORDER — TIRZEPATIDE 5 MG/.5ML
INJECTION, SOLUTION SUBCUTANEOUS
Qty: 4 ML | Refills: 1 | Status: SHIPPED | OUTPATIENT
Start: 2025-09-03

## (undated) DEVICE — FORCEP-COLON BIOPSY LARGE W/NEEDLE 240CM

## (undated) DEVICE — SANITARY NAPKINS-SINGLE

## (undated) DEVICE — SU MONOCRYL 4-0 PS-2 18" UND Y496G

## (undated) DEVICE — GLV 7.5 BIOGEL LATEX 30475-01

## (undated) DEVICE — CONNECTOR-ERBEFLO 2 PORT

## (undated) DEVICE — CANISTER-SUCTION 2000CC

## (undated) DEVICE — TRAY-SKIN PREP POVIDONE/IODINE

## (undated) DEVICE — LINA LOOP 5MM MONO 160MM X 80MM

## (undated) DEVICE — PREP CHLORAPREP 26ML TINTED HI-LITE ORANGE 930815

## (undated) DEVICE — LIGASURE-5MM BLUNT TIP LAPAROSCOPIC

## (undated) DEVICE — NDL-SPINAL 18G X 5.5"

## (undated) DEVICE — SPATULA TIP FOR SUCTION IRRIGATION PROBE

## (undated) DEVICE — TROCAR-5X100MM BLADED W/FIXATION

## (undated) DEVICE — IRRIGATION-NACL 1000ML

## (undated) DEVICE — SOL WATER IRRIG 1000ML BOTTLE 2F7114

## (undated) DEVICE — PACK-LAP LAVH-CUSTOM

## (undated) DEVICE — LABEL-STERILE PREPRINTED FOR OR

## (undated) DEVICE — TROCAR-11X100MM BLADED W/FIXATION

## (undated) DEVICE — COVER LT HANDLE 2/PK 5160-2FG

## (undated) DEVICE — SCD SLEEVE-THIGH REG.

## (undated) DEVICE — SUTURE-VICRYL 0 UR-6 J603H

## (undated) DEVICE — Device

## (undated) DEVICE — TUBING-INSUFFLATION/LAPAROFLATOR W/FILTER

## (undated) DEVICE — PACK LAPAROTOMY CUSTOM SBA32LPMBG

## (undated) DEVICE — SYRINGE-10CC LUER LOCK

## (undated) DEVICE — PACK BASIN SET UP SUTCNBSBBA

## (undated) DEVICE — SUTURE-VICRYL 2-0 CT-1 J345H

## (undated) DEVICE — SU VICRYL 3-0 SH 27" UND J416H

## (undated) DEVICE — APPLICATOR-CHLORAPREP 26ML TINTED CHG 2%+ 70% IPA-SURGICAL

## (undated) DEVICE — DRAPE-THREE QUARTER (LARGE) SHEET

## (undated) DEVICE — SU DERMABOND ADVANCED .7ML DNX12

## (undated) DEVICE — TUBING-SUCTION 20FT

## (undated) DEVICE — BLADE-SURG CLIPPER

## (undated) DEVICE — BIN-UROLOGY / CYSTO

## (undated) DEVICE — DRAPE MAYO STAND 23X54 8337

## (undated) DEVICE — SU SILK 2-0 SH 30" K833H

## (undated) DEVICE — IRRIGATION-H2O 1000ML

## (undated) DEVICE — CATH TRAY-16FR METER W/STATLOCK LATEX]

## (undated) DEVICE — DRAPE SHEET REV FOLD 3/4 9349

## (undated) DEVICE — SENSOR-OXISENSOR II ADULT

## (undated) DEVICE — SCISSOR-ENDOPATH 5MM CURVED

## (undated) DEVICE — SUTURE-MONOCRYL 3-0 PS-1 Y936H

## (undated) DEVICE — CAUTERY PAD-POLYHESIVE II ADULT

## (undated) DEVICE — CLEARIFY VISUALIZATION SYSTEM

## (undated) DEVICE — SUTURE-VICRYL 3-0 SH J416H

## (undated) DEVICE — TOPICAL SKIN ADHESIVE EXOFIN

## (undated) DEVICE — UTERINE MANIPULATOR-KRONNER MANIPUJECTOR

## (undated) DEVICE — BLANKET-BAIR UPPER BODY

## (undated) DEVICE — SUCTION-IRRIGATION STRYKEFLOW II (STRYKER)

## (undated) DEVICE — ESU GROUND PAD ADULT W/CORD E7507

## (undated) DEVICE — BLADE-SCALPEL #10

## (undated) DEVICE — PUNCTURE CLOSURE DEVICE

## (undated) DEVICE — DRSG STERI STRIP 1/2X4" R1547

## (undated) DEVICE — NDL-INSUFFLATION 120MM

## (undated) DEVICE — LABEL STERILE PREPRINTED FOR OR FRRH01-2M

## (undated) DEVICE — CAUTERY TIP CLEANER

## (undated) DEVICE — LIGHT HANDLE COVER

## (undated) DEVICE — GLV-7.0 PROTEXIS PI CLASSIC LF/PF

## (undated) DEVICE — GLV-8.5 BIOGEL LATEX

## (undated) DEVICE — POUCH-INSTRUMENT 2 COMP. 7 X 11IN

## (undated) RX ORDER — PROPOFOL 10 MG/ML
INJECTION, EMULSION INTRAVENOUS
Status: DISPENSED
Start: 2022-02-11

## (undated) RX ORDER — LIDOCAINE HYDROCHLORIDE 20 MG/ML
INJECTION, SOLUTION EPIDURAL; INFILTRATION; INTRACAUDAL; PERINEURAL
Status: DISPENSED
Start: 2018-05-02

## (undated) RX ORDER — FENTANYL CITRATE 50 UG/ML
INJECTION, SOLUTION INTRAMUSCULAR; INTRAVENOUS
Status: DISPENSED
Start: 2018-05-02

## (undated) RX ORDER — ONDANSETRON 2 MG/ML
INJECTION INTRAMUSCULAR; INTRAVENOUS
Status: DISPENSED
Start: 2018-05-02

## (undated) RX ORDER — ROCURONIUM BROMIDE 50 MG/5 ML
SYRINGE (ML) INTRAVENOUS
Status: DISPENSED
Start: 2018-05-02

## (undated) RX ORDER — PROPOFOL 10 MG/ML
INJECTION, EMULSION INTRAVENOUS
Status: DISPENSED
Start: 2018-05-02

## (undated) RX ORDER — NEOSTIGMINE METHYLSULFATE 1 MG/ML
VIAL (ML) INJECTION
Status: DISPENSED
Start: 2018-05-02

## (undated) RX ORDER — KETOROLAC TROMETHAMINE 30 MG/ML
INJECTION, SOLUTION INTRAMUSCULAR; INTRAVENOUS
Status: DISPENSED
Start: 2018-05-02

## (undated) RX ORDER — EPHEDRINE SULFATE 50 MG/ML
INJECTION, SOLUTION INTRAMUSCULAR; INTRAVENOUS; SUBCUTANEOUS
Status: DISPENSED
Start: 2018-05-02

## (undated) RX ORDER — DEXAMETHASONE SODIUM PHOSPHATE 10 MG/ML
INJECTION, SOLUTION INTRAMUSCULAR; INTRAVENOUS
Status: DISPENSED
Start: 2018-05-02

## (undated) RX ORDER — GLYCOPYRROLATE 0.2 MG/ML
INJECTION, SOLUTION INTRAMUSCULAR; INTRAVENOUS
Status: DISPENSED
Start: 2018-05-02

## (undated) RX ORDER — FENTANYL CITRATE 50 UG/ML
INJECTION, SOLUTION INTRAMUSCULAR; INTRAVENOUS
Status: DISPENSED
Start: 2022-02-11